# Patient Record
Sex: FEMALE | URBAN - METROPOLITAN AREA
[De-identification: names, ages, dates, MRNs, and addresses within clinical notes are randomized per-mention and may not be internally consistent; named-entity substitution may affect disease eponyms.]

---

## 2017-01-31 DIAGNOSIS — H93.90: Primary | ICD-10-CM

## 2017-01-31 NOTE — PROGRESS NOTES
Dr. 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

## 2017-05-17 ENCOUNTER — APPOINTMENT (OUTPATIENT)
Age: 67
Setting detail: DERMATOLOGY
End: 2017-05-22

## 2017-05-17 DIAGNOSIS — L81.7 PIGMENTED PURPURIC DERMATOSIS: ICD-10-CM

## 2017-05-17 PROCEDURE — OTHER PRESCRIPTION: OTHER

## 2017-05-17 RX ORDER — TRIAMCINOLONE ACETONIDE 1 MG/G
CREAM TOPICAL BID
Qty: 80 | Refills: 0 | Status: ERX | COMMUNITY
Start: 2017-05-17

## 2017-05-17 ASSESSMENT — LOCATION SIMPLE DESCRIPTION DERM
LOCATION SIMPLE: RIGHT PRETIBIAL REGION
LOCATION SIMPLE: LEFT PRETIBIAL REGION

## 2017-05-17 ASSESSMENT — LOCATION DETAILED DESCRIPTION DERM
LOCATION DETAILED: LEFT DISTAL PRETIBIAL REGION
LOCATION DETAILED: RIGHT DISTAL PRETIBIAL REGION

## 2017-05-17 ASSESSMENT — LOCATION ZONE DERM: LOCATION ZONE: LEG

## 2017-10-03 ENCOUNTER — TRANSFERRED RECORDS (OUTPATIENT)
Dept: HEALTH INFORMATION MANAGEMENT | Facility: CLINIC | Age: 67
End: 2017-10-03

## 2017-12-12 ENCOUNTER — HOSPITAL ENCOUNTER (OUTPATIENT)
Dept: MAMMOGRAPHY | Facility: CLINIC | Age: 67
Discharge: HOME | End: 2017-12-12
Attending: INTERNAL MEDICINE | Admitting: INTERNAL MEDICINE
Payer: MEDICARE

## 2017-12-12 DIAGNOSIS — Z12.31 ENCOUNTER FOR SCREENING MAMMOGRAM FOR HIGH-RISK PATIENT: ICD-10-CM

## 2017-12-12 PROCEDURE — G0202 SCR MAMMO BI INCL CAD: HCPCS | Mod: 52

## 2018-03-15 ENCOUNTER — OFFICE VISIT (OUTPATIENT)
Dept: VASCULAR SURGERY | Facility: CLINIC | Age: 68
End: 2018-03-15
Payer: COMMERCIAL

## 2018-03-15 ENCOUNTER — APPOINTMENT (OUTPATIENT)
Dept: VASCULAR SURGERY | Facility: CLINIC | Age: 68
End: 2018-03-15
Payer: COMMERCIAL

## 2018-03-15 DIAGNOSIS — Z53.9 ERRONEOUS ENCOUNTER--DISREGARD: Primary | ICD-10-CM

## 2018-03-15 PROCEDURE — 99213 OFFICE O/P EST LOW 20 MIN: CPT | Performed by: SURGERY

## 2018-03-15 PROCEDURE — 93971 EXTREMITY STUDY: CPT | Performed by: SURGERY

## 2018-03-15 NOTE — PROGRESS NOTES
[facility] Progress Note    Emely Forbes is a 67-year-old female who returns with recurrent left leg pain and varicose veins.  I last saw her in June 2016 following radiofrequency ablation of her left small saphenous vein and Vein of Giacomini and 1 session of ultrasound-guided sclerotherapy residual left lower extremity varicose veins.  She is done well until about 4-6 months ago when she noticed development of new pain on the left leg and bulging varicose veins.  She describes pain as an, tiredness and heaviness as well as a burning pain.  She has not had recurrent dermatitis like she had prior to her treatment.    She has been wearing compression hose over the last 2 years but has not found adequate relief of her symptoms with them.    Her past medical history is significant for breast cancer treated in November 2010 with no evidence of disease at this time.  She also had spontaneous hemorrhage from 1 of her kidneys in 2013 which required, I believe, embolization.    Medicines: Evista, probiotic, Allegra, calcium plus vitamin D and magnesium oxide    Allergies: Adhesive tape and latex, contrast dye and Norvasc    Social history: She works as a , is a non-smoker and has about 1 drink of alcohol weekly.    12 point review of systems was updated and reviewed.  It is negative other than the previously mentioned problems.    Physical exam  General: Pleasant, fit appearing female in no acute distress.  Respiratory: Normal respiratory effort  HEENT: Normocephalic, atraumatic.  EOMI.  Extremities and nose are normal.  Cardiovascular: Pulse is regular  Psychiatric: Judgment, insight, mood and affect are normal  Musculoskeletal: Gait and station are normal.  Is no deformity of  the joints of her fingers or toes.  There is no cyanosis of her nailbeds.  Neurologic: Grossly normal  Extremities: She has 4-6 mm varicosities about the left medial calf.  There is also a 3-4 mm varicosity coursing in the distal anterior  left thigh, across the left patellar area onto the anterolateral left leg.  There are no venous stasis changes.  I do not appreciate significant edema.  I do not appreciate any significant varicose veins of her right leg.    She has easily palpable dorsalis pedis and posterior tibial pulses bilaterally.    Duplex ultrasound of her left lower extremity veins reveals no evidence of deep vein thrombosis.  Her common femoral vein is incompetent while the remaining deep vein valves are competent.  Her left great saphenous vein is incompetent from the knee to the ankle and is a source of several incompetent vein branches on the medial calf.  The saphenofemoral junction is also incompetent.  Segments of the small saphenous vein are not visualized but where visualized the vein is competent the Vein of Giacomini is not visualized.    Impression  Recurrent left lower extremity leg pain and varicose veins.  It appears that the varicosities and symptoms of her left calf are originating from her left great saphenous vein incompetence.  We discussed options of continued conservative management with use of compression hose, elevation, exercise and dietary measures.  Risks of superficial phlebitis, deep vein thrombosis, swelling, worsening of the varicose veins and dermatitis were also discussed.    If she chose treatment, she would need a nonthermal technique as her great saphenous vein is only 1.9 mm deep to the skin in the proximal calf.  She will likely need ultrasound sclerotherapy of the residual varicose veins on her calf.    Details of cyanoacrylate glue ablation of the great saphenous vein including risks of hypersensitivity reaction, bleeding, infection and a low likelihood of nerve injury were discussed.  She appears to understand.    We will perform a skin test with Cyanoacrylate glue to ensure that she does not have a hypersensitivity response.    KAYCEE Chapman MD

## 2018-04-06 ENCOUNTER — APPOINTMENT (OUTPATIENT)
Age: 68
Setting detail: DERMATOLOGY
End: 2018-04-08

## 2018-04-06 DIAGNOSIS — L82.0 INFLAMED SEBORRHEIC KERATOSIS: ICD-10-CM

## 2018-04-06 DIAGNOSIS — D485 NEOPLASM OF UNCERTAIN BEHAVIOR OF SKIN: ICD-10-CM

## 2018-04-06 DIAGNOSIS — L81.7 PIGMENTED PURPURIC DERMATOSIS: ICD-10-CM

## 2018-04-06 PROBLEM — D48.5 NEOPLASM OF UNCERTAIN BEHAVIOR OF SKIN: Status: ACTIVE | Noted: 2018-04-06

## 2018-04-06 PROCEDURE — OTHER PRESCRIPTION: OTHER

## 2018-04-06 PROCEDURE — 11100: CPT | Mod: 59

## 2018-04-06 PROCEDURE — OTHER COUNSELING: OTHER

## 2018-04-06 PROCEDURE — 17110 DESTRUCT B9 LESION 1-14: CPT

## 2018-04-06 PROCEDURE — 99213 OFFICE O/P EST LOW 20 MIN: CPT | Mod: 25

## 2018-04-06 PROCEDURE — OTHER LIQUID NITROGEN: OTHER

## 2018-04-06 PROCEDURE — OTHER BIOPSY BY SHAVE METHOD: OTHER

## 2018-04-06 PROCEDURE — OTHER MIPS QUALITY: OTHER

## 2018-04-06 RX ORDER — TRIAMCINOLONE ACETONIDE 1 MG/G
CREAM TOPICAL BID
Qty: 80 | Refills: 2 | Status: ERX

## 2018-04-06 ASSESSMENT — LOCATION SIMPLE DESCRIPTION DERM
LOCATION SIMPLE: RIGHT BACK
LOCATION SIMPLE: RIGHT UPPER BACK
LOCATION SIMPLE: RIGHT SHOULDER
LOCATION SIMPLE: CHEST
LOCATION SIMPLE: LEFT PRETIBIAL REGION
LOCATION SIMPLE: RIGHT PRETIBIAL REGION

## 2018-04-06 ASSESSMENT — LOCATION ZONE DERM
LOCATION ZONE: LEG
LOCATION ZONE: ARM
LOCATION ZONE: TRUNK

## 2018-04-06 ASSESSMENT — LOCATION DETAILED DESCRIPTION DERM
LOCATION DETAILED: RIGHT SUPERIOR UPPER BACK
LOCATION DETAILED: RIGHT DISTAL PRETIBIAL REGION
LOCATION DETAILED: UPPER STERNUM
LOCATION DETAILED: RIGHT SUPERIOR LATERAL UPPER BACK
LOCATION DETAILED: LEFT DISTAL PRETIBIAL REGION
LOCATION DETAILED: RIGHT POSTERIOR SHOULDER

## 2018-04-06 NOTE — PROCEDURE: LIQUID NITROGEN
Consent: The patient's verbal consent was obtained including but not limited to risks of crusting, scabbing, blistering, scarring, darker or lighter pigmentary change, recurrence, incomplete removal and infection.
Duration Of Freeze Thaw-Cycle (Seconds): 15
Detail Level: Simple
Number Of Freeze-Thaw Cycles: 1 freeze-thaw cycle
Post-Care Instructions: I reviewed with the patient in detail post-care instructions. Patient is to wear sunprotection, and avoid picking at any of the treated lesions. Pt may apply Vaseline to crusted or scabbing areas.
Render Post Care In The Note?: yes
Total Number Of Lesions Treated: 9
Medical Necessity Information: It is in your best interest to select a reason for this procedure from the list below. All of these items fulfill various CMS LCD requirements except the new and changing color options.
Include Z78.9 (Other Specified Conditions Influencing Health Status) As An Associated Diagnosis?: No
Medical Necessity Clause: This procedure was medically necessary because the lesions that were treated were:

## 2018-04-06 NOTE — PROCEDURE: BIOPSY BY SHAVE METHOD
Biopsy Type: H and E
Post-Care Instructions: I reviewed with the patient in detail post-care instructions. Patient is to keep the biopsy site cover and dry overnight, and then apply H2O2,  vaseline  and a bandage daily until healed.
Anesthesia Type: 1% lidocaine with epinephrine
Curettage Text: The wound bed was treated with curettage after the biopsy was performed.
X Size Of Lesion In Cm: 0.6
Electrodesiccation And Curettage Text: The wound bed was treated with electrodesiccation and curettage after the biopsy was performed.
Type Of Destruction Used: Electrodesiccation
Wound Care: Vaseline
Destruction After The Procedure: No
Body Location Override (Optional - Billing Will Still Be Based On Selected Body Map Location If Applicable): upper sternal chest, right of midline
Size Of Lesion In Cm: 0.9
Additional Anesthesia Volume In Cc (Will Not Render If 0): 0
Electrodesiccation Text: The wound bed was treated with electrodesiccation after the biopsy was performed.
Billing Type: Third-Party Bill
Biopsy Method: double edge Personna blade
Hemostasis: Drysol
Silver Nitrate Text: The wound bed was treated with silver nitrate after the biopsy was performed.
Render Post-Care Instructions In Note?: yes
Dressing: Band-Aid
Anesthesia Volume In Cc (Will Not Render If 0): 0.2
Detail Level: Detailed
Cryotherapy Text: The wound bed was treated with cryotherapy after the biopsy was performed.
Consent: Written consent was obtained and risks were reviewed including but not limited to scarring, infection, bleeding, scabbing, incomplete removal, nerve damage and allergy to anesthesia.
Notification Instructions: Patient will be notified of biopsy results. However, patient instructed to call the office if not contacted within 2 weeks.

## 2018-04-06 NOTE — PROCEDURE: MIPS QUALITY
Detail Level: Detailed
Quality 226: Preventive Care And Screening: Tobacco Use: Screening And Cessation Intervention: Patient screened for tobacco and never smoked
Quality 131: Pain Assessment And Follow-Up: Pain assessment using a standardized tool is documented as negative, no follow-up plan required
Quality 431: Preventive Care And Screening: Unhealthy Alcohol Use - Screening: Patient screened for unhealthy alcohol use using a single question and scores less than 2 times per year
Quality 265: Biopsy Follow-Up: Biopsy results reviewed, communicated, tracked, and documented
Quality 130: Documentation Of Current Medications In The Medical Record: Current Medications Documented
Quality 110: Preventive Care And Screening: Influenza Immunization: Influenza Immunization previously received during influenza season

## 2018-04-06 NOTE — HPI: SKIN LESION
How Severe Is Your Skin Lesion?: mild
Has Your Skin Lesion Been Treated?: not been treated
Is This A New Presentation, Or A Follow-Up?: Skin Lesions
Additional History: She is requesting removal as these lesions as they rub on her bra strap and she finds herself picking at them.
Is This A New Presentation, Or A Follow-Up?: Skin Lesion
Additional History: The patient thinks that this lesion is at the edge of a previously treated AK.

## 2018-04-09 ENCOUNTER — APPOINTMENT (OUTPATIENT)
Dept: VASCULAR SURGERY | Facility: CLINIC | Age: 68
End: 2018-04-09
Payer: COMMERCIAL

## 2018-04-09 ENCOUNTER — OFFICE VISIT (OUTPATIENT)
Dept: VASCULAR SURGERY | Facility: CLINIC | Age: 68
End: 2018-04-09
Payer: COMMERCIAL

## 2018-04-09 DIAGNOSIS — Z53.9 ERRONEOUS ENCOUNTER--DISREGARD: Primary | ICD-10-CM

## 2018-04-09 PROCEDURE — 36482 ENDOVEN THER CHEM ADHES 1ST: CPT | Mod: LT | Performed by: SURGERY

## 2018-04-09 PROCEDURE — 36471 NJX SCLRSNT MLT INCMPTNT VN: CPT | Performed by: SURGERY

## 2018-04-09 PROCEDURE — 76942 ECHO GUIDE FOR BIOPSY: CPT | Performed by: SURGERY

## 2018-04-09 NOTE — PROGRESS NOTES
[facility] Operative Report    Preoperative diagnosis  1.  CEAP2 chronic venous insufficiency left lower extremity  2.  Symptomatic left great saphenous vein incompetence  3.  Symptomatic left great saphenous vein tributaries    Postoperative diagnosis  The same    Procedure  1.  The VenaSeal procedure left great saphenous vein  2.  Medically necessary ultrasound-guided sclerotherapy left great saphenous vein tributaries    Surgeon  KAYCEE Chapman MD    First assistant  Betzy Dumont CST/CSFA    Anesthesia  Ativan 2 mg, clonidine 0.1 mg orally with subcutaneous tumescent    Operative description  Details the procedure including risks of bleeding, infection, nerve injury, scarring, hyperpigmentation, deep vein thrombosis, hypersensitivity response to the glue were all discussed.  Both the patient and her  appeared to understand and wish to proceed.  Informed consent was obtained.    I initialed the patient's left leg, then we transported her to the operating room, placing her supine on the operating table, then prepped and draped the left groin and entire left lower extremity sterilely.  We took a timeout to confirm the appropriate operative site and procedure: Cyanoacrylate glue ablation (VenaSeal procedure) sees left great saphenous vein with ultrasound-guided sclerotherapy left lower extremity varicose veins.    We interrogated left leg with ultrasound was able to clearly identify the left great saphenous vein noting tributary coursing from it just below the knee.  The vein was incompetent primarily below the knee, therefore I accessed the vein near the left ankle after infiltrating the skin overlying the vein with 1% lidocaine with bicarbonate.  We placed a 7 Congolese sheath and then passed the delivery catheter which had previously been primed through the sheath into the great saphenous vein up to the level of the knee.  I attempted to pass the delivery catheter more proximally in the great saphenous  "vein but could not negotiate a small turn at the area of a tributary in the great saphenous vein.  The operating table was placed in a neutral position and the catheter tip position confirmed just below the popliteal crease.  I then delivered 1 aliquoted glue, withdrew the catheter 1 cm, delivered a second aliquot of glue then withdrew the catheter 3 cm and held pressure for 30 minutes.  We then delivered 1 aliquoted glue to each 3 cm of the vein down to 5 cm from the skin exit site.  After delivering the last aliquoted glue, I held compression for 30 seconds to allow the glue to polymerize before removing the delivery catheter without incident.    I reimage the vein and found it to be closed.    I then isolated tributaries of the left great saphenous vein on the posterior medial left calf and directed a 27-gauge needle, instilling 1 mL of 0.5% polidocanol foam mixed in a 1 part polidocanol to 2 parts air configuration, into the vein under ultrasound guidance.This glad you are working acute blood check the greenlight normal    She is just her left leg everything and everything in her groin looked normal phasicity and everything she has her\" her common femoral looked fine nothing to suggest anything higher up okay interesting thank was that only in the dictated a letter and review of I was careful to inject in the more proximal calf as the vein communicated with a  to the gastrocnemius vein in the more distal calf.    I then down to headlight and injected several reticular veins on the medial and lateral left leg as well as a small varicosity on the left patellar area.  We used a total of 4 mL of 0.5% polidocanol foam.    We cleaned the leg with saline solution and then placed a small gauze and Tegaderm dressing over the vein entrance site.  A knee-high compression garment was then placed.    Remove the patient to recovery and observe her for 30 minutes to ensure excellent hemostasis.  She tolerated " procedure well without complications.    We used a total of 1 mL of cyanoacrylate glue to treat 27.5 cm of the left great saphenous vein.  We used 4 mL of 1% lidocaine with bicarbonate.    We had the patient walk for 10 minutes prior to leaving her office.  We then escorted her to her car.    KAYCEE Chapman MD

## 2018-04-12 ENCOUNTER — APPOINTMENT (OUTPATIENT)
Dept: VASCULAR SURGERY | Facility: CLINIC | Age: 68
End: 2018-04-12
Payer: COMMERCIAL

## 2018-04-12 PROCEDURE — 99207 ZZC VEINSOLUTIONS 48 HOUR: CPT | Performed by: SURGERY

## 2018-04-12 PROCEDURE — 93971 EXTREMITY STUDY: CPT | Performed by: SURGERY

## 2018-04-19 ENCOUNTER — APPOINTMENT (OUTPATIENT)
Age: 68
Setting detail: DERMATOLOGY
End: 2018-04-30

## 2018-04-19 DIAGNOSIS — L82.0 INFLAMED SEBORRHEIC KERATOSIS: ICD-10-CM

## 2018-04-19 PROCEDURE — OTHER LIQUID NITROGEN: OTHER

## 2018-04-19 PROCEDURE — 17110 DESTRUCT B9 LESION 1-14: CPT

## 2018-04-19 PROCEDURE — OTHER MIPS QUALITY: OTHER

## 2018-04-19 PROCEDURE — OTHER COUNSELING: OTHER

## 2018-04-19 ASSESSMENT — LOCATION DETAILED DESCRIPTION DERM
LOCATION DETAILED: RIGHT POSTERIOR SHOULDER
LOCATION DETAILED: RIGHT SUPERIOR UPPER BACK
LOCATION DETAILED: RIGHT SUPERIOR LATERAL UPPER BACK

## 2018-04-19 ASSESSMENT — LOCATION ZONE DERM
LOCATION ZONE: ARM
LOCATION ZONE: TRUNK

## 2018-04-19 ASSESSMENT — LOCATION SIMPLE DESCRIPTION DERM
LOCATION SIMPLE: RIGHT SHOULDER
LOCATION SIMPLE: RIGHT BACK
LOCATION SIMPLE: RIGHT UPPER BACK

## 2018-04-19 NOTE — PROCEDURE: LIQUID NITROGEN
Consent: The patient's verbal consent was obtained including but not limited to risks of crusting, scabbing, blistering, scarring, darker or lighter pigmentary change, recurrence, incomplete removal and infection.
Detail Level: Simple
Medical Necessity Clause: This procedure was medically necessary because the lesions that were treated were:
Render Post Care In The Note?: yes
Add 52 Modifier (Optional): no
Number Of Freeze-Thaw Cycles: 1 freeze-thaw cycle
Post-Care Instructions: I reviewed with the patient in detail post-care instructions. Patient is to wear sunprotection, and avoid picking at any of the treated lesions. Pt may apply Vaseline to crusted or scabbing areas.
Medical Necessity Information: It is in your best interest to select a reason for this procedure from the list below. All of these items fulfill various CMS LCD requirements except the new and changing color options.
Duration Of Freeze Thaw-Cycle (Seconds): 15
Total Number Of Lesions Treated: 7

## 2018-05-07 ENCOUNTER — OFFICE VISIT (OUTPATIENT)
Dept: VASCULAR SURGERY | Facility: CLINIC | Age: 68
End: 2018-05-07
Payer: COMMERCIAL

## 2018-05-07 DIAGNOSIS — Z53.9 ERRONEOUS ENCOUNTER--DISREGARD: Primary | ICD-10-CM

## 2018-05-07 PROCEDURE — 99207 ZZC VEINSOLUTIONS POST OPERATIVE VISIT: CPT | Performed by: SURGERY

## 2018-05-07 NOTE — MR AVS SNAPSHOT
"              After Visit Summary   2018    Emely Forbes    MRN: [id number]           Patient Information     Date Of Birth          1950        Visit Information        Provider Department      2018 9:30 AM Ricardo Chapman MD [facility]      Today's Diagnoses     ERRONEOUS ENCOUNTER--DISREGARD    -  1       Follow-ups after your visit        Who to contact     If you have questions or need follow up information about today's clinic visit or your schedule please contact [facility] directly at [phone].  Normal or non-critical lab and imaging results will be communicated to you by [employer], letter or phone within 4 business days after the clinic has received the results. If you do not hear from us within 7 days, please contact the clinic through [employer] or phone. If you have a critical or abnormal lab result, we will notify you by phone as soon as possible.  Submit refill requests through [employer] or call your pharmacy and they will forward the refill request to us. Please allow 3 business days for your refill to be completed.          Additional Information About Your Visit        MyChart Information     [employer] lets you send messages to your doctor, view your test results, renew your prescriptions, schedule appointments and more. To sign up, go to [email or web address] . Click on \"Log in\" on the left side of the screen, which will take you to the Welcome page. Then click on \"Sign up Now\" on the right side of the page.     You will be asked to enter the access code listed below, as well as some personal information. Please follow the directions to create your username and password.     Your access code is: [email or web address]  Expires: 10/28/2018 12:46 PM     Your access code will  in 90 days. If you need help or a new code, please call your [place] clinic or [phone].        Care EveryWhere ID     This " is your Care EveryWhere ID. This could be used by other organizations to access your [facility] medical records  [id number]         Blood Pressure from Last 3 Encounters:   01/27/16 121/86   12/02/13 137/89   09/14/12 131/83    Weight from Last 3 Encounters:   01/27/16 67.1 kg (148 lb)   12/02/13 66.2 kg (146 lb)   09/14/12 64.3 kg (141 lb 12.8 oz)              Today, you had the following     No orders found for display         Today's Medication Changes          These changes are accurate as of 5/7/18 11:59 PM.  If you have any questions, ask your nurse or doctor.               These medicines have changed or have updated prescriptions.        Dose/Directions    fexofenadine 180 MG tablet   Commonly known as:  ALLEGRA   This may have changed:    - when to take this  - reasons to take this   Used for:  Seasonal allergic rhinitis        Dose:  180 mg   Take 1 tablet by mouth daily.   Quantity:  30 tablet   Refills:  6                Primary Care Provider Office Phone # Fax #    Carlie Marina PA-C [phone] [address] [facility] [address]  [place] [address]        Equal Access to Services     IVETH ESPINOZA : Hadii alisia morenoo Soalexander, waaxda luqadaha, qaybta kaalmada cally, katerin braden. So [facility] [phone].    ATENCIÓN: Si habla español, tiene a tillman disposición servicios gratuitos de asistencia lingüística. Llame al [phone].    We comply with applicable federal civil rights laws and [place] laws. We do not discriminate on the basis of race, color, national origin, age, disability, sex, sexual orientation, or gender identity.            Thank you!     Thank you for choosing [facility]  for your care. Our goal is always to provide you with excellent care. Hearing back from our patients is one way we can continue to improve our services. Please take a few minutes to complete the written survey that you may receive in the mail after your  visit with us. Thank you!             Your Updated Medication List - Protect others around you: Learn how to safely use, store and throw away your medicines at www.disposemymeds.org.          This list is accurate as of 5/7/18 11:59 PM.  Always use your most recent med list.                   Brand Name Dispense Instructions for use Diagnosis    CALCIUM + D PO      Take  by mouth.        fexofenadine 180 MG tablet    ALLEGRA    30 tablet    Take 1 tablet by mouth daily.    Seasonal allergic rhinitis       MAG-200 PO      Take  by mouth.        PROBIOTIC DAILY PO      Take 1 tablet by mouth daily as needed    Dysuria       raloxifene 60 MG tablet    Evista    30 tablet    Take 1 tablet (60 mg) by mouth daily

## 2018-05-08 NOTE — PROGRESS NOTES
"[facility] Progress Note    Emely Forbes returns in 6 week follow-up of cyanoacrylate glue ablation of the infragenicular left great saphenous vein.  She states that she developed significant erythema overlying the course of the great saphenous vein on the medial leg which he described as a \"rash\".  She was given a topical agent to put on this which she said worked fairly well for her.  She said this is similar to the rash that she has had previously.  She is also raise concerns about what appear to be some new purple telangiectasias along the medial aspect of the left leg.    On exam there is no longer any erythema.  There is minimal induration along the course of the treated saphenous vein to deep palpation.  There is an area of firmness at the mid anteromedial left leg in the area of sclerotherapy of a tributary.    I interrogated the left medial leg with ultrasound and note inflammation and thrombus within the tributary on the anteromedial mid left leg.  Stab this with an 18-gauge needle and expressed some thrombus from it.    I also noted a closed great saphenous vein was a halo of edema around the glue within the vein.    She does have purple telangiectasias along the proximal medial left calf which, after inspecting her preoperative photos, do not appear to be present preoperatively.  There also is still trace to 1+ edema of her left ankle.    Impression  Residual swelling and some inflammation now 6 weeks status post cyanoacrylate glue ablation of the left great saphenous vein.  The postinflammatory to be treated with sclerotherapy if they do not resolve.  I would prefer that she allow time for healing and I will see her in the fall (5-6 months from now) to see if these telangiectasias have resolved.  If they have not, I will treat them with sclerotherapy at no charge to her.  I have asked her to contact me sooner if she has concerns or questions.    KAYCEE Chapman MD  "

## 2018-10-17 ENCOUNTER — TRANSFERRED RECORDS (OUTPATIENT)
Dept: HEALTH INFORMATION MANAGEMENT | Facility: CLINIC | Age: 68
End: 2018-10-17

## 2018-12-06 ENCOUNTER — OFFICE VISIT (OUTPATIENT)
Dept: VASCULAR SURGERY | Facility: CLINIC | Age: 68
End: 2018-12-06
Payer: COMMERCIAL

## 2018-12-06 DIAGNOSIS — Z53.9 ERRONEOUS ENCOUNTER--DISREGARD: Primary | ICD-10-CM

## 2018-12-06 PROCEDURE — 99212 OFFICE O/P EST SF 10 MIN: CPT | Performed by: SURGERY

## 2018-12-06 NOTE — PROGRESS NOTES
"[facility] Office Note    Emely Forbes returns in follow-up of Cyanoacrylate glue ablation of her left great saphenous vein with ultrasound-guided sclerotherapy of the great saphenous vein tributaries.  He states her leg feels great!  She had no trouble with rashes or pain.  Her concerns at this time are two: 1.  A small \"lump\" on the medial aspect of her left leg and 2.  A residual varicose vein in her left infrapatellar area.    Physical exam  The lump on the medial aspect of her left calf measures about 2-3 mm by 5 or 6 mm.  This may represent some very firm thrombus or it could be a small shard of glue.  Ultrasound imaging shows that the treated saphenous vein appears to be deep to this area, therefore this may well simply be hard thrombus within this vein which there was treated by ultrasound-guided sclerotherapy.  I would recommend observation    There is a small varicosity in the left infrapatellar area.  This could be treated with sclerotherapy at a time of her choosing.  She will return sometime in January to have this treated.    KAYCEE Chapman MD    "

## 2018-12-10 ENCOUNTER — HOSPITAL ENCOUNTER (OUTPATIENT)
Dept: MAMMOGRAPHY | Facility: CLINIC | Age: 68
End: 2018-12-10
Attending: INTERNAL MEDICINE
Payer: MEDICARE

## 2018-12-10 ENCOUNTER — HOSPITAL ENCOUNTER (OUTPATIENT)
Dept: BONE DENSITY | Facility: CLINIC | Age: 68
Discharge: HOME | End: 2018-12-10
Attending: INTERNAL MEDICINE | Admitting: INTERNAL MEDICINE
Payer: MEDICARE

## 2018-12-10 DIAGNOSIS — Z79.899 ENCOUNTER FOR LONG-TERM (CURRENT) USE OF HIGH-RISK MEDICATION: ICD-10-CM

## 2018-12-10 DIAGNOSIS — Z78.0 ASYMPTOMATIC MENOPAUSAL STATE: ICD-10-CM

## 2018-12-10 DIAGNOSIS — M85.88 OTHER SPECIFIED DISORDERS OF BONE DENSITY AND STRUCTURE, OTHER SITE: ICD-10-CM

## 2018-12-10 DIAGNOSIS — Z12.31 VISIT FOR SCREENING MAMMOGRAM: ICD-10-CM

## 2018-12-10 PROCEDURE — 77067 SCR MAMMO BI INCL CAD: CPT | Mod: 52

## 2018-12-10 PROCEDURE — 77080 DXA BONE DENSITY AXIAL: CPT

## 2019-01-17 ENCOUNTER — APPOINTMENT (OUTPATIENT)
Dept: VASCULAR SURGERY | Facility: CLINIC | Age: 69
End: 2019-01-17
Payer: MEDICARE

## 2019-01-17 ENCOUNTER — APPOINTMENT (OUTPATIENT)
Dept: VASCULAR SURGERY | Facility: CLINIC | Age: 69
End: 2019-01-17

## 2019-01-17 PROCEDURE — 36468 NJX SCLRSNT SPIDER VEINS: CPT | Performed by: SURGERY

## 2019-01-17 PROCEDURE — S9999 SALES TAX: HCPCS | Performed by: SURGERY

## 2019-02-13 ENCOUNTER — APPOINTMENT (OUTPATIENT)
Age: 69
Setting detail: DERMATOLOGY
End: 2019-03-11

## 2019-02-13 DIAGNOSIS — L81.4 OTHER MELANIN HYPERPIGMENTATION: ICD-10-CM

## 2019-02-13 DIAGNOSIS — D485 NEOPLASM OF UNCERTAIN BEHAVIOR OF SKIN: ICD-10-CM

## 2019-02-13 DIAGNOSIS — L82.0 INFLAMED SEBORRHEIC KERATOSIS: ICD-10-CM

## 2019-02-13 DIAGNOSIS — D22 MELANOCYTIC NEVI: ICD-10-CM

## 2019-02-13 DIAGNOSIS — L82.1 OTHER SEBORRHEIC KERATOSIS: ICD-10-CM

## 2019-02-13 PROBLEM — D22.39 MELANOCYTIC NEVI OF OTHER PARTS OF FACE: Status: ACTIVE | Noted: 2019-02-13

## 2019-02-13 PROBLEM — D23.22 OTHER BENIGN NEOPLASM OF SKIN OF LEFT EAR AND EXTERNAL AURICULAR CANAL: Status: ACTIVE | Noted: 2019-02-13

## 2019-02-13 PROBLEM — D48.5 NEOPLASM OF UNCERTAIN BEHAVIOR OF SKIN: Status: ACTIVE | Noted: 2019-02-13

## 2019-02-13 PROCEDURE — OTHER DIAGNOSIS COMMENT: OTHER

## 2019-02-13 PROCEDURE — 17110 DESTRUCT B9 LESION 1-14: CPT

## 2019-02-13 PROCEDURE — 99213 OFFICE O/P EST LOW 20 MIN: CPT | Mod: 25

## 2019-02-13 PROCEDURE — OTHER COUNSELING: OTHER

## 2019-02-13 PROCEDURE — OTHER MIPS QUALITY: OTHER

## 2019-02-13 PROCEDURE — OTHER BIOPSY BY SHAVE METHOD: OTHER

## 2019-02-13 PROCEDURE — 11102 TANGNTL BX SKIN SINGLE LES: CPT | Mod: 59

## 2019-02-13 PROCEDURE — OTHER LIQUID NITROGEN: OTHER

## 2019-02-13 PROCEDURE — OTHER OBSERVATION: OTHER

## 2019-02-13 ASSESSMENT — LOCATION ZONE DERM
LOCATION ZONE: TRUNK
LOCATION ZONE: FACE

## 2019-02-13 ASSESSMENT — LOCATION DETAILED DESCRIPTION DERM
LOCATION DETAILED: RIGHT SUPERIOR CENTRAL BUCCAL CHEEK
LOCATION DETAILED: UPPER STERNUM
LOCATION DETAILED: LEFT FOREHEAD
LOCATION DETAILED: STERNAL NOTCH
LOCATION DETAILED: SUPERIOR MID FOREHEAD
LOCATION DETAILED: RIGHT CENTRAL TEMPLE

## 2019-02-13 ASSESSMENT — LOCATION SIMPLE DESCRIPTION DERM
LOCATION SIMPLE: CHEST
LOCATION SIMPLE: RIGHT TEMPLE
LOCATION SIMPLE: SUPERIOR FOREHEAD
LOCATION SIMPLE: LEFT FOREHEAD
LOCATION SIMPLE: RIGHT CHEEK

## 2019-02-13 NOTE — PROCEDURE: DIAGNOSIS COMMENT
Detail Level: Simple
Comment: Procedure performed by Dr. Hollie Palmer,  DNP APRN NP-C
Comment: Overall lesion size 2.5 x 1.5 cm
Comment: Procedure performed by Dr. Hollie Palmer, DNP APRN NP-C

## 2019-02-13 NOTE — PROCEDURE: MIPS QUALITY
Detail Level: Detailed
Quality 226: Preventive Care And Screening: Tobacco Use: Screening And Cessation Intervention: Patient screened for tobacco and never smoked
Quality 265: Biopsy Follow-Up: Biopsy results reviewed, communicated, tracked, and documented
Quality 110: Preventive Care And Screening: Influenza Immunization: Influenza Immunization previously received during influenza season
Quality 130: Documentation Of Current Medications In The Medical Record: Current Medications Documented
Quality 131: Pain Assessment And Follow-Up: Pain assessment using a standardized tool is documented as negative, no follow-up plan required
Quality 431: Preventive Care And Screening: Unhealthy Alcohol Use - Screening: Patient screened for unhealthy alcohol use using a single question and scores less than 2 times per year

## 2019-02-13 NOTE — PROCEDURE: OBSERVATION
Body Location Override (Optional - Billing Will Still Be Based On Selected Body Map Location If Applicable): L superior helix
Detail Level: Detailed
X Size Of Lesion In Cm (Optional): 0

## 2019-02-13 NOTE — PROCEDURE: BIOPSY BY SHAVE METHOD
Curettage Text: The wound bed was treated with curettage after the biopsy was performed.
Body Location Override (Optional - Billing Will Still Be Based On Selected Body Map Location If Applicable): L temple at hairline
Biopsy Method: double edge Personna blade
Hemostasis: Aluminum Chloride
Depth Of Biopsy: dermis
X Size Of Lesion In Cm: 0.5
Anesthesia Type: 1% lidocaine with epinephrine and a 1:10 solution of 8.4% sodium bicarbonate
Was A Bandage Applied: Yes
Billing Type: Third-Party Bill
Detail Level: Detailed
Consent: Verbal consent was obtained and risks were reviewed including but not limited to scarring, infection, bleeding, scabbing, incomplete removal, nerve damage and allergy to anesthesia.
Type Of Destruction Used: Electrodesiccation
Silver Nitrate Text: The wound bed was treated with silver nitrate after the biopsy was performed.
Biopsy Type: H and E
Path Notes Override (Will Replace All Of The Above Text): Small portion of larger lesion measuring 1x1cm
Additional Anesthesia Volume In Cc (Will Not Render If 0): 0
Destruction After The Procedure: No
Cryotherapy Text: The wound bed was treated with cryotherapy after the biopsy was performed.
Post-Care Instructions: I verbally reviewed with the patient in detail post-care instructions. Patient is to keep the biopsy site dry overnight, and then apply H2O2, Vaseline and a bandage daily until healed.
Electrodesiccation And Curettage Text: The wound bed was treated with electrodesiccation and curettage after the biopsy was performed.
Notification Instructions: Patient will be notified of biopsy results. However, patient instructed to call the office if not contacted within 2 weeks.
Anesthesia Volume In Cc (Will Not Render If 0): 2
Dressing: pressure dressing with telfa
Electrodesiccation Text: The wound bed was treated with electrodesiccation after the biopsy was performed.
Size Of Lesion In Cm: 0.6
Wound Care: Petrolatum

## 2019-02-15 ENCOUNTER — APPOINTMENT (OUTPATIENT)
Dept: VASCULAR SURGERY | Facility: CLINIC | Age: 69
End: 2019-02-15

## 2019-02-15 PROCEDURE — 99207 ZZC VEINSOLUTIONS NO CHARGE VISIT: CPT | Performed by: SURGERY

## 2019-03-11 ENCOUNTER — APPOINTMENT (OUTPATIENT)
Age: 69
Setting detail: DERMATOLOGY
End: 2019-03-16

## 2019-03-11 PROBLEM — C44.91 BASAL CELL CARCINOMA OF SKIN, UNSPECIFIED: Status: ACTIVE | Noted: 2019-03-11

## 2019-03-11 PROCEDURE — OTHER MOHS SURGERY PHONE CONSULTATION: OTHER

## 2019-03-12 ENCOUNTER — APPOINTMENT (OUTPATIENT)
Age: 69
Setting detail: DERMATOLOGY
End: 2019-03-13

## 2019-03-12 DIAGNOSIS — L82.1 OTHER SEBORRHEIC KERATOSIS: ICD-10-CM

## 2019-03-12 DIAGNOSIS — D18.0 HEMANGIOMA: ICD-10-CM

## 2019-03-12 DIAGNOSIS — Z71.89 OTHER SPECIFIED COUNSELING: ICD-10-CM

## 2019-03-12 PROBLEM — C44.319 BASAL CELL CARCINOMA OF SKIN OF OTHER PARTS OF FACE: Status: ACTIVE | Noted: 2019-03-12

## 2019-03-12 PROBLEM — D23.71 OTHER BENIGN NEOPLASM OF SKIN OF RIGHT LOWER LIMB, INCLUDING HIP: Status: ACTIVE | Noted: 2019-03-12

## 2019-03-12 PROBLEM — D18.01 HEMANGIOMA OF SKIN AND SUBCUTANEOUS TISSUE: Status: ACTIVE | Noted: 2019-03-12

## 2019-03-12 PROCEDURE — OTHER PULSED-DYE LASER: OTHER

## 2019-03-12 PROCEDURE — OTHER MIPS QUALITY: OTHER

## 2019-03-12 PROCEDURE — OTHER LIQUID NITROGEN (COSMETIC): OTHER

## 2019-03-12 PROCEDURE — OTHER MOHS SURGERY: OTHER

## 2019-03-12 PROCEDURE — 17311 MOHS 1 STAGE H/N/HF/G: CPT

## 2019-03-12 PROCEDURE — 99213 OFFICE O/P EST LOW 20 MIN: CPT | Mod: 25

## 2019-03-12 PROCEDURE — 14040 TIS TRNFR F/C/C/M/N/A/G/H/F: CPT

## 2019-03-12 PROCEDURE — OTHER COUNSELING: OTHER

## 2019-03-12 ASSESSMENT — LOCATION SIMPLE DESCRIPTION DERM
LOCATION SIMPLE: LEFT FOREHEAD
LOCATION SIMPLE: SUPERIOR FOREHEAD
LOCATION SIMPLE: RIGHT FOREHEAD
LOCATION SIMPLE: CHEST

## 2019-03-12 ASSESSMENT — LOCATION DETAILED DESCRIPTION DERM
LOCATION DETAILED: RIGHT MEDIAL FOREHEAD
LOCATION DETAILED: LEFT LATERAL SUPERIOR CHEST
LOCATION DETAILED: LEFT MEDIAL FOREHEAD
LOCATION DETAILED: SUPERIOR MID FOREHEAD
LOCATION DETAILED: RIGHT SUPERIOR FOREHEAD

## 2019-03-12 ASSESSMENT — LOCATION ZONE DERM
LOCATION ZONE: FACE
LOCATION ZONE: TRUNK

## 2019-03-12 NOTE — PROCEDURE: MIPS QUALITY
Detail Level: Detailed
Quality 110: Preventive Care And Screening: Influenza Immunization: Influenza Immunization previously received during influenza season
Quality 431: Preventive Care And Screening: Unhealthy Alcohol Use - Screening: Patient screened for unhealthy alcohol use using a single question and scores less than 2 times per year
Quality 226: Preventive Care And Screening: Tobacco Use: Screening And Cessation Intervention: Patient screened for tobacco and never smoked
Quality 143: Oncology: Medical And Radiation- Pain Intensity Quantified: Pain severity quantified, no pain present
Quality 130: Documentation Of Current Medications In The Medical Record: Current Medications Documented

## 2019-03-12 NOTE — PROCEDURE: MOHS SURGERY
Post-Care Instructions: The patient was provided with detailed verbal and written instructions for daily wound care, including use of H2O2 cleansing, followed by application of plain Vaseline and a bandage.  These instructions including Dr. Palmer's contact information should there be any questions or concerns.  The patient is not to engage in any heavy lifting, exercise, or swimming for the next week.  Should the patient develop any fevers, chills, bleeding, or pain not controlled by OTC analgesics, s/he should contact the office immediately.

## 2019-03-12 NOTE — PROCEDURE: PULSED-DYE LASER
Pulse Duration: 3 ms
Spot Size: 10 mm
Post-Care Instructions: I reviewed with the patient in detail post-care instructions. Patient should stay away from the sun and wear sun protection until treated areas are fully healed.
Spot Size: 7 mm
Treated Area: small area
Pulse Duration: 10 ms
Delay Time (Ms): 20
Laser Type: Pulsed-dye laser 595nm
Immediate Endpoint: erythema
Price (Use Numbers Only, No Special Characters Or $): 0
Fluence In J/Cm2 (Optional): 7
Location (Required For Details To Render In Note But Body Touch Will Also Count For First Location): forehead
Consent: Written consent obtained, risks reviewed including but not limited to crusting, scabbing, blistering, scarring, darker or lighter pigmentary change, incidental hair removal, bruising, and/or incomplete removal.
Post-Procedure Care: Post care reviewed with patient.
Pulse Duration: 6 ms
Cryogen Time (Ms): 30
Detail Level: Detailed
Fluence In J/Cm2 (Optional): 8.0
Spot Size: 5 mm
Delay Time (Ms): 10
Pulse Count (Optional): 2

## 2019-03-12 NOTE — PROCEDURE: LIQUID NITROGEN (COSMETIC)
Total Number Of Lesions Treated: 2
Duration Of Freeze Thaw-Cycle (Seconds): 5
Number Of Freeze-Thaw Cycles: 1 freeze-thaw cycle
Consent: The patient's consent was obtained including but not limited to risks of crusting, scabbing, blistering, scarring, darker or lighter pigmentary change, recurrence, incomplete removal and infection.
Post-Care Instructions: I reviewed with the patient in detail post-care instructions. Patient is to wear sunprotection, and avoid picking at any of the treated lesions. Pt may apply Vaseline to crusted or scabbing areas.
Render Post Care In The Note?: yes
Detail Level: Detailed

## 2019-03-19 ENCOUNTER — APPOINTMENT (OUTPATIENT)
Age: 69
Setting detail: DERMATOLOGY
End: 2019-03-20

## 2019-03-19 DIAGNOSIS — Z48.02 ENCOUNTER FOR REMOVAL OF SUTURES: ICD-10-CM

## 2019-03-19 DIAGNOSIS — L82.0 INFLAMED SEBORRHEIC KERATOSIS: ICD-10-CM

## 2019-03-19 PROCEDURE — OTHER MIPS QUALITY: OTHER

## 2019-03-19 PROCEDURE — OTHER LIQUID NITROGEN: OTHER

## 2019-03-19 PROCEDURE — 17110 DESTRUCT B9 LESION 1-14: CPT | Mod: 79

## 2019-03-19 PROCEDURE — OTHER COUNSELING: OTHER

## 2019-03-19 PROCEDURE — OTHER SUTURE REMOVAL (GLOBAL PERIOD): OTHER

## 2019-03-19 PROCEDURE — OTHER DIAGNOSIS COMMENT: OTHER

## 2019-03-19 ASSESSMENT — LOCATION SIMPLE DESCRIPTION DERM
LOCATION SIMPLE: LEFT FOREHEAD
LOCATION SIMPLE: SCALP
LOCATION SIMPLE: FRONTAL SCALP

## 2019-03-19 ASSESSMENT — LOCATION ZONE DERM
LOCATION ZONE: SCALP
LOCATION ZONE: FACE

## 2019-03-19 ASSESSMENT — LOCATION DETAILED DESCRIPTION DERM
LOCATION DETAILED: LEFT SUPERIOR PARIETAL SCALP
LOCATION DETAILED: MEDIAL FRONTAL SCALP
LOCATION DETAILED: LEFT LATERAL FOREHEAD

## 2019-03-19 NOTE — PROCEDURE: LIQUID NITROGEN
Medical Necessity Information: It is in your best interest to select a reason for this procedure from the list below. All of these items fulfill various CMS LCD requirements except the new and changing color options.
Add 52 Modifier (Optional): no
Medical Necessity Clause: This procedure was medically necessary because the lesions that were treated were:
Post-Care Instructions: I reviewed with the patient in detail post-care instructions. Patient is to wear sunprotection, and avoid picking at any of the treated lesions. Pt may apply Vaseline to crusted or scabbing areas.
Duration Of Freeze Thaw-Cycle (Seconds): 15-20
Render Post-Care Instructions In Note?: yes
Number Of Freeze-Thaw Cycles: 1 freeze-thaw cycle
Consent: The patient's consent was obtained including but not limited to risks of crusting, scabbing, blistering, scarring, darker or lighter pigmentary change, recurrence, incomplete removal and infection.
Detail Level: Simple

## 2019-03-19 NOTE — PROCEDURE: SUTURE REMOVAL (GLOBAL PERIOD)
Detail Level: Detailed
Add 95389 Cpt? (Important Note: In 2017 The Use Of 61783 Is Being Tracked By Cms To Determine Future Global Period Reimbursement For Global Periods): no
Universal Safety Interventions

## 2019-03-19 NOTE — PROCEDURE: MIPS QUALITY
Quality 226: Preventive Care And Screening: Tobacco Use: Screening And Cessation Intervention: Patient screened for tobacco and never smoked
Detail Level: Detailed
Quality 131: Pain Assessment And Follow-Up: Pain assessment documented as positive using a standardized tool AND a follow-up plan is documented
Quality 110: Preventive Care And Screening: Influenza Immunization: Influenza Immunization previously received during influenza season
Quality 130: Documentation Of Current Medications In The Medical Record: Current Medications Documented
Quality 431: Preventive Care And Screening: Unhealthy Alcohol Use - Screening: Patient screened for unhealthy alcohol use using a single question and scores less than 2 times per year

## 2019-03-29 ENCOUNTER — APPOINTMENT (OUTPATIENT)
Dept: VASCULAR SURGERY | Facility: CLINIC | Age: 69
End: 2019-03-29
Payer: MEDICARE

## 2019-03-29 PROCEDURE — 99207 ZZC VEINSOLUTIONS NO CHARGE VISIT: CPT | Performed by: SURGERY

## 2019-04-09 ENCOUNTER — APPOINTMENT (OUTPATIENT)
Age: 69
Setting detail: DERMATOLOGY
End: 2019-04-11

## 2019-04-09 DIAGNOSIS — Z00.00 ENCOUNTER FOR GENERAL ADULT MEDICAL EXAMINATION WITHOUT ABNORMAL FINDINGS: ICD-10-CM

## 2019-04-09 DIAGNOSIS — L92.3 FOREIGN BODY GRANULOMA OF THE SKIN AND SUBCUTANEOUS TISSUE: ICD-10-CM

## 2019-04-09 PROBLEM — Z71.1 PERSON WITH FEARED HEALTH COMPLAINT IN WHOM NO DIAGNOSIS IS MADE: Status: ACTIVE | Noted: 2019-04-09

## 2019-04-09 PROCEDURE — OTHER COUNSELING: OTHER

## 2019-04-09 PROCEDURE — OTHER MIPS QUALITY: OTHER

## 2019-04-09 PROCEDURE — 99213 OFFICE O/P EST LOW 20 MIN: CPT | Mod: 24

## 2019-04-09 ASSESSMENT — LOCATION DETAILED DESCRIPTION DERM
LOCATION DETAILED: LEFT SUPERIOR FOREHEAD
LOCATION DETAILED: LEFT FOREHEAD

## 2019-04-09 ASSESSMENT — LOCATION SIMPLE DESCRIPTION DERM: LOCATION SIMPLE: LEFT FOREHEAD

## 2019-04-09 ASSESSMENT — LOCATION ZONE DERM: LOCATION ZONE: FACE

## 2019-04-09 NOTE — PROCEDURE: MIPS QUALITY
Quality 131: Pain Assessment And Follow-Up: Pain assessment using a standardized tool is documented as negative, no follow-up plan required
Detail Level: Detailed
Quality 130: Documentation Of Current Medications In The Medical Record: Current Medications Documented
Quality 226: Preventive Care And Screening: Tobacco Use: Screening And Cessation Intervention: Patient screened for tobacco and never smoked
Quality 110: Preventive Care And Screening: Influenza Immunization: Influenza Immunization previously received during influenza season
Quality 431: Preventive Care And Screening: Unhealthy Alcohol Use - Screening: Patient screened for unhealthy alcohol use using a single question and scores less than 2 times per year

## 2019-04-09 NOTE — PROCEDURE: COUNSELING
Detail Level: Detailed
Patient Specific Counseling (Will Not Stick From Patient To Patient): Patient instructed that site should continue to improve and she can continue applying vitamin E oil.
Patient Specific Counseling (Will Not Stick From Patient To Patient): Noted that this may be irritated by her recent dressings.  If this persists or changes in a concerning way, she may RTC for re-evaluation.

## 2019-08-06 RX ORDER — TRIAMCINOLONE ACETONIDE 1 MG/G
CREAM TOPICAL BID
Qty: 80 | Refills: 2 | Status: ERX | COMMUNITY
Start: 2019-08-06

## 2019-10-07 ENCOUNTER — APPOINTMENT (OUTPATIENT)
Age: 69
Setting detail: DERMATOLOGY
End: 2019-11-17

## 2019-10-07 DIAGNOSIS — D22 MELANOCYTIC NEVI: ICD-10-CM

## 2019-10-07 DIAGNOSIS — Z85.828 PERSONAL HISTORY OF OTHER MALIGNANT NEOPLASM OF SKIN: ICD-10-CM

## 2019-10-07 DIAGNOSIS — D18.0 HEMANGIOMA: ICD-10-CM

## 2019-10-07 DIAGNOSIS — L81.4 OTHER MELANIN HYPERPIGMENTATION: ICD-10-CM

## 2019-10-07 DIAGNOSIS — Z71.89 OTHER SPECIFIED COUNSELING: ICD-10-CM

## 2019-10-07 DIAGNOSIS — L90.5 SCAR CONDITIONS AND FIBROSIS OF SKIN: ICD-10-CM

## 2019-10-07 DIAGNOSIS — L82.1 OTHER SEBORRHEIC KERATOSIS: ICD-10-CM

## 2019-10-07 DIAGNOSIS — L82.0 INFLAMED SEBORRHEIC KERATOSIS: ICD-10-CM

## 2019-10-07 DIAGNOSIS — L57.0 ACTINIC KERATOSIS: ICD-10-CM

## 2019-10-07 DIAGNOSIS — L81.7 PIGMENTED PURPURIC DERMATOSIS: ICD-10-CM

## 2019-10-07 PROBLEM — D22.5 MELANOCYTIC NEVI OF TRUNK: Status: ACTIVE | Noted: 2019-10-07

## 2019-10-07 PROBLEM — D48.5 NEOPLASM OF UNCERTAIN BEHAVIOR OF SKIN: Status: ACTIVE | Noted: 2019-10-07

## 2019-10-07 PROBLEM — D18.01 HEMANGIOMA OF SKIN AND SUBCUTANEOUS TISSUE: Status: ACTIVE | Noted: 2019-10-07

## 2019-10-07 PROCEDURE — 17000 DESTRUCT PREMALG LESION: CPT | Mod: 59

## 2019-10-07 PROCEDURE — OTHER LIQUID NITROGEN: OTHER

## 2019-10-07 PROCEDURE — OTHER BIOPSY BY SHAVE METHOD: OTHER

## 2019-10-07 PROCEDURE — 17110 DESTRUCT B9 LESION 1-14: CPT | Mod: 59

## 2019-10-07 PROCEDURE — OTHER MIPS QUALITY: OTHER

## 2019-10-07 PROCEDURE — OTHER SHAVE REMOVAL (NO PATHOLOGY): OTHER

## 2019-10-07 PROCEDURE — OTHER COUNSELING: OTHER

## 2019-10-07 PROCEDURE — 99214 OFFICE O/P EST MOD 30 MIN: CPT | Mod: 25

## 2019-10-07 PROCEDURE — 11102 TANGNTL BX SKIN SINGLE LES: CPT | Mod: 59

## 2019-10-07 PROCEDURE — 11301 SHAVE SKIN LESION 0.6-1.0 CM: CPT

## 2019-10-07 ASSESSMENT — LOCATION ZONE DERM
LOCATION ZONE: TRUNK
LOCATION ZONE: FACE
LOCATION ZONE: ARM
LOCATION ZONE: LEG

## 2019-10-07 ASSESSMENT — LOCATION SIMPLE DESCRIPTION DERM
LOCATION SIMPLE: LEFT TEMPLE
LOCATION SIMPLE: LEFT FOREHEAD
LOCATION SIMPLE: LEFT UPPER ARM
LOCATION SIMPLE: LEFT PRETIBIAL REGION
LOCATION SIMPLE: RIGHT PRETIBIAL REGION
LOCATION SIMPLE: CHEST
LOCATION SIMPLE: LEFT BREAST
LOCATION SIMPLE: LEFT UPPER BACK
LOCATION SIMPLE: ABDOMEN
LOCATION SIMPLE: UPPER BACK

## 2019-10-07 ASSESSMENT — LOCATION DETAILED DESCRIPTION DERM
LOCATION DETAILED: INFERIOR THORACIC SPINE
LOCATION DETAILED: LEFT ANTERIOR LATERAL PROXIMAL UPPER ARM
LOCATION DETAILED: LEFT LATERAL TEMPLE
LOCATION DETAILED: LEFT SUPERIOR MEDIAL UPPER BACK
LOCATION DETAILED: LEFT MEDIAL BREAST 7-8:00 REGION
LOCATION DETAILED: LEFT LATERAL DISTAL PRETIBIAL REGION
LOCATION DETAILED: LEFT MEDIAL BREAST 9-10:00 REGION
LOCATION DETAILED: LEFT LATERAL FOREHEAD
LOCATION DETAILED: LOWER STERNUM
LOCATION DETAILED: RIGHT RIB CAGE
LOCATION DETAILED: RIGHT DISTAL PRETIBIAL REGION
LOCATION DETAILED: SUPERIOR THORACIC SPINE

## 2019-10-07 NOTE — PROCEDURE: BIOPSY BY SHAVE METHOD
Anesthesia Volume In Cc (Will Not Render If 0): 0.3
Render In Bullet Format When Appropriate: No
Notification Instructions: Patient will be notified of biopsy results. However, patient instructed to call the office if not contacted within 2 weeks.
Electrodesiccation Text: The wound bed was treated with electrodesiccation after the biopsy was performed.
Was A Bandage Applied: Yes
Depth Of Biopsy: dermis
Post-Care Instructions: I verbally reviewed with the patient in detail post-care instructions. Patient is to keep the biopsy site dry overnight, and then apply H2O2, Vaseline and a bandage daily until healed.
Anesthesia Type: 1% lidocaine with epinephrine and a 1:10 solution of 8.4% sodium bicarbonate
Body Location Override (Optional - Billing Will Still Be Based On Selected Body Map Location If Applicable): left lateral distal deltoid
Biopsy Method: double edge Personna blade
Cryotherapy Text: The wound bed was treated with cryotherapy after the biopsy was performed.
Additional Anesthesia Volume In Cc (Will Not Render If 0): 0
Dressing: bandage
Silver Nitrate Text: The wound bed was treated with silver nitrate after the biopsy was performed.
Biopsy Type: H and E
Billing Type: Third-Party Bill
X Size Of Lesion In Cm: 0.6
Consent: Verbal consent was obtained and risks were reviewed including but not limited to scarring, infection, bleeding, scabbing, incomplete removal, nerve damage and allergy to anesthesia.
Curettage Text: The wound bed was treated with curettage after the biopsy was performed.
Detail Level: Detailed
Type Of Destruction Used: Electrodesiccation
Hemostasis: Electrocautery
Wound Care: Petrolatum
Electrodesiccation And Curettage Text: The wound bed was treated with electrodesiccation and curettage after the biopsy was performed.
Size Of Lesion In Cm: 0.9

## 2019-10-07 NOTE — PROCEDURE: MIPS QUALITY
Quality 131: Pain Assessment And Follow-Up: Pain assessment using a standardized tool is documented as negative, no follow-up plan required
Quality 226: Preventive Care And Screening: Tobacco Use: Screening And Cessation Intervention: Patient screened for tobacco and never smoked
Quality 110: Preventive Care And Screening: Influenza Immunization: Influenza Immunization previously received during influenza season
Quality 265: Biopsy Follow-Up: Biopsy results reviewed, communicated, tracked, and documented
Quality 130: Documentation Of Current Medications In The Medical Record: Current Medications Documented
Quality 431: Preventive Care And Screening: Unhealthy Alcohol Use - Screening: Patient screened for unhealthy alcohol use using a single question and scores less than 2 times per year
Quality 474: Zoster Vaccination Status: Shingrix Vaccination not Administered or Previously Received, Reason not Otherwise Specified
Detail Level: Detailed
Quality 143: Oncology: Medical And Radiation- Pain Intensity Quantified: Pain severity quantified, no pain present

## 2019-10-07 NOTE — PROCEDURE: SHAVE REMOVAL (NO PATHOLOGY)
Size Of Lesion In Cm: 0.7
Medical Necessity Clause: This procedure was medically necessary because the lesion that was treated was:
Bill For Surgical Tray: no
X Size Of Lesion In Cm (Optional): 0.5
Wound Care: Petrolatum
Medical Necessity Information: It is in your best interest to select a reason for this procedure from the list below. All of these items fulfill various CMS LCD requirements except the new and changing color options.
Consent was obtained from the patient. The risks and benefits to therapy were discussed in detail. Specifically, the risks of infection, scarring, bleeding, prolonged wound healing, incomplete removal, allergy to anesthesia, nerve injury and recurrence were addressed. Prior to the procedure, the treatment site was clearly identified and confirmed by the patient. All components of Universal Protocol/PAUSE Rule completed.
Anesthesia Type: 1% lidocaine with epinephrine
Detail Level: Detailed
Body Location Override (Optional - Billing Will Still Be Based On Selected Body Map Location If Applicable): sternal chest
Anesthesia Volume In Cc: 0.3
Hemostasis: Electrocautery and Aluminum Chloride
Path Notes (To The Dermatopathologist): Please check margins.
Render Post-Care Instructions In Note?: yes
Post-Care Instructions: I reviewed with the patient in detail post-care instructions. Patient is to keep the biopsy site dry overnight, and then apply bacitracin twice daily until healed. Patient may apply hydrogen peroxide soaks to remove any crusting.

## 2019-10-07 NOTE — PROCEDURE: LIQUID NITROGEN
Render Note In Bullet Format When Appropriate: No
Number Of Freeze-Thaw Cycles: 2 freeze-thaw cycles
Post-Care Instructions: I reviewed with the patient in detail post-care instructions. Patient is to wear sunprotection, and avoid picking at any of the treated lesions. Pt may apply Vaseline to crusted or scabbing areas.
Detail Level: Detailed
Medical Necessity Information: It is in your best interest to select a reason for this procedure from the list below. All of these items fulfill various CMS LCD requirements except the new and changing color options.
Consent: The patient's consent was obtained including but not limited to risks of crusting, scabbing, blistering, scarring, darker or lighter pigmentary change, recurrence, incomplete removal and infection.
Render Post-Care Instructions In Note?: yes
Medical Necessity Clause: This procedure was medically necessary because the lesions that were treated were:
Duration Of Freeze Thaw-Cycle (Seconds): 10-15
Number Of Freeze-Thaw Cycles: 1 freeze-thaw cycle
Duration Of Freeze Thaw-Cycle (Seconds): 15

## 2019-10-23 ENCOUNTER — TRANSFERRED RECORDS (OUTPATIENT)
Dept: HEALTH INFORMATION MANAGEMENT | Facility: CLINIC | Age: 69
End: 2019-10-23

## 2019-12-02 ENCOUNTER — APPOINTMENT (OUTPATIENT)
Age: 69
Setting detail: DERMATOLOGY
End: 2019-12-18

## 2019-12-02 DIAGNOSIS — Z71.89 OTHER SPECIFIED COUNSELING: ICD-10-CM

## 2019-12-02 DIAGNOSIS — L90.5 SCAR CONDITIONS AND FIBROSIS OF SKIN: ICD-10-CM

## 2019-12-02 DIAGNOSIS — L81.8 OTHER SPECIFIED DISORDERS OF PIGMENTATION: ICD-10-CM

## 2019-12-02 DIAGNOSIS — D485 NEOPLASM OF UNCERTAIN BEHAVIOR OF SKIN: ICD-10-CM

## 2019-12-02 DIAGNOSIS — L82.0 INFLAMED SEBORRHEIC KERATOSIS: ICD-10-CM

## 2019-12-02 DIAGNOSIS — L57.0 ACTINIC KERATOSIS: ICD-10-CM

## 2019-12-02 DIAGNOSIS — Z85.828 PERSONAL HISTORY OF OTHER MALIGNANT NEOPLASM OF SKIN: ICD-10-CM

## 2019-12-02 PROBLEM — D48.5 NEOPLASM OF UNCERTAIN BEHAVIOR OF SKIN: Status: ACTIVE | Noted: 2019-12-02

## 2019-12-02 PROCEDURE — 99213 OFFICE O/P EST LOW 20 MIN: CPT | Mod: 25

## 2019-12-02 PROCEDURE — OTHER OBSERVATION: OTHER

## 2019-12-02 PROCEDURE — OTHER LIQUID NITROGEN: OTHER

## 2019-12-02 PROCEDURE — OTHER MIPS QUALITY: OTHER

## 2019-12-02 PROCEDURE — OTHER DIAGNOSIS COMMENT: OTHER

## 2019-12-02 PROCEDURE — 17110 DESTRUCT B9 LESION 1-14: CPT

## 2019-12-02 PROCEDURE — OTHER COUNSELING: OTHER

## 2019-12-02 PROCEDURE — 17000 DESTRUCT PREMALG LESION: CPT | Mod: 59

## 2019-12-02 PROCEDURE — 17003 DESTRUCT PREMALG LES 2-14: CPT | Mod: 59

## 2019-12-02 ASSESSMENT — LOCATION DETAILED DESCRIPTION DERM
LOCATION DETAILED: LEFT LATERAL FOREHEAD
LOCATION DETAILED: LEFT SUPERIOR PREAURICULAR CHEEK
LOCATION DETAILED: LEFT LATERAL TEMPLE
LOCATION DETAILED: POSTERIOR MID-PARIETAL SCALP
LOCATION DETAILED: LEFT PROXIMAL POSTERIOR UPPER ARM
LOCATION DETAILED: RIGHT DISTAL PRETIBIAL REGION
LOCATION DETAILED: LEFT MEDIAL MALAR CHEEK
LOCATION DETAILED: RIGHT MEDIAL DISTAL PRETIBIAL REGION
LOCATION DETAILED: UPPER STERNUM
LOCATION DETAILED: RIGHT CLAVICULAR SKIN
LOCATION DETAILED: LEFT CENTRAL TEMPLE
LOCATION DETAILED: STERNAL NOTCH
LOCATION DETAILED: RIGHT LATERAL SUPERIOR CHEST
LOCATION DETAILED: LEFT INFERIOR ANTERIOR NECK

## 2019-12-02 ASSESSMENT — LOCATION ZONE DERM
LOCATION ZONE: LEG
LOCATION ZONE: NECK
LOCATION ZONE: ARM
LOCATION ZONE: SCALP
LOCATION ZONE: TRUNK
LOCATION ZONE: FACE

## 2019-12-02 ASSESSMENT — LOCATION SIMPLE DESCRIPTION DERM
LOCATION SIMPLE: CHEST
LOCATION SIMPLE: RIGHT CLAVICULAR SKIN
LOCATION SIMPLE: LEFT POSTERIOR UPPER ARM
LOCATION SIMPLE: LEFT ANTERIOR NECK
LOCATION SIMPLE: LEFT CHEEK
LOCATION SIMPLE: LEFT FOREHEAD
LOCATION SIMPLE: RIGHT PRETIBIAL REGION
LOCATION SIMPLE: POSTERIOR SCALP
LOCATION SIMPLE: LEFT TEMPLE

## 2019-12-02 NOTE — PROCEDURE: LIQUID NITROGEN
Render Note In Bullet Format When Appropriate: No
Consent: The patient's consent was obtained including but not limited to risks of crusting, scabbing, blistering, scarring, darker or lighter pigmentary change, recurrence, incomplete removal and infection.
Medical Necessity Information: It is in your best interest to select a reason for this procedure from the list below. All of these items fulfill various CMS LCD requirements except the new and changing color options.
Number Of Freeze-Thaw Cycles: 2 freeze-thaw cycles
Medical Necessity Clause: This procedure was medically necessary because the lesions that were treated were:
Render Post-Care Instructions In Note?: yes
Duration Of Freeze Thaw-Cycle (Seconds): 10-15
Number Of Freeze-Thaw Cycles: 1 freeze-thaw cycle
Duration Of Freeze Thaw-Cycle (Seconds): 15
Post-Care Instructions: I reviewed with the patient in detail post-care instructions. Patient is to wear sunprotection, and avoid picking at any of the treated lesions. Pt may apply Vaseline to crusted or scabbing areas.
Detail Level: Detailed

## 2019-12-02 NOTE — PROCEDURE: COUNSELING
Patient Specific Counseling (Will Not Stick From Patient To Patient): Perform vitamin E oil massage to scar daily.
Detail Level: Detailed

## 2019-12-02 NOTE — PROCEDURE: OBSERVATION
Size Of Lesion In Cm (Optional): 0.7
Body Location Override (Optional - Billing Will Still Be Based On Selected Body Map Location If Applicable): L malar cheek
Detail Level: Detailed

## 2019-12-12 ENCOUNTER — HOSPITAL ENCOUNTER (OUTPATIENT)
Dept: MAMMOGRAPHY | Facility: CLINIC | Age: 69
End: 2019-12-12
Attending: INTERNAL MEDICINE
Payer: MEDICARE

## 2019-12-12 DIAGNOSIS — N64.4 BREAST PAIN, LEFT: ICD-10-CM

## 2019-12-12 DIAGNOSIS — Z85.3 HISTORY OF BREAST CANCER: ICD-10-CM

## 2019-12-12 PROCEDURE — G0279 TOMOSYNTHESIS, MAMMO: HCPCS

## 2019-12-12 PROCEDURE — 76642 ULTRASOUND BREAST LIMITED: CPT | Mod: LT

## 2020-02-03 ENCOUNTER — APPOINTMENT (OUTPATIENT)
Age: 70
Setting detail: DERMATOLOGY
End: 2020-02-08

## 2020-02-03 DIAGNOSIS — D485 NEOPLASM OF UNCERTAIN BEHAVIOR OF SKIN: ICD-10-CM

## 2020-02-03 DIAGNOSIS — Z85.828 PERSONAL HISTORY OF OTHER MALIGNANT NEOPLASM OF SKIN: ICD-10-CM

## 2020-02-03 PROBLEM — D48.5 NEOPLASM OF UNCERTAIN BEHAVIOR OF SKIN: Status: ACTIVE | Noted: 2020-02-03

## 2020-02-03 PROCEDURE — 99213 OFFICE O/P EST LOW 20 MIN: CPT | Mod: 25

## 2020-02-03 PROCEDURE — OTHER MIPS QUALITY: OTHER

## 2020-02-03 PROCEDURE — OTHER BIOPSY BY SHAVE METHOD: OTHER

## 2020-02-03 PROCEDURE — OTHER COUNSELING: OTHER

## 2020-02-03 PROCEDURE — 11102 TANGNTL BX SKIN SINGLE LES: CPT

## 2020-02-03 ASSESSMENT — LOCATION ZONE DERM
LOCATION ZONE: NOSE
LOCATION ZONE: FACE

## 2020-02-03 ASSESSMENT — LOCATION DETAILED DESCRIPTION DERM
LOCATION DETAILED: LEFT NASAL DORSUM
LOCATION DETAILED: LEFT FOREHEAD

## 2020-02-03 ASSESSMENT — LOCATION SIMPLE DESCRIPTION DERM
LOCATION SIMPLE: NOSE
LOCATION SIMPLE: LEFT FOREHEAD

## 2020-02-03 NOTE — PROCEDURE: BIOPSY BY SHAVE METHOD
Dressing: bandage
Curettage Text: The wound bed was treated with curettage after the biopsy was performed.
Detail Level: Detailed
Render In Bullet Format When Appropriate: No
Electrodesiccation And Curettage Text: The wound bed was treated with electrodesiccation and curettage after the biopsy was performed.
Biopsy Type: H and E
Path Notes (To The Dermatopathologist): Please check margins
Anesthesia Volume In Cc (Will Not Render If 0): 0.2
Silver Nitrate Text: The wound bed was treated with silver nitrate after the biopsy was performed.
Size Of Lesion In Cm: 0.5
Anesthesia Type: 1% lidocaine with 1:100,000 epinephrine and a 1:10 solution of 8.4% sodium bicarbonate
Biopsy Method: double edge Personna blade
Billing Type: Third-Party Bill
Was A Bandage Applied: Yes
Wound Care: Petrolatum
Type Of Destruction Used: Curettage
Consent: Written consent was obtained and risks were reviewed including but not limited to scarring, infection, bleeding, scabbing, incomplete removal, nerve damage and allergy to anesthesia.
X Size Of Lesion In Cm: 0.3
Post-Care Instructions: I reviewed with the patient in detail post-care instructions. Patient is to keep the biopsy site dry overnight, and then apply bacitracin twice daily until healed. Patient may apply hydrogen peroxide soaks to remove any crusting.
Cryotherapy Text: The wound bed was treated with cryotherapy after the biopsy was performed.
Body Location Override (Optional - Billing Will Still Be Based On Selected Body Map Location If Applicable): L nasal supratip
Additional Anesthesia Volume In Cc (Will Not Render If 0): 0
Depth Of Biopsy: dermis
Hemostasis: Drysol
Electrodesiccation Text: The wound bed was treated with electrodesiccation after the biopsy was performed.
Notification Instructions: Patient will be notified of biopsy results. However, patient instructed to call the office if not contacted within 2 weeks.

## 2020-02-03 NOTE — PROCEDURE: MIPS QUALITY
Quality 130: Documentation Of Current Medications In The Medical Record: Current Medications Documented
Detail Level: Detailed
Quality 265: Biopsy Follow-Up: Biopsy results reviewed, communicated, tracked, and documented
Quality 226: Preventive Care And Screening: Tobacco Use: Screening And Cessation Intervention: Patient screened for tobacco use and is an ex/non-smoker
Quality 131: Pain Assessment And Follow-Up: Pain assessment using a standardized tool is documented as negative, no follow-up plan required

## 2020-08-10 ENCOUNTER — APPOINTMENT (OUTPATIENT)
Dept: URBAN - METROPOLITAN AREA CLINIC 255 | Age: 70
Setting detail: DERMATOLOGY
End: 2020-08-11

## 2020-08-10 DIAGNOSIS — D18.0 HEMANGIOMA: ICD-10-CM

## 2020-08-10 DIAGNOSIS — D22 MELANOCYTIC NEVI: ICD-10-CM

## 2020-08-10 DIAGNOSIS — L81.4 OTHER MELANIN HYPERPIGMENTATION: ICD-10-CM

## 2020-08-10 DIAGNOSIS — Z85.828 PERSONAL HISTORY OF OTHER MALIGNANT NEOPLASM OF SKIN: ICD-10-CM

## 2020-08-10 DIAGNOSIS — L82.1 OTHER SEBORRHEIC KERATOSIS: ICD-10-CM

## 2020-08-10 DIAGNOSIS — L57.0 ACTINIC KERATOSIS: ICD-10-CM

## 2020-08-10 PROBLEM — D22.5 MELANOCYTIC NEVI OF TRUNK: Status: ACTIVE | Noted: 2020-08-10

## 2020-08-10 PROBLEM — D23.71 OTHER BENIGN NEOPLASM OF SKIN OF RIGHT LOWER LIMB, INCLUDING HIP: Status: ACTIVE | Noted: 2020-08-10

## 2020-08-10 PROBLEM — D18.01 HEMANGIOMA OF SKIN AND SUBCUTANEOUS TISSUE: Status: ACTIVE | Noted: 2020-08-10

## 2020-08-10 PROCEDURE — OTHER COUNSELING: OTHER

## 2020-08-10 PROCEDURE — 17000 DESTRUCT PREMALG LESION: CPT

## 2020-08-10 PROCEDURE — OTHER LIQUID NITROGEN: OTHER

## 2020-08-10 PROCEDURE — 17003 DESTRUCT PREMALG LES 2-14: CPT

## 2020-08-10 PROCEDURE — OTHER MIPS QUALITY: OTHER

## 2020-08-10 PROCEDURE — 99214 OFFICE O/P EST MOD 30 MIN: CPT | Mod: 25

## 2020-08-10 ASSESSMENT — LOCATION ZONE DERM
LOCATION ZONE: TRUNK
LOCATION ZONE: ARM
LOCATION ZONE: FACE

## 2020-08-10 ASSESSMENT — LOCATION DETAILED DESCRIPTION DERM
LOCATION DETAILED: LEFT INFERIOR LATERAL BUCCAL CHEEK
LOCATION DETAILED: LEFT SUPERIOR FOREHEAD
LOCATION DETAILED: LEFT SUPERIOR MEDIAL UPPER BACK
LOCATION DETAILED: LEFT CENTRAL MALAR CHEEK
LOCATION DETAILED: LEFT INFERIOR FOREHEAD
LOCATION DETAILED: RIGHT INFERIOR MEDIAL UPPER BACK
LOCATION DETAILED: SUPERIOR MID FOREHEAD
LOCATION DETAILED: LEFT DISTAL RADIAL DORSAL FOREARM
LOCATION DETAILED: LEFT DISTAL DORSAL FOREARM
LOCATION DETAILED: RIGHT MEDIAL UPPER BACK
LOCATION DETAILED: RIGHT SUPERIOR CENTRAL MALAR CHEEK
LOCATION DETAILED: RIGHT CLAVICULAR SKIN

## 2020-08-10 ASSESSMENT — LOCATION SIMPLE DESCRIPTION DERM
LOCATION SIMPLE: LEFT FOREHEAD
LOCATION SIMPLE: LEFT FOREARM
LOCATION SIMPLE: RIGHT CLAVICULAR SKIN
LOCATION SIMPLE: LEFT UPPER BACK
LOCATION SIMPLE: LEFT CHEEK
LOCATION SIMPLE: RIGHT UPPER BACK
LOCATION SIMPLE: SUPERIOR FOREHEAD
LOCATION SIMPLE: RIGHT CHEEK

## 2020-08-10 NOTE — PROCEDURE: MIPS QUALITY
Quality 431: Preventive Care And Screening: Unhealthy Alcohol Use - Screening: Patient screened for unhealthy alcohol use using a single question and scores less than 2 times per year
Quality 110: Preventive Care And Screening: Influenza Immunization: Influenza Immunization previously received during influenza season
Detail Level: Detailed
Quality 130: Documentation Of Current Medications In The Medical Record: Current Medications Documented
Quality 226: Preventive Care And Screening: Tobacco Use: Screening And Cessation Intervention: Patient screened for tobacco use and is an ex/non-smoker
Quality 111:Pneumonia Vaccination Status For Older Adults: Pneumococcal Vaccination not Administered or Previously Received, Reason not Otherwise Specified

## 2020-11-04 ENCOUNTER — TRANSFERRED RECORDS (OUTPATIENT)
Dept: HEALTH INFORMATION MANAGEMENT | Facility: CLINIC | Age: 70
End: 2020-11-04

## 2020-11-04 ENCOUNTER — MEDICAL CORRESPONDENCE (OUTPATIENT)
Dept: HEALTH INFORMATION MANAGEMENT | Facility: CLINIC | Age: 70
End: 2020-11-04

## 2020-11-06 ENCOUNTER — CARE COORDINATION (OUTPATIENT)
Dept: SURGERY | Facility: CLINIC | Age: 70
End: 2020-11-06

## 2020-11-06 ENCOUNTER — TELEPHONE (OUTPATIENT)
Dept: SURGERY | Facility: CLINIC | Age: 70
End: 2020-11-06

## 2020-11-06 DIAGNOSIS — C50.919 BREAST CANCER (H): ICD-10-CM

## 2020-11-06 DIAGNOSIS — N64.4 BREAST PAIN, LEFT: Primary | ICD-10-CM

## 2020-11-06 NOTE — TELEPHONE ENCOUNTER
Emely notified of the followin.) Diagnostic left breast mammogram, followed by left breast ultrasound on  checking in at 7:45 am at [facility]    2.) Consult with Dr. Cohen on  at 8:45 am.    Emely verbalized understanding.    Gretchen Rosario RN, BSN, OCN  Oncology Care Coordinator  [facility]  [facility]  Phone: [phone]

## 2020-11-09 ENCOUNTER — HOSPITAL ENCOUNTER (OUTPATIENT)
Dept: GENERAL RADIOLOGY | Facility: CLINIC | Age: 70
Discharge: HOME | End: 2020-11-09
Admitting: RADIOLOGY
Payer: MEDICARE

## 2020-11-09 DIAGNOSIS — R07.89 CHEST WALL PAIN: ICD-10-CM

## 2020-11-09 PROCEDURE — 71046 X-RAY EXAM CHEST 2 VIEWS: CPT

## 2020-11-12 ENCOUNTER — HOSPITAL ENCOUNTER (OUTPATIENT)
Dept: MAMMOGRAPHY | Facility: CLINIC | Age: 70
End: 2020-11-12
Attending: SURGERY
Payer: MEDICARE

## 2020-11-12 DIAGNOSIS — C50.919 BREAST CANCER (H): ICD-10-CM

## 2020-11-12 DIAGNOSIS — N64.4 BREAST PAIN, LEFT: ICD-10-CM

## 2020-11-12 PROCEDURE — 76642 ULTRASOUND BREAST LIMITED: CPT | Mod: LT

## 2020-11-12 PROCEDURE — G0279 TOMOSYNTHESIS, MAMMO: HCPCS

## 2020-12-01 ENCOUNTER — OFFICE VISIT (OUTPATIENT)
Dept: SURGERY | Facility: CLINIC | Age: 70
End: 2020-12-01
Payer: MEDICARE

## 2020-12-01 VITALS
WEIGHT: 140 LBS | BODY MASS INDEX: 23.32 KG/M2 | HEART RATE: 73 BPM | OXYGEN SATURATION: 99 % | HEIGHT: 65 IN | DIASTOLIC BLOOD PRESSURE: 88 MMHG | SYSTOLIC BLOOD PRESSURE: 130 MMHG | RESPIRATION RATE: 16 BRPM

## 2020-12-01 DIAGNOSIS — C50.911 HORMONE RECEPTOR POSITIVE MALIGNANT NEOPLASM OF RIGHT BREAST (H): Primary | ICD-10-CM

## 2020-12-01 PROCEDURE — 99204 OFFICE O/P NEW MOD 45 MIN: CPT | Performed by: SURGERY

## 2020-12-01 RX ORDER — TRIAMTERENE AND HYDROCHLOROTHIAZIDE 37.5; 25 MG/1; MG/1
CAPSULE ORAL
COMMUNITY
Start: 2020-09-08

## 2020-12-01 ASSESSMENT — MIFFLIN-ST. JEOR: SCORE: 1161.15

## 2020-12-01 NOTE — PROGRESS NOTES
Breast Patients    BREAST PATIENTS (ALL)    1-Do you have any of the following symptoms? Breast Pain  2-In which breast are you having the symptoms? left  3-Have you had a Mammogram? [place] [facility] - Date:  November 2020  4-Have you ever had a breast cyst drained? No  5-Have you ever had a breast biopsy? Yes:  Right  -  Date:  October 2010  6-Have you ever had a Breast Cancer? Yes:  Right  -  Date:  October 2010   7-Is there a history of Breast Cancer in your family? Yes   Relationship to you:    Mother  8-Have you ever had Ovarian Cancer? No  9-Is there a history of Ovarian Cancer in your family? No  10-Summarize your caffeine intake (i.e. coffee, tea, chocolate, soda etc.): None    BREAST PATIENTS (FEMALE)    11-What age did your periods begin? 11-12 years old  12-Date your last menstrual period began? Hysterectomy  13-Number of full-term pregnancies: 2  14-Your age when your first child was born? 31 years old  15-Did you nurse your children? Yes  16-Are you pregnant now? No  17-Have you begun menopause? Yes  18-Have you had either ovary removed? Yes  19-Do you have breast implants? No   20-Do you use hormone replacement therapy?  Yes     21-Have you taken oral contraceptive pills?  No  22-Have you had an intrauterine device (IUD) placed?  Yes, For how many years?  Long time ago  23-What is your current bra size?  36C

## 2020-12-01 NOTE — LETTER
Breast Pain Recommendations    -  Keep a pain diary   -  Supportive Bra that is professionally fit.    -  Wear a high-intensity sports bra when exercising. Wear while sleeping.   -  Consider eliminating/decrease caffeine (chocolate, tea, coffee, soda) for a two week period of   time to see if pain improves/resolves  -  Eat a low fat diet and improve omega 3 fatty acid intake   -  Reduce sodium intake to 2000 mg /day and increase water intake.  -  Intake vitamin D 800-1000 IU daily   -  Acupuncture   -  Evening Primrose Oil starting with 500 mg/day. May increase to 2 TID for 6 months.   -  Fish oil supplementation  -  Vitamin E starting with 400 IU/day. Can increase to 1200 IU daily in 2-3 divided doses. For 6 months.   - Maintain a healthy BMI <25.  - Tylenol, ibuprofen, or naproxen.        Lifestyle Modifications were provided.   - Maintain a healthy weight. Recommended BMI is 20-25. Higher body mass index (BMI) and adult weight gain is associated with increased risk for breast cancer. This increase in risk has been attributed to increase in circulating endogenous estrogen levels from fat tissue.   - Alcohol consumption, even at moderate levels (1-2 drinks per day), increases breast cancer risk. Limit alcohol consumption to less than 1 drink per day. (1 ounce of liquor, 6 ounces of wine, or 8 ounces of beer)  - Exercise 30 minutes 3x weekly. 150 minutes per week of activity.

## 2020-12-02 ENCOUNTER — TELEPHONE (OUTPATIENT)
Dept: SURGERY | Facility: CLINIC | Age: 70
End: 2020-12-02

## 2020-12-02 ENCOUNTER — TELEPHONE (OUTPATIENT)
Dept: SURGERY | Facility: PHYSICIAN GROUP | Age: 70
End: 2020-12-02

## 2020-12-02 NOTE — PROGRESS NOTES
[facility] Breast Surgery Consultation    HPI:   Emely Forbes is a 70 year old female who is seen in consultation at the request of Dr. Patrick for evaluation of left breast pain. She has a history of right breast cancer and underwent right mastectomy with SLNB in 2010 with Dr. Laboy and immediate reconstruction with a TRAM flap with Dr. Castellanos. She had issues with wound healing post operatively and underwent several surgeries related to this including left breast reduction mammoplasty and then revision of hypertrophic scar on the left. She did not require adjuvant radiation or chemotherapy. She took letrozole for 6 years under the care of Dr. Patrick.     She reports she has had left breast pain for the past 2 years. The pain is daily and worse when lying on the left breast and occasionally wakes her at night. She has tried occasional tylenol which does improve pain slightly. She cannot use ibuprofen due to prior kidney issues and significant hematuria. She has tried several new bras as she thought perhaps pain was due to pressure from a prior sports bra.     She had a diagnostic mammogram and ultrasound both in 2019 and most recently in November 2020. We reviewed her mammograms in clinic today and I agree with radiology review that imaging appears benign.     She has an allergy to IV contrast for CT with hives. She has had a prior breast MRI and did not tolerate it well due to claustrophobia.     Hormonal history:   menarche [age]yo,  2 children, 1st at age 31, post-menopausal, no OCP use, <1 year HRT, no fertility treatment.     Family history of breast cancer: Yes - mother, post menopausal  Family history of ovarian cancer:  No  Family history of colon cancer: No  Family history of prostate cancer: No      Past Medical History:   has a past medical history of Breast cancer (H) (10/10), Carpal tunnel syndrome, Gastro-oesophageal reflux disease, GERD (gastroesophageal reflux disease), Hematuria (2003),  benign breast biopsy, Osteopenia, PONV (postoperative nausea and vomiting), S/P hysterectomy with oophorectomy (2001 for fibroids), and S/P tonsillectomy.      Current Outpatient Medications:      fexofenadine (ALLEGRA) 180 MG tablet, Take 1 tablet by mouth daily. (Patient taking differently: Take 180 mg by mouth daily as needed ), Disp: 30 tablet, Rfl: 6     Probiotic Product (PROBIOTIC DAILY PO), Take 1 tablet by mouth daily as needed, Disp: , Rfl:      triamterene-HCTZ (DYAZIDE) 37.5-25 MG capsule, TAKE 1 CAPSULE BY MOUTH EVERY MORNING, Disp: , Rfl:      raloxifene (EVISTA) 60 MG tablet, Take 1 tablet (60 mg) by mouth daily, Disp: 30 tablet, Rfl: 0    Past Surgical History:  Past Surgical History:   Procedure Laterality Date     BREAST SURGERY      RIGHT BREAST BIOPSY, RIGHT MASTECTOMY WITH TRAM FLAP RECONSTRUCTION     CYSTOSCOPY, URETEROSCOPY, COMBINED  9/14/2012    Procedure: COMBINED CYSTOSCOPY, URETEROSCOPY;  VIDEO CYSTOSCOPY, RIGHT FLEXIBLE URETEROSCOPY, CAUTERIZATION OF PAPILLARY VEIN (LATEX IV CONTRAST TAPE ALLERGY);  Surgeon: Rey Bailon MD;  Location:  OR     ENT SURGERY      TONSILLECTOMY     GI SURGERY      EGD     GYN SURGERY      HYSTERECTOMY WITH OOPHORECTOMY     HYSTERECTOMY, PAP NO LONGER INDICATED       ORTHOPEDIC SURGERY      CARPAL TUNNEL RELEASE     REVISE RECONSTRUCTED BREAST  12/8/2011    Procedure:REVISE RECONSTRUCTED BREAST; REVISION RIGHT BREAST RECONSTRUCTION, LEFT BREAST STEROID INJECTION ; Surgeon:FRAN LEVIN; Location: [place]           Allergies   Allergen Reactions     Adhesive Tape      blisters     Contrast Dye      Latex      blisters     Norvasc [Amlodipine Besylate]          Social History:  Social History     Socioeconomic History     Marital status:      Spouse name: Thomas     Number of children: 2     Years of education: Not on file     Highest education level: Not on file   Occupational History     Occupation: Mine     Employer: UNKNOWN  "  Social Needs     Financial resource strain: Not on file     Food insecurity     Worry: Not on file     Inability: Not on file     Transportation needs     Medical: Not on file     Non-medical: Not on file   Tobacco Use     Smoking status: Never Smoker     Smokeless tobacco: Never Used   Substance and Sexual Activity     Alcohol use: Yes     Alcohol/week: 0.0 - 0.8 standard drinks     Comment: 1 DRINK PER WEEK     Drug use: No     Sexual activity: Yes     Partners: Male   Lifestyle     Physical activity     Days per week: Not on file     Minutes per session: Not on file     Stress: Not on file   Relationships     Social connections     Talks on phone: Not on file     Gets together: Not on file     Attends Buddhism service: Not on file     Active member of club or organization: Not on file     Attends meetings of clubs or organizations: Not on file     Relationship status: Not on file     Intimate partner violence     Fear of current or ex partner: Not on file     Emotionally abused: Not on file     Physically abused: Not on file     Forced sexual activity: Not on file   Other Topics Concern     Parent/sibling w/ CABG, MI or angioplasty before 65F 55M? 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        ROS:  The 10 point review of systems is negative other than noted in the HPI and above.    PE:  Vitals: /88 (BP Location: Left arm, Patient Position: Sitting, Cuff Size: Adult Regular)   Pulse 73   Resp 16   Ht 1.643 m (5' 4.7\")   Wt 63.5 kg (140 lb)   SpO2 99%   BMI 23.51 kg/m    General appearance: well-nourished, sitting comfortably, no apparent distress  Psych: normal affect, pleasant  HEENT:  Head normocephalic and atraumatic, pupils equal and round, conjunctivae clear, mucous membranes moist, external ears and nose normal  Neck: Supple without thyromegaly or " masses  Lungs: Respirations unlabored  Lymphatic: No cervical, or supraclavicular lymphadenopathy  Extremities: Without edema  Musculoskeletal:  Normal station and gait  Neurologic: nonfocal, grossly intact times four extremities, alert and oriented times three  Psychiatric: Mood and affect are appropriate  Skin: Without lesions or rashes    Breast:  A bilateral breast exam was performed in the supine position.. Bilateral breasts were palpated in a circumferential clockwise fashion including the supraclavicular and axillary areas.   Right breast TRAM flap reconstruction, well healed scars and reconstructed nipple. Very good symmetry to left breast. No masses on right, mild tenderness on lateral right breast/chest wall to palpation. On left breast, well healed reduction scars with everted nipple. Breast tissue is soft with scattered areas of increased density. No palpable masses. Tender to palpation on medial left breast from 7-8:00 near inframammary fold and more along chest wall than in breast tissue. Mildly tender on lateral breast/chest wall      Lymph:       No supraclavicular/infraclavicular adenopathy.   Axillary adenopathy: none    Assessment/Plan: Emely Forbes is a 70 year old who presents with h/o right breast cancer and persistent left breast pain. We discussed causes of breast pain and that my role is to ensure no missed malignancy. As above, we have reviewed her imaging and her clinical exam is benign. We discussed possibility of breast MRI as this is the most sensitive test for breast imaging. She had a lot of difficulty with this in the past due to claustrophobia and is hesitant to proceed with this at this point which is reasonable given her benign exam and 3D mammogram/US. We did discuss the possibility of contrast enhanced mammogram; however, with her contrast allergy this is not a good option. We also discussed benign causes of breast pain and she was given the breast pain handout below. I believe  more of her pain is chest wall musculoskeletal pain on exam and recommend she try limiting activity on the left for a two week period to see if this helps and utilize tylenol prn. She will think about if she would like a breast MRI and let us know; otherwise, she should continue with annual mammography going forward and try the options listed below. Specifically we discussed a trial of evening primrose oil, vitamin E and fish oil.     I did place a referral for genetics given her personal history of breast cancer and a first degree relative with breast cancer.     Breast Pain Recommendations  -  Keep a pain diary   -  Supportive Bra that is professionally fit.    -  Wear a high-intensity sports bra when exercising. Wear while sleeping.   -  Consider eliminating/decrease caffeine (chocolate, tea, coffee, soda) for a two week period of   time to see if pain improves/resolves  -  Eat a low fat diet and improve omega 3 fatty acid intake   -  Reduce sodium intake to 2000 mg /day and increase water intake.  -  Intake vitamin D 800-1000 IU daily   -  Acupuncture   -  Evening Primrose Oil starting with 500 mg/day. May increase to 2 TID for 6 months.   -  Fish oil supplementation  -  Vitamin E starting with 400 IU/day. Can increase to 1200 IU daily in 2-3 divided doses. For 6 months.   - Maintain a healthy BMI <25.  - Tylenol, ibuprofen, or naproxen.        Lifestyle Modifications were provided.   - Maintain a healthy weight. Recommended BMI is 20-25. Higher body mass index (BMI) and adult weight gain is associated with increased risk for breast cancer. This increase in risk has been attributed to increase in circulating endogenous estrogen levels from fat tissue.   - Alcohol consumption, even at moderate levels (1-2 drinks per day), increases breast cancer risk. Limit alcohol consumption to less than 1 drink per day. (1 ounce of liquor, 6 ounces of wine, or 8 ounces of beer)  - Exercise 30 minutes 3x weekly. 150 minutes per  week of activity.        Time spent with the patient with greater that 50% of the time in discussion was 30 minutes.    Yuliya Cohen MD      Please route or send letter to:  Primary Care Provider (PCP) and Referring Provider

## 2020-12-02 NOTE — TELEPHONE ENCOUNTER
Call reviewed with Dr. Cohen.     Per Dr. Cohen, given patient's allergy to contrast dye she would not recommend a contrast enhanced mammogram. If Emely wants to proceed with additional imaging, Breast MRI would be recommended.     Above reviewed with Emely. She would like to think about this. She will call our office back to let us know how she'd like to proceed. Both parties in agreement of plan.    Gretchen Rosario RN, BSN, OCN  Oncology Care Coordinator  [facility]  [facility] [place]  Phone: [phone]

## 2020-12-02 NOTE — TELEPHONE ENCOUNTER
Name of caller: Patient    Reason for Call:  Call back  Patient saw Dr. Cohen yesterday, and was told to call back with some information.  She has the information and would like a call back to discuss. (Regarding getting a CT scan or MRI).    Seen for: Hormone receptor positive malignant neoplasm of right breast (H)    Surgeon:  Dr. Cohen    Recent Surgery:  No    If yes, when & what type:  N/A      Best phone number to reach pt at is: [phone]    Ok to leave a message with medical info? Yes.

## 2020-12-18 ENCOUNTER — APPOINTMENT (OUTPATIENT)
Dept: URBAN - METROPOLITAN AREA CLINIC 255 | Age: 70
Setting detail: DERMATOLOGY
End: 2020-12-21

## 2020-12-18 DIAGNOSIS — Z85.828 PERSONAL HISTORY OF OTHER MALIGNANT NEOPLASM OF SKIN: ICD-10-CM

## 2020-12-18 DIAGNOSIS — L82.0 INFLAMED SEBORRHEIC KERATOSIS: ICD-10-CM

## 2020-12-18 DIAGNOSIS — D485 NEOPLASM OF UNCERTAIN BEHAVIOR OF SKIN: ICD-10-CM

## 2020-12-18 DIAGNOSIS — L57.0 ACTINIC KERATOSIS: ICD-10-CM

## 2020-12-18 PROBLEM — D48.5 NEOPLASM OF UNCERTAIN BEHAVIOR OF SKIN: Status: ACTIVE | Noted: 2020-12-18

## 2020-12-18 PROCEDURE — 17000 DESTRUCT PREMALG LESION: CPT | Mod: 59

## 2020-12-18 PROCEDURE — OTHER MIPS QUALITY: OTHER

## 2020-12-18 PROCEDURE — OTHER BIOPSY BY SHAVE METHOD: OTHER

## 2020-12-18 PROCEDURE — 11102 TANGNTL BX SKIN SINGLE LES: CPT | Mod: 59

## 2020-12-18 PROCEDURE — 11103 TANGNTL BX SKIN EA SEP/ADDL: CPT | Mod: 59

## 2020-12-18 PROCEDURE — 17003 DESTRUCT PREMALG LES 2-14: CPT | Mod: 59

## 2020-12-18 PROCEDURE — OTHER COUNSELING: OTHER

## 2020-12-18 PROCEDURE — 17110 DESTRUCT B9 LESION 1-14: CPT

## 2020-12-18 PROCEDURE — OTHER LIQUID NITROGEN: OTHER

## 2020-12-18 PROCEDURE — 99213 OFFICE O/P EST LOW 20 MIN: CPT | Mod: 25

## 2020-12-18 ASSESSMENT — LOCATION DETAILED DESCRIPTION DERM
LOCATION DETAILED: RIGHT CENTRAL MALAR CHEEK
LOCATION DETAILED: LEFT SUPERIOR MEDIAL MALAR CHEEK
LOCATION DETAILED: RIGHT LATERAL MALAR CHEEK
LOCATION DETAILED: RIGHT SUPERIOR FOREHEAD
LOCATION DETAILED: LEFT MEDIAL ZYGOMA
LOCATION DETAILED: LEFT CENTRAL MALAR CHEEK
LOCATION DETAILED: LEFT SUPERIOR CENTRAL MALAR CHEEK
LOCATION DETAILED: LEFT FOREHEAD
LOCATION DETAILED: RIGHT LATERAL ZYGOMA
LOCATION DETAILED: LEFT INFERIOR FOREHEAD
LOCATION DETAILED: RIGHT FOREHEAD
LOCATION DETAILED: LEFT INFERIOR CENTRAL MALAR CHEEK
LOCATION DETAILED: LEFT SUPERIOR LATERAL BUCCAL CHEEK
LOCATION DETAILED: RIGHT INFERIOR FOREHEAD
LOCATION DETAILED: RIGHT INFERIOR LATERAL MALAR CHEEK

## 2020-12-18 ASSESSMENT — LOCATION SIMPLE DESCRIPTION DERM
LOCATION SIMPLE: RIGHT FOREHEAD
LOCATION SIMPLE: LEFT ZYGOMA
LOCATION SIMPLE: LEFT CHEEK
LOCATION SIMPLE: RIGHT ZYGOMA
LOCATION SIMPLE: LEFT FOREHEAD
LOCATION SIMPLE: RIGHT CHEEK

## 2020-12-18 ASSESSMENT — LOCATION ZONE DERM: LOCATION ZONE: FACE

## 2020-12-18 NOTE — PROCEDURE: BIOPSY BY SHAVE METHOD
Render Path Notes In Note?: No
Dressing: bandage
Curettage Text: The wound bed was treated with curettage after the biopsy was performed.
Hemostasis: Drysol
Was A Bandage Applied: Yes
Biopsy Type: H and E
Size Of Lesion In Cm: 0.5
Detail Level: Detailed
Size Of Lesion In Cm: 0.8
Electrodesiccation Text: The wound bed was treated with electrodesiccation after the biopsy was performed.
Path Notes (To The Dermatopathologist): Please check margins.
Anesthesia Type: 1% lidocaine with epinephrine and a 1:10 solution of 8.4% sodium bicarbonate
Biopsy Method: double edge Personna blade
Additional Anesthesia Volume In Cc (Will Not Render If 0): 0
Post-Care Instructions: I reviewed with the patient in detail post-care instructions. Patient is to keep the biopsy site dry overnight, and then apply bacitracin twice daily until healed. Patient may apply hydrogen peroxide soaks to remove any crusting.
Notification Instructions: Patient will be notified of biopsy results. However, patient instructed to call the office if not contacted within 2 weeks.
Consent: Written consent was obtained and risks were reviewed including but not limited to scarring, infection, bleeding, scabbing, incomplete removal, nerve damage and allergy to anesthesia.
Anesthesia Volume In Cc (Will Not Render If 0): 0.3
Type Of Destruction Used: Curettage
Billing Type: Third-Party Bill
X Size Of Lesion In Cm: 0.6
Electrodesiccation And Curettage Text: The wound bed was treated with electrodesiccation and curettage after the biopsy was performed.
Body Location Override (Optional - Billing Will Still Be Based On Selected Body Map Location If Applicable): Right lateral forehead
Depth Of Biopsy: dermis
Wound Care: Petrolatum
Body Location Override (Optional - Billing Will Still Be Based On Selected Body Map Location If Applicable): Left lateral temple
Silver Nitrate Text: The wound bed was treated with silver nitrate after the biopsy was performed.
Cryotherapy Text: The wound bed was treated with cryotherapy after the biopsy was performed.
Information: Selecting Yes will display possible errors in your note based on the variables you have selected. This validation is only offered as a suggestion for you. PLEASE NOTE THAT THE VALIDATION TEXT WILL BE REMOVED WHEN YOU FINALIZE YOUR NOTE. IF YOU WANT TO FAX A PRELIMINARY NOTE YOU WILL NEED TO TOGGLE THIS TO 'NO' IF YOU DO NOT WANT IT IN YOUR FAXED NOTE.

## 2020-12-18 NOTE — PROCEDURE: LIQUID NITROGEN
Render Post-Care Instructions In Note?: no
Medical Necessity Information: It is in your best interest to select a reason for this procedure from the list below. All of these items fulfill various CMS LCD requirements except the new and changing color options.
Number Of Freeze-Thaw Cycles: 1 freeze-thaw cycle
Detail Level: Detailed
Post-Care Instructions: I reviewed with the patient in detail post-care instructions. Patient is to wear sunprotection, and avoid picking at any of the treated lesions. Pt may apply Vaseline to crusted or scabbing areas.
Consent: The patient's consent was obtained including but not limited to risks of crusting, scabbing, blistering, scarring, darker or lighter pigmentary change, recurrence, incomplete removal and infection.
Duration Of Freeze Thaw-Cycle (Seconds): 15-20
Medical Necessity Clause: This procedure was medically necessary because the lesions that were treated were:
Duration Of Freeze Thaw-Cycle (Seconds): 10

## 2020-12-18 NOTE — PROCEDURE: MIPS QUALITY
Detail Level: Detailed
Quality 431: Preventive Care And Screening: Unhealthy Alcohol Use - Screening: Patient screened for unhealthy alcohol use using a single question and scores less than 2 times per year
Quality 265: Biopsy Follow-Up: Biopsy results reviewed, communicated, tracked, and documented
Quality 130: Documentation Of Current Medications In The Medical Record: Current Medications Documented
Quality 110: Preventive Care And Screening: Influenza Immunization: Influenza Immunization previously received during influenza season
Quality 226: Preventive Care And Screening: Tobacco Use: Screening And Cessation Intervention: Patient screened for tobacco use and is an ex/non-smoker

## 2021-01-07 ENCOUNTER — APPOINTMENT (OUTPATIENT)
Dept: URBAN - METROPOLITAN AREA CLINIC 255 | Age: 71
Setting detail: DERMATOLOGY
End: 2021-01-10

## 2021-01-07 PROBLEM — C44.319 BASAL CELL CARCINOMA OF SKIN OF OTHER PARTS OF FACE: Status: ACTIVE | Noted: 2021-01-07

## 2021-01-07 PROCEDURE — 13132 CMPLX RPR F/C/C/M/N/AX/G/H/F: CPT

## 2021-01-07 PROCEDURE — 17312 MOHS ADDL STAGE: CPT

## 2021-01-07 PROCEDURE — OTHER MIPS QUALITY: OTHER

## 2021-01-07 PROCEDURE — OTHER MOHS SURGERY: OTHER

## 2021-01-07 PROCEDURE — 17311 MOHS 1 STAGE H/N/HF/G: CPT

## 2021-01-07 NOTE — PROCEDURE: MOHS SURGERY
Body Location Override (Optional - Billing Will Still Be Based On Selected Body Map Location If Applicable): Left Lateral Temple

## 2021-01-07 NOTE — PROCEDURE: MIPS QUALITY
Quality 431: Preventive Care And Screening: Unhealthy Alcohol Use - Screening: Patient screened for unhealthy alcohol use using a single question and scores less than 2 times per year
Quality 130: Documentation Of Current Medications In The Medical Record: Current Medications Documented
Quality 110: Preventive Care And Screening: Influenza Immunization: Influenza Immunization previously received during influenza season
Quality 226: Preventive Care And Screening: Tobacco Use: Screening And Cessation Intervention: Patient screened for tobacco use and is an ex/non-smoker
Quality 143: Oncology: Medical And Radiation- Pain Intensity Quantified: Pain severity quantified, no pain present
Detail Level: Detailed

## 2021-01-14 ENCOUNTER — TELEPHONE (OUTPATIENT)
Dept: SURGERY | Facility: CLINIC | Age: 71
End: 2021-01-14

## 2021-01-14 DIAGNOSIS — C50.911 HORMONE RECEPTOR POSITIVE MALIGNANT NEOPLASM OF RIGHT BREAST (H): Primary | ICD-10-CM

## 2021-01-14 NOTE — TELEPHONE ENCOUNTER
Scheduling number provided to patient.    Gretchen Rosario RN, BSN, OCN  Breast Nurse Navigator  [facility]  [facility]  Phone: [phone]

## 2021-01-15 ENCOUNTER — APPOINTMENT (OUTPATIENT)
Dept: URBAN - METROPOLITAN AREA CLINIC 255 | Age: 71
Setting detail: DERMATOLOGY
End: 2021-01-18

## 2021-01-15 DIAGNOSIS — Z48.02 ENCOUNTER FOR REMOVAL OF SUTURES: ICD-10-CM

## 2021-01-15 DIAGNOSIS — L57.0 ACTINIC KERATOSIS: ICD-10-CM

## 2021-01-15 PROCEDURE — OTHER DIAGNOSIS COMMENT: OTHER

## 2021-01-15 PROCEDURE — 17003 DESTRUCT PREMALG LES 2-14: CPT | Mod: 79

## 2021-01-15 PROCEDURE — OTHER LIQUID NITROGEN: OTHER

## 2021-01-15 PROCEDURE — 17000 DESTRUCT PREMALG LESION: CPT | Mod: 79

## 2021-01-15 PROCEDURE — OTHER SUTURE REMOVAL (GLOBAL PERIOD): OTHER

## 2021-01-15 PROCEDURE — OTHER COUNSELING: OTHER

## 2021-01-15 PROCEDURE — OTHER MIPS QUALITY: OTHER

## 2021-01-15 ASSESSMENT — LOCATION DETAILED DESCRIPTION DERM
LOCATION DETAILED: LEFT FOREHEAD
LOCATION DETAILED: LEFT SUPERIOR MEDIAL FOREHEAD
LOCATION DETAILED: LEFT SUPERIOR PREAURICULAR CHEEK
LOCATION DETAILED: LEFT SUPERIOR FOREHEAD

## 2021-01-15 ASSESSMENT — LOCATION SIMPLE DESCRIPTION DERM
LOCATION SIMPLE: LEFT CHEEK
LOCATION SIMPLE: LEFT FOREHEAD

## 2021-01-15 ASSESSMENT — LOCATION ZONE DERM: LOCATION ZONE: FACE

## 2021-01-15 NOTE — PROCEDURE: SUTURE REMOVAL (GLOBAL PERIOD)
Body Location Override (Optional - Billing Will Still Be Based On Selected Body Map Location If Applicable): Left lateral temple
Add 59390 Cpt? (Important Note: In 2017 The Use Of 79382 Is Being Tracked By Cms To Determine Future Global Period Reimbursement For Global Periods): no
Detail Level: Detailed

## 2021-01-15 NOTE — PROCEDURE: DIAGNOSIS COMMENT
Comment: S/P MMS for a Primary Nodular Basal Cell Carcinoma on the left lateral temple (01/07/2021)
Detail Level: Simple

## 2021-01-18 ENCOUNTER — PRE VISIT (OUTPATIENT)
Dept: ONCOLOGY | Facility: CLINIC | Age: 71
End: 2021-01-18

## 2021-01-18 NOTE — TELEPHONE ENCOUNTER
ONCOLOGY INTAKE: Records Information      APPT INFORMATION:  Referring provider:  Dr. Cohen  Referring provider s clinic:  [facility]  Reason for visit/diagnosis:  Hormone receptor positive malignant neoplasm of right breast  Has patient been notified of appointment date and time?: yes    RECORDS INFORMATION:  Were the records received with the referral (via [employer])? no    Has patient been seen for any external appt for this diagnosis? no    If yes, where? n/a    Has patient had any imaging or procedures outside of [place] for this condition? no      If Yes, where? n/a    ADDITIONAL INFORMATION:  none

## 2021-01-28 ENCOUNTER — OFFICE VISIT (OUTPATIENT)
Dept: FAMILY MEDICINE | Facility: CLINIC | Age: 71
End: 2021-01-28
Payer: MEDICARE

## 2021-01-28 VITALS
WEIGHT: 144 LBS | BODY MASS INDEX: 23.99 KG/M2 | TEMPERATURE: 96.6 F | HEIGHT: 65 IN | DIASTOLIC BLOOD PRESSURE: 84 MMHG | HEART RATE: 69 BPM | OXYGEN SATURATION: 99 % | SYSTOLIC BLOOD PRESSURE: 137 MMHG

## 2021-01-28 DIAGNOSIS — Z13.6 CARDIOVASCULAR SCREENING; LDL GOAL LESS THAN 160: ICD-10-CM

## 2021-01-28 DIAGNOSIS — Z85.828 HISTORY OF SKIN CANCER: Primary | ICD-10-CM

## 2021-01-28 DIAGNOSIS — R42 VERTIGO: ICD-10-CM

## 2021-01-28 DIAGNOSIS — C50.911 HORMONE RECEPTOR POSITIVE MALIGNANT NEOPLASM OF RIGHT BREAST (H): ICD-10-CM

## 2021-01-28 DIAGNOSIS — Z23 NEED FOR VACCINATION: ICD-10-CM

## 2021-01-28 DIAGNOSIS — Z12.11 COLON CANCER SCREENING: ICD-10-CM

## 2021-01-28 LAB
ALBUMIN SERPL-MCNC: 4.2 G/DL (ref 3.4–5)
ALP SERPL-CCNC: 81 U/L (ref 40–150)
ALT SERPL W P-5'-P-CCNC: 23 U/L (ref 0–50)
ANION GAP SERPL CALCULATED.3IONS-SCNC: 3 MMOL/L (ref 3–14)
AST SERPL W P-5'-P-CCNC: 15 U/L (ref 0–45)
BILIRUB SERPL-MCNC: 0.6 MG/DL (ref 0.2–1.3)
BUN SERPL-MCNC: 21 MG/DL (ref 7–30)
CALCIUM SERPL-MCNC: 10 MG/DL (ref 8.5–10.1)
CHLORIDE SERPL-SCNC: 105 MMOL/L (ref 94–109)
CHOLEST SERPL-MCNC: 231 MG/DL
CO2 SERPL-SCNC: 31 MMOL/L (ref 20–32)
CREAT SERPL-MCNC: 0.93 MG/DL (ref 0.52–1.04)
GFR SERPL CREATININE-BSD FRML MDRD: 63 ML/MIN/{1.73_M2}
GLUCOSE SERPL-MCNC: 102 MG/DL (ref 70–99)
HDLC SERPL-MCNC: 63 MG/DL
LDLC SERPL CALC-MCNC: 152 MG/DL
NONHDLC SERPL-MCNC: 168 MG/DL
POTASSIUM SERPL-SCNC: 3.8 MMOL/L (ref 3.4–5.3)
PROT SERPL-MCNC: 7.8 G/DL (ref 6.8–8.8)
SODIUM SERPL-SCNC: 139 MMOL/L (ref 133–144)
TRIGL SERPL-MCNC: 78 MG/DL

## 2021-01-28 PROCEDURE — 90670 PCV13 VACCINE IM: CPT | Performed by: PHYSICIAN ASSISTANT

## 2021-01-28 PROCEDURE — 99204 OFFICE O/P NEW MOD 45 MIN: CPT | Mod: 25 | Performed by: PHYSICIAN ASSISTANT

## 2021-01-28 PROCEDURE — 80053 COMPREHEN METABOLIC PANEL: CPT | Performed by: PHYSICIAN ASSISTANT

## 2021-01-28 PROCEDURE — 80061 LIPID PANEL: CPT | Performed by: PHYSICIAN ASSISTANT

## 2021-01-28 PROCEDURE — G0009 ADMIN PNEUMOCOCCAL VACCINE: HCPCS | Performed by: PHYSICIAN ASSISTANT

## 2021-01-28 PROCEDURE — 36415 COLL VENOUS BLD VENIPUNCTURE: CPT | Performed by: PHYSICIAN ASSISTANT

## 2021-01-28 RX ORDER — CHOLECALCIFEROL (VITAMIN D3) 50 MCG
1 TABLET ORAL DAILY
COMMUNITY
Start: 2021-01-28

## 2021-01-28 ASSESSMENT — MIFFLIN-ST. JEOR: SCORE: 1180.09

## 2021-01-28 NOTE — NURSING NOTE
Prior to immunization administration, verified patients identity using patient s name and date of birth. Please see Immunization Activity for additional information.     Screening Questionnaire for Adult Immunization    Are you sick today?   No   Do you have allergies to medications, food, a vaccine component or latex?   Yes   Have you ever had a serious reaction after receiving a vaccination?   No   Do you have a long-term health problem with heart, lung, kidney, or metabolic disease (e.g., diabetes), asthma, a blood disorder, no spleen, complement component deficiency, a cochlear implant, or a spinal fluid leak?  Are you on long-term aspirin therapy?   No   Do you have cancer, leukemia, HIV/AIDS, or any other immune system problem?   No   Do you have a parent, brother, or sister with an immune system problem?   No   In the past 3 months, have you taken medications that affect  your immune system, such as prednisone, other steroids, or anticancer drugs; drugs for the treatment of rheumatoid arthritis, Crohn s disease, or psoriasis; or have you had radiation treatments?   No   Have you had a seizure, or a brain or other nervous system problem?   No   During the past year, have you received a transfusion of blood or blood    products, or been given immune (gamma) globulin or antiviral drug?   No   For women: Are you pregnant or is there a chance you could become       pregnant during the next month?   No   Have you received any vaccinations in the past 4 weeks?   No     Immunization questionnaire was positive for at least one answer.  Notified no one.        Per orders of LEEANNE Marina, injection of prevnar 13  given by Miranda Bailey CMA. Patient instructed to remain in clinic for 15 minutes afterwards, and to report any adverse reaction to me immediately.       Screening performed by Miranda Bailey CMA on 1/28/2021 at 9:47 AM.

## 2021-01-28 NOTE — LETTER
"January 29, 2021      Emely FREDERICK Forbes  [address] [place] [address]          Emely,     Your total cholesterol is higher this year (231 compared to 205 when it was checked 7 years ago).   Your HDL or \"good cholesterol\" is great at 63.   Your triglycerides were super at 78.   Your LDL or \"bad cholesterol\" was 152 compared to 136 before.   Follow a lot fat, high fiber diet and let's check that annually now.     Your blood sugar, kidney function, liver function and electrolytes were normal.     Let me know if you have any questions.     It was great to see you again.     Carlie Marina PA-C     Resulted Orders   Lipid panel reflex to direct LDL Fasting   Result Value Ref Range    Cholesterol 231 (H) <200 mg/dL      Comment:      Desirable:       <200 mg/dl    Triglycerides 78 <150 mg/dL      Comment:      Fasting specimen    HDL Cholesterol 63 >49 mg/dL    LDL Cholesterol Calculated 152 (H) <100 mg/dL      Comment:      Above desirable:  100-129 mg/dl  Borderline High:  130-159 mg/dL  High:             160-189 mg/dL  Very high:       >189 mg/dl      Non HDL Cholesterol 168 (H) <130 mg/dL      Comment:      Above Desirable:  130-159 mg/dl  Borderline high:  160-189 mg/dl  High:             190-219 mg/dl  Very high:       >219 mg/dl     Comprehensive metabolic panel   Result Value Ref Range    Sodium 139 133 - 144 mmol/L    Potassium 3.8 3.4 - 5.3 mmol/L    Chloride 105 94 - 109 mmol/L    Carbon Dioxide 31 20 - 32 mmol/L    Anion Gap 3 3 - 14 mmol/L    Glucose 102 (H) 70 - 99 mg/dL      Comment:      Fasting specimen    Urea Nitrogen 21 7 - 30 mg/dL    Creatinine 0.93 0.52 - 1.04 mg/dL    GFR Estimate 63 >60 mL/min/[1.73_m2]      Comment:      Non  GFR Calc  Starting 12/18/2018, serum creatinine based estimated GFR (eGFR) will be   calculated using the Chronic Kidney Disease Epidemiology Collaboration   (CKD-EPI) equation.      GFR Estimate If Black 73 >60 mL/min/[1.73_m2]      Comment:       " American GFR Calc  Starting 12/18/2018, serum creatinine based estimated GFR (eGFR) will be   calculated using the Chronic Kidney Disease Epidemiology Collaboration   (CKD-EPI) equation.      Calcium 10.0 8.5 - 10.1 mg/dL    Bilirubin Total 0.6 0.2 - 1.3 mg/dL    Albumin 4.2 3.4 - 5.0 g/dL    Protein Total 7.8 6.8 - 8.8 g/dL    Alkaline Phosphatase 81 40 - 150 U/L    ALT 23 0 - 50 U/L    AST 15 0 - 45 U/L

## 2021-01-29 ENCOUNTER — VIRTUAL VISIT (OUTPATIENT)
Dept: ONCOLOGY | Facility: CLINIC | Age: 71
End: 2021-01-29
Attending: SURGERY
Payer: MEDICARE

## 2021-01-29 DIAGNOSIS — C50.911 MALIGNANT NEOPLASM OF RIGHT FEMALE BREAST, UNSPECIFIED ESTROGEN RECEPTOR STATUS, UNSPECIFIED SITE OF BREAST (H): Primary | ICD-10-CM

## 2021-01-29 DIAGNOSIS — Z80.8 FAMILY HISTORY OF MALIGNANT MELANOMA: ICD-10-CM

## 2021-01-29 DIAGNOSIS — Z80.3 FAMILY HISTORY OF MALIGNANT NEOPLASM OF BREAST: ICD-10-CM

## 2021-01-29 PROCEDURE — 96040 PR GENETIC COUNSELING, EACH 30 MIN: CPT | Mod: 95 | Performed by: GENETIC COUNSELOR, MS

## 2021-01-29 NOTE — LETTER
Cancer Risk Management  Program Locations    [facility]  [facility]  Mailing Address  Cancer Risk Management Program  [facility]  [address]  [place] [address]    New patient appointments  [phone]  January 29, 2021    Emely Forbes  [address] [place] [address]      Dear Emely,    It was a pleasure speaking with you over video on 1/29/2021. Here is a copy of the progress note from our discussion. If you have any additional questions, please feel free to call.    Referring Provider: Yuliya Cohen MD    Presenting Information:   Given concerns regarding the potential for COVID-19 exposure during a clinic visit, Emely elected for a video genetic counseling visit through the Cancer Risk Management Program to discuss her personal and family history of breast cancer. Today we reviewed this history, cancer screening recommendations, and available genetic testing options.    Personal History:  Emely is a 70 year old year old female. She was diagnosed with right breast cancer at age 60; treatment included right mastectomy and adjuvant aromatase inhibitors.     She had her first menstrual period at age 13 or 14, her first child at age 31, and she completed menopause at age 49. Emely has her ovaries, fallopian tubes and uterus removed at age 49 due to her history of fibroids. She reports that she used hormone replacement therapy for less than one year.    She has annual left clinical breast exams and mammograms; her most recent mammogram in November 2020 was normal. Emely has not had a colonoscopy. She reports that she has annual Cologuard testing which are reported to be normal. She does not regularly do any other cancer screening at this time.         Family History: (Please see scanned pedigree for detailed family  history information)    Emely's sister was diagnosed with melanoma on her arm at an unknown age.    Emely's mother was diagnosed with thyroid cancer at age 37. She was later diagnosed with breast cancer at age 68 and  at age 84.    A maternal cousin was diagnosed with and  from breast cancer in her 30's.    A maternal cousin was diagnosed with breast cancer in her 70's.    A maternal cousin's daughter was diagnosed with breast cancer at an unknown age.    A maternal uncle was diagnosed with melanoma at an unknown age.    There is no reported family history of cancer on her paternal side of the family.    Her maternal ethnicity is [place], [place], [place]ym[place], and [place]. Her paternal ethnicity is [facility]. There is no known Ashkenazi Gnosticist ancestry on either side of her family. There is no reported consanguinity.    Discussion:    Emely's personal and family history of breast cancer is suggestive of a hereditary cancer syndrome.    We reviewed the features of sporadic, familial, and hereditary cancers. We discussed that mutations in either BRCA1 or BRCA2 could be possible hereditary explanations for her family history of breast cancer. Mutations in the BRCA1 or BRCA2 gene are known to cause Hereditary Breast and Ovarian Cancer Syndrome (HBOC). HBOC typically presents with multiple family members diagnosed with breast cancer before age 50 and/or ovarian cancer. Other cancer risks associated with HBOC include male breast cancer, prostate cancer, pancreatic cancer, and melanoma.     We discussed the natural history and genetics of hereditary cancer. A detailed handout regarding hereditary cancer, along with the other information we discussed, will be mailed to Emely at the end of our appointment today and can be found in the after visit summary. Topics included: inheritance pattern, cancer risks, cancer screening recommendations, and also risks, benefits and limitations of testing.    Based on her personal and family  history, Emely meets current National Comprehensive Cancer Network (NCCN) criteria for genetic testing of BRCA1/2 along with other high-penetrance breast and/or ovarian cancer susceptibility genes.    We discussed that there are additional genes that could cause increased risk for breast cancer. As many of these genes present with overlapping features in a family and accurate cancer risk cannot always be established based upon the pedigree analysis alone, it would be reasonable for Emely to consider panel genetic testing to analyze multiple genes at once.    Genetic testing is available for 29 genes associated with hereditary gynecologic, breast, and related cancers: CustomNext-Cancer (RITU, BAP1, BARD1, BRCA1, BRCA2, BRIP1, CDH1, CDK4, CDKN2A, CHEK2, DICER1, EPCAM, MITF, MLH1, MSH2, MSH6, NBN, NF1, PALB2, PMS2, POT1, PTEN, RAD51C, RAD51D, RB1, RECQL, SMARCA4, STK11, TP53).    We discussed that some of the genes in the CustomNext-Cancer panel are associated with specific hereditary cancer syndromes and published management guidelines: Hereditary Breast and Ovarian Cancer syndrome (BRCA1, BRCA2), Hyde syndrome (MLH1, MSH2, MSH6, PMS2, EPCAM), Hereditary Diffuse Gastric Cancer (CDH1), Cowden syndrome (PTEN), Li Fraumeni syndrome (TP53), Peutz-Jeghers syndrome (STK11), Familial Atypical Multiple Mole Melanoma syndrome (CDK4, CDKN2A), Retinoblastoma (RB1), and Neurofibromatosis type 1 (NF1).      Risk-reducing salpingo-oophorectomy can be considered in women with mutations in BRIP1, RAD51C, or RAD51D. Breast and/or other cancer risk management guidelines are available for RITU, CHEK2, PALB2, NF1, and NBN.    The remaining genes (BAP1, BARD1, DICER1, MITF, POT1, RECQL, and SMARCA4) are associated with increased cancer risk and may allow us to make medical recommendations when mutations are identified.      Emely would like to submit a blood sample for her genetic testing and would like this performed at the [facility]  [place]. I will message the scheduling team there to call the patient and get a blood draw appointment scheduled.     Verbal consent was given over the phone and written on the consent form due to COVID-19 restrictions. Turnaround time is 4-5 weeks once the lab receives the sample.    Medical Management: For Emely, we reviewed that the information from genetic testing may determine:    additional cancer screening for which Emely may qualify (i.e. mammogram and breast MRI, more frequent colonoscopies, more frequent dermatologic exams, etc.),    options for risk reducing surgeries Emely could consider (i.e. left mastectomy),      and targeted chemotherapies for Emely if she were to develop certain cancers in the future (i.e. immunotherapy for individuals with Hyde syndrome, PARP inhibitors, etc.).     These recommendations and possible targeted chemotherapies will be discussed in detail once genetic testing is completed.     Plan:  1) Today Emely elected to proceed with CustomNext-Cancer.  2) A copy of the consent form and the after visit summary will be mailed to Emely.  3) This information should be available in 4-5 weeks, once the lab receives the sample.  4) I will call Emely with the results once they become available.    Time spent on the phone: 70 minutes    Rodney Obrien MS, [facility]  Licensed, Certified Genetic Counselor  (P): [phone]  (F): [phone]

## 2021-01-29 NOTE — PROGRESS NOTES
2021    Referring Provider: Yuliya Cohen MD    Presenting Information:   Given concerns regarding the potential for COVID-19 exposure during a clinic visit, Emely elected for a video genetic counseling visit through the Cancer Risk Management Program to discuss her personal and family history of breast cancer. Today we reviewed this history, cancer screening recommendations, and available genetic testing options.    Personal History:  Emely is a 70 year old year old female. She was diagnosed with right breast cancer at age 60; treatment included right mastectomy and adjuvant aromatase inhibitors.     She had her first menstrual period at age 13 or 14, her first child at age 31, and she completed menopause at age 49. Emely has her ovaries, fallopian tubes and uterus removed at age 49 due to her history of fibroids. She reports that she used hormone replacement therapy for less than one year.    She has annual left clinical breast exams and mammograms; her most recent mammogram in 2020 was normal. Emely has not had a colonoscopy. She reports that she has annual Cologuard testing which are reported to be normal. She does not regularly do any other cancer screening at this time.     Family History: (Please see scanned pedigree for detailed family history information)    Emely's sister was diagnosed with melanoma on her arm at an unknown age.    Emely's mother was diagnosed with thyroid cancer at age 37. She was later diagnosed with breast cancer at age 68 and  at age 84.    A maternal cousin was diagnosed with and  from breast cancer in her 30's.    A maternal cousin was diagnosed with breast cancer in her 70's.    A maternal cousin's daughter was diagnosed with breast cancer at an unknown age.    A maternal uncle was diagnosed with melanoma at an unknown age.    There is no reported family history of cancer on her paternal side of the family.    Her maternal ethnicity is [place], Citizen of [place], [place], and  Pashto. Her paternal ethnicity is [facility]. There is no known Ashkenazi Amish ancestry on either side of her family. There is no reported consanguinity.    Discussion:    Emely's personal and family history of breast cancer is suggestive of a hereditary cancer syndrome.    We reviewed the features of sporadic, familial, and hereditary cancers. We discussed that mutations in either BRCA1 or BRCA2 could be possible hereditary explanations for her family history of breast cancer. Mutations in the BRCA1 or BRCA2 gene are known to cause Hereditary Breast and Ovarian Cancer Syndrome (HBOC). HBOC typically presents with multiple family members diagnosed with breast cancer before age 50 and/or ovarian cancer. Other cancer risks associated with HBOC include male breast cancer, prostate cancer, pancreatic cancer, and melanoma.     We discussed the natural history and genetics of hereditary cancer. A detailed handout regarding hereditary cancer, along with the other information we discussed, will be mailed to Emely at the end of our appointment today and can be found in the after visit summary. Topics included: inheritance pattern, cancer risks, cancer screening recommendations, and also risks, benefits and limitations of testing.    Based on her personal and family history, Emely meets current National Comprehensive Cancer Network (NCCN) criteria for genetic testing of BRCA1/2 along with other high-penetrance breast and/or ovarian cancer susceptibility genes.    We discussed that there are additional genes that could cause increased risk for breast cancer. As many of these genes present with overlapping features in a family and accurate cancer risk cannot always be established based upon the pedigree analysis alone, it would be reasonable for Emely to consider panel genetic testing to analyze multiple genes at once.    Genetic testing is available for 29 genes associated with hereditary gynecologic, breast, and related cancers:  CustomNext-Cancer (RITU, BAP1, BARD1, BRCA1, BRCA2, BRIP1, CDH1, CDK4, CDKN2A, CHEK2, DICER1, EPCAM, MITF, MLH1, MSH2, MSH6, NBN, NF1, PALB2, PMS2, POT1, PTEN, RAD51C, RAD51D, RB1, RECQL, SMARCA4, STK11, TP53).    We discussed that some of the genes in the CustomNext-Cancer panel are associated with specific hereditary cancer syndromes and published management guidelines: Hereditary Breast and Ovarian Cancer syndrome (BRCA1, BRCA2), Hyde syndrome (MLH1, MSH2, MSH6, PMS2, EPCAM), Hereditary Diffuse Gastric Cancer (CDH1), Cowden syndrome (PTEN), Li Fraumeni syndrome (TP53), Peutz-Jeghers syndrome (STK11), Familial Atypical Multiple Mole Melanoma syndrome (CDK4, CDKN2A), Retinoblastoma (RB1), and Neurofibromatosis type 1 (NF1).      Risk-reducing salpingo-oophorectomy can be considered in women with mutations in BRIP1, RAD51C, or RAD51D. Breast and/or other cancer risk management guidelines are available for RITU, CHEK2, PALB2, NF1, and NBN.    The remaining genes (BAP1, BARD1, DICER1, MITF, POT1, RECQL, and SMARCA4) are associated with increased cancer risk and may allow us to make medical recommendations when mutations are identified.    Emely would like to submit a blood sample for her genetic testing and would like this performed at the [facility]. I will message the scheduling team there to call the patient and get a blood draw appointment scheduled.     Verbal consent was given over the phone and written on the consent form due to COVID-19 restrictions. Turnaround time is 4-5 weeks once the lab receives the sample.    Medical Management: For Emely, we reviewed that the information from genetic testing may determine:    additional cancer screening for which Emely may qualify (i.e. mammogram and breast MRI, more frequent colonoscopies, more frequent dermatologic exams, etc.),    options for risk reducing surgeries Emely could consider (i.e. left mastectomy),      and targeted chemotherapies for Emely if she were  to develop certain cancers in the future (i.e. immunotherapy for individuals with Hyde syndrome, PARP inhibitors, etc.).     These recommendations and possible targeted chemotherapies will be discussed in detail once genetic testing is completed.     Plan:  1) Today Emely elected to proceed with CustomNext-Cancer.  2) A copy of the consent form and the after visit summary will be mailed to Emely.  3) This information should be available in 4-5 weeks, once the lab receives the sample.  4) I will call Emely with the results once they become available.    Time spent on the phone: 70 minutes    Rodney Obrien MS, [facility]  Licensed, Certified Genetic Counselor  (P): [phone]  (F): [phone]

## 2021-01-29 NOTE — RESULT ENCOUNTER NOTE
"Emely,    Your total cholesterol is higher this year (231 compared to 205 when it was checked 7 years ago).  Your HDL or \"good cholesterol\" is great at 63.  Your triglycerides were super at 78.  Your LDL or \"bad cholesterol\" was 152 compared to 136 before.  Follow a lot fat, high fiber diet and let's check that annually now.    Your blood sugar, kidney function, liver function and electrolytes were normal.    Let me know if you have any questions.    It was great to see you again.    Carlie Marina PA-C  "

## 2021-01-29 NOTE — PATIENT INSTRUCTIONS
Assessing Cancer Risk  Only about 5-10% of cancers are thought to be due to an inherited cancer susceptibility gene.    These families often have:    Several people with the same or related types of cancer    Cancers diagnosed at a young age (before age 50)    Individuals with more than one primary cancer    Multiple generations of the family affected with cancer    Some people may be candidates for genetic testing of more than one gene.  For these families, genetic testing using a cancer panel may be offered.  These panels will test different genes known to increase the risk for breast, ovarian, uterine, and/or other cancers. All of the genes discussed below have published clinical management guidelines for individuals who are found to carry a mutation. The purpose of this handout is to serve as a brief summary of the genes analyzed by the panels used to inquire about hereditary breast and gynecologic cancer:  RITU, BRCA1, BRCA2, BRIP1, CDH1, CHEK2, MLH1, MSH2, MSH6, PMS2, EPCAM, PTEN, PALB2, RAD51C, RAD51D, and TP53.  ______________________________________________________________________________  Hereditary Breast and Ovarian Cancer Syndrome   (BRCA1 and BRCA2)  A single mutation in one of the copies of BRCA1 or BRCA2 increases the risk for breast and ovarian cancer, among others.  The risk for pancreatic cancer and melanoma may also be slightly increased in some families.  The chart below shows the chance that someone with a BRCA mutation would develop cancer in his or her lifetime1,2,3,4.        A person s ethnic background is also important to consider, as individuals of [employer]kenazi [place] ancestry have a higher chance of having a BRCA gene mutation.  There are three BRCA mutations that occur more frequently in this population.    Hyde Syndrome   (MLH1, MSH2, MSH6, PMS2, and EPCAM)  Currently five genes are known to cause Hyde Syndrome: MLH1, MSH2, MSH6, PMS2, and EPCAM.  A single mutation in one of the  Hyde Syndrome genes increases the risk for colon, endometrial, ovarian, and stomach cancers.  Other cancers that occur less commonly in Hyde Syndrome include urinary tract, skin, and brain cancers.  The chart below shows the chance that a person with Hyde syndrome would develop cancer in his or her lifetime5.      *Cancer risk varies depending on Yhde syndrome gene found    Cowden Syndrome   (PTEN)  Cowden syndrome is a hereditary condition that increases the risk for breast, thyroid, endometrial, colon, and kidney cancer.  Cowden syndrome is caused by a mutation in the PTEN gene.  A single mutation in one of the copies of PTEN causes Cowden syndrome and increases cancer risk.  The chart below shows the chance that someone with a PTEN mutation would develop cancer in their lifetime6,7.  Other benign features seen in some individuals with Cowden syndrome include benign skin lesions (facial papules, keratoses, lipomas), learning disability, autism, thyroid nodules, colon polyps, and larger head size.      *One recent study found breast cancer risk to be increased to 85%    Li-Fraumeni Syndrome   (TP53)  Li-Fraumeni Syndrome (LFS) is a cancer predisposition syndrome caused by a mutation in the TP53 gene. A single mutation in one of the copies of TP53 increases the risk for multiple cancers. Individuals with LFS are at an increased risk for developing cancer at a young age. The lifetime risk for development of a LFS-associated cancer is 50% by age 30 and 90% by age 60.   Core Cancers: Sarcomas, Breast, Brain, Lung, Leukemias/Lymphomas, Adrenocortical carcinomas  Other Cancers: Gastrointestinal, Thyroid, Skin, Genitourinary    Hereditary Diffuse Gastric Cancer   (CDH1)  Currently, one gene is known to cause hereditary diffuse gastric cancer (HDGC): CDH1.  Individuals with HDGC are at increased risk for diffuse gastric cancer and lobular breast cancer. Of people diagnosed with HDGC, 30-50% have a mutation in the CDH1  gene.  This suggests there are likely other genes that may cause HDGC that have not been identified yet.      Lifetime Cancer Risks    General Population HDGC    Diffuse Gastric  <1% ~80%   Breast 12% 39-52%         Additional Genes  RITU  RITU is a moderate-risk breast cancer gene. Women who have a mutation in RITU can have between a 2-4 fold increased risk for breast cancer compared to the general population8. RITU mutations have also been associated with increased risk for pancreatic cancer, however an estimate of this cancer risk is not well understood9. Individuals who inherit two RITU mutations have a condition called ataxia-telangiectasia (AT).  This rare autosomal recessive condition affects the nervous system and immune system, and is associated with progressive cerebellar ataxia beginning in childhood.  Individuals with ataxia-telangiectasia often have a weakened immune system and have an increased risk for childhood cancers.    PALB2  Mutations in PALB2 have been shown to increase the risk of breast cancer up to 33-58% in some families; where individuals fall within this risk range is dependent upon family [id number]. PALB2 mutations have also been associated with increased risk for pancreatic cancer, although this risk has not been quantified yet.  Individuals who inherit two PALB2 mutations--one from their mother and one from their father--have a condition called Fanconi Anemia.  This rare autosomal recessive condition is associated with short stature, developmental delay, bone marrow failure, and increased risk for childhood cancers.    CHEK2   CHEK2 is a moderate-risk breast cancer gene.  Women who have a mutation in CHEK2 have around a 2-fold increased risk for breast cancer compared to the general population, and this risk may be higher depending upon family history.11,12,13 Mutations in CHEK2 have also been shown to increase the risk of a number of other cancers, including colon and prostate, however  these cancer risks are currently not well understood.    BRIP1, RAD51C and RAD51D  Mutations in BRIP1, RAD51C, and RAD51D have been shown to increase the risk of ovarian cancer and possibly female breast cancer as well14,15 .       Lifetime Cancer Risk    General Population BRIP1 RAD51C RAD51D   Ovarian 1-2% ~5-8% ~5-9% ~7-15%           Inheritance  All of the cancer syndromes reviewed above are inherited in an autosomal dominant pattern.  This means that if a parent has a mutation, each of his or her children will have a 50% chance of inheriting that same mutation.  Therefore, each child--male or female--would have a 50% chance of being at increased risk for developing cancer.      Image obtained from Genetics Home Reference, 2013     Mutations in some genes can occur de martin, which means that a person s mutation occurred for the first time in them and was not inherited from a parent.  Now that they have the mutation, however, it can be passed on to future generations.    Genetic Testing  Genetic testing involves a blood test and will look at the genetic information in the RITU, BRCA1, BRCA2, BRIP1, CDH1, CHEK2, MLH1, MSH2, MSH6, PMS2, EPCAM, PTEN, PALB2, RAD51C, RAD51D, and TP53 genes for any harmful mutations that are associated with increased cancer risk.  If possible, it is recommended that the person(s) who has had cancer be tested before other family members.  That person will give us the most useful information about whether or not a specific gene is associated with the cancer in the family.    Results  There are three possible results of genetic testing:    Positive--a harmful mutation was identified in one or more of the genes    Negative--no mutation was identified in any of the genes on this panel    Variant of unknown significance--a variation in one of the genes was identified, but it is unclear how this impacts cancer risk in the family    Advantages and Disadvantages   There are advantages and  disadvantages to genetic testing.    Advantages    May clarify your cancer risk    Can help you make medical decisions    May explain the cancers in your family    May give useful information to your family members (if you share your results)    Disadvantages    Possible negative emotional impact of learning about inherited cancer risk    Uncertainty in interpreting a negative test result in some situations    Possible genetic discrimination concerns (see below)    Genetic Information Nondiscrimination Act (RANDY)  RANDY is a federal law that protects individuals from health insurance or employment discrimination based on a genetic test result alone.  Although rare, there are currently no legal discrimination protections in terms of life insurance, long term care, or disability insurances.  Visit the [a]list games [employer] website to learn more.    Reducing Cancer Risk  All of the genes described above have nationally recognized cancer screening guidelines that would be recommended for individuals who test positive.  In addition to increased cancer screening, surgeries may be offered or recommended to reduce cancer risk.  Recommendations are based upon an individual s genetic test result as well as their personal and family history of cancer.    Questions to Think About Regarding Genetic Testing:    What effect will the test result have on me and my relationship with my family members if I have an inherited gene mutation?  If I don t have a gene mutation?    Should I share my test results, and how will my family react to this news, which may also affect them?    Are my children ready to learn new information that may one day affect their own health?    Hereditary Cancer Resources    FORCE: Facing Our Risk of Cancer Empowered facingourrisk.org   Bright Pink bebrightpink.org   Li-Fraumeni Syndrome Association lfsassociation.org   PTEN World PTENworld.com   No stomach for cancer, Inc.  nostomachforcancer.org   Stomach cancer relief network Scrnet.org   Collaborative Group of the Americas on Inherited Colorectal Cancer (CGA) cgaicc.com    Cancer Care cancercare.org   American Cancer Society (ACS) cancer.org   National Cancer [place] (NCI) cancer.gov     Please call us if you have any questions or concerns.   Cancer Risk Management Program [phone]-CANCER ([phone])  ? Rodney Obrien, MS, [facility] [phone]  ? Rajwinder Colvin, MS, [facility]  [phone]  ? Jasmine Call, MS, [facility]  [phone]  ? Lorri Jean, MS, [facility] [phone]  ? Alem Nikki, MS, [facility] [phone]  ? Tiffanie Palacios, MS, [facility]  [phone]  ? Romina Patterson, MS, [facility]  [phone]    References  1. Shawna TELLO, Vaughn PDP, Lashawn S, Faizan CARRASCO, Monik JE, Joyce JL, Letitia N, Darron H, Jesus O, Valencia A, Wilbertini B, Raditrey P, Mandavekialberto S, Karena DM, Freeman N, Brenda E, Jesús H, Anival E, Su J, Gronye J, Oskar B, Kendellus H, Thorlacius S, Eerola H, Nevalbertolinna H, Antonia K, Nat OP. Average risks of breast and ovarian cancer associated with BRCA1 or BRCA2 mutations detected in case series unselected for family history: a combined analysis of 222 studies. Am J Hum Yane. 2003;72:1117-30.  2. Florian N, Hamida M, Vega G.  BRCA1 and BRCA2 Hereditary Breast and Ovarian Cancer. Gene Reviews online. 2013.  3. Harsha YC, Gabriella S, Cassie G, Cast S. Breast cancer risk among male BRCA1 and BRCA2 mutation carriers. J Natl Cancer Inst. 2007;99:1811-4.  4. Kevin THORNTON, Otoniel I, Demetrius J, Keven E, Verónica ER, Radha F. Risk of breast cancer in male BRCA2 carriers. J Med Ayne. 2010;47:710-1.  5. National Comprehensive Cancer Network. Clinical practice guidelines in oncology, colorectal cancer screening. Available online (registration required). 2015.  6. Yunier NÚÑEZ, Zora J, Jv J, Jeff LA, Doc PEPPER, Kenia C. Lifetime cancer risks in individuals with germline PTEN mutations. Clin Cancer Res. 2012;18:400-7.  7. Pilarski R. Cowden  Syndrome: A Critical Review of the Clinical Literature. J Yane . 2009:18:13-27.  8. Alexi A, Javier D, Clotilde S, Bobbi P, Yuan T, Lacho M, Gordo B, Melanie H, Ki R, Gio K, Levar L, Kevin DG, Karena D, Jaron DF, Víctor MR, The Breast Cancer Susceptibility Collaboration ([place]) & Tea HEALY. RITU mutations that cause ataxia-telangiectasia are breast cancer susceptibility alleles. Nature Genetics. 2006;38:873-875  9. Brayan N , Kamila Y, Karishma J, Steven L, Benjamin GM , [place] ML, Gallinger S, Mares AG, Syngal S, Bryanna ML, Fredis J , Rahat R, Lisa SZ, Catherine JR, Khalida VE, Iván M, Voluana B, Haylee N, Luis RH, Medardo KW, and Qasim AP. RITU mutations in patients with hereditary pancreatic cancer. Cancer Discover. 2012;2:41-46  10. Shawna SANTACRUZ, et al. Breast-Cancer Risk in Families with Mutations in PALB2. NEJM. 2014; 371(6):497-506.  11. CHEK2 Breast Cancer Case-Control Consortium. CHEK2*1100delC and susceptibility to breast cancer: A collaborative analysis involving 10,860 breast cancer cases and 9,065 controls from 10 studies. Am J Hum Yane, 74 (2004), pp. 4216-7134  12. Rojelio T, Rudy S, Raza K, et al. Spectrum of Mutations in BRCA1, BRCA2, CHEK2, and TP53 in Families at High Risk of Breast Cancer. ARMANDO. 2006;295(12):8660-8614.   13. Jamari C, Idalmis D, Steven A, et al. Risk of breast cancer in women with a CHEK2 mutation with and without a family history of breast cancer. J Clin Oncol. 2011;29:8969-6355.  14. Brendan H, Francoise E, Pascual SJ, et al. Contribution of germline mutations in the RAD51B, RAD51C, and RAD51D genes to ovarian cancer in the population. J Clin Oncol. 2015;33(26):6345-1766. Doi:10.1200/JCO.2015.61.2408.  15. Felicita T, Thao TAM, Sina P, et al. Mutations in BRIP1 confer high risk of ovarian cancer. Lisa Yane. 2011;43(11):6676-8504. doi:10.1038/ng.955.

## 2021-01-29 NOTE — LETTER
Assessing Cancer Risk  Only about 5-10% of cancers are thought to be due to an inherited cancer susceptibility gene.    These families often have:    Several people with the same or related types of cancer    Cancers diagnosed at a young age (before age 50)    Individuals with more than one primary cancer    Multiple generations of the family affected with cancer    Some people may be candidates for genetic testing of more than one gene.  For these families, genetic testing using a cancer panel may be offered.  These panels will test different genes known to increase the risk for breast, ovarian, uterine, and/or other cancers. All of the genes discussed below have published clinical management guidelines for individuals who are found to carry a mutation. The purpose of this handout is to serve as a brief summary of the genes analyzed by the panels used to inquire about hereditary breast and gynecologic cancer:  RITU, BRCA1, BRCA2, BRIP1, CDH1, CHEK2, MLH1, MSH2, MSH6, PMS2, EPCAM, PTEN, PALB2, RAD51C, RAD51D, and TP53.  ______________________________________________________________________________  Hereditary Breast and Ovarian Cancer Syndrome   (BRCA1 and BRCA2)  A single mutation in one of the copies of BRCA1 or BRCA2 increases the risk for breast and ovarian cancer, among others.  The risk for pancreatic cancer and melanoma may also be slightly increased in some families.  The chart below shows the chance that someone with a BRCA mutation would develop cancer in his or her lifetime1,2,3,4.        A person s ethnic background is also important to consider, as individuals of Ashkenazi Confucianist ancestry have a higher chance of having a BRCA gene mutation.  There are three BRCA mutations that occur more frequently in this population.    Hyde Syndrome   (MLH1, MSH2, MSH6, PMS2, and EPCAM)  Currently five genes are known to cause Hyde Syndrome: MLH1, MSH2, MSH6, PMS2, and EPCAM.  A single mutation in one of the Hyde  Syndrome genes increases the risk for colon, endometrial, ovarian, and stomach cancers.  Other cancers that occur less commonly in Hyde Syndrome include urinary tract, skin, and brain cancers.  The chart below shows the chance that a person with Hyde syndrome would develop cancer in his or her lifetime5.      *Cancer risk varies depending on Hyde syndrome gene found    Cowden Syndrome   (PTEN)  Cowden syndrome is a hereditary condition that increases the risk for breast, thyroid, endometrial, colon, and kidney cancer.  Cowden syndrome is caused by a mutation in the PTEN gene.  A single mutation in one of the copies of PTEN causes Cowden syndrome and increases cancer risk.  The chart below shows the chance that someone with a PTEN mutation would develop cancer in their lifetime6,7.  Other benign features seen in some individuals with Cowden syndrome include benign skin lesions (facial papules, keratoses, lipomas), learning disability, autism, thyroid nodules, colon polyps, and larger head size.      *One recent study found breast cancer risk to be increased to 85%    Li-Fraumeni Syndrome   (TP53)  Li-Fraumeni Syndrome (LFS) is a cancer predisposition syndrome caused by a mutation in the TP53 gene. A single mutation in one of the copies of TP53 increases the risk for multiple cancers. Individuals with LFS are at an increased risk for developing cancer at a young age. The lifetime risk for development of a LFS-associated cancer is 50% by age 30 and 90% by age 60.   Core Cancers: Sarcomas, Breast, Brain, Lung, Leukemias/Lymphomas, Adrenocortical carcinomas  Other Cancers: Gastrointestinal, Thyroid, Skin, Genitourinary    Hereditary Diffuse Gastric Cancer   (CDH1)  Currently, one gene is known to cause hereditary diffuse gastric cancer (HDGC): CDH1.  Individuals with HDGC are at increased risk for diffuse gastric cancer and lobular breast cancer. Of people diagnosed with HDGC, 30-50% have a mutation in the CDH1 gene.   This suggests there are likely other genes that may cause HDGC that have not been identified yet.      Lifetime Cancer Risks    General Population HDGC    Diffuse Gastric  <1% ~80%   Breast 12% 39-52%           Additional Genes  RITU  RITU is a moderate-risk breast cancer gene. Women who have a mutation in RITU can have between a 2-4 fold increased risk for breast cancer compared to the general population8. RITU mutations have also been associated with increased risk for pancreatic cancer, however an estimate of this cancer risk is not well understood9. Individuals who inherit two RITU mutations have a condition called ataxia-telangiectasia (AT).  This rare autosomal recessive condition affects the nervous system and immune system, and is associated with progressive cerebellar ataxia beginning in childhood.  Individuals with ataxia-telangiectasia often have a weakened immune system and have an increased risk for childhood cancers.    PALB2  Mutations in PALB2 have been shown to increase the risk of breast cancer up to 33-58% in some families; where individuals fall within this risk range is dependent upon family [id number]. PALB2 mutations have also been associated with increased risk for pancreatic cancer, although this risk has not been quantified yet.  Individuals who inherit two PALB2 mutations--one from their mother and one from their father--have a condition called Fanconi Anemia.  This rare autosomal recessive condition is associated with short stature, developmental delay, bone marrow failure, and increased risk for childhood cancers.    CHEK2   CHEK2 is a moderate-risk breast cancer gene.  Women who have a mutation in CHEK2 have around a 2-fold increased risk for breast cancer compared to the general population, and this risk may be higher depending upon family history.11,12,13 Mutations in CHEK2 have also been shown to increase the risk of a number of other cancers, including colon and prostate, however these  cancer risks are currently not well understood.    BRIP1, RAD51C and RAD51D  Mutations in BRIP1, RAD51C, and RAD51D have been shown to increase the risk of ovarian cancer and possibly female breast cancer as well14,15 .       Lifetime Cancer Risk    General Population BRIP1 RAD51C RAD51D   Ovarian 1-2% ~5-8% ~5-9% ~7-15%           Inheritance  All of the cancer syndromes reviewed above are inherited in an autosomal dominant pattern.  This means that if a parent has a mutation, each of his or her children will have a 50% chance of inheriting that same mutation.  Therefore, each child--male or female--would have a 50% chance of being at increased risk for developing cancer.      Image obtained from Genetics Home Reference, 2013     Mutations in some genes can occur de martin, which means that a person s mutation occurred for the first time in them and was not inherited from a parent.  Now that they have the mutation, however, it can be passed on to future generations.    Genetic Testing  Genetic testing involves a blood test and will look at the genetic information in the RITU, BRCA1, BRCA2, BRIP1, CDH1, CHEK2, MLH1, MSH2, MSH6, PMS2, EPCAM, PTEN, PALB2, RAD51C, RAD51D, and TP53 genes for any harmful mutations that are associated with increased cancer risk.  If possible, it is recommended that the person(s) who has had cancer be tested before other family members.  That person will give us the most useful information about whether or not a specific gene is associated with the cancer in the family.    Results  There are three possible results of genetic testing:    Positive--a harmful mutation was identified in one or more of the genes    Negative--no mutation was identified in any of the genes on this panel    Variant of unknown significance--a variation in one of the genes was identified, but it is unclear how this impacts cancer risk in the family    Advantages and Disadvantages   There are advantages and disadvantages  to genetic testing.    Advantages    May clarify your cancer risk    Can help you make medical decisions    May explain the cancers in your family    May give useful information to your family members (if you share your results)    Disadvantages    Possible negative emotional impact of learning about inherited cancer risk    Uncertainty in interpreting a negative test result in some situations    Possible genetic discrimination concerns (see below)    Genetic Information Nondiscrimination Act (RANDY)  RANDY is a federal law that protects individuals from health insurance or employment discrimination based on a genetic test result alone.  Although rare, there are currently no legal discrimination protections in terms of life insurance, long term care, or disability insurances.  Visit the [employer] [place] website to learn more.    Reducing Cancer Risk  All of the genes described above have nationally recognized cancer screening guidelines that would be recommended for individuals who test positive.  In addition to increased cancer screening, surgeries may be offered or recommended to reduce cancer risk.  Recommendations are based upon an individual s genetic test result as well as their personal and family history of cancer.    Questions to Think About Regarding Genetic Testing:    What effect will the test result have on me and my relationship with my family members if I have an inherited gene mutation?  If I don t have a gene mutation?    Should I share my test results, and how will my family react to this news, which may also affect them?    Are my children ready to learn new information that may one day affect their own health?    Hereditary Cancer Resources    FORCE: Facing Our Risk of Cancer Empowered [email or web address]   Bright Pink bebrightpink.org   Li-Fraumeni Syndrome Association lfsassociation.org   PTEN World [email or web address]   No stomach for cancer, Inc. nostomachforcancer.[email or web address]   Stomach  cancer relief network Scrnet.org   Collaborative Group of the Americas on Inherited Colorectal Cancer (CGA) cgaicc.com    Cancer Care cancercare.org   American Cancer Society (ACS) cancer.org   National Cancer [place] (NCI) cancer.gov     Please call us if you have any questions or concerns.   Cancer Risk Management Program [phone]-CANCER ([phone])  ? Rodney Obrien, MS, [facility] [phone]  ? Rajwinder Colvin, MS, [facility]  [phone]  ? Jasmine Call, MS, [facility]  [phone]  ? Lorri Jean, MS, [facility] [phone]  ? Alem Nikki, MS, [facility] [phone]  ? Tiffanie Palacios, MS, [facility]  [phone]  ? Romina Patterson, MS, [facility]  [phone]    References  1. Shawna A, Vaughn PDP, Lashawn S, Faizan CARRASCO, Monik JE, Joyce JL, Letitia N, Darron H, Jesus O, Valencia A, Bibi B, Raditrey P, Manjason S, Karena DM, Freeman N, Brenda E, Jesús H, Anival E, Su J, Gronye J, Oskar B, Sen H, Thorlacius S, Eerola H, Nevbarrerana H, Antonia K, Nat OP. Average risks of breast and ovarian cancer associated with BRCA1 or BRCA2 mutations detected in case series unselected for family history: a combined analysis of 222 studies. Am J Hum Yane. 2003;72:1117-30.  2. Florian N, Hamida M, Silvia G.  BRCA1 and BRCA2 Hereditary Breast and Ovarian Cancer. Gene Reviews online. 2013.  3. Harsha YC, Gabriella S, Cassie G, Cast S. Breast cancer risk among male BRCA1 and BRCA2 mutation carriers. J Natl Cancer Inst. 2007;99:1811-4.  4. Kevin THORNTON, Otoniel I, Demetrius J, Keven E, Verónica ER, Radha F. Risk of breast cancer in male BRCA2 carriers. J Med Yane. 2010;47:710-1.  5. National Comprehensive Cancer Network. Clinical practice guidelines in oncology, colorectal cancer screening. Available online (registration required). 2015.  6. Yunier NÚÑEZ, Zora J, Jv J, Jeff LA, Doc PEPPER, Eng C. Lifetime cancer risks in individuals with germline PTEN mutations. Clin Cancer Res. 2012;18:400-7.  7. Nisreen OTERO. Cowden Syndrome: A Critical Review of  the Clinical Literature. J Yane . 2009:18:13-27.  8. Alexi TELLO, Javier D, Clotilde S, Bobbi P, Yuan T, Lacho M, Gordo B, Melanie H, Ki R, Gio K, Levar L, Kevin DG, Karena D, Jaron DF, Víctor MR, The Breast Cancer Susceptibility Collaboration () & Tea HEALY. RITU mutations that cause ataxia-telangiectasia are breast cancer susceptibility alleles. Nature Genetics. 2006;38:873-875  9. Brayan N , Kamila Y, Karishma J, Steven L, Benjamin GM , [place] ML, Gallinger S, Mares AG, Syngal S, Bryanna ML, Fredis J , Rahat R, Lisa SZ, Catherine JR, Khalida VE, Iván M, Voluana B, Haylee N, Luis RH, Medardo KW, and Qasim AP. RITU mutations in patients with hereditary pancreatic cancer. Cancer Discover. 2012;2:41-46  10. Shawna SANTACRUZ, et al. Breast-Cancer Risk in Families with Mutations in PALB2. NEJM. 2014; 371(6):497-506.  11. CHEK2 Breast Cancer Case-Control Consortium. CHEK2*1100delC and susceptibility to breast cancer: A collaborative analysis involving 10,860 breast cancer cases and 9,065 controls from 10 studies. Am J Hum Yane, 74 (2004), pp. 9676-6220  12. Rojelio T, Rudy S, Raza K, et al. Spectrum of Mutations in BRCA1, BRCA2, CHEK2, and TP53 in Families at High Risk of Breast Cancer. ARMANDO. 2006;295(12):3034-4488.   13. Jamari C, Idalmis D, Steven A, et al. Risk of breast cancer in women with a CHEK2 mutation with and without a family history of breast cancer. J Clin Oncol. 2011;29:1610-3723.  14. Brendan H, Francoise E, Pascual SJ, et al. Contribution of germline mutations in the RAD51B, RAD51C, and RAD51D genes to ovarian cancer in the population. J Clin Oncol. 2015;33(26):6159-0065. Doi:10.1200/JCO.2015.61.2408.  15. Felicita T, Thao TAM, Sina P, et al. Mutations in BRIP1 confer high risk of ovarian cancer. Lisa Yane. 2011;43(11):2674-3294. doi:10.1038/ng.955.

## 2021-02-10 ENCOUNTER — OFFICE VISIT (OUTPATIENT)
Dept: INFUSION THERAPY | Facility: CLINIC | Age: 71
End: 2021-02-10
Attending: INTERNAL MEDICINE
Payer: MEDICARE

## 2021-02-10 ENCOUNTER — HOSPITAL ENCOUNTER (OUTPATIENT)
Facility: CLINIC | Age: 71
Setting detail: SPECIMEN
Discharge: HOME | End: 2021-02-10
Attending: INTERNAL MEDICINE | Admitting: INTERNAL MEDICINE
Payer: MEDICARE

## 2021-02-10 DIAGNOSIS — Z80.8 FAMILY HISTORY OF MALIGNANT MELANOMA: ICD-10-CM

## 2021-02-10 DIAGNOSIS — Z80.3 FAMILY HISTORY OF MALIGNANT NEOPLASM OF BREAST: ICD-10-CM

## 2021-02-10 DIAGNOSIS — C50.911 MALIGNANT NEOPLASM OF RIGHT FEMALE BREAST, UNSPECIFIED ESTROGEN RECEPTOR STATUS, UNSPECIFIED SITE OF BREAST (H): ICD-10-CM

## 2021-02-10 LAB — MISCELLANEOUS TEST: NORMAL

## 2021-02-10 PROCEDURE — 36415 COLL VENOUS BLD VENIPUNCTURE: CPT

## 2021-02-10 NOTE — PROGRESS NOTES
Medical Assistant Note:  Emely Forbes presents today for blood draw.    Patient seen by provider today: No.   present during visit today: Not Applicable.    Concerns: No Concerns.    Procedure:  Lab draw site: lac, Needle type: bf, Gauge: 23.    Post Assessment:  Labs drawn without difficulty: Yes.    Discharge Plan:  Departure Mode: Ambulatory.    Face to Face Time: 5 min  .    Azalia Manzano, CMA

## 2021-02-10 NOTE — LETTER
2/10/2021         RE: Emely Forbes  [address]  [place] [address]        Dear Colleague,    Thank you for referring your patient, Emely Forbes, to the [facility]. Please see a copy of my visit note below.    Medical Assistant Note:  Emely Forbes presents today for blood draw.    Patient seen by provider today: No.   present during visit today: Not Applicable.    Concerns: No Concerns.    Procedure:  Lab draw site: lac, Needle type: bf, Gauge: 23.    Post Assessment:  Labs drawn without difficulty: Yes.    Discharge Plan:  Departure Mode: Ambulatory.    Face to Face Time: 5 min  .    Azalia Manzano CMA              Again, thank you for allowing me to participate in the care of your patient.        Sincerely,         Lab Draw

## 2021-02-19 RX ORDER — TRIAMCINOLONE ACETONIDE 1 MG/G
CREAM TOPICAL BID
Qty: 80 | Refills: 2 | Status: ERX

## 2021-02-22 DIAGNOSIS — Z12.11 COLON CANCER SCREENING: ICD-10-CM

## 2021-02-22 PROCEDURE — 82274 ASSAY TEST FOR BLOOD FECAL: CPT | Performed by: PHYSICIAN ASSISTANT

## 2021-02-23 LAB — HEMOCCULT STL QL IA: NEGATIVE

## 2021-03-05 ENCOUNTER — TELEPHONE (OUTPATIENT)
Dept: ONCOLOGY | Facility: CLINIC | Age: 71
End: 2021-03-05
Payer: MEDICARE

## 2021-03-05 LAB — LAB SCANNED RESULT: NORMAL

## 2021-03-05 NOTE — TELEPHONE ENCOUNTER
Patient called requesting to speak with Rodney Obrien regarding the genetic testing results that she received an email/message through [employer].    Patient would like to know what she needs to do to see the results.    Please advise. Thank you.    Esmer Mena  [facility]  Cancer/Infusion Center   [phone]

## 2021-03-14 ENCOUNTER — HEALTH MAINTENANCE LETTER (OUTPATIENT)
Age: 71
End: 2021-03-14

## 2021-03-25 ENCOUNTER — VIRTUAL VISIT (OUTPATIENT)
Dept: ONCOLOGY | Facility: CLINIC | Age: 71
End: 2021-03-25
Attending: GENETIC COUNSELOR, MS
Payer: MEDICARE

## 2021-03-25 DIAGNOSIS — Z80.8 FAMILY HISTORY OF MALIGNANT MELANOMA: ICD-10-CM

## 2021-03-25 DIAGNOSIS — C50.911 MALIGNANT NEOPLASM OF RIGHT FEMALE BREAST, UNSPECIFIED ESTROGEN RECEPTOR STATUS, UNSPECIFIED SITE OF BREAST (H): Primary | ICD-10-CM

## 2021-03-25 DIAGNOSIS — Z80.3 FAMILY HISTORY OF MALIGNANT NEOPLASM OF BREAST: ICD-10-CM

## 2021-03-25 PROCEDURE — 96040 HC GENETIC COUNSELING, EACH 30 MINUTES: CPT | Mod: 95 | Performed by: GENETIC COUNSELOR, MS

## 2021-03-25 NOTE — PROGRESS NOTES
"3/25/2021    Referring Provider: Yuliya Cohen MD    Presenting Information:  I spoke to Emely by video today to discuss her genetic testing results. Her blood was drawn on 2/10/21. A CustomNext-Cancer panel was ordered from [facility]. This testing was done because of Emely's personal and family history of breast cancer.    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 This variant is called c.[id number] (p.A402V). No other variants or mutations were found in the CHEK2 gene. Given the uncertain significance of this result, medical management decisions should NOT be made based on this test result alone.    Of note, Emely is negative for mutations in the RITU, BAP1, BARD1, BRCA1, BRCA2, BRIP1, CDH1, CDK4, CDKN2A, DICER1, EPCAM, MITF, MLH1, MSH2, MSH6, NBN, NF1, PALB2, PMS2, POT1, PTEN, RAD51C, RAD51D, RB1, RECQL, SMARCA4, STK11, and TP53 genes. No mutations were found in any of the other 28 genes analyzed. This test involved sequencing and deletion/duplication analysis of all genes with the exception of EPCAM (deletions only) and MITF (sequencing only). We reviewed the autosomal dominant inheritance of these genes. Emely cannot pass on a mutation in any of these genes to her children based on this test result. Mutations in these genes do not skip generations.      A copy of the test report can be found in the Laboratory tab, dated 2/10/21, and named \"[facility]". The report is scanned in as a linked document.    Interpretation:  We discussed several different interpretations of this inconclusive test result. It is not clear if this variant in the CHEK2 gene is associated with increased cancer risk.  1. This variant may be a benign change that does not increase cancer risk.  2. This variant may be a harmful mutation that causes an increased risk for cancer.    CHEK2 c.[id number] " (p.A402V)  Harmful mutations in the CHEK2 gene are associated with increased risk for breast and colon cancer and possibly other cancers such as melanoma, prostate cancer, and thyroid cancer. We discussed that medical management decisions are NOT recommended for individuals with a VUS result.    The laboratory is working to determine if this variant is harmful or benign, and they will contact me if it is reclassified. If this variant is determined to be a benign change, there may be a different gene or combination of genes and environment that are associated with the cancers in this family.    Inheritance:  We reviewed the autosomal dominant inheritance of this variant in the CHEK2 gene. We discussed that Emely has a 50% chance to pass this variant to each of her children. Likewise, each of her siblings has a 50% risk of having the same variant. Because it is unclear what, if any, risk is associated with this variant, clinical genetic testing for this CHEK2 variant alone is not recommended for relatives.    Screening:  Based on this inconclusive test result, it is important for Emely and her relatives to refer back to the family history for appropriate cancer screening.      Emely s close female relatives remain at increased risk for breast cancer given their family history. Breast cancer screening is generally recommended to begin approximately 10 years younger than the earliest age of breast cancer diagnosis in the family, or at age 40, whichever comes first. Breast screening options should be discussed with an individual's primary care provider and a genetic counselor, to determine at what age to begin screening, what screening is appropriate, and if additional screening (such as breast MRI) is necessary based on personal/family history factors.    Due to the close family history of melanoma, Emely remains at some increased risk for developing melanoma. According to the American Cancer Society, Emely is encouraged to speak  to her primary care provider about having regular skin exams and safe skin practices (i.e. sunscreen, self skin exams, limited sun exposure, etc.). Of note, Emely is already being followed by Dermatology.    Other population cancer screening options, such as those recommended by the American Cancer Society and the National Comprehensive Cancer Network (NCCN), are also appropriate for Emely and her family. These screening recommendations may change if there are changes to Emely's personal and/or family history of cancer. Final screening recommendations should be made by each individual's managing physician.      Additional Testing Considerations:  Although Emely's genetic testing result is inconclusive, other relatives may still carry a harmful gene mutation associated with breast cancer. Genetic counseling is recommended for her maternal cousins with breast cancer to discuss genetic testing options. If any of these relatives do pursue genetic testing, Emely is encouraged to contact me so that we may review the impact of their test results on her.    Summary:  We do not have an explanation for Emely's personal or family history of cancer. While no genetic changes were identified, Emely may still be at risk for certain cancers due to family history, environmental factors, or other genetic causes not identified by this test.  Because of that, it is important that she continue with cancer screening based on her personal and family history as discussed above.    Genetic testing is rapidly advancing, and new cancer susceptibility genes will most likely be identified in the future. Therefore, I encouraged Emely to contact me annually or if there are changes in her personal or family history. This may change how we assess her cancer risk, screening, and the testing we would offer.    Plan:  1.  A copy of the test results will be mailed to Emely.    2.  She plans to follow-up with her other providers.  3.  She should contact me annually, or  sooner if her family history changes.  4.  I will contact Emely if the laboratory informs me that this VUS has been reclassified.  This may change screening and testing recommendations for Emely and her relatives.    If Emely has any further questions, I encouraged her to contact me at [phone].    Time spent on video: 23 minutes    Rodney Obrien MS, [facility] [place]  Licensed, Certified Genetic Counselor  (P): [phone]  (F): [phone]

## 2021-03-25 NOTE — Clinical Note
"Hello,    Please enclose a copy of the test report from the laboratory tab titled \"send out misc test\" dated 2/10/21 (Order [id number]) to send to the patient along with the letter.    Referring provider: please see summary of genetic test results below.    Thank you,  Rodney"

## 2021-03-25 NOTE — LETTER
Cancer Risk Management  Program [facility]  [facility]  Mailing Address  Cancer Risk Management Program  [address]  [place] [address]    New patient appointments  [phone]  March 25, 2021    Emely Forbes  [address] [place] [address]      Dear Emely,    It was a pleasure speaking with you over video on 3/25/2021. Here is a copy of the progress note from our discussion. If you have any additional questions, please feel free to call.    Referring Provider: Yuliya Cohen MD    Presenting Information:  I spoke to Emely by video today to discuss her genetic testing results. Her blood was drawn on 2/10/21. A [facility]Next-Cancer panel was ordered from [facility]. This testing was done because of Emely's personal and family history of breast cancer.    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 This variant is called [id number] (p.A402V). No other variants or mutations were found in the CHEK2 gene. Given the uncertain significance of this result, medical management decisions should NOT be made based on this test result alone.    Of note, Emely is negative for mutations in the RITU, BAP1, BARD1, BRCA1, BRCA2, BRIP1, CDH1, CDK4, CDKN2A, DICER1, EPCAM, MITF, MLH1, MSH2, MSH6, NBN, NF1, PALB2, PMS2, POT1, PTEN, RAD51C, RAD51D, RB1, RECQL, SMARCA4, STK11, and TP53 genes. No mutations were found in any of the other 28 genes analyzed. This test involved sequencing and deletion/duplication analysis of all genes with the exception of EPCAM (deletions only) and MITF (sequencing only). We reviewed the autosomal dominant inheritance of these genes. Emely cannot pass on a mutation in any of  "these genes to her children based on this test result. Mutations in these genes do not skip generations.      A copy of the test report can be found in the Laboratory tab, dated 2/10/21, and named \"[facility]\". The report is scanned in as a linked document.    Interpretation:  We discussed several different interpretations of this inconclusive test result. It is not clear if this variant in the CHEK2 gene is associated with increased cancer risk.  1. This variant may be a benign change that does not increase cancer risk.  2. This variant may be a harmful mutation that causes an increased risk for cancer.    CHEK2 c.[id number] (p.A402V)  Harmful mutations in the CHEK2 gene are associated with increased risk for breast and colon cancer and possibly other cancers such as melanoma, prostate cancer, and thyroid cancer. We discussed that medical management decisions are NOT recommended for individuals with a VUS result.    The laboratory is working to determine if this variant is harmful or benign, and they will contact me if it is reclassified. If this variant is determined to be a benign change, there may be a different gene or combination of genes and environment that are associated with the cancers in this family.    Inheritance:  We reviewed the autosomal dominant inheritance of this variant in the CHEK2 gene. We discussed that Emely has a 50% chance to pass this variant to each of her children. Likewise, each of her siblings has a 50% risk of having the same variant. Because it is unclear what, if any, risk is associated with this variant, clinical genetic testing for this CHEK2 variant alone is not recommended for relatives.    Screening:  Based on this inconclusive test result, it is important for Emely and her relatives to refer back to the family history for appropriate cancer screening.      Emely s close female relatives remain at increased risk for breast cancer given their family history. Breast " cancer screening is generally recommended to begin approximately 10 years younger than the earliest age of breast cancer diagnosis in the family, or at age 40, whichever comes first. Breast screening options should be discussed with an individual's primary care provider and a genetic counselor, to determine at what age to begin screening, what screening is appropriate, and if additional screening (such as breast MRI) is necessary based on personal/family history factors.    Due to the close family history of melanoma, Emely remains at some increased risk for developing melanoma. According to the American Cancer Society, Eemly is encouraged to speak to her primary care provider about having regular skin exams and safe skin practices (i.e. sunscreen, self skin exams, limited sun exposure, etc.). Of note, Emely is already being followed by Dermatology.    Other population cancer screening options, such as those recommended by the American Cancer Society and the National Comprehensive Cancer Network (NCCN), are also appropriate for Emely and her family. These screening recommendations may change if there are changes to Emely's personal and/or family history of cancer. Final screening recommendations should be made by each individual's managing physician.      Additional Testing Considerations:  Although Emely's genetic testing result is inconclusive, other relatives may still carry a harmful gene mutation associated with breast cancer. Genetic counseling is recommended for her maternal cousins with breast cancer to discuss genetic testing options. If any of these relatives do pursue genetic testing, Emely is encouraged to contact me so that we may review the impact of their test results on her.    Summary:  We do not have an explanation for Emely's personal or family history of cancer. While no genetic changes were identified, Emely may still be at risk for certain cancers due to family history, environmental factors, or other genetic  causes not identified by this test.  Because of that, it is important that she continue with cancer screening based on her personal and family history as discussed above.    Genetic testing is rapidly advancing, and new cancer susceptibility genes will most likely be identified in the future. Therefore, I encouraged Emely to contact me annually or if there are changes in her personal or family history. This may change how we assess her cancer risk, screening, and the testing we would offer.    Plan:  1.  A copy of the test results will be mailed to Emely.    2.  She plans to follow-up with her other providers.  3.  She should contact me annually, or sooner if her family history changes.  4.  I will contact Emely if the laboratory informs me that this VUS has been reclassified.  This may change screening and testing recommendations for Emely and her relatives.    If Emely has any further questions, I encouraged her to contact me at [phone].    Time spent on video: 23 minutes    Rodney Obrien MS, [facility]  Licensed, Certified Genetic Counselor  (P): [phone]  (F): [phone]

## 2021-05-17 ENCOUNTER — APPOINTMENT (OUTPATIENT)
Dept: URBAN - METROPOLITAN AREA CLINIC 255 | Age: 71
Setting detail: DERMATOLOGY
End: 2021-05-24

## 2021-05-17 DIAGNOSIS — L82.1 OTHER SEBORRHEIC KERATOSIS: ICD-10-CM

## 2021-05-17 DIAGNOSIS — Z85.828 PERSONAL HISTORY OF OTHER MALIGNANT NEOPLASM OF SKIN: ICD-10-CM

## 2021-05-17 DIAGNOSIS — L81.4 OTHER MELANIN HYPERPIGMENTATION: ICD-10-CM

## 2021-05-17 DIAGNOSIS — D22 MELANOCYTIC NEVI: ICD-10-CM

## 2021-05-17 DIAGNOSIS — L82.0 INFLAMED SEBORRHEIC KERATOSIS: ICD-10-CM

## 2021-05-17 DIAGNOSIS — D18.0 HEMANGIOMA: ICD-10-CM

## 2021-05-17 PROBLEM — D18.01 HEMANGIOMA OF SKIN AND SUBCUTANEOUS TISSUE: Status: ACTIVE | Noted: 2021-05-17

## 2021-05-17 PROBLEM — D22.5 MELANOCYTIC NEVI OF TRUNK: Status: ACTIVE | Noted: 2021-05-17

## 2021-05-17 PROCEDURE — 99213 OFFICE O/P EST LOW 20 MIN: CPT | Mod: 25

## 2021-05-17 PROCEDURE — OTHER MIPS QUALITY: OTHER

## 2021-05-17 PROCEDURE — OTHER LIQUID NITROGEN: OTHER

## 2021-05-17 PROCEDURE — OTHER COUNSELING: OTHER

## 2021-05-17 PROCEDURE — 17110 DESTRUCT B9 LESION 1-14: CPT

## 2021-05-17 ASSESSMENT — LOCATION ZONE DERM
LOCATION ZONE: SCALP
LOCATION ZONE: FACE
LOCATION ZONE: TRUNK

## 2021-05-17 ASSESSMENT — LOCATION DETAILED DESCRIPTION DERM
LOCATION DETAILED: RIGHT INFERIOR MEDIAL UPPER BACK
LOCATION DETAILED: LEFT LATERAL SUPERIOR CHEST
LOCATION DETAILED: LEFT SUPERIOR MEDIAL UPPER BACK
LOCATION DETAILED: LEFT MEDIAL FRONTAL SCALP
LOCATION DETAILED: RIGHT MEDIAL UPPER BACK
LOCATION DETAILED: LEFT CENTRAL TEMPLE

## 2021-05-17 ASSESSMENT — LOCATION SIMPLE DESCRIPTION DERM
LOCATION SIMPLE: CHEST
LOCATION SIMPLE: RIGHT UPPER BACK
LOCATION SIMPLE: LEFT UPPER BACK
LOCATION SIMPLE: LEFT TEMPLE
LOCATION SIMPLE: LEFT SCALP

## 2021-05-17 NOTE — PROCEDURE: LIQUID NITROGEN
Medical Necessity Information: It is in your best interest to select a reason for this procedure from the list below. All of these items fulfill various CMS LCD requirements except the new and changing color options.
Consent: The patient's consent was obtained including but not limited to risks of crusting, scabbing, blistering, scarring, darker or lighter pigmentary change, recurrence, incomplete removal and infection.
Post-Care Instructions: I reviewed with the patient in detail post-care instructions. Patient is to wear sunprotection, and avoid picking at any of the treated lesions. Pt may apply Vaseline to crusted or scabbing areas.
Medical Necessity Clause: This procedure was medically necessary because the lesions that were treated were:
Detail Level: Detailed
Render Post-Care Instructions In Note?: no
Number Of Freeze-Thaw Cycles: 1 freeze-thaw cycle
Duration Of Freeze Thaw-Cycle (Seconds): 15-20

## 2021-07-16 NOTE — PROCEDURE: MOHS SURGERY PHONE CONSULTATION
Has The Pathology Report Been Received?: Yes Advancement Flap (Double) Text: The defect edges were debeveled with a #15 scalpel blade.  Given the location of the defect and the proximity to free margins a double advancement flap was deemed most appropriate.  Using a sterile surgical marker, the appropriate advancement flaps were drawn incorporating the defect and placing the expected incisions within the relaxed skin tension lines where possible.    The area thus outlined was incised deep to adipose tissue with a #15 scalpel blade.  The skin margins were undermined to an appropriate distance in all directions utilizing iris scissors.

## 2021-09-10 ENCOUNTER — APPOINTMENT (OUTPATIENT)
Dept: URBAN - METROPOLITAN AREA CLINIC 255 | Age: 71
Setting detail: DERMATOLOGY
End: 2021-09-11

## 2021-09-10 DIAGNOSIS — L82.0 INFLAMED SEBORRHEIC KERATOSIS: ICD-10-CM

## 2021-09-10 DIAGNOSIS — Z71.89 OTHER SPECIFIED COUNSELING: ICD-10-CM

## 2021-09-10 DIAGNOSIS — L82.1 OTHER SEBORRHEIC KERATOSIS: ICD-10-CM

## 2021-09-10 PROCEDURE — 99212 OFFICE O/P EST SF 10 MIN: CPT | Mod: 25

## 2021-09-10 PROCEDURE — OTHER MIPS QUALITY: OTHER

## 2021-09-10 PROCEDURE — 17110 DESTRUCT B9 LESION 1-14: CPT

## 2021-09-10 PROCEDURE — OTHER LIQUID NITROGEN: OTHER

## 2021-09-10 PROCEDURE — OTHER COUNSELING: OTHER

## 2021-09-10 ASSESSMENT — LOCATION SIMPLE DESCRIPTION DERM
LOCATION SIMPLE: CHEST
LOCATION SIMPLE: HAIR
LOCATION SIMPLE: LEFT EYEBROW

## 2021-09-10 ASSESSMENT — LOCATION ZONE DERM
LOCATION ZONE: SCALP
LOCATION ZONE: TRUNK
LOCATION ZONE: FACE

## 2021-09-10 ASSESSMENT — LOCATION DETAILED DESCRIPTION DERM
LOCATION DETAILED: UPPER STERNUM
LOCATION DETAILED: LEFT CENTRAL EYEBROW
LOCATION DETAILED: HAIR

## 2021-09-10 NOTE — PROCEDURE: MIPS QUALITY
Quality 130: Documentation Of Current Medications In The Medical Record: Current Medications Documented
Quality 110: Preventive Care And Screening: Influenza Immunization: Influenza Immunization previously received during influenza season
Detail Level: Detailed
Quality 431: Preventive Care And Screening: Unhealthy Alcohol Use - Screening: Patient not identified as an unhealthy alcohol user when screened for unhealthy alcohol use using a systematic screening method
Quality 226: Preventive Care And Screening: Tobacco Use: Screening And Cessation Intervention: Patient screened for tobacco use and is an ex/non-smoker

## 2021-09-10 NOTE — PROCEDURE: LIQUID NITROGEN
Render Note In Bullet Format When Appropriate: No
Post-Care Instructions: I reviewed with the patient in detail post-care instructions. Patient is to wear sunprotection, and avoid picking at any of the treated lesions. Pt may apply Vaseline to crusted or scabbing areas.
Consent: The patient's consent was obtained including but not limited to risks of crusting, scabbing, blistering, scarring, darker or lighter pigmentary change, recurrence, incomplete removal and infection.
Medical Necessity Information: It is in your best interest to select a reason for this procedure from the list below. All of these items fulfill various CMS LCD requirements except the new and changing color options.
Medical Necessity Clause: This procedure was medically necessary because the lesions that were treated were:
Detail Level: Detailed
Duration Of Freeze Thaw-Cycle (Seconds): 0
Show Topical Anesthesia Variable?: Yes

## 2021-10-24 ENCOUNTER — HEALTH MAINTENANCE LETTER (OUTPATIENT)
Age: 71
End: 2021-10-24

## 2021-10-26 ENCOUNTER — APPOINTMENT (OUTPATIENT)
Dept: URBAN - METROPOLITAN AREA CLINIC 255 | Age: 71
Setting detail: DERMATOLOGY
End: 2021-10-27

## 2021-10-26 DIAGNOSIS — Z85.828 PERSONAL HISTORY OF OTHER MALIGNANT NEOPLASM OF SKIN: ICD-10-CM

## 2021-10-26 DIAGNOSIS — L72.0 EPIDERMAL CYST: ICD-10-CM

## 2021-10-26 DIAGNOSIS — L81.4 OTHER MELANIN HYPERPIGMENTATION: ICD-10-CM

## 2021-10-26 DIAGNOSIS — L82.0 INFLAMED SEBORRHEIC KERATOSIS: ICD-10-CM

## 2021-10-26 DIAGNOSIS — D22 MELANOCYTIC NEVI: ICD-10-CM

## 2021-10-26 DIAGNOSIS — D18.0 HEMANGIOMA: ICD-10-CM

## 2021-10-26 DIAGNOSIS — L82.1 OTHER SEBORRHEIC KERATOSIS: ICD-10-CM

## 2021-10-26 PROBLEM — D18.01 HEMANGIOMA OF SKIN AND SUBCUTANEOUS TISSUE: Status: ACTIVE | Noted: 2021-10-26

## 2021-10-26 PROBLEM — D22.5 MELANOCYTIC NEVI OF TRUNK: Status: ACTIVE | Noted: 2021-10-26

## 2021-10-26 PROCEDURE — OTHER LIQUID NITROGEN: OTHER

## 2021-10-26 PROCEDURE — 17110 DESTRUCT B9 LESION 1-14: CPT

## 2021-10-26 PROCEDURE — 99213 OFFICE O/P EST LOW 20 MIN: CPT | Mod: 25

## 2021-10-26 PROCEDURE — OTHER PRESCRIPTION: OTHER

## 2021-10-26 PROCEDURE — OTHER COUNSELING: OTHER

## 2021-10-26 RX ORDER — TRETIONIN 0.5 MG/G
CREAM TOPICAL QHS
Qty: 20 | Refills: 2 | Status: ERX | COMMUNITY
Start: 2021-10-26

## 2021-10-26 ASSESSMENT — LOCATION SIMPLE DESCRIPTION DERM
LOCATION SIMPLE: ANTERIOR SCALP
LOCATION SIMPLE: LEFT TEMPLE
LOCATION SIMPLE: RIGHT CHEEK
LOCATION SIMPLE: LEFT UPPER BACK
LOCATION SIMPLE: RIGHT UPPER BACK
LOCATION SIMPLE: RIGHT EYELID

## 2021-10-26 ASSESSMENT — LOCATION DETAILED DESCRIPTION DERM
LOCATION DETAILED: MID-FRONTAL SCALP
LOCATION DETAILED: RIGHT MEDIAL UPPER BACK
LOCATION DETAILED: LEFT CENTRAL TEMPLE
LOCATION DETAILED: RIGHT CENTRAL BUCCAL CHEEK
LOCATION DETAILED: LEFT SUPERIOR MEDIAL UPPER BACK
LOCATION DETAILED: RIGHT INFERIOR MEDIAL UPPER BACK
LOCATION DETAILED: RIGHT MEDIAL CANTHUS

## 2021-10-26 ASSESSMENT — LOCATION ZONE DERM
LOCATION ZONE: TRUNK
LOCATION ZONE: SCALP
LOCATION ZONE: FACE
LOCATION ZONE: EYELID

## 2021-10-26 NOTE — PROCEDURE: LIQUID NITROGEN
Show Topical Anesthesia Variable?: Yes
Add 52 Modifier (Optional): no
Consent: The patient's consent was obtained including but not limited to risks of crusting, scabbing, blistering, scarring, darker or lighter pigmentary change, recurrence, incomplete removal and infection.
Detail Level: Detailed
Medical Necessity Clause: This procedure was medically necessary because the lesions that were treated were:
Post-Care Instructions: I reviewed with the patient in detail post-care instructions. Patient is to wear sunprotection, and avoid picking at any of the treated lesions. Pt may apply Vaseline to crusted or scabbing areas.
Medical Necessity Information: It is in your best interest to select a reason for this procedure from the list below. All of these items fulfill various CMS LCD requirements except the new and changing color options.

## 2021-11-03 ENCOUNTER — RX ONLY (RX ONLY)
Age: 71
End: 2021-11-03

## 2021-11-03 RX ORDER — TRETIONIN 0.5 MG/G
CREAM TOPICAL QHS
Qty: 45 | Refills: 2 | Status: ERX

## 2021-11-03 RX ORDER — TRETIONIN 0.5 MG/G
CREAM TOPICAL QHS
Qty: 20 | Refills: 2 | Status: CANCELLED
Stop reason: CLARIF

## 2021-11-30 ENCOUNTER — HOSPITAL ENCOUNTER (OUTPATIENT)
Dept: MAMMOGRAPHY | Facility: CLINIC | Age: 71
Discharge: HOME | End: 2021-11-30
Attending: INTERNAL MEDICINE | Admitting: INTERNAL MEDICINE
Payer: MEDICARE

## 2021-11-30 DIAGNOSIS — Z12.31 BREAST CANCER SCREENING BY MAMMOGRAM: ICD-10-CM

## 2021-11-30 PROCEDURE — 77067 SCR MAMMO BI INCL CAD: CPT | Mod: 52

## 2022-02-08 ENCOUNTER — APPOINTMENT (OUTPATIENT)
Dept: URBAN - METROPOLITAN AREA CLINIC 255 | Age: 72
Setting detail: DERMATOLOGY
End: 2022-02-08

## 2022-02-08 VITALS — HEIGHT: 64 IN | WEIGHT: 140 LBS

## 2022-02-08 DIAGNOSIS — L82.0 INFLAMED SEBORRHEIC KERATOSIS: ICD-10-CM

## 2022-02-08 DIAGNOSIS — L57.0 ACTINIC KERATOSIS: ICD-10-CM

## 2022-02-08 PROCEDURE — OTHER LIQUID NITROGEN: OTHER

## 2022-02-08 PROCEDURE — 17110 DESTRUCT B9 LESION 1-14: CPT

## 2022-02-08 PROCEDURE — 17000 DESTRUCT PREMALG LESION: CPT | Mod: 59

## 2022-02-08 PROCEDURE — OTHER COUNSELING: OTHER

## 2022-02-08 ASSESSMENT — LOCATION SIMPLE DESCRIPTION DERM
LOCATION SIMPLE: LEFT CHEEK
LOCATION SIMPLE: SCALP
LOCATION SIMPLE: RIGHT CHEEK

## 2022-02-08 ASSESSMENT — LOCATION DETAILED DESCRIPTION DERM
LOCATION DETAILED: RIGHT INFERIOR NASAL CHEEK
LOCATION DETAILED: LEFT INFERIOR MEDIAL MALAR CHEEK
LOCATION DETAILED: RIGHT SUPERIOR PARIETAL SCALP

## 2022-02-08 ASSESSMENT — LOCATION ZONE DERM
LOCATION ZONE: SCALP
LOCATION ZONE: FACE

## 2022-02-08 NOTE — PROCEDURE: LIQUID NITROGEN
Show Applicator Variable?: Yes
Post-Care Instructions: I reviewed with the patient in detail post-care instructions. Patient is to wear sunprotection, and avoid picking at any of the treated lesions. Pt may apply Vaseline to crusted or scabbing areas.
Consent: The patient's consent was obtained including but not limited to risks of crusting, scabbing, blistering, scarring, darker or lighter pigmentary change, recurrence, incomplete removal and infection.
Render Post-Care Instructions In Note?: no
Medical Necessity Clause: This procedure was medically necessary because the lesions that were treated were:
Detail Level: Detailed
Spray Paint Text: The liquid nitrogen was applied to the skin utilizing a spray paint frosting technique.
Medical Necessity Information: It is in your best interest to select a reason for this procedure from the list below. All of these items fulfill various CMS LCD requirements except the new and changing color options.
Duration Of Freeze Thaw-Cycle (Seconds): 0

## 2022-04-10 ENCOUNTER — HEALTH MAINTENANCE LETTER (OUTPATIENT)
Age: 72
End: 2022-04-10

## 2022-04-18 ENCOUNTER — OFFICE VISIT (OUTPATIENT)
Dept: VASCULAR SURGERY | Facility: CLINIC | Age: 72
End: 2022-04-18

## 2022-04-18 DIAGNOSIS — I78.1 SPIDER TELANGIECTASIS OF SKIN: Primary | ICD-10-CM

## 2022-04-18 PROCEDURE — 36468 NJX SCLRSNT SPIDER VEINS: CPT | Performed by: SURGERY

## 2022-04-18 PROCEDURE — S9999 SALES TAX: HCPCS | Performed by: SURGERY

## 2022-04-18 NOTE — PROGRESS NOTES
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  The patient voiced understanding and wished to proceed.  Informed consent was obtained.    The patient was concerned about an area of thin skin just distal to the midportion of the anteromedial right leg.  There were no venous structures coursing to this area and this skin lesion did not appear to be related to a vein abnormality.  The patient recalls no trauma to the area just simply saw this area which appeared to be filled with red fluid and then seem to decompress.  Palpating the area in question which measured about 3 to 4 mm in diameter revealed a slightly weakened area of the skin with a bit of a defect but no masses or other concerns.  She will be seeing a dermatologist tomorrow to have this checked.    There were scattered reticular veins and telangiectasias over both lower extremities assessed from the knees to the ankles.  She was concerned about this from a cosmetic standpoint.  I donned the Syris headlight and injected areas from the knees to the distal third of the legs anteriorly, medially and laterally using 0.5% polidocanol foam, total 2 syringes.  I had the patient perform ankle pumps.    We cleaned her legs, had her don compression, then had her walk for 10 minutes.  She tolerated procedure well without evidence of allergic reactions or other complications.  She will return in 6 weeks in follow-up.    tSclerotherapy    Date/Time: 4/18/2022 12:26 PM  Performed by: Ricardo Chapman MD  Authorized by: Ricardo Chapman MD     Time out: Immediately prior to the procedure a time out was called     Type:  Cosmetic  Session:  Limited  Procedure side:  Bilateral  Solution/Amount:  .5 POLIDOCANOL  Syringes:  2  Patient tolerance:  Patient tolerated the procedure well with no immediate complications  Wrap/Hose:  Hose    .  I dennis Chapman MD    Dictated using Dragon voice recognition software which may result in transcription errors

## 2022-04-18 NOTE — LETTER
4/18/2022         RE: Emely Forbes  [address] [place] [address]        Dear Colleague,    Thank you for referring your patient, Emely Forbes, to the [facility] [place]. Please see a copy of my visit note below.        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  The patient voiced understanding and wished to proceed.  Informed consent was obtained.    The patient was concerned about an area of thin skin just distal to the midportion of the anteromedial right leg.  There were no venous structures coursing to this area and this skin lesion did not appear to be related to a vein abnormality.  The patient recalls no trauma to the area just simply saw this area which appeared to be filled with red fluid and then seem to decompress.  Palpating the area in question which measured about 3 to 4 mm in diameter revealed a slightly weakened area of the skin with a bit of a defect but no masses or other concerns.  She will be seeing a dermatologist tomorrow to have this checked.    There were scattered reticular veins and telangiectasias over both lower extremities assessed from the knees to the ankles.  She was concerned about this from a cosmetic standpoint.  I donned the Syris headlight and injected areas from the knees to the distal third of the legs anteriorly, medially and laterally using 0.5% polidocanol foam, total 2 syringes.  I had the patient perform ankle pumps.    We cleaned her legs, had her don compression, then had her walk for 10 minutes.  She tolerated procedure well without evidence of allergic reactions or other complications.  She  will return in 6 weeks in follow-up.    tSclerotherapy    Date/Time: 4/18/2022 12:26 PM  Performed by: Ricardo Chapman MD  Authorized by: Ricardo Chapman MD     Time out: Immediately prior to the procedure a time out was called    Type:  Cosmetic  Session:  Limited  Procedure side:  Bilateral  Solution/Amount:  .5 POLIDOCANOL  Syringes:  2  Patient tolerance:  Patient tolerated the procedure well with no immediate complications  Wrap/Hose:  Hose    .  I donned    C Edgar Chapman MD    Dictated using Dragon voice recognition software which may result in transcription errors      Again, thank you for allowing me to participate in the care of your patient.        Sincerely,        Ricardo Chapman MD

## 2022-04-19 ENCOUNTER — APPOINTMENT (OUTPATIENT)
Dept: URBAN - METROPOLITAN AREA CLINIC 255 | Age: 72
Setting detail: DERMATOLOGY
End: 2022-04-20

## 2022-04-19 VITALS — WEIGHT: 140 LBS | HEIGHT: 65 IN

## 2022-04-19 DIAGNOSIS — L70.8 OTHER ACNE: ICD-10-CM

## 2022-04-19 DIAGNOSIS — L81.4 OTHER MELANIN HYPERPIGMENTATION: ICD-10-CM

## 2022-04-19 DIAGNOSIS — L82.1 OTHER SEBORRHEIC KERATOSIS: ICD-10-CM

## 2022-04-19 DIAGNOSIS — D22 MELANOCYTIC NEVI: ICD-10-CM

## 2022-04-19 DIAGNOSIS — L82.0 INFLAMED SEBORRHEIC KERATOSIS: ICD-10-CM

## 2022-04-19 PROBLEM — D22.5 MELANOCYTIC NEVI OF TRUNK: Status: ACTIVE | Noted: 2022-04-19

## 2022-04-19 PROBLEM — D23.71 OTHER BENIGN NEOPLASM OF SKIN OF RIGHT LOWER LIMB, INCLUDING HIP: Status: ACTIVE | Noted: 2022-04-19

## 2022-04-19 PROCEDURE — OTHER LIQUID NITROGEN: OTHER

## 2022-04-19 PROCEDURE — OTHER MIPS QUALITY: OTHER

## 2022-04-19 PROCEDURE — 17110 DESTRUCT B9 LESION 1-14: CPT

## 2022-04-19 PROCEDURE — 99213 OFFICE O/P EST LOW 20 MIN: CPT | Mod: 25

## 2022-04-19 PROCEDURE — OTHER COUNSELING: OTHER

## 2022-04-19 ASSESSMENT — LOCATION DETAILED DESCRIPTION DERM
LOCATION DETAILED: NASAL DORSUM
LOCATION DETAILED: LEFT POSTERIOR SHOULDER
LOCATION DETAILED: LEFT INFERIOR LATERAL UPPER BACK
LOCATION DETAILED: RIGHT SUPERIOR LATERAL MIDBACK
LOCATION DETAILED: LEFT RIB CAGE
LOCATION DETAILED: LEFT SUPERIOR FOREHEAD
LOCATION DETAILED: RIGHT INFERIOR UPPER BACK
LOCATION DETAILED: LEFT ANTERIOR SHOULDER
LOCATION DETAILED: LEFT INFERIOR CENTRAL MALAR CHEEK
LOCATION DETAILED: LEFT SUPERIOR LATERAL LOWER BACK
LOCATION DETAILED: RIGHT ANTERIOR SHOULDER

## 2022-04-19 ASSESSMENT — LOCATION SIMPLE DESCRIPTION DERM
LOCATION SIMPLE: LEFT LOWER BACK
LOCATION SIMPLE: LEFT FOREHEAD
LOCATION SIMPLE: NOSE
LOCATION SIMPLE: RIGHT UPPER BACK
LOCATION SIMPLE: RIGHT LOWER BACK
LOCATION SIMPLE: RIGHT SHOULDER
LOCATION SIMPLE: LEFT CHEEK
LOCATION SIMPLE: LEFT SHOULDER
LOCATION SIMPLE: LEFT UPPER BACK
LOCATION SIMPLE: ABDOMEN

## 2022-04-19 ASSESSMENT — LOCATION ZONE DERM
LOCATION ZONE: ARM
LOCATION ZONE: NOSE
LOCATION ZONE: TRUNK
LOCATION ZONE: FACE

## 2022-04-19 NOTE — PROCEDURE: LIQUID NITROGEN
Duration Of Freeze Thaw-Cycle (Seconds): 0
Consent: The patient's consent was obtained including but not limited to risks of crusting, scabbing, blistering, scarring, darker or lighter pigmentary change, recurrence, incomplete removal and infection.
Add 52 Modifier (Optional): no
Number Of Freeze-Thaw Cycles: 3 freeze-thaw cycles
Post-Care Instructions: I reviewed with the patient in detail post-care instructions. Patient is to wear sunprotection, and avoid picking at any of the treated lesions. Pt may apply Vaseline to crusted or scabbing areas.
Detail Level: Zone
Total Number Of Lesions Treated: 6
Spray Paint Text: The liquid nitrogen was applied to the skin utilizing a spray paint frosting technique.
Medical Necessity Clause: This procedure was medically necessary because the lesions that were treated were:
Medical Necessity Information: It is in your best interest to select a reason for this procedure from the list below. All of these items fulfill various CMS LCD requirements except the new and changing color options.
Show Spray Paint Technique Variable?: Yes

## 2022-04-19 NOTE — HPI: SKIN LESION
What Type Of Note Output Would You Prefer (Optional)?: Standard Output
How Severe Is Your Skin Lesion?: mild
Has Your Skin Lesion Been Treated?: not been treated
Is This A New Presentation, Or A Follow-Up?: Skin Lesion
Additional History: Patient t reports that her vein doctor not concerned about lesion.

## 2022-06-06 ENCOUNTER — OFFICE VISIT (OUTPATIENT)
Dept: VASCULAR SURGERY | Facility: CLINIC | Age: 72
End: 2022-06-06
Payer: MEDICARE

## 2022-06-06 DIAGNOSIS — I78.1 SPIDER TELANGIECTASIS OF SKIN: Primary | ICD-10-CM

## 2022-06-06 PROCEDURE — 99207 PR VEINSOLUTIONS NO CHARGE VISIT: CPT | Performed by: SURGERY

## 2022-06-06 NOTE — PROGRESS NOTES
[facility] sclerotherapy follow-up note  Emely Forbes returns in follow-up of cosmetic sclerotherapy of bilateral lower extremity spider telangiectasias.    Exam  She has 2 areas on her right proximal medial calf and the mid right pretibial area which appeared somewhat dark, actually darker than they appeared on her pretreatment photos.    I donned the Syris headlight and inspected these areas and these do not represent trapped blood.  They ángel.    Assessment/plan  She had reasonable improvement on her left lower extremity but no significant improvement and to the areas that were treated on the right leg.  These actually look a bit worse.    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  She will see how things respond between now and then and may return.    JULISSA Chapman MD    Dictated using Dragon voice recognition software which may result in transcription errors

## 2022-06-06 NOTE — LETTER
6/6/2022         RE: Emely Forbes  [address] [place] [place] [address]        Dear Colleague,    Thank you for referring your patient, Emely Forbes, to the [facility] [place]. Please see a copy of my visit note below.    [facility] Vein [facility] sclerotherapy follow-up note  Emely Forbes returns in follow-up of cosmetic sclerotherapy of bilateral lower extremity spider telangiectasias.    Exam  She has 2 areas on her right proximal medial calf and the mid right pretibial area which appeared somewhat dark, actually darker than they appeared on her pretreatment photos.    I donned the Syris headlight and inspected these areas and these do not represent trapped blood.  They ángel.    Assessment/plan  She had reasonable improvement on her left lower extremity but no significant improvement and to the areas that were treated on the right leg.  These actually look a bit worse.    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  She will see how things respond between now and then and may return.    JULISSA Chapman MD    Dictated using Dragon voice recognition software which may result in transcription errors      Again, thank you for allowing me to participate in the care of your patient.        Sincerely,        Ricardo Chapman MD

## 2022-06-07 ENCOUNTER — OFFICE VISIT (OUTPATIENT)
Dept: FAMILY MEDICINE | Facility: CLINIC | Age: 72
End: 2022-06-07
Payer: MEDICARE

## 2022-06-07 VITALS
OXYGEN SATURATION: 98 % | DIASTOLIC BLOOD PRESSURE: 86 MMHG | HEIGHT: 64 IN | RESPIRATION RATE: 16 BRPM | HEART RATE: 65 BPM | WEIGHT: 147.1 LBS | BODY MASS INDEX: 25.11 KG/M2 | TEMPERATURE: 96.9 F | SYSTOLIC BLOOD PRESSURE: 125 MMHG

## 2022-06-07 DIAGNOSIS — Z00.00 ENCOUNTER FOR ANNUAL WELLNESS EXAM IN MEDICARE PATIENT: Primary | ICD-10-CM

## 2022-06-07 DIAGNOSIS — Z13.6 CARDIOVASCULAR SCREENING; LDL GOAL LESS THAN 160: ICD-10-CM

## 2022-06-07 DIAGNOSIS — Z11.59 NEED FOR HEPATITIS C SCREENING TEST: ICD-10-CM

## 2022-06-07 DIAGNOSIS — Z12.11 SCREEN FOR COLON CANCER: ICD-10-CM

## 2022-06-07 DIAGNOSIS — Z85.828 HISTORY OF SKIN CANCER: ICD-10-CM

## 2022-06-07 DIAGNOSIS — Z80.8 FH: THYROID CANCER: ICD-10-CM

## 2022-06-07 DIAGNOSIS — C50.911 HORMONE RECEPTOR POSITIVE MALIGNANT NEOPLASM OF RIGHT BREAST (H): ICD-10-CM

## 2022-06-07 DIAGNOSIS — Z23 NEED FOR VACCINATION: ICD-10-CM

## 2022-06-07 DIAGNOSIS — Z78.0 POSTMENOPAUSAL STATUS: ICD-10-CM

## 2022-06-07 LAB
ERYTHROCYTE [DISTWIDTH] IN BLOOD BY AUTOMATED COUNT: 13 % (ref 10–15)
HCT VFR BLD AUTO: 47.3 % (ref 35–47)
HGB BLD-MCNC: 15.4 G/DL (ref 11.7–15.7)
MCH RBC QN AUTO: 30 PG (ref 26.5–33)
MCHC RBC AUTO-ENTMCNC: 32.6 G/DL (ref 31.5–36.5)
MCV RBC AUTO: 92 FL (ref 78–100)
PLATELET # BLD AUTO: 214 10E3/UL (ref 150–450)
RBC # BLD AUTO: 5.14 10E6/UL (ref 3.8–5.2)
WBC # BLD AUTO: 5.2 10E3/UL (ref 4–11)

## 2022-06-07 PROCEDURE — 82274 ASSAY TEST FOR BLOOD FECAL: CPT | Performed by: PHYSICIAN ASSISTANT

## 2022-06-07 PROCEDURE — 84443 ASSAY THYROID STIM HORMONE: CPT | Performed by: PHYSICIAN ASSISTANT

## 2022-06-07 PROCEDURE — 80061 LIPID PANEL: CPT | Performed by: PHYSICIAN ASSISTANT

## 2022-06-07 PROCEDURE — 85027 COMPLETE CBC AUTOMATED: CPT | Performed by: PHYSICIAN ASSISTANT

## 2022-06-07 PROCEDURE — 36415 COLL VENOUS BLD VENIPUNCTURE: CPT | Performed by: PHYSICIAN ASSISTANT

## 2022-06-07 PROCEDURE — 90471 IMMUNIZATION ADMIN: CPT | Performed by: PHYSICIAN ASSISTANT

## 2022-06-07 PROCEDURE — 80053 COMPREHEN METABOLIC PANEL: CPT | Performed by: PHYSICIAN ASSISTANT

## 2022-06-07 PROCEDURE — 90715 TDAP VACCINE 7 YRS/> IM: CPT | Performed by: PHYSICIAN ASSISTANT

## 2022-06-07 PROCEDURE — G0439 PPPS, SUBSEQ VISIT: HCPCS | Performed by: PHYSICIAN ASSISTANT

## 2022-06-07 PROCEDURE — 86803 HEPATITIS C AB TEST: CPT | Performed by: PHYSICIAN ASSISTANT

## 2022-06-07 ASSESSMENT — ENCOUNTER SYMPTOMS
NAUSEA: 0
DIARRHEA: 0
HEMATOCHEZIA: 0
BREAST MASS: 0
SORE THROAT: 0
DIZZINESS: 0
WEAKNESS: 0
FREQUENCY: 0
EYE PAIN: 0
HEMATURIA: 0
PALPITATIONS: 0
CHILLS: 0
PARESTHESIAS: 0
MYALGIAS: 1
DYSURIA: 0
ARTHRALGIAS: 1
SHORTNESS OF BREATH: 0
HEARTBURN: 1
HEADACHES: 0
JOINT SWELLING: 0
NERVOUS/ANXIOUS: 0
COUGH: 0
ABDOMINAL PAIN: 0
CONSTIPATION: 0
FEVER: 0

## 2022-06-07 ASSESSMENT — ACTIVITIES OF DAILY LIVING (ADL): CURRENT_FUNCTION: NO ASSISTANCE NEEDED

## 2022-06-07 ASSESSMENT — PAIN SCALES - GENERAL: PAINLEVEL: NO PAIN (0)

## 2022-06-07 NOTE — NURSING NOTE
Prior to immunization administration, verified patients identity using patient s name and date of birth. Please see Immunization Activity for additional information.     Screening Questionnaire for Adult Immunization    Are you sick today?   No   Do you have allergies to medications, food, a vaccine component or latex?   Yes   Have you ever had a serious reaction after receiving a vaccination?   No   Do you have a long-term health problem with heart, lung, kidney, or metabolic disease (e.g., diabetes), asthma, a blood disorder, no spleen, complement component deficiency, a cochlear implant, or a spinal fluid leak?  Are you on long-term aspirin therapy?   No   Do you have cancer, leukemia, HIV/AIDS, or any other immune system problem?   No   Do you have a parent, brother, or sister with an immune system problem?   No   In the past 3 months, have you taken medications that affect  your immune system, such as prednisone, other steroids, or anticancer drugs; drugs for the treatment of rheumatoid arthritis, Crohn s disease, or psoriasis; or have you had radiation treatments?   No   Have you had a seizure, or a brain or other nervous system problem?   No   During the past year, have you received a transfusion of blood or blood    products, or been given immune (gamma) globulin or antiviral drug?   No   For women: Are you pregnant or is there a chance you could become       pregnant during the next month?   No   Have you received any vaccinations in the past 4 weeks?   No     Immunization questionnaire was positive for at least one answer.  Notified Carlie Marina.        Per orders of  Carlie Marina, injection of Tdap given by Yue Reyes CMA. Patient instructed to remain in clinic for 15 minutes afterwards, and to report any adverse reaction to me immediately.       Screening performed by Yue Reyes CMA on 6/7/2022 at 10:43 AM.

## 2022-06-07 NOTE — PROGRESS NOTES
"SUBJECTIVE:   Emely Forbes is a 71 year old female who presents for Preventive Visit.    Mammogram is up to date  Declines colonoscopy, fit due  Followed by onc (Dr. 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    FH thyroid ca in her mother  She has some pain in the ant neck  She has seen ENT and had neg imaging    She has hx of skin ca and followed by [facility] Q3mo    Patient has been advised of split billing requirements and indicates understanding: Yes  Are you in the first 12 months of your Medicare coverage?  No    Healthy Habits:     In general, how would you rate your overall health?  Good    Frequency of exercise:  4-5 days/week    Duration of exercise:  Greater than 60 minutes    Do you usually eat at least 4 servings of fruit and vegetables a day, include whole grains    & fiber and avoid regularly eating high fat or \"junk\" foods?  Yes    Taking medications regularly:  Yes    Medication side effects:  None    Ability to successfully perform activities of daily living:  No assistance needed    Home Safety:  No safety concerns identified    Hearing Impairment:  No hearing concerns    In the past 6 months, have you been bothered by leaking of urine?  No    In general, how would you rate your overall mental or emotional health?  Excellent      PHQ-2 Total Score: 0    Additional concerns today:  No    Do you feel safe in your environment? Yes    Have you ever done Advance Care Planning? (For example, a Health Directive, POLST, or a discussion with a medical provider or your loved ones about your wishes): No, advance care planning information given to patient to review.  Patient plans to discuss their wishes with loved ones or provider.         Fall risk  Fallen 2 or more times in the past " year?: No  Any fall with injury in the past year?: No    Cognitive Screening   1) Repeat 3 items (Leader, Season, Table)    2) Clock draw: NORMAL  3) 3 item recall: Recalls 3 objects  Results: 3 items recalled: COGNITIVE IMPAIRMENT LESS LIKELY    Mini-CogTM Copyright IRLANDA Lemus. Licensed by the author for use in [facility]; reprinted with permission (ld@[facility]). All rights reserved.      Do you have sleep apnea, excessive snoring or daytime drowsiness?: no    Reviewed and updated as needed this visit by clinical staff   Tobacco  Allergies  Meds                Reviewed and updated as needed this visit by Provider                   Social History     Tobacco Use     Smoking status: Never Smoker     Smokeless tobacco: Never Used   Substance Use Topics     Alcohol use: Yes     Alcohol/week: 0.0 - 0.8 standard drinks     Comment: 1 DRINK PER WEEK     If you drink alcohol do you typically have >3 drinks per day or >7 drinks per week? No    Alcohol Use 6/7/2022   Prescreen: >3 drinks/day or >7 drinks/week? No   Prescreen: >3 drinks/day or >7 drinks/week? -         Current providers sharing in care for this patient include:   Patient Care Team:  Carlie Marina PA-C as PCP - General (Internal Medicine)  Yuliya Cohen MD as Assigned Surgical Provider  Carlie Marina PA-C as Assigned PCP  Ricardo Chapman MD as Assigned Heart and Vascular Provider    The following health maintenance items are reviewed in Epic and correct as of today:  Health Maintenance Due   Topic Date Due     ANNUAL REVIEW OF  ORDERS  Never done     HEPATITIS C SCREENING  Never done     ZOSTER IMMUNIZATION (2 of 3) 01/27/2014     ADVANCE CARE PLANNING  09/04/2017     DTAP/TDAP/TD IMMUNIZATION (2 - Td or Tdap) 12/05/2021     COVID-19 Vaccine (4 - Booster for Pfizer series) 02/21/2022     COLORECTAL CANCER SCREENING  02/22/2022     Pneumococcal Vaccine: 65+ Years (2 - PPSV23 or PCV20) 01/28/2022         FHS-7:   Breast CA  "Risk Assessment (FHS-7) 11/30/2021   Did any of your first-degree relatives have breast or ovarian cancer? Yes   Did any of your relatives have bilateral breast cancer? No   Did any man in your family have breast cancer? No   Did any woman in your family have breast and ovarian cancer? No   Did any woman in your family have breast cancer before age 50 y? No   Do you have 2 or more relatives with breast and/or ovarian cancer? No   Do you have 2 or more relatives with breast and/or bowel cancer? No         Pertinent mammograms are reviewed under the imaging tab.    Review of Systems   Constitutional: Negative for chills and fever.   HENT: Negative for congestion, ear pain, hearing loss and sore throat.    Eyes: Negative for pain and visual disturbance.   Respiratory: Negative for cough and shortness of breath.    Cardiovascular: Negative for chest pain, palpitations and peripheral edema.   Gastrointestinal: Positive for heartburn. Negative for abdominal pain, constipation, diarrhea, hematochezia and nausea.   Breasts:  Negative for tenderness, breast mass and discharge.   Genitourinary: Negative for dysuria, frequency, genital sores, hematuria, pelvic pain, urgency, vaginal bleeding and vaginal discharge.   Musculoskeletal: Positive for arthralgias and myalgias. Negative for joint swelling.   Skin: Negative for rash.   Neurological: Negative for dizziness, weakness, headaches and paresthesias.   Psychiatric/Behavioral: Negative for mood changes. The patient is not nervous/anxious.          OBJECTIVE:   /86 (BP Location: Left arm, Patient Position: Sitting, Cuff Size: Adult Large)   Pulse 65   Temp 96.9  F (36.1  C) (Temporal)   Resp 16   Ht 1.63 m (5' 4.17\")   Wt 66.7 kg (147 lb 1.6 oz)   SpO2 98%   Breastfeeding No   BMI 25.11 kg/m   Estimated body mass index is 25.11 kg/m  as calculated from the following:    Height as of this encounter: 1.63 m (5' 4.17\").    Weight as of this encounter: 66.7 kg (147 " lb 1.6 oz).  Physical Exam  GENERAL APPEARANCE: healthy, alert and no distress  EYES: Eyes grossly normal to inspection, PERRL and conjunctivae and sclerae normal  HENT: ear canals and TM's normal, nose and mouth without ulcers or lesions, oropharynx clear and oral mucous membranes moist  NECK: no adenopathy, no asymmetry, masses, or scars and thyroid normal to palpation  RESP: lungs clear to auscultation - no rales, rhonchi or wheezes  BREAST: s/p reconstruction, normal without masses, tenderness or nipple discharge and no palpable axillary masses or adenopathy  CV: regular rate and rhythm, normal S1 S2, no S3 or S4, no murmur, click or rub, no peripheral edema and peripheral pulses strong  ABDOMEN: soft, nontender, no hepatosplenomegaly, no masses and bowel sounds normal  MS: no musculoskeletal defects are noted and gait is age appropriate without ataxia  SKIN: no suspicious lesions or rashes  NEURO: Normal strength and tone, sensory exam grossly normal, mentation intact and speech normal  PSYCH: mentation appears normal and affect normal/bright        ASSESSMENT / PLAN:   Assessment and Plan:     (Z00.00) Encounter for annual wellness exam in Medicare patient  (primary encounter diagnosis)  Comment: mammogram is up to date.  Declines colonoscopy but agrees to fit test.  Plan: Comprehensive metabolic panel (BMP + Alb, Alk         Phos, ALT, AST, Total. Bili, TP), CBC with         platelets          Patient Instructions   Consider seeing Dr. 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 Patrick. No signs of recurrence.    (Z85.828) History of skin cancer  Comment:   Plan: followed by [facility] Q3mo    (Z11.59) Need for hepatitis C screening test  Comment:   Plan: Hepatitis C Screen Reflex to HCV RNA Quant  "and         Genotype            (Z12.11) Screen for colon cancer  Comment:   Plan: Fecal colorectal cancer screen FIT - Future         (S+30)            (Z13.6) CARDIOVASCULAR SCREENING; LDL GOAL LESS THAN 160  Comment:   Plan: Lipid panel reflex to direct LDL Fasting            (Z78.0) Postmenopausal status  Comment:   Plan: DX Hip/Pelvis/Spine            (Z80.8) FH: thyroid cancer  Comment:   Plan: TSH with free T4 reflex            (Z23) Need for vaccination  Comment:   Plan: TDAP VACCINE (Adacel, Boostrix)  [[id number]]                Patient has been advised of split billing requirements and indicates understanding: Yes    COUNSELING:  Reviewed preventive health counseling, as reflected in patient instructions    Estimated body mass index is 25.11 kg/m  as calculated from the following:    Height as of this encounter: 1.63 m (5' 4.17\").    Weight as of this encounter: 66.7 kg (147 lb 1.6 oz).        She reports that she has never smoked. She has never used smokeless tobacco.      Appropriate preventive services were discussed with this patient, including applicable screening as appropriate for cardiovascular disease, diabetes, osteopenia/osteoporosis, and glaucoma.  As appropriate for age/gender, discussed screening for colorectal cancer, prostate cancer, breast cancer, and cervical cancer. Checklist reviewing preventive services available has been given to the patient.    Reviewed patients plan of care and provided an AVS. The Basic Care Plan (routine screening as documented in Health Maintenance) for Emely meets the Care Plan requirement. This Care Plan has been established and reviewed with the Patient.    Counseling Resources:  ATP IV Guidelines  Pooled Cohorts Equation Calculator  Breast Cancer Risk Calculator  Breast Cancer: Medication to Reduce Risk  FRAX Risk Assessment  ICSI Preventive Guidelines  Dietary Guidelines for Americans, 2010  USDA's MyPlate  ASA Prophylaxis  Lung CA Screening    Carlie Marina, " MANDI BLACKMAN [facility]    Identified Health Risks:

## 2022-06-08 LAB
ALBUMIN SERPL-MCNC: 4.3 G/DL (ref 3.4–5)
ALP SERPL-CCNC: 83 U/L (ref 40–150)
ALT SERPL W P-5'-P-CCNC: 24 U/L (ref 0–50)
ANION GAP SERPL CALCULATED.3IONS-SCNC: 3 MMOL/L (ref 3–14)
AST SERPL W P-5'-P-CCNC: 18 U/L (ref 0–45)
BILIRUB SERPL-MCNC: 0.9 MG/DL (ref 0.2–1.3)
BUN SERPL-MCNC: 17 MG/DL (ref 7–30)
CALCIUM SERPL-MCNC: 9.2 MG/DL (ref 8.5–10.1)
CHLORIDE BLD-SCNC: 104 MMOL/L (ref 94–109)
CHOLEST SERPL-MCNC: 213 MG/DL
CO2 SERPL-SCNC: 31 MMOL/L (ref 20–32)
CREAT SERPL-MCNC: 0.9 MG/DL (ref 0.52–1.04)
FASTING STATUS PATIENT QL REPORTED: YES
GFR SERPL CREATININE-BSD FRML MDRD: 69 ML/MIN/1.73M2
GLUCOSE BLD-MCNC: 99 MG/DL (ref 70–99)
HCV AB SERPL QL IA: NONREACTIVE
HDLC SERPL-MCNC: 60 MG/DL
LDLC SERPL CALC-MCNC: 134 MG/DL
NONHDLC SERPL-MCNC: 153 MG/DL
POTASSIUM BLD-SCNC: 3.4 MMOL/L (ref 3.4–5.3)
PROT SERPL-MCNC: 7.6 G/DL (ref 6.8–8.8)
SODIUM SERPL-SCNC: 138 MMOL/L (ref 133–144)
TRIGL SERPL-MCNC: 93 MG/DL
TSH SERPL DL<=0.005 MIU/L-ACNC: 1 MU/L (ref 0.4–4)

## 2022-06-08 NOTE — RESULT ENCOUNTER NOTE
It was a pleasure seeing you for your physical examination.  I wanted to get back to you with your test results.        I am happy to report that your CBC or complete blood count is normal with no signs of anemia, leukemia or platelet abnormalities. Your chemistry panel shows no signs of diabetes.  Your blood salts, kidney tests, liver tests, and proteins are all fine.    Your hepatitis C test was negative.  Your TSH (thyroid test) is in normal range.    Your total cholesterol is 213 with the normal range being below 200.  Your HDL or good cholesterol is 60 with the normal range being above 50.  Your LDL or bad cholesterol is 134 with the normal range being below 160.    Carlie Marina PA-C

## 2022-06-13 ENCOUNTER — HOSPITAL ENCOUNTER (OUTPATIENT)
Dept: BONE DENSITY | Facility: CLINIC | Age: 72
Discharge: HOME | End: 2022-06-13
Attending: PHYSICIAN ASSISTANT | Admitting: PHYSICIAN ASSISTANT
Payer: MEDICARE

## 2022-06-13 DIAGNOSIS — Z78.0 POSTMENOPAUSAL STATUS: ICD-10-CM

## 2022-06-13 PROCEDURE — 77080 DXA BONE DENSITY AXIAL: CPT

## 2022-06-17 NOTE — RESULT ENCOUNTER NOTE
Call pt and schedule a telephone visit to discuss her bone density as she does have scores in the osteoporosis range so we should discuss medication options

## 2022-06-23 ENCOUNTER — VIRTUAL VISIT (OUTPATIENT)
Dept: FAMILY MEDICINE | Facility: CLINIC | Age: 72
End: 2022-06-23
Payer: MEDICARE

## 2022-06-23 DIAGNOSIS — M81.8 OTHER OSTEOPOROSIS, UNSPECIFIED PATHOLOGICAL FRACTURE PRESENCE: Primary | ICD-10-CM

## 2022-06-23 PROCEDURE — 99442 PR PHYSICIAN TELEPHONE EVALUATION 11-20 MIN: CPT | Mod: 95 | Performed by: PHYSICIAN ASSISTANT

## 2022-06-23 NOTE — PROGRESS NOTES
Emely is a 71 year old who is being evaluated via a billable telephone visit.      What phone number would you like to be contacted at? [phone]  How would you like to obtain your AVS? Mail a copy        Subjective   Emely is a 71 year old, presenting for the following health issues:  Bone density    HPI     Pt has hx of breast ca in 2010 and was on Femara for 7 years and was seen by Dr. Patrick who stopped that medication due to osteopenia in the hips.   Dexa from 2018 reviewed that he ordered.  Pt has no hx of fracture  Pt a nonsmoker  Her mother did have osteoporosis and has had a hip fx which ultimately led to her death.        Objective           Vitals:  No vitals were obtained today due to virtual visit.    Physical Exam   healthy, alert and no distress  PSYCH: Alert and oriented times 3; coherent speech, normal   rate and volume, able to articulate logical thoughts, able   to abstract reason, no tangential thoughts, no hallucinations   or delusions  Her affect is normal  RESP: No cough, no audible wheezing, able to talk in full sentences  Remainder of exam unable to be completed due to telephone visits      FINDINGS:  Lumbar Spine: L1-L4: BMD: 1.303 g/cm2. T-score: 0.9. Z-score: 2.6.  RIGHT Hip Total: BMD: 0.845 g/cm2. T-score: -1.3. Z-score: 0.2.  RIGHT Hip Femoral neck: BMD: 0.721 g/cm2. T-score: -2.3. Z-score: -0.6.  LEFT Hip Total: BMD: 0.803 g/cm2. T-score: -1.6. Z-score: -0.1.  LEFT Hip Femoral neck: BMD: 0.694 g/cm2. T-score: -2.5. Z-score: -0.8.     WHO Criteria:  Normal: T score at or above -1 SD  Osteopenia: T score between -1 and -2.5 SD  Osteoporosis: T score at or below -2.5 SD     COMPARISON: There has been a 1.4% decrease in lumbar spine BMD. There has been a 3.5% decrease in bilateral hip BMD.     FRAX Results: Not applicable due to Osteoporosis.     Assessment and Plan:     (M81.8) Other osteoporosis, unspecified pathological fracture presence  (primary encounter diagnosis)  Comment:  Plan:  recd she f/u with Dr. Patrick to discussed treatment options. Discussed options such as bisphosphonates and Evista (may be a good option given her hx of breast ca). 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        Carlie Marina PA-C            Phone call duration: 15 minutes

## 2022-07-01 LAB — HEMOCCULT STL QL IA: NEGATIVE

## 2022-07-01 NOTE — RESULT ENCOUNTER NOTE
Dear Emely,     I'm covering for Carlie Marina:    Here are your recent FIT test results which are negative.    Please let us know if you have any questions or concerns.    Regards,  Kiara Diaz PA-C

## 2022-08-04 ENCOUNTER — APPOINTMENT (OUTPATIENT)
Dept: URBAN - METROPOLITAN AREA CLINIC 256 | Age: 72
Setting detail: DERMATOLOGY
End: 2022-08-07

## 2022-08-04 VITALS — WEIGHT: 140 LBS | HEIGHT: 64 IN

## 2022-08-04 DIAGNOSIS — Z85.828 PERSONAL HISTORY OF OTHER MALIGNANT NEOPLASM OF SKIN: ICD-10-CM

## 2022-08-04 DIAGNOSIS — L57.0 ACTINIC KERATOSIS: ICD-10-CM

## 2022-08-04 DIAGNOSIS — L82.1 OTHER SEBORRHEIC KERATOSIS: ICD-10-CM

## 2022-08-04 DIAGNOSIS — D18.0 HEMANGIOMA: ICD-10-CM

## 2022-08-04 DIAGNOSIS — Z71.89 OTHER SPECIFIED COUNSELING: ICD-10-CM

## 2022-08-04 DIAGNOSIS — D22 MELANOCYTIC NEVI: ICD-10-CM

## 2022-08-04 DIAGNOSIS — L81.4 OTHER MELANIN HYPERPIGMENTATION: ICD-10-CM

## 2022-08-04 PROBLEM — D18.01 HEMANGIOMA OF SKIN AND SUBCUTANEOUS TISSUE: Status: ACTIVE | Noted: 2022-08-04

## 2022-08-04 PROBLEM — D22.5 MELANOCYTIC NEVI OF TRUNK: Status: ACTIVE | Noted: 2022-08-04

## 2022-08-04 PROBLEM — D23.71 OTHER BENIGN NEOPLASM OF SKIN OF RIGHT LOWER LIMB, INCLUDING HIP: Status: ACTIVE | Noted: 2022-08-04

## 2022-08-04 PROCEDURE — OTHER PRESCRIPTION: OTHER

## 2022-08-04 PROCEDURE — 99213 OFFICE O/P EST LOW 20 MIN: CPT

## 2022-08-04 PROCEDURE — OTHER COUNSELING: OTHER

## 2022-08-04 PROCEDURE — OTHER MIPS QUALITY: OTHER

## 2022-08-04 RX ORDER — FLUOROURACIL 2 G/40G
5% CREAM TOPICAL BID
Qty: 40 | Refills: 3 | Status: ERX | COMMUNITY
Start: 2022-08-04

## 2022-08-04 ASSESSMENT — LOCATION SIMPLE DESCRIPTION DERM
LOCATION SIMPLE: LEFT UPPER BACK
LOCATION SIMPLE: LEFT TEMPLE
LOCATION SIMPLE: LEFT FOREARM
LOCATION SIMPLE: CHEST
LOCATION SIMPLE: LEFT LOWER BACK

## 2022-08-04 ASSESSMENT — LOCATION DETAILED DESCRIPTION DERM
LOCATION DETAILED: RIGHT MEDIAL SUPERIOR CHEST
LOCATION DETAILED: RIGHT LATERAL SUPERIOR CHEST
LOCATION DETAILED: LEFT INFERIOR LATERAL MIDBACK
LOCATION DETAILED: LEFT PROXIMAL DORSAL FOREARM
LOCATION DETAILED: LEFT SUPERIOR UPPER BACK
LOCATION DETAILED: LEFT LATERAL SUPERIOR CHEST
LOCATION DETAILED: LEFT CENTRAL TEMPLE

## 2022-08-04 ASSESSMENT — LOCATION ZONE DERM
LOCATION ZONE: FACE
LOCATION ZONE: ARM
LOCATION ZONE: TRUNK

## 2022-08-22 ENCOUNTER — RX ONLY (RX ONLY)
Age: 72
End: 2022-08-22

## 2022-08-22 RX ORDER — FLUOROURACIL 2 G/40G
5% CREAM TOPICAL BID
Qty: 40 | Refills: 3 | Status: ERX

## 2022-10-15 ENCOUNTER — HEALTH MAINTENANCE LETTER (OUTPATIENT)
Age: 72
End: 2022-10-15

## 2022-11-07 ENCOUNTER — APPOINTMENT (OUTPATIENT)
Dept: URBAN - METROPOLITAN AREA CLINIC 255 | Age: 72
Setting detail: DERMATOLOGY
End: 2022-11-09

## 2022-11-07 VITALS — WEIGHT: 140 LBS | WEIGHT: 140 LBS | HEIGHT: 64 IN | HEIGHT: 64 IN

## 2022-11-07 DIAGNOSIS — L81.4 OTHER MELANIN HYPERPIGMENTATION: ICD-10-CM

## 2022-11-07 DIAGNOSIS — L57.0 ACTINIC KERATOSIS: ICD-10-CM

## 2022-11-07 DIAGNOSIS — D22 MELANOCYTIC NEVI: ICD-10-CM

## 2022-11-07 DIAGNOSIS — L82.1 OTHER SEBORRHEIC KERATOSIS: ICD-10-CM

## 2022-11-07 DIAGNOSIS — D18.0 HEMANGIOMA: ICD-10-CM

## 2022-11-07 DIAGNOSIS — D49.2 NEOPLASM OF UNSPECIFIED BEHAVIOR OF BONE, SOFT TISSUE, AND SKIN: ICD-10-CM

## 2022-11-07 DIAGNOSIS — Z71.89 OTHER SPECIFIED COUNSELING: ICD-10-CM

## 2022-11-07 PROBLEM — D18.01 HEMANGIOMA OF SKIN AND SUBCUTANEOUS TISSUE: Status: ACTIVE | Noted: 2022-11-07

## 2022-11-07 PROBLEM — D22.5 MELANOCYTIC NEVI OF TRUNK: Status: ACTIVE | Noted: 2022-11-07

## 2022-11-07 PROBLEM — D23.72 OTHER BENIGN NEOPLASM OF SKIN OF LEFT LOWER LIMB, INCLUDING HIP: Status: ACTIVE | Noted: 2022-11-07

## 2022-11-07 PROCEDURE — OTHER BIOPSY BY SHAVE METHOD: OTHER

## 2022-11-07 PROCEDURE — 11102 TANGNTL BX SKIN SINGLE LES: CPT

## 2022-11-07 PROCEDURE — 17000 DESTRUCT PREMALG LESION: CPT | Mod: 59

## 2022-11-07 PROCEDURE — OTHER MIPS QUALITY: OTHER

## 2022-11-07 PROCEDURE — 17003 DESTRUCT PREMALG LES 2-14: CPT

## 2022-11-07 PROCEDURE — OTHER ADDITIONAL NOTES: OTHER

## 2022-11-07 PROCEDURE — 99213 OFFICE O/P EST LOW 20 MIN: CPT | Mod: 25

## 2022-11-07 PROCEDURE — OTHER LIQUID NITROGEN: OTHER

## 2022-11-07 PROCEDURE — OTHER COUNSELING: OTHER

## 2022-11-07 ASSESSMENT — LOCATION ZONE DERM
LOCATION ZONE: LEG
LOCATION ZONE: TRUNK
LOCATION ZONE: SCALP

## 2022-11-07 ASSESSMENT — LOCATION SIMPLE DESCRIPTION DERM
LOCATION SIMPLE: UPPER BACK
LOCATION SIMPLE: RIGHT PRETIBIAL REGION
LOCATION SIMPLE: LEFT CLAVICULAR SKIN
LOCATION SIMPLE: ANTERIOR SCALP
LOCATION SIMPLE: CHEST

## 2022-11-07 ASSESSMENT — LOCATION DETAILED DESCRIPTION DERM
LOCATION DETAILED: INFERIOR THORACIC SPINE
LOCATION DETAILED: RIGHT MEDIAL SUPERIOR CHEST
LOCATION DETAILED: RIGHT PROXIMAL PRETIBIAL REGION
LOCATION DETAILED: MID-FRONTAL SCALP
LOCATION DETAILED: LEFT CLAVICULAR SKIN

## 2022-11-07 NOTE — PROCEDURE: ADDITIONAL NOTES
Additional Notes: Her previous biopsy report was attached for [facility]. Patient preferred telfa and tegaderm for bandaging. Additional Notes: Her previous biopsy report was attached for [facility]. Patient preferred telfa and tegaderm for bandaging.

## 2022-11-07 NOTE — PROCEDURE: BIOPSY BY SHAVE METHOD
Additional Anesthesia Volume In Cc (Will Not Render If 0): 0
Information: Selecting Yes will display possible errors in your note based on the variables you have selected. This validation is only offered as a suggestion for you. PLEASE NOTE THAT THE VALIDATION TEXT WILL BE REMOVED WHEN YOU FINALIZE YOUR NOTE. IF YOU WANT TO FAX A PRELIMINARY NOTE YOU WILL NEED TO TOGGLE THIS TO 'NO' IF YOU DO NOT WANT IT IN YOUR FAXED NOTE.
Post-Care Instructions: I reviewed with the patient in detail post-care instructions. Patient is to keep the biopsy site dry overnight, and then apply bacitracin twice daily until healed. Patient may apply hydrogen peroxide soaks to remove any crusting.
Hide Additional Size Dimension?: No
Was A Bandage Applied: Yes
Notification Instructions: Patient will be notified of biopsy results. However, patient instructed to call the office if not contacted within 2 weeks.
Silver Nitrate Text: The wound bed was treated with silver nitrate after the biopsy was performed.
Wound Care: Petrolatum
Hemostasis: Drysol
Billing Type: Third-Party Bill
Depth Of Biopsy: dermis
Consent: Written consent was obtained and risks were reviewed including but not limited to scarring, infection, bleeding, scabbing, incomplete removal, nerve damage and allergy to anesthesia.
Electrodesiccation Text: The wound bed was treated with electrodesiccation after the biopsy was performed.
Biopsy Type: H and E
Path Notes Override (Will Replace All Of The Above Text): Please check margins. Biopsied in the past as a benign keratosis. Treated multiple times with liquid nitrogen.
Electrodesiccation And Curettage Text: The wound bed was treated with electrodesiccation and curettage after the biopsy was performed.
Curettage Text: The wound bed was treated with curettage after the biopsy was performed.
Detail Level: Detailed
Anesthesia Type: 1% lidocaine with epinephrine
Type Of Destruction Used: Curettage
Cryotherapy Text: The wound bed was treated with cryotherapy after the biopsy was performed.
Anesthesia Volume In Cc (Will Not Render If 0): 0.5
Dressing: bandage
Biopsy Method: Dermablade

## 2022-11-07 NOTE — PROCEDURE: ADDITIONAL NOTES
Additional Notes: This is from a fall many years ago. It is  at times. This can be biopsied or monitored. Reminded patient that the legs take the longest to heal. She will hold off on treating it at this time as she is going through physical therapy for her hamstring of the same leg.

## 2022-11-07 NOTE — HPI: FULL BODY SKIN EXAMINATION
What Is The Reason For Today's Visit?: Full Body Skin Examination
What Is The Reason For Today's Visit? (Being Monitored For X): the re-examination of lesions previously examined
Additional History: The lesion of her chest has been biopsied and treated with liquid nitrogen.

## 2022-11-07 NOTE — PROCEDURE: LIQUID NITROGEN
Show Applicator Variable?: Yes
Detail Level: Simple
Render Post-Care Instructions In Note?: no
Duration Of Freeze Thaw-Cycle (Seconds): 0
Post-Care Instructions: I reviewed with the patient in detail post-care instructions. Patient is to wear sunprotection, and avoid picking at any of the treated lesions. Pt may apply Vaseline to crusted or scabbing areas.
Consent: The patient's consent was obtained including but not limited to risks of crusting, scabbing, blistering, scarring, darker or lighter pigmentary change, recurrence, incomplete removal and infection.

## 2022-11-17 ENCOUNTER — APPOINTMENT (OUTPATIENT)
Dept: URBAN - METROPOLITAN AREA CLINIC 260 | Age: 72
Setting detail: DERMATOLOGY
End: 2022-11-18

## 2022-11-17 PROBLEM — D04.9 CARCINOMA IN SITU OF SKIN, UNSPECIFIED: Status: ACTIVE | Noted: 2022-11-17

## 2022-11-17 PROCEDURE — OTHER PRESCRIPTION: OTHER

## 2022-11-17 PROCEDURE — OTHER COUNSELING: OTHER

## 2022-11-17 RX ORDER — FLUOROURACIL 2 G/40G
CREAM TOPICAL DAILY
Qty: 40 | Refills: 0 | Status: ERX | COMMUNITY
Start: 2022-11-17

## 2022-12-20 ENCOUNTER — HOSPITAL ENCOUNTER (OUTPATIENT)
Dept: MAMMOGRAPHY | Facility: CLINIC | Age: 72
Discharge: HOME | End: 2022-12-20
Attending: PHYSICIAN ASSISTANT | Admitting: PHYSICIAN ASSISTANT
Payer: MEDICARE

## 2022-12-20 DIAGNOSIS — Z12.31 ENCOUNTER FOR SCREENING MAMMOGRAM FOR MALIGNANT NEOPLASM OF BREAST: ICD-10-CM

## 2022-12-20 PROCEDURE — 77067 SCR MAMMO BI INCL CAD: CPT | Mod: 52

## 2022-12-27 ENCOUNTER — APPOINTMENT (OUTPATIENT)
Dept: URBAN - METROPOLITAN AREA CLINIC 255 | Age: 72
Setting detail: DERMATOLOGY
End: 2022-12-31

## 2022-12-27 DIAGNOSIS — D49.2 NEOPLASM OF UNSPECIFIED BEHAVIOR OF BONE, SOFT TISSUE, AND SKIN: ICD-10-CM

## 2022-12-27 DIAGNOSIS — L57.0 ACTINIC KERATOSIS: ICD-10-CM

## 2022-12-27 PROBLEM — D48.5 NEOPLASM OF UNCERTAIN BEHAVIOR OF SKIN: Status: ACTIVE | Noted: 2022-12-27

## 2022-12-27 PROCEDURE — OTHER MIPS QUALITY: OTHER

## 2022-12-27 PROCEDURE — OTHER PRESCRIPTION MEDICATION MANAGEMENT: OTHER

## 2022-12-27 PROCEDURE — OTHER COUNSELING: OTHER

## 2022-12-27 PROCEDURE — 99213 OFFICE O/P EST LOW 20 MIN: CPT | Mod: 25

## 2022-12-27 PROCEDURE — 11104 PUNCH BX SKIN SINGLE LESION: CPT

## 2022-12-27 PROCEDURE — OTHER BIOPSY BY PUNCH METHOD: OTHER

## 2022-12-27 ASSESSMENT — LOCATION ZONE DERM
LOCATION ZONE: LEG
LOCATION ZONE: FACE

## 2022-12-27 ASSESSMENT — LOCATION SIMPLE DESCRIPTION DERM
LOCATION SIMPLE: RIGHT FOREHEAD
LOCATION SIMPLE: RIGHT PRETIBIAL REGION

## 2022-12-27 ASSESSMENT — LOCATION DETAILED DESCRIPTION DERM
LOCATION DETAILED: RIGHT PROXIMAL PRETIBIAL REGION
LOCATION DETAILED: RIGHT MEDIAL FOREHEAD

## 2022-12-27 NOTE — PROCEDURE: BIOPSY BY PUNCH METHOD
Render Path Notes In Note?: Yes
Epidermal Sutures: 4-0 Prolene
Dressing: bandage
Consent: Written consent was obtained and risks were reviewed including but not limited to scarring, infection, bleeding, scabbing, incomplete removal, nerve damage and allergy to anesthesia.
Bill For Surgical Tray: no
Post-Care Instructions: I reviewed with the patient in detail post-care instructions. Patient is to keep the biopsy site dry overnight, and then apply bacitracin twice daily until healed. Patient may apply hydrogen peroxide soaks to remove any crusting.
Size Of Lesion In Cm (Optional): 0
Home Suture Removal Text: Patient was provided a home suture removal kit and will remove their sutures at home.  If they have any questions or difficulties they will call the office.
Punch Size In Mm: 6
Detail Level: Detailed
Biopsy Type: H and E
Anesthesia Type: 1% lidocaine with epinephrine
Notification Instructions: Patient will be notified of biopsy results. However, patient instructed to call the office if not contacted within 2 weeks.
Depth Of Punch Biopsy: dermis
Billing Type: Third-Party Bill
Wound Care: Petrolatum
Information: Selecting Yes will display possible errors in your note based on the variables you have selected. This validation is only offered as a suggestion for you. PLEASE NOTE THAT THE VALIDATION TEXT WILL BE REMOVED WHEN YOU FINALIZE YOUR NOTE. IF YOU WANT TO FAX A PRELIMINARY NOTE YOU WILL NEED TO TOGGLE THIS TO 'NO' IF YOU DO NOT WANT IT IN YOUR FAXED NOTE.
Anesthesia Volume In Cc (Will Not Render If 0): 2
Number Of Epidermal Sutures (Optional): 3
Suture Removal: 7 days
Hemostasis: Electrocautery

## 2022-12-27 NOTE — PROCEDURE: PRESCRIPTION MEDICATION MANAGEMENT
Initiate Treatment: Efudex once daily x 1 week
Discontinue Regimen: Efudex
Render In Strict Bullet Format?: No
Detail Level: Zone
Plan: Return if recurrent
Plan: Return if not improved

## 2023-01-11 ENCOUNTER — APPOINTMENT (OUTPATIENT)
Dept: URBAN - METROPOLITAN AREA CLINIC 255 | Age: 73
Setting detail: DERMATOLOGY
End: 2023-01-15

## 2023-01-11 DIAGNOSIS — Z48.02 ENCOUNTER FOR REMOVAL OF SUTURES: ICD-10-CM

## 2023-01-11 PROCEDURE — OTHER SUTURE REMOVAL (GLOBAL PERIOD): OTHER

## 2023-01-11 PROCEDURE — OTHER PHOTO-DOCUMENTATION: OTHER

## 2023-01-11 PROCEDURE — OTHER COUNSELING: OTHER

## 2023-01-11 PROCEDURE — OTHER MIPS QUALITY: OTHER

## 2023-01-11 ASSESSMENT — LOCATION SIMPLE DESCRIPTION DERM: LOCATION SIMPLE: RIGHT PRETIBIAL REGION

## 2023-01-11 ASSESSMENT — LOCATION DETAILED DESCRIPTION DERM: LOCATION DETAILED: RIGHT DISTAL PRETIBIAL REGION

## 2023-01-11 ASSESSMENT — LOCATION ZONE DERM: LOCATION ZONE: LEG

## 2023-01-11 NOTE — PROCEDURE: SUTURE REMOVAL (GLOBAL PERIOD)
Detail Level: Detailed
Add 32040 Cpt? (Important Note: In 2017 The Use Of 25360 Is Being Tracked By Cms To Determine Future Global Period Reimbursement For Global Periods): no

## 2023-02-10 ENCOUNTER — HOSPITAL ENCOUNTER (EMERGENCY)
Facility: CLINIC | Age: 73
Discharge: HOME | End: 2023-02-10
Attending: EMERGENCY MEDICINE | Admitting: EMERGENCY MEDICINE
Payer: MEDICARE

## 2023-02-10 ENCOUNTER — OFFICE VISIT (OUTPATIENT)
Dept: URGENT CARE | Facility: URGENT CARE | Age: 73
End: 2023-02-10
Payer: MEDICARE

## 2023-02-10 ENCOUNTER — NURSE TRIAGE (OUTPATIENT)
Dept: FAMILY MEDICINE | Facility: CLINIC | Age: 73
End: 2023-02-10
Payer: MEDICARE

## 2023-02-10 VITALS
SYSTOLIC BLOOD PRESSURE: 138 MMHG | TEMPERATURE: 98.7 F | BODY MASS INDEX: 25.27 KG/M2 | RESPIRATION RATE: 11 BRPM | HEART RATE: 71 BPM | WEIGHT: 148 LBS | DIASTOLIC BLOOD PRESSURE: 99 MMHG | OXYGEN SATURATION: 95 % | HEIGHT: 64 IN

## 2023-02-10 VITALS
RESPIRATION RATE: 18 BRPM | HEART RATE: 75 BPM | OXYGEN SATURATION: 96 % | DIASTOLIC BLOOD PRESSURE: 98 MMHG | SYSTOLIC BLOOD PRESSURE: 157 MMHG | TEMPERATURE: 97.8 F

## 2023-02-10 DIAGNOSIS — R00.2 HEART PALPITATIONS: Primary | ICD-10-CM

## 2023-02-10 DIAGNOSIS — R42 DIZZINESS: ICD-10-CM

## 2023-02-10 DIAGNOSIS — R23.1 CLAMMINESS: ICD-10-CM

## 2023-02-10 DIAGNOSIS — R00.2 PALPITATIONS: ICD-10-CM

## 2023-02-10 LAB
ANION GAP SERPL CALCULATED.3IONS-SCNC: 11 MMOL/L (ref 7–15)
ATRIAL RATE - MUSE: 80 BPM
BASOPHILS # BLD AUTO: 0.1 10E3/UL (ref 0–0.2)
BASOPHILS NFR BLD AUTO: 1 %
BUN SERPL-MCNC: 22.6 MG/DL (ref 8–23)
CALCIUM SERPL-MCNC: 9.6 MG/DL (ref 8.8–10.2)
CHLORIDE SERPL-SCNC: 101 MMOL/L (ref 98–107)
CREAT SERPL-MCNC: 0.93 MG/DL (ref 0.51–0.95)
DEPRECATED HCO3 PLAS-SCNC: 30 MMOL/L (ref 22–29)
DIASTOLIC BLOOD PRESSURE - MUSE: NORMAL MMHG
EOSINOPHIL # BLD AUTO: 0.1 10E3/UL (ref 0–0.7)
EOSINOPHIL NFR BLD AUTO: 2 %
ERYTHROCYTE [DISTWIDTH] IN BLOOD BY AUTOMATED COUNT: 12.7 % (ref 10–15)
GFR SERPL CREATININE-BSD FRML MDRD: 66 ML/MIN/1.73M2
GLUCOSE SERPL-MCNC: 101 MG/DL (ref 70–99)
HCT VFR BLD AUTO: 45.7 % (ref 35–47)
HGB BLD-MCNC: 15.2 G/DL (ref 11.7–15.7)
HOLD SPECIMEN: NORMAL
IMM GRANULOCYTES # BLD: 0 10E3/UL
IMM GRANULOCYTES NFR BLD: 0 %
INTERPRETATION ECG - MUSE: NORMAL
LYMPHOCYTES # BLD AUTO: 1.6 10E3/UL (ref 0.8–5.3)
LYMPHOCYTES NFR BLD AUTO: 22 %
MCH RBC QN AUTO: 30.6 PG (ref 26.5–33)
MCHC RBC AUTO-ENTMCNC: 33.3 G/DL (ref 31.5–36.5)
MCV RBC AUTO: 92 FL (ref 78–100)
MONOCYTES # BLD AUTO: 0.5 10E3/UL (ref 0–1.3)
MONOCYTES NFR BLD AUTO: 8 %
NEUTROPHILS # BLD AUTO: 4.8 10E3/UL (ref 1.6–8.3)
NEUTROPHILS NFR BLD AUTO: 67 %
NRBC # BLD AUTO: 0 10E3/UL
NRBC BLD AUTO-RTO: 0 /100
P AXIS - MUSE: 73 DEGREES
PLATELET # BLD AUTO: 230 10E3/UL (ref 150–450)
POTASSIUM SERPL-SCNC: 3.6 MMOL/L (ref 3.4–5.3)
PR INTERVAL - MUSE: 142 MS
QRS DURATION - MUSE: 82 MS
QT - MUSE: 380 MS
QTC - MUSE: 438 MS
R AXIS - MUSE: 53 DEGREES
RBC # BLD AUTO: 4.97 10E6/UL (ref 3.8–5.2)
SODIUM SERPL-SCNC: 142 MMOL/L (ref 136–145)
SYSTOLIC BLOOD PRESSURE - MUSE: NORMAL MMHG
T AXIS - MUSE: 71 DEGREES
TROPONIN T SERPL HS-MCNC: 7 NG/L
TSH SERPL DL<=0.005 MIU/L-ACNC: 1.66 UIU/ML (ref 0.3–4.2)
VENTRICULAR RATE- MUSE: 80 BPM
WBC # BLD AUTO: 7.1 10E3/UL (ref 4–11)

## 2023-02-10 PROCEDURE — 84443 ASSAY THYROID STIM HORMONE: CPT | Performed by: EMERGENCY MEDICINE

## 2023-02-10 PROCEDURE — 82310 ASSAY OF CALCIUM: CPT | Performed by: EMERGENCY MEDICINE

## 2023-02-10 PROCEDURE — 85025 COMPLETE CBC W/AUTO DIFF WBC: CPT | Performed by: EMERGENCY MEDICINE

## 2023-02-10 PROCEDURE — 36415 COLL VENOUS BLD VENIPUNCTURE: CPT | Performed by: EMERGENCY MEDICINE

## 2023-02-10 PROCEDURE — 93005 ELECTROCARDIOGRAM TRACING: CPT

## 2023-02-10 PROCEDURE — 99214 OFFICE O/P EST MOD 30 MIN: CPT | Performed by: PHYSICIAN ASSISTANT

## 2023-02-10 PROCEDURE — 82374 ASSAY BLOOD CARBON DIOXIDE: CPT | Performed by: EMERGENCY MEDICINE

## 2023-02-10 PROCEDURE — 99284 EMERGENCY DEPT VISIT MOD MDM: CPT

## 2023-02-10 PROCEDURE — 84484 ASSAY OF TROPONIN QUANT: CPT | Performed by: EMERGENCY MEDICINE

## 2023-02-10 PROCEDURE — C9803 HOPD COVID-19 SPEC COLLECT: HCPCS

## 2023-02-10 ASSESSMENT — ENCOUNTER SYMPTOMS: PALPITATIONS: 1

## 2023-02-10 NOTE — ED NOTES
"Rapid Assessment Note    History:   The patient presents to the ED with intermittent tachycardia and palpitations for the last month. The patient states that her symptoms have been erratic and often happen daily, lasting anywhere between 2 minutes to an hour. During these episodes, she feels like her \"heart is going to jump of [her] chest\". Last night, she had palpitations for approximately 1 hour before going to bed, and this morning she woke up feeling warm and with persisting tachycardia and palpitations. She initially called her primary care provider who told her to go to urgent care. There, she was told to present to the ED for further evaluation. Presently, she denies any symptoms and notes having a history of breast cancer which she was on an oral chemotherapy for 7 years for. She also had a double mastectomy.    Exam:   Well-appearing sitting upright seems somewhat anxious.  Cardiac exam is regular without murmurs rubs or gallops.    Plan of Care:   Patient presents with a 1 month history of palpitations intermittently no trigger noted.  Patient is in sinus rhythm without EKG abnormalities.  Will evaluate for lab work abnormalities but likely will be discharged his EKG is normal follow-up with primary care recommended.  I evaluated the patient and developed an initial plan of care. I discussed this plan and explained that I, or one of my partners, would be returning to complete the evaluation.     I, Lissa Medel, am serving as a scribe to document services personally performed by Dr.brian kirk  , based on my observations and the provider's statements to me.    11/5/2018  [employer]    Portions of this medical record were completed by a scribe. UPON MY REVIEW AND AUTHENTICATION BY ELECTRONIC SIGNATURE, this confirms (a) I performed the applicable clinical services, and (b) the record is accurate.      Trevon Kirk MD  02/10/23 1702    "

## 2023-02-10 NOTE — PROGRESS NOTES
SUBJECTIVE:   Emely Forbes is a 72 year old female who complains of intermittent heart palpations becoming more consistently and daily.  She is feeling clammy when they happen.  She feels like her heart is beating out of her chest.  She denies a history of shortness of breath and weakness and denies a history of asthma. Patient denies smoke cigarettes.     OBJECTIVE:    BP (!) 157/98   Pulse 75   Temp 97.8  F (36.6  C) (Tympanic)   Resp 18   SpO2 96%     She appears upset and flused, skin feels clammy . Ears normal.  Throat and pharynx normal.  Neck supple. No adenopathy in the neck lungs clear without wheezes or rales. Cardiac RRR    ASSESSMENT:     (R00.2) Heart palpitations  (primary encounter diagnosis)        (R23.1) Clamminess      (R42) Dizziness        PLAN:  Explained to her that she needs a full cardiac workup in the ED  She understands and is here with her  who will bring her to ED

## 2023-02-10 NOTE — TELEPHONE ENCOUNTER
"CC: Patient calling reporting palpitations     DESCRIPTION: \"my heart is fluttering and it will start beating so fast\"  ONSET: about a month ago   DURATION: anywhere from seconds - hours. Varies in duration.   PATTERN: completely random, denies relation to exertion   TAP: unable to do this   HEART RATE: patient counted radial pulse while on writer P 84   RECURRENT SYMPTOM: similar symptoms many years ago but did not have workup  CAUSE: unsure   CARDIAC HISTORY: DENIES heart attack, angina, bypass surgery, angioplasty, arrhythmia  OTHER SYMPTOMS: denies chest pain, difficulty breathing     When symptoms do occur, patient states \"my head feels different\" \"I get hot and sweaty\"    Patient is asymptomatic currently.     Triaged per [facility] protocol, patient to be seen in office today. No appointments available at [place], [place], or [facility].    Writer advised with no appointment availability would be advised that patient be seen in urgent care today. Patient expressed hesitations to go to urgent care and would like to make an appointment for next week. Writer reiterated recommendation to be seen today - discussed need to rule out serious cardiac dysthymias.    Patient requesting to be transferred to central scheduling to see if able to find appointment at other locations, transferred to scheduling but advised patient that if unable to find appointment, patient would need to go to urgent care today and provided [place] urgent care information. Patient expressed verbal understanding of this recommendation - will attempt to schedule first and go to urgent care if unable to schedule.     Writer reviewed red flag symptoms that would warrant emergent evaluation in ER, patient expressed verbal understanding and is agreeable.    Verna Villatoro RN  [facility]    Reason for Disposition    Age > 60 years  (Exception: Brief heartbeat symptoms that went away and now feels well.)    Additional " Information    Negative: Passed out (i.e., lost consciousness, collapsed and was not responding)    Negative: Shock suspected (e.g., cold/pale/clammy skin, too weak to stand, low BP, rapid pulse)    Negative: Difficult to awaken or acting confused (e.g., disoriented, slurred speech)    Negative: Visible sweat on face or sweat dripping down face    Negative: Unable to walk, or can only walk with assistance (e.g., requires support)    Negative: Received SHOCK from implantable cardiac defibrillator and has persisting symptoms (i.e., palpitations, lightheadedness)    Negative: Dizziness, lightheadedness, or weakness and heart beating very rapidly (e.g., > 140 / minute)    Negative: Dizziness, lightheadedness, or weakness and heart beating very slowly (e.g., < 50 / minute)    Negative: Sounds like a life-threatening emergency to the triager    Negative: Chest pain    Negative: Difficulty breathing    Negative: Dizziness, lightheadedness, or weakness    Negative: Heart beating very rapidly (e.g., > 140 / minute) and present now  (Exception: During exercise.)    Negative: Heart beating very slowly (e.g., < 50 / minute)  (Exception: Athlete and heart rate normal for caller.)    Negative: New or worsened shortness of breath with activity (dyspnea on exertion)    Negative: Patient sounds very sick or weak to the triager    Negative: Wearing a 'Holter monitor' or 'cardiac event monitor'    Negative: Received SHOCK from implantable cardiac defibrillator (and now feels well)    Negative: Heart beating very rapidly (e.g., > 140 / minute) and not present now  (Exception: During exercise.)    Negative: Skipped or extra beat(s) and increases with exercise or exertion    Negative: Skipped or extra beat(s) and occurs 4 or more times per minute    Negative: History of heart disease (i.e., heart attack, bypass surgery, angina, angioplasty)  (Exception: Brief heartbeat symptoms that went away and now feels well.)    Protocols used: HEART  RATE AND HEARTBEAT [id number]

## 2023-02-10 NOTE — ED TRIAGE NOTES
"Patient comes in with several weeks of intermittent palpitations.  She denies CP.  \"Feels like my heart is going to jump out of my chest.\"   Denies the palpitations right now. No pattern to when this happens.  Sometimes lasts a few minutes and sometimes lasts up to an hour.       "

## 2023-02-11 NOTE — ED PROVIDER NOTES
"  History     Chief Complaint:  Palpitations     The history is provided by the patient.      Emely Forbes is a 72 year old female with a history of breast cancer who presents with intermittent tachycardia and palpitations for the last month. The patient states that her symptoms have been erratic and usually happen daily, lasting anywhere between 2 minutes to an hour, and making her feel like her \"heart is going to jump of [her] chest\". Last night, she had an episode lasting approximately 1 hour and this morning she woke up feeling warm with persisting tachycardia and palpitations. She initially called her primary care provider who told her to go to urgent care. There, she was told to present to the ED for further evaluation. Presently, her symptoms are resolved. Notably, she has history of breast cancer and was on an oral chemotherapy for 7 years for. During this time, she also underwent a double mastectomy.    Independent Historian:   None - Patient Only    Review of External Notes:     ROS:  Review of Systems   Cardiovascular: Positive for palpitations.        (+) tachycardia   All other systems reviewed and are negative.    Allergies:  Adhesive Tape  Contrast Dye  Latex  Norvasc [Amlodipine Besylate]     Medications:    Dyazide    Past Medical History:    Breast cancer  CTS  GERD  Osteopenia  PONV  Venous malformation    Past Surgical History:    Right breast biopsy  Cystoscopy, ureteroscopy, combined  Tonsillectomy  EGD  Hysterectomy w/ oophorectomy  Carpal tunnel release  Revise reconstructed breast, right    Family History:    Breast cancer  Cardiovascular  Cerebrovascular disease  Diabetes mellitus  Hypertension  Thyroid disease     Social History:  The patient presents to the ED alone.  The patient arrived to the ED in a private vehicle.  PCP: Carlie Marina     Physical Exam     Patient Vitals for the past 24 hrs:   BP Temp Pulse Resp SpO2 Height Weight   02/10/23 1800 (!) 138/99 -- 71 11 95 % -- -- " "  02/10/23 1750 (!) 149/107 -- 81 (!) 32 95 % -- --   02/10/23 1628 (!) 173/91 98.7  F (37.1  C) 82 16 99 % 1.626 m (5' 4\") 67.1 kg (148 lb)      Physical Exam  Vitals reviewed.   HENT:      Head: Normocephalic.      Mouth/Throat:      Mouth: Mucous membranes are moist.   Eyes:      Pupils: Pupils are equal, round, and reactive to light.   Cardiovascular:      Rate and Rhythm: Normal rate and regular rhythm.   Pulmonary:      Effort: Pulmonary effort is normal.      Breath sounds: Normal breath sounds.   Abdominal:      General: Abdomen is flat.   Musculoskeletal:         General: Normal range of motion.   Skin:     General: Skin is warm.      Capillary Refill: Capillary refill takes less than 2 seconds.   Neurological:      General: No focal deficit present.      Mental Status: She is alert.   Psychiatric:         Mood and Affect: Mood normal.           Emergency Department Course   ECG  ECG taken at 1624, ECG read at 1630  Sinus rhythm  Normal ECG   No significant change as compared to prior, dated 3/19/09.  Rate 80 bpm. OH interval 142 ms. QRS duration 82 ms. QT/QTc 380/438 ms. P-R-T axes 73 53 71.     Imaging:  Holter Monitor 48 hour Adult Pediatric    (Results Pending)      Report per radiology    Laboratory:  Labs Ordered and Resulted from Time of ED Arrival to Time of ED Departure   BASIC METABOLIC PANEL - Abnormal       Result Value    Sodium 142      Potassium 3.6      Chloride 101      Carbon Dioxide (CO2) 30 (*)     Anion Gap 11      Urea Nitrogen 22.6      Creatinine 0.93      Calcium 9.6      Glucose 101 (*)     GFR Estimate 66     TROPONIN T, HIGH SENSITIVITY - Normal    Troponin T, High Sensitivity 7     TSH WITH FREE T4 REFLEX - Normal    TSH 1.66     CBC WITH PLATELETS AND DIFFERENTIAL    WBC Count 7.1      RBC Count 4.97      Hemoglobin 15.2      Hematocrit 45.7      MCV 92      MCH 30.6      MCHC 33.3      RDW 12.7      Platelet Count 230      % Neutrophils 67      % Lymphocytes 22      % " Monocytes 8      % Eosinophils 2      % Basophils 1      % Immature Granulocytes 0      NRBCs per 100 WBC 0      Absolute Neutrophils 4.8      Absolute Lymphocytes 1.6      Absolute Monocytes 0.5      Absolute Eosinophils 0.1      Absolute Basophils 0.1      Absolute Immature Granulocytes 0.0      Absolute NRBCs 0.0        Emergency Department Course & Assessments:  Independent Interpretation (X-rays, CTs, rhythm strip):      Social Determinants of Health affecting care:       Assessments:  1648: I performed an exam of the patient and obtained history, as documented above.   1821: I rechecked the patient and explained findings. I believe that she is safe for discharge at this time.    Disposition:  The patient was discharged to home.     Impression & Plan      Medical Decision Making:  Patient presents with intermittent palpitations.  Examination is normal without signs of murmur but heart rate is normal here but patient is asymptomatic.  Lab work is unremarkable.  Patient offered reassurance due to age and history of palpitations intermittently offered Holter monitor as a bridge to follow-up with primary care no need for admission patient was asked to return if constant or unrelenting symptoms she does have an appointment to discuss her care as an outpatient was discharged home in stable condition  Diagnosis:    ICD-10-CM    1. Palpitations  R00.2 Holter Monitor 48 hour Adult Pediatric         Scribe Disclosure:  I, Lissa Evelyn, am serving as a scribe at 8:18 PM on 2/10/2023 to document services personally performed by Trevon Kirk MD  based on my observations and the provider's statements to me.     2/10/2023   Trevon Kirk MD Goodman, Brian Samuel, MD  02/15/23 [id number]

## 2023-02-11 NOTE — DISCHARGE INSTRUCTIONS
Your EKG and lab work are unremarkable.  As we have discussed episodes of rapid pulse that come and go are unable to be diagnosed in the ER if you are asymptomatic here.  If you have symptoms that are constant and unrelenting the emergency room is here to recheck at any point.  Please follow-up with Dr. Jaime as scheduled on Tuesday we have ordered you an outpatient Holter monitor the cardiopulmonary department during the week we will call you and schedule an appointment to pick 1 after you for 48 hours.  Use the emergency room as backup if you have constant unrelenting symptoms or severe chest pain.  Thanks for your patience today.

## 2023-02-15 ENCOUNTER — ANCILLARY PROCEDURE (OUTPATIENT)
Dept: CARDIOLOGY | Facility: CLINIC | Age: 73
End: 2023-02-15
Attending: EMERGENCY MEDICINE
Payer: MEDICARE

## 2023-02-15 DIAGNOSIS — R00.2 PALPITATIONS: ICD-10-CM

## 2023-02-15 PROCEDURE — 93224 XTRNL ECG REC UP TO 48 HRS: CPT | Performed by: INTERNAL MEDICINE

## 2023-02-26 ENCOUNTER — HOSPITAL ENCOUNTER (EMERGENCY)
Facility: CLINIC | Age: 73
Discharge: HOME | End: 2023-02-27
Attending: EMERGENCY MEDICINE | Admitting: EMERGENCY MEDICINE
Payer: MEDICARE

## 2023-02-26 DIAGNOSIS — F41.9 ANXIETY: ICD-10-CM

## 2023-02-26 DIAGNOSIS — E87.6 HYPOKALEMIA: ICD-10-CM

## 2023-02-26 DIAGNOSIS — R00.2 PALPITATIONS: ICD-10-CM

## 2023-02-26 LAB
ANION GAP SERPL CALCULATED.3IONS-SCNC: 15 MMOL/L (ref 7–15)
BASOPHILS # BLD AUTO: 0.1 10E3/UL (ref 0–0.2)
BASOPHILS NFR BLD AUTO: 1 %
BUN SERPL-MCNC: 20.3 MG/DL (ref 8–23)
CALCIUM SERPL-MCNC: 10.3 MG/DL (ref 8.8–10.2)
CHLORIDE SERPL-SCNC: 97 MMOL/L (ref 98–107)
CREAT SERPL-MCNC: 1.07 MG/DL (ref 0.51–0.95)
DEPRECATED HCO3 PLAS-SCNC: 26 MMOL/L (ref 22–29)
EOSINOPHIL # BLD AUTO: 0.1 10E3/UL (ref 0–0.7)
EOSINOPHIL NFR BLD AUTO: 1 %
ERYTHROCYTE [DISTWIDTH] IN BLOOD BY AUTOMATED COUNT: 12.4 % (ref 10–15)
GFR SERPL CREATININE-BSD FRML MDRD: 56 ML/MIN/1.73M2
GLUCOSE SERPL-MCNC: 123 MG/DL (ref 70–99)
HCT VFR BLD AUTO: 45 % (ref 35–47)
HGB BLD-MCNC: 15.3 G/DL (ref 11.7–15.7)
HOLD SPECIMEN: NORMAL
IMM GRANULOCYTES # BLD: 0 10E3/UL
IMM GRANULOCYTES NFR BLD: 0 %
LYMPHOCYTES # BLD AUTO: 1.9 10E3/UL (ref 0.8–5.3)
LYMPHOCYTES NFR BLD AUTO: 21 %
MCH RBC QN AUTO: 30.4 PG (ref 26.5–33)
MCHC RBC AUTO-ENTMCNC: 34 G/DL (ref 31.5–36.5)
MCV RBC AUTO: 90 FL (ref 78–100)
MONOCYTES # BLD AUTO: 0.7 10E3/UL (ref 0–1.3)
MONOCYTES NFR BLD AUTO: 7 %
NEUTROPHILS # BLD AUTO: 6.4 10E3/UL (ref 1.6–8.3)
NEUTROPHILS NFR BLD AUTO: 70 %
NRBC # BLD AUTO: 0 10E3/UL
NRBC BLD AUTO-RTO: 0 /100
PLATELET # BLD AUTO: 249 10E3/UL (ref 150–450)
POTASSIUM SERPL-SCNC: 3.1 MMOL/L (ref 3.4–5.3)
RBC # BLD AUTO: 5.03 10E6/UL (ref 3.8–5.2)
SODIUM SERPL-SCNC: 138 MMOL/L (ref 136–145)
TROPONIN T SERPL HS-MCNC: 8 NG/L
WBC # BLD AUTO: 9.2 10E3/UL (ref 4–11)

## 2023-02-26 PROCEDURE — 85025 COMPLETE CBC W/AUTO DIFF WBC: CPT | Performed by: EMERGENCY MEDICINE

## 2023-02-26 PROCEDURE — 80048 BASIC METABOLIC PNL TOTAL CA: CPT | Performed by: EMERGENCY MEDICINE

## 2023-02-26 PROCEDURE — 93005 ELECTROCARDIOGRAM TRACING: CPT | Mod: RTG

## 2023-02-26 PROCEDURE — 84484 ASSAY OF TROPONIN QUANT: CPT | Performed by: EMERGENCY MEDICINE

## 2023-02-26 PROCEDURE — 36415 COLL VENOUS BLD VENIPUNCTURE: CPT | Performed by: EMERGENCY MEDICINE

## 2023-02-26 PROCEDURE — 99284 EMERGENCY DEPT VISIT MOD MDM: CPT | Mod: 25

## 2023-02-26 ASSESSMENT — ENCOUNTER SYMPTOMS
LIGHT-HEADEDNESS: 0
PALPITATIONS: 1
SHORTNESS OF BREATH: 0
NERVOUS/ANXIOUS: 1

## 2023-02-26 ASSESSMENT — ACTIVITIES OF DAILY LIVING (ADL): ADLS_ACUITY_SCORE: 35

## 2023-02-27 VITALS
HEIGHT: 64 IN | SYSTOLIC BLOOD PRESSURE: 119 MMHG | WEIGHT: 140 LBS | OXYGEN SATURATION: 95 % | BODY MASS INDEX: 23.9 KG/M2 | HEART RATE: 67 BPM | TEMPERATURE: 98.4 F | RESPIRATION RATE: 9 BRPM | DIASTOLIC BLOOD PRESSURE: 85 MMHG

## 2023-02-27 LAB
ATRIAL RATE - MUSE: 89 BPM
DIASTOLIC BLOOD PRESSURE - MUSE: NORMAL MMHG
INTERPRETATION ECG - MUSE: NORMAL
P AXIS - MUSE: 73 DEGREES
PR INTERVAL - MUSE: 138 MS
QRS DURATION - MUSE: 88 MS
QT - MUSE: 370 MS
QTC - MUSE: 450 MS
R AXIS - MUSE: 33 DEGREES
SYSTOLIC BLOOD PRESSURE - MUSE: NORMAL MMHG
T AXIS - MUSE: 68 DEGREES
VENTRICULAR RATE- MUSE: 89 BPM

## 2023-02-27 PROCEDURE — 96365 THER/PROPH/DIAG IV INF INIT: CPT | Mod: 59

## 2023-02-27 PROCEDURE — 250N000011 HC RX IP 250 OP 636: Performed by: EMERGENCY MEDICINE

## 2023-02-27 PROCEDURE — 258N000003 HC RX IP 258 OP 636: Performed by: EMERGENCY MEDICINE

## 2023-02-27 PROCEDURE — 250N000013 HC RX MED GY IP 250 OP 250 PS 637: Performed by: EMERGENCY MEDICINE

## 2023-02-27 RX ORDER — HYDROXYZINE HYDROCHLORIDE 25 MG/1
25 TABLET, FILM COATED ORAL EVERY 6 HOURS PRN
Status: DISCONTINUED | OUTPATIENT
Start: 2023-02-27 | End: 2023-02-27 | Stop reason: HOSPADM

## 2023-02-27 RX ORDER — HYDROXYZINE HYDROCHLORIDE 25 MG/1
25 TABLET, FILM COATED ORAL 3 TIMES DAILY PRN
Qty: 20 TABLET | Refills: 0 | Status: SHIPPED | OUTPATIENT
Start: 2023-02-27 | End: 2023-08-28

## 2023-02-27 RX ORDER — POTASSIUM CHLORIDE 1.5 G/1.58G
40 POWDER, FOR SOLUTION ORAL ONCE
Status: COMPLETED | OUTPATIENT
Start: 2023-02-27 | End: 2023-02-27

## 2023-02-27 RX ORDER — POTASSIUM CHLORIDE 7.45 MG/ML
10 INJECTION INTRAVENOUS ONCE
Status: COMPLETED | OUTPATIENT
Start: 2023-02-27 | End: 2023-02-27

## 2023-02-27 RX ADMIN — POTASSIUM CHLORIDE 10 MEQ: 7.46 INJECTION, SOLUTION INTRAVENOUS at 00:48

## 2023-02-27 RX ADMIN — POTASSIUM CHLORIDE 40 MEQ: 1.5 POWDER, FOR SOLUTION ORAL at 01:52

## 2023-02-27 RX ADMIN — SODIUM CHLORIDE 1000 ML: 9 INJECTION, SOLUTION INTRAVENOUS at 00:47

## 2023-02-27 RX ADMIN — HYDROXYZINE HYDROCHLORIDE 25 MG: 25 TABLET, FILM COATED ORAL at 01:37

## 2023-02-27 ASSESSMENT — ACTIVITIES OF DAILY LIVING (ADL)
ADLS_ACUITY_SCORE: 35
ADLS_ACUITY_SCORE: 35

## 2023-02-27 NOTE — ED TRIAGE NOTES
Pt reports onset of afib at approx at 1700 today. Was seen here approx 2 weeks ago for same. Pt denies chest pain/ightheadedness/SOB.

## 2023-02-27 NOTE — ED PROVIDER NOTES
"  History     Chief Complaint:  Palpitations        HPI   Emely Forbes is a 72 year old female with a history of breast cancer who presents with an onset of palpitations around 1700 today that lasted for 45 minutes. Patient was seen in the ED two weeks ago for palpitations and was referred to her internist who set her up with a Holter Monitor. She notes that this monitor did not catch any irregular rhythms within the 48 hour period. She then had another episode of palpitations two nights ago and was evaluated by EMS at this time, though again there was no evidence of atrial fibrillation. Emely reports that she felt a few flutters earlier today and then had a more sustained episode this afternoon, which prompted her to call 911 again. She describes the palpitations as feeling her heart beat fast and states that she feels it \"jump, flutter, and do weird things.\" Patient did track her heart rhythm on an apple watch, which showed inconclusive results. She states that she has been quite anxious about these episodes, but otherwise is not short of breath or experiencing other symptoms. She denies any regular caffeine or alcohol use. Emely has not followed with cardiology for this concern in the past. No recent long travel.     Independent Historian:   None - Patient Only    Review of External Notes: Reviewed her recent office and triage note    ROS:  Review of Systems   Respiratory: Negative for shortness of breath.    Cardiovascular: Positive for palpitations. Negative for chest pain.   Neurological: Negative for light-headedness.   Psychiatric/Behavioral: The patient is nervous/anxious.      10 point review of systems performed and is negative except as above and in HPI.    Allergies:  Adhesive Tape  Contrast Dye  Latex  Norvasc [Amlodipine Besylate]     Medications:    Dyazide   Lexapro     Past Medical History:    Breast cancer  Carpal tunnel syndrome  GERD  Osteoporosis   Anxiety   Venous malformation     Past Surgical " "History:    Hysterectomy with oophorectomy   Tonsillectomy   Cystoscopy, ureteroscopy, combined  EGD  Carpal tunnel release  Right mastectomy with tram flap reconstruction   Revise reconstructed breast     Family History:    Mother: Tyroid cancer, HTN, breast cancer, uterine cancer  Father: Cerebrovascular disease, cardiovascular disease, diabetes    Social History:  Arrives with her .   PCP: Carlie Marina PA-C, Family Medicine     Physical Exam     Patient Vitals for the past 24 hrs:   BP Temp Temp src Pulse Resp SpO2 Height Weight   02/27/23 0227 119/85 -- -- 67 (!) 9 95 % -- --   02/27/23 0157 (!) 160/101 -- -- 79 23 95 % -- --   02/27/23 0127 (!) 157/93 -- -- 74 22 98 % -- --   02/27/23 0100 (!) 152/96 -- -- 73 17 97 % -- --   02/27/23 0026 (!) 141/90 -- -- 70 14 97 % -- --   02/27/23 0004 (!) 151/94 -- -- 76 19 96 % -- --   02/26/23 2334 (!) 163/111 -- -- 74 11 99 % -- --   02/26/23 2304 (!) 161/99 -- -- 74 (!) 9 99 % -- --   02/26/23 2300 (!) 161/99 -- -- 78 12 98 % -- --   02/26/23 2237 (!) 153/99 -- -- 85 19 99 % -- --   02/26/23 2220 (!) 161/108 98.4  F (36.9  C) Oral 91 18 98 % 1.626 m (5' 4\") 63.5 kg (140 lb)      Physical Exam  General: Resting on the gurney, appears uncomfortable  Head:  The scalp, face, and head appear normal  Mouth/Throat: Mucus membranes are moist  CV:  Regular rhythm with the exception of irregularity when ectopic beats are seen in rhythm strip.     Normal S1 and S2  No pathological murmur   Resp:  Breath sounds clear and equal bilaterally    Non-labored, no retractions or accessory muscle use    No coarseness    No wheezing   GI:  Abdomen is soft, no rigidity    No tenderness to palpation  MS:  Normal motor assessment of all extremities.    Good capillary refill noted.  Skin:  No rash or lesions noted.  Neuro:   Speech is normal and fluent. No apparent deficit.  Psych:  Awake. Alert. Anxious affect.     Appropriate interactions.    Emergency Department Course   ECG  ECG " results from 02/26/23   EKG 12 lead     Value    Systolic Blood Pressure     Diastolic Blood Pressure     Ventricular Rate 89    Atrial Rate 89    SC Interval 138    QRS Duration 88        QTc 450    P Axis 73    R AXIS 33    T Axis 68    Interpretation ECG      Sinus rhythm  Possible Left atrial enlargement  Septal infarct , age undetermined  Abnormal ECG  When compared with ECG of 10-FEB-2023 16:24,  No significant change was found  Confirmed by GENERATED REPORT, COMPUTER ([id number]),  Deb Noble ([id number]) on 2/27/2023 1:33:21 AM         Laboratory:  Labs Ordered and Resulted from Time of ED Arrival to Time of ED Departure   BASIC METABOLIC PANEL - Abnormal       Result Value    Sodium 138      Potassium 3.1 (*)     Chloride 97 (*)     Carbon Dioxide (CO2) 26      Anion Gap 15      Urea Nitrogen 20.3      Creatinine 1.07 (*)     Calcium 10.3 (*)     Glucose 123 (*)     GFR Estimate 56 (*)    TROPONIN T, HIGH SENSITIVITY - Normal    Troponin T, High Sensitivity 8     CBC WITH PLATELETS AND DIFFERENTIAL    WBC Count 9.2      RBC Count 5.03      Hemoglobin 15.3      Hematocrit 45.0      MCV 90      MCH 30.4      MCHC 34.0      RDW 12.4      Platelet Count 249      % Neutrophils 70      % Lymphocytes 21      % Monocytes 7      % Eosinophils 1      % Basophils 1      % Immature Granulocytes 0      NRBCs per 100 WBC 0      Absolute Neutrophils 6.4      Absolute Lymphocytes 1.9      Absolute Monocytes 0.7      Absolute Eosinophils 0.1      Absolute Basophils 0.1      Absolute Immature Granulocytes 0.0      Absolute NRBCs 0.0          Emergency Department Course & Assessments:       Interventions:  Medications   hydrOXYzine (ATARAX) tablet 25 mg (25 mg Oral $Given 2/27/23 0137)   0.9% sodium chloride BOLUS (0 mLs Intravenous Stopped 2/27/23 0129)   potassium chloride 10 mEq in 100 mL sterile water infusion (0 mEq Intravenous Stopped 2/27/23 0139)   potassium chloride (KLOR-CON) Packet 40 mEq (40 mEq Oral $Given  2/27/23 0152)      Independent Interpretation (X-rays, CTs, rhythm strip):  Rhythm strip reviewed which showed several single ectopic beats.   Reviewed patient's tracings from her apple watch rhythm strips which also showed ectopic beats and otherwise showed normal sinus rhythm.     Social Determinants of Health affecting care:   None    Assessments:  2348 I obtained history and examined the patient as noted above.  0140 I rechecked the patient and explained findings. She was only recently administered Atarax for her anxiety and thus has not felt the effects of this medication yet. Patient was started on IV potassium which was bothering her, so she will be given oral potasium instead.  0405 I rechecked the patient and explained findings. She is comfortable with discharge at this time.     Disposition:  The patient was discharged to home.     Impression & Plan      Medical Decision Making:  Emely Forbes is a 72 year old female presented with a sensation of palpitations.  The work up in the Emergency Department is negative, monitoring and EKG have not shown any abnormal heart rhythms or concerning morphologies, other than occasional ectopic beats.  I considered a broad differential diagnosis including acute coronary syndrome, myocardial infarction, pulmonary embolism, electrolyte abnormalities, drug reaction, anxiety, amongst others.  Lab work reveals mild hypokalemia but no other electrolyte abnormalities. Pt did not tolerate IV replacement, therefore IV was stopped and oral potassium given.  Her ectopy then stopped.  The patient is not anemic.  No EKG changes or troponin elevation concerning for acute coronary syndrome.  No serious etiology for the palpitations were detected today during this visit and I feel the patient is safe for discharge.  She does have significant anxiety with the palpitations, therefore atarax given with significant improvement.  I would not start her on a beta-blocker in the emergency  department I recommended she eat high potassium foods as this corrected her ectopy and she is sensitive to medications.  She was given a small course of Atarax should she have more ectopy and associated anxiety.  Close follow up with primary care is indicated should the symptoms continue, as further work up may be performed; this was made clear to the patient, who understands.     Diagnosis:    ICD-10-CM    1. Palpitations  R00.2       2. Hypokalemia  E87.6       3. Anxiety  F41.9            Discharge Medications:  Discharge Medication List as of 2/27/2023  4:03 AM      START taking these medications    Details   hydrOXYzine (ATARAX) 25 MG tablet Take 1 tablet (25 mg) by mouth 3 times daily as needed for itching, Disp-20 tablet, R-0, Local Print              Scribe Disclosure:  I, Lizzie Landry, am serving as a scribe at 11:48 PM on 2/26/2023 to document services personally performed by Lady Giraldo MD based on my observations and the provider's statements to me.     2/26/2023   Lady Giraldo MD Debroux, Karah M, MD  02/27/23 0655

## 2023-02-27 NOTE — ED NOTES
"Pt. Related she could \"feel the Afib again.\"  EKG order placed for pt.; however, pt. Now states she does not feel it any longer.  Pt. Is on cardiac monitor and will advise nurse if she feels irregularity again in order to obtain EKG.  "

## 2023-02-27 NOTE — ED NOTES
"Patient up to restroom with stand by assist, denies any dizziness or discomfort. Reports feeling better and is not having the \"palpitation\" feeling any more.  "

## 2023-02-28 NOTE — PROGRESS NOTES
HPI and Plan:               CURRENT MEDICATIONS:  Current Outpatient Medications   Medication Sig Dispense Refill     hydrOXYzine (ATARAX) 25 MG tablet Take 1 tablet (25 mg) by mouth 3 times daily as needed for itching 20 tablet 0     triamterene-HCTZ (DYAZIDE) 37.5-25 MG capsule TAKE 1 CAPSULE BY MOUTH EVERY MORNING       vitamin D3 (CHOLECALCIFEROL) 50 mcg (2000 units) tablet Take 1 tablet (50 mcg) by mouth daily         ALLERGIES     Allergies   Allergen Reactions     Adhesive Tape      blisters     Contrast Dye      Latex      blisters     Norvasc [Amlodipine Besylate]        PAST MEDICAL HISTORY:  Past Medical History:   Diagnosis Date     Breast cancer (H) 10/10    R s/p mast/tram flap, reconstruction     Carpal tunnel syndrome      Gastro-oesophageal reflux disease      GERD (gastroesophageal reflux disease)     s/p EGD 5/06     Hematuria 2003     HX: benign breast biopsy     L, papilloma     Osteopenia     DXA 12/16/10     PONV (postoperative nausea and vomiting)     Scope patch on pre-op     S/P hysterectomy with oophorectomy 2001 for fibroids     S/P tonsillectomy        PAST SURGICAL HISTORY:  Past Surgical History:   Procedure Laterality Date     BREAST SURGERY      RIGHT BREAST BIOPSY, RIGHT MASTECTOMY WITH TRAM FLAP RECONSTRUCTION     CYSTOSCOPY, URETEROSCOPY, COMBINED  9/14/2012    Procedure: COMBINED CYSTOSCOPY, URETEROSCOPY;  VIDEO CYSTOSCOPY, RIGHT FLEXIBLE URETEROSCOPY, CAUTERIZATION OF PAPILLARY VEIN (LATEX IV CONTRAST TAPE ALLERGY);  Surgeon: Rey Bailon MD;  Location:  OR     ENT SURGERY      TONSILLECTOMY     GI SURGERY      EGD     GYN SURGERY      HYSTERECTOMY WITH OOPHORECTOMY     HYSTERECTOMY, PAP NO LONGER INDICATED       ORTHOPEDIC SURGERY      CARPAL TUNNEL RELEASE     REVISE RECONSTRUCTED BREAST  12/8/2011    Procedure:REVISE RECONSTRUCTED BREAST; REVISION RIGHT BREAST RECONSTRUCTION, LEFT BREAST STEROID INJECTION ; Surgeon:FRAN LEVIN; Location[facility]       FAMILY  HISTORY:  Family History   Problem Relation Age of Onset     Thyroid Disease Mother         cancer     Hypertension Mother      Breast Cancer Mother      Cancer Mother         uterine     Cardiovascular Father      Cerebrovascular Disease Father      Diabetes Father        SOCIAL HISTORY:  Social History     Socioeconomic History     Marital status:      Spouse name: Thomas     Number of children: 2   Occupational History     Occupation:      Employer: UNKNOWN   Tobacco Use     Smoking status: Never     Smokeless tobacco: Never   Vaping Use     Vaping Use: Never used   Substance and Sexual Activity     Alcohol use: Yes     Alcohol/week: 0.0 - 0.8 standard drinks     Comment: 1 DRINK PER WEEK     Drug use: No     Sexual activity: Yes     Partners: Male   Social History Narrative    , 2 kids    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       Review of Systems:  Skin:          Eyes:         ENT:         Respiratory:          Cardiovascular:         Gastroenterology:        Genitourinary:         Musculoskeletal:         Neurologic:         Psychiatric:         Heme/Lymph/Imm:         Endocrine:           Physical Exam:  Vitals: There were no vitals taken for this visit.    Constitutional:  cooperative, alert and oriented, well developed, well nourished, in no acute distress        Skin:  warm and dry to the touch, no apparent skin lesions or masses noted          Head:  normocephalic, no masses or lesions        Eyes:  pupils equal and round        Lymph:      ENT:  no pallor or cyanosis, dentition good        Neck:           Respiratory:  clear to auscultation         Cardiac: regular rhythm                pulses full and equal                                        GI:  abdomen soft        Extremities and Muscular Skeletal:  no edema              Neurological:  no gross motor deficits        Psych:   Alert and Oriented x 3        CC  No referring provider defined for this encounter.

## 2023-03-01 ENCOUNTER — OFFICE VISIT (OUTPATIENT)
Dept: CARDIOLOGY | Facility: CLINIC | Age: 73
End: 2023-03-01
Payer: MEDICARE

## 2023-03-01 ENCOUNTER — TELEPHONE (OUTPATIENT)
Dept: CARDIOLOGY | Facility: CLINIC | Age: 73
End: 2023-03-01

## 2023-03-01 ENCOUNTER — LAB (OUTPATIENT)
Dept: LAB | Facility: CLINIC | Age: 73
End: 2023-03-01
Payer: MEDICARE

## 2023-03-01 VITALS
DIASTOLIC BLOOD PRESSURE: 90 MMHG | WEIGHT: 147 LBS | SYSTOLIC BLOOD PRESSURE: 144 MMHG | OXYGEN SATURATION: 99 % | HEART RATE: 75 BPM | BODY MASS INDEX: 24.49 KG/M2 | HEIGHT: 65 IN

## 2023-03-01 DIAGNOSIS — R00.2 PALPITATIONS: ICD-10-CM

## 2023-03-01 DIAGNOSIS — R00.2 PALPITATIONS: Primary | ICD-10-CM

## 2023-03-01 LAB
ANION GAP SERPL CALCULATED.3IONS-SCNC: 9 MMOL/L (ref 7–15)
BUN SERPL-MCNC: 17.1 MG/DL (ref 8–23)
CALCIUM SERPL-MCNC: 9.7 MG/DL (ref 8.8–10.2)
CHLORIDE SERPL-SCNC: 104 MMOL/L (ref 98–107)
CREAT SERPL-MCNC: 0.85 MG/DL (ref 0.51–0.95)
DEPRECATED HCO3 PLAS-SCNC: 30 MMOL/L (ref 22–29)
GFR SERPL CREATININE-BSD FRML MDRD: 73 ML/MIN/1.73M2
GLUCOSE SERPL-MCNC: 96 MG/DL (ref 70–99)
POTASSIUM SERPL-SCNC: 3.7 MMOL/L (ref 3.4–5.3)
SODIUM SERPL-SCNC: 143 MMOL/L (ref 136–145)

## 2023-03-01 PROCEDURE — 80048 BASIC METABOLIC PNL TOTAL CA: CPT | Performed by: INTERNAL MEDICINE

## 2023-03-01 PROCEDURE — 99205 OFFICE O/P NEW HI 60 MIN: CPT | Performed by: INTERNAL MEDICINE

## 2023-03-01 PROCEDURE — 36415 COLL VENOUS BLD VENIPUNCTURE: CPT | Performed by: INTERNAL MEDICINE

## 2023-03-01 RX ORDER — METOPROLOL TARTRATE 25 MG/1
25 TABLET, FILM COATED ORAL 2 TIMES DAILY
Qty: 60 TABLET | Refills: 1 | Status: SHIPPED | OUTPATIENT
Start: 2023-03-01 | End: 2023-03-27

## 2023-03-01 NOTE — TELEPHONE ENCOUNTER
[facility] Call Center    Phone Message    May a detailed message be left on voicemail: yes     Reason for Call: Other: Pt needs a call from Dr. Gonzalez with results of the the Echo Stress as soon as they are availalbe as pt is waiting to hear if she can fly.        Action Taken: Message routed to:  [facility] ([facility] cardio    Travel Screening: Not Applicable     Thank you!  [facility]

## 2023-03-01 NOTE — TELEPHONE ENCOUNTER
Bmp drawn post-OV 3/1/2023 noted. Ordered to check K+ level and consider medication changes.    Per Dr. Gonzalez's dictation 3/1/2023:  I do think that her hypokalemia may have exacerbated her recent symptoms and triamterene/hydrochlorothiazide can certainly be contributing to that.  I think it would be reasonable to reassess her potassium level today, and if it is still low, I would recommend the discontinuation of triamterene/hydrochlorothiazide.  We can consider alternative options for blood pressure control if she remains hypertensive despite metoprolol.    K+ in ED 2/26/2023 was 3.1    Component      Latest Ref Rng & Units 3/1/2023   Sodium      136 - 145 mmol/L 143   Potassium      3.4 - 5.3 mmol/L 3.7   Chloride      98 - 107 mmol/L 104   Carbon Dioxide (CO2)      22 - 29 mmol/L 30 (H)   Anion Gap      7 - 15 mmol/L 9   Urea Nitrogen      8.0 - 23.0 mg/dL 17.1   Creatinine      0.51 - 0.95 mg/dL 0.85   Calcium      8.8 - 10.2 mg/dL 9.7   Glucose      70 - 99 mg/dL 96   GFR Estimate      >60 mL/min/1.73m2 73     Will message Dr. Gonzalez to review

## 2023-03-01 NOTE — TELEPHONE ENCOUNTER
Spoke to patient, reviewed normal labs and no changes recommended at this time. She states she already took one tablet of metoprolol as prescribed by Dr Gonzalez and is feeling better. She wonders if she should stop the dyazide, states it was prescribed to her by her ENT for inner ear problems. Recommended she discuss with them but it does not appear to be effecting her electrolytes/renal function anymore, no indication to stop it from a cardiology perspective but it may be changed in the future if her BP remains elevated per Dr Gonzalez's note. Pt verbalized understanding, no other questions at this time.

## 2023-03-01 NOTE — TELEPHONE ENCOUNTER
Stress test is scheduled for 3/2/23, will contact patient with results once they are available and Dr Gonzalez has reviewed it.

## 2023-03-01 NOTE — LETTER
3/1/2023    Clement Jaime MD  [address] [address]    RE: Emely Forbes       Dear Colleague,     I had the pleasure of seeing Emely Forbes in the [facility].  HPI and Plan:               CURRENT MEDICATIONS:  Current Outpatient Medications   Medication Sig Dispense Refill     hydrOXYzine (ATARAX) 25 MG tablet Take 1 tablet (25 mg) by mouth 3 times daily as needed for itching 20 tablet 0     triamterene-HCTZ (DYAZIDE) 37.5-25 MG capsule TAKE 1 CAPSULE BY MOUTH EVERY MORNING       vitamin D3 (CHOLECALCIFEROL) 50 mcg (2000 units) tablet Take 1 tablet (50 mcg) by mouth daily         ALLERGIES     Allergies   Allergen Reactions     Adhesive Tape      blisters     Contrast Dye      Latex      blisters     Norvasc [Amlodipine Besylate]        PAST MEDICAL HISTORY:  Past Medical History:   Diagnosis Date     Breast cancer (H) 10/10    R s/p mast/tram flap, reconstruction     Carpal tunnel syndrome      Gastro-oesophageal reflux disease      GERD (gastroesophageal reflux disease)     s/p EGD 5/06     Hematuria 2003     HX: benign breast biopsy     L, papilloma     Osteopenia     DXA 12/16/10     PONV (postoperative nausea and vomiting)     Scope patch on pre-op     S/P hysterectomy with oophorectomy 2001 for fibroids     S/P tonsillectomy        PAST SURGICAL HISTORY:  Past Surgical History:   Procedure Laterality Date     BREAST SURGERY      RIGHT BREAST BIOPSY, RIGHT MASTECTOMY WITH TRAM FLAP RECONSTRUCTION     CYSTOSCOPY, URETEROSCOPY, COMBINED  9/14/2012    Procedure: COMBINED CYSTOSCOPY, URETEROSCOPY;  VIDEO CYSTOSCOPY, RIGHT FLEXIBLE URETEROSCOPY, CAUTERIZATION OF PAPILLARY VEIN (LATEX IV CONTRAST TAPE ALLERGY);  Surgeon: Rey Bailon MD;  Location:  OR     ENT SURGERY      TONSILLECTOMY     GI SURGERY      EGD     GYN SURGERY      HYSTERECTOMY WITH OOPHORECTOMY     HYSTERECTOMY, PAP NO LONGER INDICATED       ORTHOPEDIC SURGERY      CARPAL  TUNNEL RELEASE     REVISE RECONSTRUCTED BREAST  12/8/2011    Procedure:REVISE RECONSTRUCTED BREAST; REVISION RIGHT BREAST RECONSTRUCTION, LEFT BREAST STEROID INJECTION ; Surgeon:FRAN LEVIN; Location:[facility]       FAMILY HISTORY:  Family History   Problem Relation Age of Onset     Thyroid Disease Mother         cancer     Hypertension Mother      Breast Cancer Mother      Cancer Mother         uterine     Cardiovascular Father      Cerebrovascular Disease Father      Diabetes Father        SOCIAL HISTORY:  Social History     Socioeconomic History     Marital status:      Spouse name: Thomas     Number of children: 2   Occupational History     Occupation:      Employer: UNKNOWN   Tobacco Use     Smoking status: Never     Smokeless tobacco: Never   Vaping Use     Vaping Use: Never used   Substance and Sexual Activity     Alcohol use: Yes     Alcohol/week: 0.0 - 0.8 standard drinks     Comment: 1 DRINK PER WEEK     Drug use: No     Sexual activity: Yes     Partners: Male   Social History Narrative    , 2 kids    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       Review of Systems:  Skin:          Eyes:         ENT:         Respiratory:          Cardiovascular:         Gastroenterology:        Genitourinary:         Musculoskeletal:         Neurologic:         Psychiatric:         Heme/Lymph/Imm:         Endocrine:           Physical Exam:  Vitals: There were no vitals taken for this visit.    Constitutional:  cooperative, alert and oriented, well developed, well nourished, in no acute distress        Skin:  warm and dry to the touch, no apparent skin lesions or masses noted          Head:  normocephalic, no masses or lesions        Eyes:  pupils equal and round        Lymph:      ENT:  no pallor or cyanosis, dentition good        Neck:           Respiratory:  clear to auscultation         Cardiac:  regular rhythm                pulses full and equal                                        GI:  abdomen soft        Extremities and Muscular Skeletal:  no edema              Neurological:  no gross motor deficits        Psych:  Alert and Oriented x 3        CC  No referring provider defined for this encounter.                Service Date: 03/01/2023    REASON FOR CONSULTATION:  Palpitations.    HISTORY OF PRESENT ILLNESS:  I had the pleasure of seeing Ms. Forbes in consultation at the [facility] today.  She is a very pleasant 72-year-old female with a history of breast cancer treated with mastectomy in 2010 who was referred today due to concerns regarding palpitations.      The patient states that she has always been a very active individual and has always had a relatively fast heart rate.  In 12/22, however, she started noticing palpitations of increasing frequency.  These worsened to the point where on 02/10/2023, she presented to the emergency department with an episode of palpitations that lasted over an hour.  She was noted to be hypertensive upon presentation to the emergency department, but an EKG was within normal limits and her workup that included basic blood work and a TSH was all within normal limits.    She subsequently returned to the emergency department on 02/26/2023 with a recurrence of her symptoms.  Of note, she had worn a Holter monitor after the first episode for 48 hours, which demonstrated sinus rhythm with occasional PACs and PVCs and a few short runs of supraventricular ectopy.  She was asymptomatic during this time, however.    Her evaluation at the time of her second emergency department visit was remarkable for the presence of hypokalemia.  Of note, she is on Dyazide, which was given for ear fullness.  She was also hypertensive at the time of her presentation.  An EKG demonstrated sinus rhythm with no acute ST-T wave abnormalities.  She received potassium supplementation  and was instructed to follow up with Cardiology.    The patient states that she felt better for about a day after her emergency department presentation, but her symptoms appear to have recurred since then.  She also brought a record of her Apple watch recordings and the EKGs, which I reviewed, demonstrating sinus rhythm with occasional PACs and PVCs.    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  She denies any history of exertional chest discomfort, syncope or presyncope.  The symptoms tend to occur at rest.  There is no history of PND or orthopnea.    Her past medical history, family history, social history, allergies and medications are in my Epic note.    PHYSICAL EXAMINATION:  Dictated below.    I personally reviewed and independently interpreted her EKG at the time of her emergency department presentation on 02/26/2023.  This demonstrates sinus rhythm with possible left atrial enlargement and no acute ST-T wave abnormalities.    She has also undergone stress echocardiography in 2009 which was negative for ischemia.    As stated above, her TSH on 02/10/2023 was within normal limits at 1.66.  Troponin was negative at 8.    IMPRESSION:    1.  Palpitations, which may be due to symptomatic PACs/PVCs versus PSVT.  2.  History of breast cancer as described above.  3.  Hypertension.  Blood pressure elevated today on Dyazide 37.5/25 mg daily.    Ms. Forbes presents for an evaluation regarding palpitations over the past few months, which appear to have worsened recently leading to 2 emergency department visits as described above.  As of now, her Holter monitor and EKG tracings demonstrate sinus rhythm with occasional PACs and PVCs at the time of her symptoms.  I do not think we have excluded supraventricular tachycardia as an etiology for her arrhythmias given that she did not have any symptoms during the 48-hour Holter monitor.    We did discuss the pathophysiology and treatment  options for symptomatic PACs and PVCs as well as SVT in detail.  As I explained to her, I would like to first establish that she has a structurally normal heart and a normal chronotropic response to exercise given her recurrent symptoms.  To this effect, I have ordered an exercise stress echocardiogram.  I have also started her on metoprolol 25 mg p.o. b.i.d. given that her symptoms appear to be quite bothersome and persistent.  She will obviously need to hold this prior to her stress echocardiogram.    If the stress echocardiogram is within normal limits, we will continue metoprolol and obtain followup Zio Patch monitoring for approximately 2 weeks.  We can then decide if further evaluation with potentially electrophysiology involvement would be appropriate.    I do think that her hypokalemia may have exacerbated her recent symptoms and triamterene/hydrochlorothiazide can certainly be contributing to that.  I think it would be reasonable to reassess her potassium level today, and if it is still low, I would recommend the discontinuation of triamterene/hydrochlorothiazide.  We can consider alternative options for blood pressure control if she remains hypertensive despite metoprolol.    Finally, she does have mild dyslipidemia and would probably benefit from coronary artery calcium scoring at some point.  We can discuss this after her palpitations have been addressed.    It was a pleasure seeing her in consultation.      This was a high complexity visit requiring over 60 minutes of outside chart review, as well as a discussion with the patient regarding options for further management and the ordering of testing to rule out significant structural heart disease that could contribute to her symptoms.    Rebecca Gonzalez MD        D: 2023   T: 2023   MT: geeta    Name:     ANÍBAL VELAZQUEZ  MRN:      [id number]        Account:      [id number]   :      1950           Service Date: 2023        Document: [id number]      Thank you for allowing me to participate in the care of your patient.      Sincerely,     Rebecca Gonzalez MD     [facility]  cc:   No referring provider defined for this encounter.

## 2023-03-01 NOTE — PROGRESS NOTES
Service Date: 03/01/2023    REASON FOR CONSULTATION:  Palpitations.    HISTORY OF PRESENT ILLNESS:  I had the pleasure of seeing Ms. Forbes in consultation at the [facility] today.  She is a very pleasant 72-year-old female with a history of breast cancer treated with mastectomy in 2010 who was referred today due to concerns regarding palpitations.      The patient states that she has always been a very active individual and has always had a relatively fast heart rate.  In 12/22, however, she started noticing palpitations of increasing frequency.  These worsened to the point where on 02/10/2023, she presented to the emergency department with an episode of palpitations that lasted over an hour.  She was noted to be hypertensive upon presentation to the emergency department, but an EKG was within normal limits and her workup that included basic blood work and a TSH was all within normal limits.    She subsequently returned to the emergency department on 02/26/2023 with a recurrence of her symptoms.  Of note, she had worn a Holter monitor after the first episode for 48 hours, which demonstrated sinus rhythm with occasional PACs and PVCs and a few short runs of supraventricular ectopy.  She was asymptomatic during this time, however.    Her evaluation at the time of her second emergency department visit was remarkable for the presence of hypokalemia.  Of note, she is on Dyazide, which was given for ear fullness.  She was also hypertensive at the time of her presentation.  An EKG demonstrated sinus rhythm with no acute ST-T wave abnormalities.  She received potassium supplementation and was instructed to follow up with Cardiology.    The patient states that she felt better for about a day after her emergency department presentation, but her symptoms appear to have recurred since then.  She also brought a record of her Apple watch recordings and the EKGs, which I reviewed, demonstrating sinus rhythm with  occasional PACs and PVCs.    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  She denies any history of exertional chest discomfort, syncope or presyncope.  The symptoms tend to occur at rest.  There is no history of PND or orthopnea.    Her past medical history, family history, social history, allergies and medications are in my Epic note.    PHYSICAL EXAMINATION:  Dictated below.    I personally reviewed and independently interpreted her EKG at the time of her emergency department presentation on 02/26/2023.  This demonstrates sinus rhythm with possible left atrial enlargement and no acute ST-T wave abnormalities.    She has also undergone stress echocardiography in 2009 which was negative for ischemia.    As stated above, her TSH on 02/10/2023 was within normal limits at 1.66.  Troponin was negative at 8.    IMPRESSION:    1.  Palpitations, which may be due to symptomatic PACs/PVCs versus PSVT.  2.  History of breast cancer as described above.  3.  Hypertension.  Blood pressure elevated today on Dyazide 37.5/25 mg daily.    Ms. Forbes presents for an evaluation regarding palpitations over the past few months, which appear to have worsened recently leading to 2 emergency department visits as described above.  As of now, her Holter monitor and EKG tracings demonstrate sinus rhythm with occasional PACs and PVCs at the time of her symptoms.  I do not think we have excluded supraventricular tachycardia as an etiology for her arrhythmias given that she did not have any symptoms during the 48-hour Holter monitor.    We did discuss the pathophysiology and treatment options for symptomatic PACs and PVCs as well as SVT in detail.  As I explained to her, I would like to first establish that she has a structurally normal heart and a normal chronotropic response to exercise given her recurrent symptoms.  To this effect, I have ordered an exercise stress echocardiogram.  I have also started her  on metoprolol 25 mg p.o. b.i.d. given that her symptoms appear to be quite bothersome and persistent.  She will obviously need to hold this prior to her stress echocardiogram.    If the stress echocardiogram is within normal limits, we will continue metoprolol and obtain followup Zio Patch monitoring for approximately 2 weeks.  We can then decide if further evaluation with potentially electrophysiology involvement would be appropriate.    I do think that her hypokalemia may have exacerbated her recent symptoms and triamterene/hydrochlorothiazide can certainly be contributing to that.  I think it would be reasonable to reassess her potassium level today, and if it is still low, I would recommend the discontinuation of triamterene/hydrochlorothiazide.  We can consider alternative options for blood pressure control if she remains hypertensive despite metoprolol.    Finally, she does have mild dyslipidemia and would probably benefit from coronary artery calcium scoring at some point.  We can discuss this after her palpitations have been addressed.    It was a pleasure seeing her in consultation.      This was a high complexity visit requiring over 60 minutes of outside chart review, as well as a discussion with the patient regarding options for further management and the ordering of testing to rule out significant structural heart disease that could contribute to her symptoms.    Rebecca Gonzalez MD        D: 2023   T: 2023   MT: geeta    Name:     ANÍBAL VELAZQUEZ  MRN:      [id number]        Account:      [id number]   :      1950           Service Date: 2023       Document: [id number]

## 2023-03-03 ENCOUNTER — HOSPITAL ENCOUNTER (OUTPATIENT)
Dept: CARDIOLOGY | Facility: CLINIC | Age: 73
Discharge: HOME | End: 2023-03-03
Attending: INTERNAL MEDICINE
Payer: MEDICARE

## 2023-03-03 ENCOUNTER — TELEPHONE (OUTPATIENT)
Dept: CARDIOLOGY | Facility: CLINIC | Age: 73
End: 2023-03-03

## 2023-03-03 DIAGNOSIS — R00.2 PALPITATIONS: ICD-10-CM

## 2023-03-03 PROCEDURE — 255N000002 HC RX 255 OP 636: Performed by: INTERNAL MEDICINE

## 2023-03-03 PROCEDURE — 93248 EXT ECG>7D<15D REV&INTERPJ: CPT | Performed by: INTERNAL MEDICINE

## 2023-03-03 PROCEDURE — 93016 CV STRESS TEST SUPVJ ONLY: CPT | Performed by: INTERNAL MEDICINE

## 2023-03-03 PROCEDURE — 93325 DOPPLER ECHO COLOR FLOW MAPG: CPT | Mod: 26 | Performed by: INTERNAL MEDICINE

## 2023-03-03 PROCEDURE — 93017 CV STRESS TEST TRACING ONLY: CPT

## 2023-03-03 PROCEDURE — 93018 CV STRESS TEST I&R ONLY: CPT | Performed by: INTERNAL MEDICINE

## 2023-03-03 PROCEDURE — 93325 DOPPLER ECHO COLOR FLOW MAPG: CPT | Mod: TC

## 2023-03-03 PROCEDURE — 93321 DOPPLER ECHO F-UP/LMTD STD: CPT | Mod: 26 | Performed by: INTERNAL MEDICINE

## 2023-03-03 PROCEDURE — 93246 EXT ECG>7D<15D RECORDING: CPT

## 2023-03-03 PROCEDURE — 93350 STRESS TTE ONLY: CPT | Mod: 26 | Performed by: INTERNAL MEDICINE

## 2023-03-03 RX ADMIN — HUMAN ALBUMIN MICROSPHERES AND PERFLUTREN 9 ML: 10; .22 INJECTION, SOLUTION INTRAVENOUS at 11:38

## 2023-03-03 NOTE — TELEPHONE ENCOUNTER
Stress echo completed as below. Seen 3/1/23 for palpitations. Pt had previously requested Dr Gonzalez review this urgently as she needs to know if she is OK to travel. Ziopatch was also placed today and is pending. Routed to provider.        A treadmill exercise test according to the Christopher protocol was performed.  The patient exercised 9:00.  The patient exhibited no chest pain during exercise.  The EKG portion of this stress test was negative for inducible ischemia (see echo results below).  Normal resting wall motion and no stress-induced wall motion abnormality.  This was a normal stress echocardiogram with no evidence of stress-induced ischemia.

## 2023-03-03 NOTE — TELEPHONE ENCOUNTER
Spoke to patient, reviewed normal stress test and that she is OK to travel. All patients questions answered. Will follow up with her again once Kelsey is available.

## 2023-03-20 ENCOUNTER — APPOINTMENT (OUTPATIENT)
Dept: URBAN - METROPOLITAN AREA CLINIC 255 | Age: 73
Setting detail: DERMATOLOGY
End: 2023-03-23

## 2023-03-20 VITALS — HEIGHT: 65 IN | WEIGHT: 143 LBS

## 2023-03-20 DIAGNOSIS — L82.0 INFLAMED SEBORRHEIC KERATOSIS: ICD-10-CM

## 2023-03-20 DIAGNOSIS — I78.8 OTHER DISEASES OF CAPILLARIES: ICD-10-CM

## 2023-03-20 DIAGNOSIS — L57.8 OTHER SKIN CHANGES DUE TO CHRONIC EXPOSURE TO NONIONIZING RADIATION: ICD-10-CM

## 2023-03-20 DIAGNOSIS — L81.0 POSTINFLAMMATORY HYPERPIGMENTATION: ICD-10-CM

## 2023-03-20 DIAGNOSIS — L57.0 ACTINIC KERATOSIS: ICD-10-CM

## 2023-03-20 PROCEDURE — 99213 OFFICE O/P EST LOW 20 MIN: CPT | Mod: 25

## 2023-03-20 PROCEDURE — OTHER MIPS QUALITY: OTHER

## 2023-03-20 PROCEDURE — OTHER LIQUID NITROGEN: OTHER

## 2023-03-20 PROCEDURE — OTHER COUNSELING: OTHER

## 2023-03-20 PROCEDURE — OTHER ADDITIONAL NOTES: OTHER

## 2023-03-20 PROCEDURE — 17000 DESTRUCT PREMALG LESION: CPT | Mod: 59

## 2023-03-20 PROCEDURE — 17110 DESTRUCT B9 LESION 1-14: CPT

## 2023-03-20 PROCEDURE — 17003 DESTRUCT PREMALG LES 2-14: CPT | Mod: 59

## 2023-03-20 ASSESSMENT — LOCATION DETAILED DESCRIPTION DERM
LOCATION DETAILED: RIGHT SUPERIOR LATERAL FOREHEAD
LOCATION DETAILED: RIGHT DISTAL PRETIBIAL REGION
LOCATION DETAILED: RIGHT INFERIOR MEDIAL MALAR CHEEK
LOCATION DETAILED: LEFT SUPERIOR FOREHEAD
LOCATION DETAILED: LEFT LATERAL PROXIMAL PRETIBIAL REGION
LOCATION DETAILED: RIGHT SUPERIOR FOREHEAD

## 2023-03-20 ASSESSMENT — LOCATION SIMPLE DESCRIPTION DERM
LOCATION SIMPLE: LEFT FOREHEAD
LOCATION SIMPLE: RIGHT PRETIBIAL REGION
LOCATION SIMPLE: RIGHT CHEEK
LOCATION SIMPLE: LEFT PRETIBIAL REGION
LOCATION SIMPLE: RIGHT FOREHEAD

## 2023-03-20 ASSESSMENT — LOCATION ZONE DERM
LOCATION ZONE: LEG
LOCATION ZONE: FACE

## 2023-03-20 NOTE — PROCEDURE: LIQUID NITROGEN
Medical Necessity Clause: This procedure was medically necessary because the lesions that were treated were:
Post-Care Instructions: I reviewed with the patient in detail post-care instructions. Patient is to wear sunprotection, and avoid picking at any of the treated lesions. Pt may apply Vaseline to crusted or scabbing areas.
Detail Level: Detailed
Show Applicator Variable?: Yes
Include Z78.9 (Other Specified Conditions Influencing Health Status) As An Associated Diagnosis?: No
Number Of Freeze-Thaw Cycles: 1 freeze-thaw cycle
Duration Of Freeze Thaw-Cycle (Seconds): 2
Consent: The patient's consent was obtained including but not limited to risks of crusting, scabbing, blistering, scarring, darker or lighter pigmentary change, recurrence, incomplete removal and infection.
Spray Paint Text: The liquid nitrogen was applied to the skin utilizing a spray paint frosting technique.
Medical Necessity Information: It is in your best interest to select a reason for this procedure from the list below. All of these items fulfill various CMS LCD requirements except the new and changing color options.

## 2023-03-27 ENCOUNTER — TELEPHONE (OUTPATIENT)
Dept: CARDIOLOGY | Facility: CLINIC | Age: 73
End: 2023-03-27
Payer: MEDICARE

## 2023-03-27 DIAGNOSIS — R00.2 PALPITATIONS: ICD-10-CM

## 2023-03-27 RX ORDER — METOPROLOL TARTRATE 25 MG/1
25 TABLET, FILM COATED ORAL 2 TIMES DAILY
Qty: 60 TABLET | Refills: 3 | Status: SHIPPED | OUTPATIENT
Start: 2023-03-27 | End: 2023-05-22

## 2023-03-27 NOTE — TELEPHONE ENCOUNTER
Patient wonders is she is to remain on the metoprolol tartrate 25 mg twice daily or if dose  should be increased?   - Will need refills. 1 month left.     Spoke with Patient who states she still notices about 2-3 short episodes per day both at rest and with exercise.     Patient denies symptoms of light headedness, dizziness, shortness of breath or near syncopal.      Does  not checked any BP's but will start to check about 90 minutes post medication. Will record and monitor.

## 2023-03-27 NOTE — TELEPHONE ENCOUNTER
[facility]    Phone Message    May a detailed message be left on voicemail: yes     Reason for Call: Requesting Results   Name/type of test: Ziopatch  Date of test: 3/3/23  Was test done at a location other than [facility] (Please fill in the location if not [facility])?: No    Patient called requesting to speak with a member of her care team in regards to her ziopatch results. Please call patient back to further discuss, thank you.    Action Taken: Message routed to:  Other: Cardiology    Travel Screening: Not Applicable     Thank you!  [facility]

## 2023-03-27 NOTE — TELEPHONE ENCOUNTER
Leadless monitor 14 day worn 3-3-23 to 3-17-23  - Predominant rhythm was SR. Average HR 62 bpm, range  bpm  - 22 SVT runs. Longest was 11 sec averaging 120 bpm  Fastest was 5 beats, max rate 164 bpm  - Rare PAC, PVC.     Stress echo done 3-2-23.     Last Dr. Gonzalez OV 3-1-23 for palpitations -    started  metoprolol tartrate 25 mg p.o. b.i.d. given that her symptoms appear to be quite bothersome and persistent.    - If the stress echocardiogram is within normal limits, we will continue metoprolol and obtain followup Zio Patch monitoring for approximately 2 weeks.

## 2023-03-27 NOTE — TELEPHONE ENCOUNTER
"Spoke to patient and reviewed preliminary ziopatch results, waiting Dr Gonzalez's review. She says she does not think the metoprolol is helping her palpitations, has not really noticed a difference in her symptoms on the med. Describes it as \"a flutter that takes her breath away,\" worse at night. She endorses some anxiety about falling asleep since it was overnight that her symptoms first occurred and brought her to the ED. She isn't sure if this is something she just has to \"live with\" or if she should pursue and ablation or other meds or what. She has a lot of questions and would prefer to discuss this with Dr Gonzalez at an appointment. Routed to provider.   "

## 2023-03-27 NOTE — TELEPHONE ENCOUNTER
Per Dr. Gonzalez's reply - message left for Patient regarding continuing the current metoprolol Tartrate dose. Refills e scribed to [facility] in [place]    Patient to call scheduling for 2-3 month KI F/Up OV.   Order placed.   Patient will still monitor BP and symptoms and call in a couple of weeks or sooner with an update.

## 2023-03-28 NOTE — TELEPHONE ENCOUNTER
Spoke with Patient regarding Metoprolol Tartrate refill. Patient will call scheduling for KI F/Up OV    Patient will continue to monitor BP and for any symptoms and call sooner with any questions or concerns.    Patient verbalized understanding and agrees.

## 2023-04-06 ENCOUNTER — OFFICE VISIT (OUTPATIENT)
Dept: CARDIOLOGY | Facility: CLINIC | Age: 73
End: 2023-04-06
Attending: INTERNAL MEDICINE
Payer: MEDICARE

## 2023-04-06 VITALS
HEART RATE: 66 BPM | WEIGHT: 150.2 LBS | DIASTOLIC BLOOD PRESSURE: 83 MMHG | BODY MASS INDEX: 25.64 KG/M2 | SYSTOLIC BLOOD PRESSURE: 139 MMHG | HEIGHT: 64 IN

## 2023-04-06 DIAGNOSIS — R00.2 PALPITATIONS: Primary | ICD-10-CM

## 2023-04-06 PROCEDURE — 99214 OFFICE O/P EST MOD 30 MIN: CPT | Performed by: NURSE PRACTITIONER

## 2023-04-06 RX ORDER — METOPROLOL SUCCINATE 50 MG/1
50 TABLET, EXTENDED RELEASE ORAL DAILY
Qty: 90 TABLET | Refills: 0 | Status: SHIPPED | OUTPATIENT
Start: 2023-04-06 | End: 2023-05-22

## 2023-04-06 NOTE — LETTER
4/6/2023    Clement Jaime MD  [address] [address]    RE: Emely Forbes       Dear Colleague,     I had the pleasure of seeing Emely Forbes in the [facility].  Cardiology Clinic Progress Note  Emely Forbes MRN# [id number]   YOB: 1950 Age: 72 year old     Primary cardiologist: Dr. Gonzalez     Reason for visit: palpitations     History of presenting illness:    Emely Forbes is a pleasant 72 year old patient with past medical history significant for breast cancer treated with mastectomy in 2010 who was recently referred to cardiology due to concerns regarding palpitations.    The patient has had ongoing palpitations since December 2022, increasing in frequency.  In fact, they worsened to a point where on 2/10/2023, she presented to the emergency department with an episode of palpitations that lasted over an hour.  She was noted to be hypertensive upon presentation to the emergency department, but EKG was within normal limits.  Her work-up included blood work with TSH which was all within normal limits.    She subsequently returned to the emergency department on 2/26/2023 with recurrence of her symptoms.  Her labs at this time showed hypokalemia.  Repeat EKG was, again, unremarkable.  She was hypertensive at the time of her presentation.  She received potassium.  Of note, she had a Holter monitor after the first episode for 48 hours, which demonstrated sinus rhythm with occasional PACs and PVCs and a few short runs of SVT.  She was asymptomatic during this time, however.    She was seen by Dr. Gonzalez on 3/1/2023 at which point she reported feeling better shortly after her emergency room visit, but her symptoms have since recurred.  She brought a record of her Apple Watch recordings and EKGs that demonstrated sinus rhythm with occasional PACs and PVCs.  She otherwise denies any exertional chest discomfort or shortness of breath, syncope or near  syncope.  Nonetheless, it was recommended she pursue exercise stress echocardiogram to establish that she has a structurally normal heart and normal chronotropic response to exercise.  She was placed on metoprolol 25 twice daily.     She underwent exercise stress echocardiogram on 3/3/2023 that was normal.  She exercised 9 minutes on the Christopher protocol exhibiting no chest discomfort with exercise.  The EKG portion of the stress test was negative for inducible ischemia.  She had normal resting wall motion and no stress-induced wall motion abnormality.  Her LV function is preserved.    Subsequent 14-day Zio patch demonstrated 22 runs of SVT, the longest lasting 11 seconds.  She had rare PACs and PVCs.  Her predominant underlying rhythm was sinus rhythm.  No symptoms were reported.    Today the patient reports ongoing intermittent episodes of palpitations that occur most often at night.  Her symptoms occur at rest.  Though, she states that she has continued to feel much better since the initiation of metoprolol.  She otherwise denies any exertional chest discomfort, syncope or near syncope.  Her blood pressure is well controlled.    Her most recent BMP from 3/1/2023 shows normal renal function and a potassium of 3.7.         Assessment and Plan:     ASSESSMENT:    Palpitations. Results of ziopatch and holter monitor outlined above. Her symptoms are likely due to symptomatic PACs/PVCs vs SVT. Echo stress test is normal without any structural abnormalities. She is currently on metoprolol tartrate 25 mg BID, reports significant improvement since the initiation of BB therapy, though symptoms remain bothersome at night, denies symptoms throughout the day.   Hypertension. Well-controlled on dyazide 37.5/25 mg daily.   Hypokalemia. Repeat BMP shows K 3.7.   Hx of breast cancer s/p mastectomy in 2010      PLAN:     Given her symptoms are quite bothersome at night and she is essentially symptom free throughout the day,  "recommend transitioning to Toprol XL 50 mg every evening.   Continue dyazide 37.5/25 mg daily.   Follow-up in on 5/22 with Sarika Mak CNP, as previously arranged.          Ana Mckay DNP, APRN, CNP  Page: [phone] (8a-5p M-F)    Orders this Visit:  Orders Placed This Encounter   Procedures    Follow-Up with Cardiology KI     Orders Placed This Encounter   Medications    metoprolol succinate ER (TOPROL XL) 50 MG 24 hr tablet     Sig: Take 1 tablet (50 mg) by mouth daily     Dispense:  90 tablet     Refill:  0     There are no discontinued medications.    Today's clinic visit entailed:  Review of the result(s) of each unique test - holter, zio, echo stress test, labs   Ordering of each unique test  Prescription drug management  40 minutes spent by me on the date of the encounter doing chart review, review of test results, patient visit and documentation   Provider  Link to [facility] Help Grid     The level of medical decision making during this visit was of moderate complexity.           Review of Systems:     Review of Systems:  Skin:        Eyes:       ENT:       Respiratory:  Negative    Cardiovascular:  Negative;chest pain;edema;syncope or near-syncope;cyanosis;exercise intolerance;fatigue;lightheadedness;dizziness palpitations  Gastroenterology:      Genitourinary:       Musculoskeletal:       Neurologic:       Psychiatric:       Heme/Lymph/Imm:       Endocrine:                 Physical Exam:   Vitals: /83   Pulse 66   Ht 1.626 m (5' 4\")   Wt 68.1 kg (150 lb 3.2 oz)   BMI 25.78 kg/m    Constitutional:  cooperative        Skin:  warm and dry to the touch        Head:  normocephalic        Eyes:  pupils equal and round        ENT:  no pallor or cyanosis        Neck:  JVP normal        Chest:  normal breath sounds, clear to auscultation, normal A-P diameter, normal symmetry, normal respiratory excursion, no use of accessory muscles        Cardiac: normal S1 and S2;regular rhythm;no murmurs, gallops or " rubs detected                  Abdomen:  abdomen soft        Vascular: pulses full and equal                                      Extremities and Back:  no edema        Neurological:  no gross motor deficits;affect appropriate             Medications:     Current Outpatient Medications   Medication Sig Dispense Refill    metoprolol succinate ER (TOPROL XL) 50 MG 24 hr tablet Take 1 tablet (50 mg) by mouth daily 90 tablet 0    metoprolol tartrate (LOPRESSOR) 25 MG tablet Take 1 tablet (25 mg) by mouth 2 times daily 60 tablet 3    triamterene-HCTZ (DYAZIDE) 37.5-25 MG capsule TAKE 1 CAPSULE BY MOUTH EVERY MORNING      vitamin D3 (CHOLECALCIFEROL) 50 mcg (2000 units) tablet Take 1 tablet (50 mcg) by mouth daily      hydrOXYzine (ATARAX) 25 MG tablet Take 1 tablet (25 mg) by mouth 3 times daily as needed for itching (Patient not taking: Reported on 3/1/2023) 20 tablet 0       Family History   Problem Relation Age of Onset    Thyroid Disease Mother         cancer    Hypertension Mother     Breast Cancer Mother     Cancer Mother         uterine    Cardiovascular Father     Cerebrovascular Disease Father     Diabetes Father        Social History     Socioeconomic History    Marital status:      Spouse name: Thomas    Number of children: 2    Years of education: Not on file    Highest education level: Not on file   Occupational History    Occupation:      Employer: UNKNOWN   Tobacco Use    Smoking status: Never    Smokeless tobacco: Never   Vaping Use    Vaping status: Never Used   Substance and Sexual Activity    Alcohol use: Not Currently     Alcohol/week: 0.0 - 0.8 standard drinks of alcohol    Drug use: No    Sexual activity: Yes     Partners: Male   Other Topics Concern    Parent/sibling w/ CABG, MI or angioplasty before 65F 55M? 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     Social Determinants of Health     Financial Resource Strain: Not on file   Food Insecurity: Not on file   Transportation Needs: Not on file   Physical Activity: Not on file   Stress: Not on file   Social Connections: Not on file   Intimate Partner Violence: Not on file   Housing Stability: Not on file            Past Medical History:     Past Medical History:   Diagnosis Date    Breast cancer (H) 10/10    R s/p mast/tram flap, reconstruction    Carpal tunnel syndrome     Gastro-oesophageal reflux disease     GERD (gastroesophageal reflux disease)     s/p EGD 5/06    Hematuria 2003    HX: benign breast biopsy     L, papilloma    Osteopenia     DXA 12/16/10    PONV (postoperative nausea and vomiting)     Scope patch on pre-op    S/P hysterectomy with oophorectomy 2001 for fibroids    S/P tonsillectomy               Past Surgical History:     Past Surgical History:   Procedure Laterality Date    BREAST SURGERY      RIGHT BREAST BIOPSY, RIGHT MASTECTOMY WITH TRAM FLAP RECONSTRUCTION    CYSTOSCOPY, URETEROSCOPY, COMBINED  9/14/2012    Procedure: COMBINED CYSTOSCOPY, URETEROSCOPY;  VIDEO CYSTOSCOPY, RIGHT FLEXIBLE URETEROSCOPY, CAUTERIZATION OF PAPILLARY VEIN (LATEX IV CONTRAST TAPE ALLERGY);  Surgeon: Rey Bailon MD;  Location:  OR    ENT SURGERY      TONSILLECTOMY    GI SURGERY      EGD    GYN SURGERY      HYSTERECTOMY WITH OOPHORECTOMY    HYSTERECTOMY, PAP NO LONGER INDICATED      ORTHOPEDIC SURGERY      CARPAL TUNNEL RELEASE    REVISE RECONSTRUCTED BREAST  12/8/2011    Procedure:REVISE RECONSTRUCTED BREAST; REVISION RIGHT BREAST RECONSTRUCTION, LEFT BREAST STEROID INJECTION ; Surgeon:FRAN LEVIN; Location:[facility]              Allergies:   Adhesive tape, Contrast dye, Latex, and Norvasc [amlodipine besylate]       Data:   All laboratory data reviewed:    Recent Labs   Lab Test 02/10/23  1644 06/07/22  1049 01/28/21  0919   LDL  --  134* 152*   HDL  --  60 63   NHDL  --  153*  168*   CHOL  --  213* 231*   TRIG  --  93 78   TSH 1.66 1.00  --        Lab Results   Component Value Date    WBC 9.2 02/26/2023    WBC 5.7 12/02/2013    RBC 5.03 02/26/2023    RBC 4.64 12/02/2013    HGB 15.3 02/26/2023    HGB 14.1 12/02/2013    HCT 45.0 02/26/2023    HCT 42.3 12/02/2013    MCV 90 02/26/2023    MCV 91 12/02/2013    MCH 30.4 02/26/2023    MCH 30.4 12/02/2013    MCHC 34.0 02/26/2023    MCHC 33.3 12/02/2013    RDW 12.4 02/26/2023    RDW 13.0 12/02/2013     02/26/2023     12/02/2013       Lab Results   Component Value Date     03/01/2023     01/28/2021    POTASSIUM 3.7 03/01/2023    POTASSIUM 3.4 06/07/2022    POTASSIUM 3.8 01/28/2021    CHLORIDE 104 03/01/2023    CHLORIDE 104 06/07/2022    CHLORIDE 105 01/28/2021    CO2 30 (H) 03/01/2023    CO2 31 06/07/2022    CO2 31 01/28/2021    ANIONGAP 9 03/01/2023    ANIONGAP 3 06/07/2022    ANIONGAP 3 01/28/2021    GLC 96 03/01/2023    GLC 99 06/07/2022     (H) 01/28/2021    BUN 17.1 03/01/2023    BUN 17 06/07/2022    BUN 21 01/28/2021    CR 0.85 03/01/2023    CR 0.93 01/28/2021    GFRESTIMATED 73 03/01/2023    GFRESTIMATED 63 01/28/2021    GFRESTBLACK 73 01/28/2021    KRISTEL 9.7 03/01/2023    KRISTEL 10.0 01/28/2021      Lab Results   Component Value Date    AST 18 06/07/2022    AST 15 01/28/2021    ALT 24 06/07/2022    ALT 23 01/28/2021       No results found for: A1C    No results found for: INR          Thank you for allowing me to participate in the care of your patient.      Sincerely,     Ana Mckay, [facility]  cc:   Rebecca Gonzalez MD  [address]  [place] [id number]

## 2023-04-06 NOTE — PATIENT INSTRUCTIONS
Today's Plan:     1) Stop metoprolol tartrate, start Toprol XL 50 mg every evening (metoprolol succinate).    2) Follow-up in May, as previously arranged.     If you have questions or concerns please call my nurse team at [phone].       Scheduling phone number: [phone]    Reminder: Please bring in all current medications, over the counter supplements and vitamin bottles to your next appointment.-    It was a pleasure seeing you today!     Ana Mckay, MELANIE  4/6/2023    -

## 2023-04-07 NOTE — HPI: MOHS SURGERY CONSULTATION
Update History & Physical    The patient's History and Physical of 4/1/23 was reviewed with the patient and I examined the patient. There was no change. The surgical site was confirmed by the patient and me. Plan: The risks, benefits, expected outcome, and alternative to the recommended procedure have been discussed with the patient. Patient understands and wants to proceed with the procedure.      Electronically signed by Jairon Adams MD on 4/7/2023 at 9:55 AM Who Is Your Referring Provider?: Nhung Palmer MD

## 2023-05-22 ENCOUNTER — OFFICE VISIT (OUTPATIENT)
Dept: CARDIOLOGY | Facility: CLINIC | Age: 73
End: 2023-05-22
Attending: NURSE PRACTITIONER
Payer: MEDICARE

## 2023-05-22 VITALS
HEART RATE: 61 BPM | SYSTOLIC BLOOD PRESSURE: 133 MMHG | BODY MASS INDEX: 25.27 KG/M2 | DIASTOLIC BLOOD PRESSURE: 85 MMHG | HEIGHT: 64 IN | WEIGHT: 148 LBS

## 2023-05-22 DIAGNOSIS — R00.2 PALPITATIONS: ICD-10-CM

## 2023-05-22 DIAGNOSIS — Z13.6 CARDIOVASCULAR SCREENING; LDL GOAL LESS THAN 160: Primary | ICD-10-CM

## 2023-05-22 DIAGNOSIS — E78.5 HYPERLIPIDEMIA LDL GOAL <100: ICD-10-CM

## 2023-05-22 PROCEDURE — 99214 OFFICE O/P EST MOD 30 MIN: CPT | Performed by: NURSE PRACTITIONER

## 2023-05-22 RX ORDER — METOPROLOL SUCCINATE 50 MG/1
50 TABLET, EXTENDED RELEASE ORAL DAILY
Qty: 90 TABLET | Refills: 3 | Status: SHIPPED | OUTPATIENT
Start: 2023-05-22 | End: 2024-01-26

## 2023-05-22 NOTE — PROGRESS NOTES
Cardiology Clinic Progress Note  Emely Forbes MRN# [id number]   YOB: 1950 Age: 72 year old   Primary Cardiologist: Dr. Gonzalez Reason for visit: 6 week follow up             Assessment and Plan:       1.  Palpitations       SVT        Rare PACs/PVCs  -2/2023 holter monitor-> sinus rhythm with occasional PACs and PVCs and a few short runs of SVT  -3/2023 zio patch -> 22 runs of SVT, longest lasting 11 seconds.  She had rare PACs and PVCs  -3/2023 exercise stress echocardiogram negative for ischemia and chronotropic incompetence  -Initially started on metoprolol succinate 25mg BID and continued to have evening time palpitations, symptomatically significantly improved since start of toprol XL   -Discussed avoidance of triggers for palpitations including alcohol, caffeine and dehydration     2.  Hypertension, controlled  -Continue triametrene/HCTZ and metoprolol     3.  Hypokalemia, resolved  -Patient actively eating potassium containing foods    4.  History of breast cancer     5. Hyperlipidemia  -ASCVD risk score 10.0%, optimal risk score 6.9%  -Will have patient undergo CT calcium scoring for risk stratification and guidance for further medication management   -Fasting lipid panel to be done in 1 month       Changes today: CT calcium scoring ordered, fasting lipid panel in 1 month     Follow up plan: Follow up with me in 3 months         History of Presenting Illness:    Emely Forbes is a very pleasant 72 year old female with a history of breast cancer treated with mastectomy (2010), and palpitations.    Patient had ongoing palpitations since December 2022 increasing in frequency.  In February 2023 they worsened to a point she presented to the emergency room with episode that lasted over an hour.  She was noted to be hypertensive upon presentation, her EKG was within normal limits.  They completed blood work including a TSH which was all within normal limits.  She returned to the emergency department  approximately 2 weeks later on 2/26/2023 with recurrence of her symptoms.  Labs at that time showed hypokalemia and EKG again was unremarkable.  She was hypertensive at the time presentation.  She was given potassium supplementation.  Of note she did wear a Holter monitor after the first episode for 48 hours, which demonstrated sinus rhythm with occasional PACs and PVCs and a few short runs of SVT, she was asymptomatic during those episodes.    Patient was seen by Dr. Gonzalez and on 3/1/2023.  She reported feeling better after her emergency room visits, however her symptoms had recurred.  She brought with her a record of her Apple Watch recordings and EKGs that demonstrated sinus rhythm with occasional PACs and PVCs.  He recommended a exercise stress echocardiogram to establish she had a structurally normal heart and normal chronotropic response to exercise.  She was started metoprolol 25 mg twice daily.      She underwent a stress echocardiogram 3/3/2023 that was normal. She exercised 9 minutes of Christopher protocol without any symptoms.  EKG was negative for inducible ischemia.  She had normal resting wall motion and no stress-induced wall motion abnormality on imaging.  Her LV function was preserved.    Subsequent 14-day Zio patch monitor demonstrated 22 runs of SVT, longest lasting 11 seconds.  She had rare PACs and PVCs.  Her predominant underlying rhythm was sinus rhythm.  No symptoms were reported.    She is on my partner Ana Mckay NP on 4/6/2023 and continued to feel episodes of palpitations most often occurring at night.  Her symptoms occur at rest.  She was overall feeling better since initiation of metoprolol.  BMP was done at that time showing normal renal function and potassium 3.7.  She was initiated on Toprol 50 mg every evening.    Patient is here today for follow up on her palpitations and blood pressure. Her palpitations have improved since switching to the metoprolol XL. Blood pressure readings at  home. Last week, she had a lane with half the amount of alcohol and experienced a slight increase in her palpitations.    Patient denies chest pain or chest tightness. Denies dizziness, lightheadedness or other presyncopal symptoms. Denies tachycardia or palpitations.      Blood pressure 133/85 and HR 61 in clinic today.        Recent Hospitalizations   None in [id number]          Social History      Social History     Socioeconomic History     Marital status:      Spouse name: Thomas     Number of children: 2     Years of education: Not on file     Highest education level: Not on file   Occupational History     Occupation: [employer]     Employer: UNKNOWN   Tobacco Use     Smoking status: Never     Smokeless tobacco: Never   Vaping Use     Vaping status: Never Used   Substance and Sexual Activity     Alcohol use: Not Currently     Alcohol/week: 0.0 - 0.8 standard drinks of alcohol     Drug use: No     Sexual activity: Yes     Partners: Male   Other Topics Concern     Parent/sibling w/ CABG, MI or angioplasty before 65F 55M? 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     Social Determinants of Health     Financial Resource Strain: Not on file   Food Insecurity: Not on file   Transportation Needs: Not on file   Physical Activity: Not on file   Stress: Not on file   Social Connections: Not on file   Intimate Partner Violence: Not on file   Housing Stability: Not on file            Review of Systems:   Skin:        Eyes:       ENT:       Respiratory:  Negative    Cardiovascular:  chest pain;edema;syncope or near-syncope;exercise intolerance;lightheadedness;dizziness palpitations;Positive for;fatigue  Gastroenterology:      Genitourinary:       Musculoskeletal:       Neurologic:       Psychiatric:       Heme/Lymph/Imm:       Endocrine:              Physical  "Exam:   Vitals: /85   Pulse 61   Ht 1.626 m (5' 4\")   Wt 67.1 kg (148 lb)   BMI 25.40 kg/m     Wt Readings from Last 4 Encounters:   05/22/23 67.1 kg (148 lb)   04/06/23 68.1 kg (150 lb 3.2 oz)   03/01/23 66.7 kg (147 lb)   02/26/23 63.5 kg (140 lb)     GEN: well nourished, in no acute distress.  HEENT:  Pupils equal, round. Sclerae nonicteric.   NECK: Supple, no masses appreciated. No JVD with patient supine.  C/V:  Regular rate and rhythm, no murmur, rub or gallop.    RESP: Respirations are unlabored. Clear to auscultation bilaterally without wheezing, rales, or rhonchi.  GI: Abdomen soft, nontender.  EXTREM: No LE edema.  NEURO: Alert and oriented, cooperative.  SKIN: Warm and dry.        Data:     LIPID RESULTS:  Lab Results   Component Value Date    CHOL 213 (H) 06/07/2022    CHOL 231 (H) 01/28/2021    HDL 60 06/07/2022    HDL 63 01/28/2021     (H) 06/07/2022     (H) 01/28/2021    TRIG 93 06/07/2022    TRIG 78 01/28/2021    CHOLHDLRATIO 3.8 12/02/2013     LIVER ENZYME RESULTS:  Lab Results   Component Value Date    AST 18 06/07/2022    AST 15 01/28/2021    ALT 24 06/07/2022    ALT 23 01/28/2021     CBC RESULTS:  Lab Results   Component Value Date    WBC 9.2 02/26/2023    WBC 5.7 12/02/2013    RBC 5.03 02/26/2023    RBC 4.64 12/02/2013    HGB 15.3 02/26/2023    HGB 14.1 12/02/2013    HCT 45.0 02/26/2023    HCT 42.3 12/02/2013    MCV 90 02/26/2023    MCV 91 12/02/2013    MCH 30.4 02/26/2023    MCH 30.4 12/02/2013    MCHC 34.0 02/26/2023    MCHC 33.3 12/02/2013    RDW 12.4 02/26/2023    RDW 13.0 12/02/2013     02/26/2023     12/02/2013     BMP RESULTS:  Lab Results   Component Value Date     03/01/2023     01/28/2021    POTASSIUM 3.7 03/01/2023    POTASSIUM 3.4 06/07/2022    POTASSIUM 3.8 01/28/2021    CHLORIDE 104 03/01/2023    CHLORIDE 104 06/07/2022    CHLORIDE 105 01/28/2021    CO2 30 (H) 03/01/2023    CO2 31 06/07/2022    CO2 31 01/28/2021    ANIONGAP 9 " 03/01/2023    ANIONGAP 3 06/07/2022    ANIONGAP 3 01/28/2021    GLC 96 03/01/2023    GLC 99 06/07/2022     (H) 01/28/2021    BUN 17.1 03/01/2023    BUN 17 06/07/2022    BUN 21 01/28/2021    CR 0.85 03/01/2023    CR 0.93 01/28/2021    GFRESTIMATED 73 03/01/2023    GFRESTIMATED 63 01/28/2021    GFRESTBLACK 73 01/28/2021    KRISTEL 9.7 03/01/2023    KRISTEL 10.0 01/28/2021      A1C RESULTS:  No results found for: A1C  INR RESULTS:  No results found for: INR         Medications     Current Outpatient Medications   Medication Sig Dispense Refill     metoprolol succinate ER (TOPROL XL) 50 MG 24 hr tablet Take 1 tablet (50 mg) by mouth daily 90 tablet 3     triamterene-HCTZ (DYAZIDE) 37.5-25 MG capsule TAKE 1 CAPSULE BY MOUTH EVERY MORNING       vitamin D3 (CHOLECALCIFEROL) 50 mcg (2000 units) tablet Take 1 tablet (50 mcg) by mouth daily       hydrOXYzine (ATARAX) 25 MG tablet Take 1 tablet (25 mg) by mouth 3 times daily as needed for itching (Patient not taking: Reported on 3/1/2023) 20 tablet 0          Past Medical History     Past Medical History:   Diagnosis Date     Breast cancer (H) 10/10    R s/p mast/tram flap, reconstruction     Carpal tunnel syndrome      Gastro-oesophageal reflux disease      GERD (gastroesophageal reflux disease)     s/p EGD 5/06     Hematuria 2003     HX: benign breast biopsy     L, papilloma     Osteopenia     DXA 12/16/10     PONV (postoperative nausea and vomiting)     Scope patch on pre-op     S/P hysterectomy with oophorectomy 2001 for fibroids     S/P tonsillectomy      Past Surgical History:   Procedure Laterality Date     BREAST SURGERY      RIGHT BREAST BIOPSY, RIGHT MASTECTOMY WITH TRAM FLAP RECONSTRUCTION     CYSTOSCOPY, URETEROSCOPY, COMBINED  9/14/2012    Procedure: COMBINED CYSTOSCOPY, URETEROSCOPY;  VIDEO CYSTOSCOPY, RIGHT FLEXIBLE URETEROSCOPY, CAUTERIZATION OF PAPILLARY VEIN (LATEX IV CONTRAST TAPE ALLERGY);  Surgeon: Rey Bailon MD;  Location:  OR     ENT SURGERY       TONSILLECTOMY     GI SURGERY      EGD     GYN SURGERY      HYSTERECTOMY WITH OOPHORECTOMY     HYSTERECTOMY, PAP NO LONGER INDICATED       ORTHOPEDIC SURGERY      CARPAL TUNNEL RELEASE     REVISE RECONSTRUCTED BREAST  12/8/2011    Procedure:REVISE RECONSTRUCTED BREAST; REVISION RIGHT BREAST RECONSTRUCTION, LEFT BREAST STEROID INJECTION ; Surgeon:FRAN LEVIN; Location:[facility]     Family History   Problem Relation Age of Onset     Thyroid Disease Mother         cancer     Hypertension Mother      Breast Cancer Mother      Cancer Mother         uterine     Cardiovascular Father      Cerebrovascular Disease Father      Diabetes Father             Allergies   Adhesive tape, Contrast dye, Latex, and Norvasc [amlodipine besylate]        Urvashi Mak NP  [facility]  Pager: [phone]

## 2023-05-22 NOTE — LETTER
5/22/2023    Clement Jaime MD  [address] [address]    RE: Emely Forbes       Dear Colleague,     I had the pleasure of seeing Emely Forbes in the [facility].  Cardiology Clinic Progress Note  Emely Forbes MRN# [id number]   YOB: 1950 Age: 72 year old   Primary Cardiologist: Dr. Gonzalez Reason for visit: 6 week follow up             Assessment and Plan:       1.  Palpitations       SVT        Rare PACs/PVCs  -2/2023 holter monitor-> sinus rhythm with occasional PACs and PVCs and a few short runs of SVT  -3/2023 zio patch -> 22 runs of SVT, longest lasting 11 seconds.  She had rare PACs and PVCs  -3/2023 exercise stress echocardiogram negative for ischemia and chronotropic incompetence  -Initially started on metoprolol succinate 25mg BID and continued to have evening time palpitations, symptomatically significantly improved since start of toprol XL   -Discussed avoidance of triggers for palpitations including alcohol, caffeine and dehydration     2.  Hypertension, controlled  -Continue triametrene/HCTZ and metoprolol     3.  Hypokalemia, resolved  -Patient actively eating potassium containing foods    4.  History of breast cancer     5. Hyperlipidemia  -ASCVD risk score 10.0%, optimal risk score 6.9%  -Will have patient undergo CT calcium scoring for risk stratification and guidance for further medication management   -Fasting lipid panel to be done in 1 month       Changes today: CT calcium scoring ordered, fasting lipid panel in 1 month     Follow up plan: Follow up with me in 3 months         History of Presenting Illness:    Emely Forbes is a very pleasant 72 year old female with a history of breast cancer treated with mastectomy (2010), and palpitations.    Patient had ongoing palpitations since December 2022 increasing in frequency.  In February 2023 they worsened to a point she presented to the emergency room with episode that  lasted over an hour.  She was noted to be hypertensive upon presentation, her EKG was within normal limits.  They completed blood work including a TSH which was all within normal limits.  She returned to the emergency department approximately 2 weeks later on 2/26/2023 with recurrence of her symptoms.  Labs at that time showed hypokalemia and EKG again was unremarkable.  She was hypertensive at the time presentation.  She was given potassium supplementation.  Of note she did wear a Holter monitor after the first episode for 48 hours, which demonstrated sinus rhythm with occasional PACs and PVCs and a few short runs of SVT, she was asymptomatic during those episodes.    Patient was seen by Dr. Gonzalez and on 3/1/2023.  She reported feeling better after her emergency room visits, however her symptoms had recurred.  She brought with her a record of her Apple Watch recordings and EKGs that demonstrated sinus rhythm with occasional PACs and PVCs.  He recommended a exercise stress echocardiogram to establish she had a structurally normal heart and normal chronotropic response to exercise.  She was started metoprolol 25 mg twice daily.      She underwent a stress echocardiogram 3/3/2023 that was normal. She exercised 9 minutes of Christopher protocol without any symptoms.  EKG was negative for inducible ischemia.  She had normal resting wall motion and no stress-induced wall motion abnormality on imaging.  Her LV function was preserved.    Subsequent 14-day Zio patch monitor demonstrated 22 runs of SVT, longest lasting 11 seconds.  She had rare PACs and PVCs.  Her predominant underlying rhythm was sinus rhythm.  No symptoms were reported.    She is on my partner Ana Mckay NP on 4/6/2023 and continued to feel episodes of palpitations most often occurring at night.  Her symptoms occur at rest.  She was overall feeling better since initiation of metoprolol.  BMP was done at that time showing normal renal function and potassium  3.7.  She was initiated on Toprol 50 mg every evening.    Patient is here today for follow up on her palpitations and blood pressure. Her palpitations have improved since switching to the metoprolol XL. Blood pressure readings at home. Last week, she had a lane with half the amount of alcohol and experienced a slight increase in her palpitations.    Patient denies chest pain or chest tightness. Denies dizziness, lightheadedness or other presyncopal symptoms. Denies tachycardia or palpitations.      Blood pressure 133/85 and HR 61 in clinic today.        Recent Hospitalizations   None in [id number]          Social History      Social History     Socioeconomic History    Marital status:      Spouse name: Thomas    Number of children: 2    Years of education: Not on file    Highest education level: Not on file   Occupational History    Occupation:      Employer: UNKNOWN   Tobacco Use    Smoking status: Never    Smokeless tobacco: Never   Vaping Use    Vaping status: Never Used   Substance and Sexual Activity    Alcohol use: Not Currently     Alcohol/week: 0.0 - 0.8 standard drinks of alcohol    Drug use: No    Sexual activity: Yes     Partners: Male   Other Topics Concern    Parent/sibling w/ CABG, MI or angioplasty before 65F 55M? 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     Social Determinants of Health     Financial Resource Strain: Not on file   Food Insecurity: Not on file   Transportation Needs: Not on file   Physical Activity: Not on file   Stress: Not on file   Social Connections: Not on file   Intimate Partner Violence: Not on file   Housing Stability: Not on file            Review of Systems:   Skin:        Eyes:       ENT:       Respiratory:  Negative    Cardiovascular:  chest pain;edema;syncope or near-syncope;exercise  "intolerance;lightheadedness;dizziness palpitations;Positive for;fatigue  Gastroenterology:      Genitourinary:       Musculoskeletal:       Neurologic:       Psychiatric:       Heme/Lymph/Imm:       Endocrine:              Physical Exam:   Vitals: /85   Pulse 61   Ht 1.626 m (5' 4\")   Wt 67.1 kg (148 lb)   BMI 25.40 kg/m     Wt Readings from Last 4 Encounters:   05/22/23 67.1 kg (148 lb)   04/06/23 68.1 kg (150 lb 3.2 oz)   03/01/23 66.7 kg (147 lb)   02/26/23 63.5 kg (140 lb)     GEN: well nourished, in no acute distress.  HEENT:  Pupils equal, round. Sclerae nonicteric.   NECK: Supple, no masses appreciated. No JVD with patient supine.  C/V:  Regular rate and rhythm, no murmur, rub or gallop.    RESP: Respirations are unlabored. Clear to auscultation bilaterally without wheezing, rales, or rhonchi.  GI: Abdomen soft, nontender.  EXTREM: No LE edema.  NEURO: Alert and oriented, cooperative.  SKIN: Warm and dry.        Data:     LIPID RESULTS:  Lab Results   Component Value Date    CHOL 213 (H) 06/07/2022    CHOL 231 (H) 01/28/2021    HDL 60 06/07/2022    HDL 63 01/28/2021     (H) 06/07/2022     (H) 01/28/2021    TRIG 93 06/07/2022    TRIG 78 01/28/2021    CHOLHDLRATIO 3.8 12/02/2013     LIVER ENZYME RESULTS:  Lab Results   Component Value Date    AST 18 06/07/2022    AST 15 01/28/2021    ALT 24 06/07/2022    ALT 23 01/28/2021     CBC RESULTS:  Lab Results   Component Value Date    WBC 9.2 02/26/2023    WBC 5.7 12/02/2013    RBC 5.03 02/26/2023    RBC 4.64 12/02/2013    HGB 15.3 02/26/2023    HGB 14.1 12/02/2013    HCT 45.0 02/26/2023    HCT 42.3 12/02/2013    MCV 90 02/26/2023    MCV 91 12/02/2013    MCH 30.4 02/26/2023    MCH 30.4 12/02/2013    MCHC 34.0 02/26/2023    MCHC 33.3 12/02/2013    RDW 12.4 02/26/2023    RDW 13.0 12/02/2013     02/26/2023     12/02/2013     BMP RESULTS:  Lab Results   Component Value Date     03/01/2023     01/28/2021    POTASSIUM 3.7 " 03/01/2023    POTASSIUM 3.4 06/07/2022    POTASSIUM 3.8 01/28/2021    CHLORIDE 104 03/01/2023    CHLORIDE 104 06/07/2022    CHLORIDE 105 01/28/2021    CO2 30 (H) 03/01/2023    CO2 31 06/07/2022    CO2 31 01/28/2021    ANIONGAP 9 03/01/2023    ANIONGAP 3 06/07/2022    ANIONGAP 3 01/28/2021    GLC 96 03/01/2023    GLC 99 06/07/2022     (H) 01/28/2021    BUN 17.1 03/01/2023    BUN 17 06/07/2022    BUN 21 01/28/2021    CR 0.85 03/01/2023    CR 0.93 01/28/2021    GFRESTIMATED 73 03/01/2023    GFRESTIMATED 63 01/28/2021    GFRESTBLACK 73 01/28/2021    KRISTEL 9.7 03/01/2023    KRISTEL 10.0 01/28/2021      A1C RESULTS:  No results found for: A1C  INR RESULTS:  No results found for: INR         Medications     Current Outpatient Medications   Medication Sig Dispense Refill    metoprolol succinate ER (TOPROL XL) 50 MG 24 hr tablet Take 1 tablet (50 mg) by mouth daily 90 tablet 3    triamterene-HCTZ (DYAZIDE) 37.5-25 MG capsule TAKE 1 CAPSULE BY MOUTH EVERY MORNING      vitamin D3 (CHOLECALCIFEROL) 50 mcg (2000 units) tablet Take 1 tablet (50 mcg) by mouth daily      hydrOXYzine (ATARAX) 25 MG tablet Take 1 tablet (25 mg) by mouth 3 times daily as needed for itching (Patient not taking: Reported on 3/1/2023) 20 tablet 0          Past Medical History     Past Medical History:   Diagnosis Date    Breast cancer (H) 10/10    R s/p mast/tram flap, reconstruction    Carpal tunnel syndrome     Gastro-oesophageal reflux disease     GERD (gastroesophageal reflux disease)     s/p EGD 5/06    Hematuria 2003    HX: benign breast biopsy     L, papilloma    Osteopenia     DXA 12/16/10    PONV (postoperative nausea and vomiting)     Scope patch on pre-op    S/P hysterectomy with oophorectomy 2001 for fibroids    S/P tonsillectomy      Past Surgical History:   Procedure Laterality Date    BREAST SURGERY      RIGHT BREAST BIOPSY, RIGHT MASTECTOMY WITH TRAM FLAP RECONSTRUCTION    CYSTOSCOPY, URETEROSCOPY, COMBINED  9/14/2012    Procedure:  COMBINED CYSTOSCOPY, URETEROSCOPY;  VIDEO CYSTOSCOPY, RIGHT FLEXIBLE URETEROSCOPY, CAUTERIZATION OF PAPILLARY VEIN (LATEX IV CONTRAST TAPE ALLERGY);  Surgeon: Rey Bailon MD;  Location:  OR    ENT SURGERY      TONSILLECTOMY    GI SURGERY      EGD    GYN SURGERY      HYSTERECTOMY WITH OOPHORECTOMY    HYSTERECTOMY, PAP NO LONGER INDICATED      ORTHOPEDIC SURGERY      CARPAL TUNNEL RELEASE    REVISE RECONSTRUCTED BREAST  12/8/2011    Procedure:REVISE RECONSTRUCTED BREAST; REVISION RIGHT BREAST RECONSTRUCTION, LEFT BREAST STEROID INJECTION ; Surgeon:FRAN LEVIN; Location:[facility]     Family History   Problem Relation Age of Onset    Thyroid Disease Mother         cancer    Hypertension Mother     Breast Cancer Mother     Cancer Mother         uterine    Cardiovascular Father     Cerebrovascular Disease Father     Diabetes Father             Allergies   Adhesive tape, Contrast dye, Latex, and Norvasc [amlodipine besylate]        Urvashi Mak NP  [facility]  Pager: [phone]        Thank you for allowing me to participate in the care of your patient.      Sincerely,     Urvashi Mak NP     [facility]  cc:   Ana Mckay, CNP  [address] [place] [address]

## 2023-05-22 NOTE — PATIENT INSTRUCTIONS
Today's Recommendations    Stay on your current dose of metoprolol   I will check your fasting labs in 1 month to recheck your cholesterol, this can be done at any [place] location  We will check your CT calcium score to determine if we should start a statin medication or consider aspirin  Continue all medications without changes.  Please follow up with me in 3 months.    Please send a [facility] message or call [phone] to the RN team with questions or concerns.     Scheduling number [phone]  MICHAEL Martin, CNP

## 2023-06-28 ENCOUNTER — LAB (OUTPATIENT)
Dept: LAB | Facility: CLINIC | Age: 73
End: 2023-06-28
Payer: MEDICARE

## 2023-06-28 DIAGNOSIS — E78.5 HYPERLIPIDEMIA LDL GOAL <100: ICD-10-CM

## 2023-06-28 LAB
ALT SERPL W P-5'-P-CCNC: 15 U/L (ref 0–50)
CHOLEST SERPL-MCNC: 201 MG/DL
HDLC SERPL-MCNC: 47 MG/DL
LDLC SERPL CALC-MCNC: 136 MG/DL
NONHDLC SERPL-MCNC: 154 MG/DL
TRIGL SERPL-MCNC: 92 MG/DL

## 2023-06-28 PROCEDURE — 84460 ALANINE AMINO (ALT) (SGPT): CPT | Performed by: NURSE PRACTITIONER

## 2023-06-28 PROCEDURE — 36415 COLL VENOUS BLD VENIPUNCTURE: CPT | Performed by: NURSE PRACTITIONER

## 2023-06-28 PROCEDURE — 80061 LIPID PANEL: CPT | Performed by: NURSE PRACTITIONER

## 2023-07-06 ENCOUNTER — HOSPITAL ENCOUNTER (OUTPATIENT)
Dept: CARDIOLOGY | Facility: CLINIC | Age: 73
Discharge: HOME | End: 2023-07-06
Attending: NURSE PRACTITIONER | Admitting: NURSE PRACTITIONER
Payer: MEDICARE

## 2023-07-06 DIAGNOSIS — Z13.6 CARDIOVASCULAR SCREENING; LDL GOAL LESS THAN 160: ICD-10-CM

## 2023-07-06 PROCEDURE — G1010 CDSM STANSON: HCPCS | Performed by: INTERNAL MEDICINE

## 2023-07-06 PROCEDURE — 75571 CT HRT W/O DYE W/CA TEST: CPT | Mod: 26 | Performed by: INTERNAL MEDICINE

## 2023-07-06 PROCEDURE — 75571 CT HRT W/O DYE W/CA TEST: CPT | Mod: ME

## 2023-07-10 ENCOUNTER — TELEPHONE (OUTPATIENT)
Dept: CARDIOLOGY | Facility: CLINIC | Age: 73
End: 2023-07-10
Payer: MEDICARE

## 2023-07-10 DIAGNOSIS — E78.5 HYPERLIPIDEMIA WITH TARGET LDL LESS THAN 70: ICD-10-CM

## 2023-07-10 DIAGNOSIS — I25.10 ASCVD (ARTERIOSCLEROTIC CARDIOVASCULAR DISEASE): Primary | ICD-10-CM

## 2023-07-10 RX ORDER — ROSUVASTATIN CALCIUM 10 MG/1
10 TABLET, COATED ORAL DAILY
Qty: 30 TABLET | Refills: 2 | Status: SHIPPED | OUTPATIENT
Start: 2023-07-10 | End: 2023-08-11

## 2023-07-10 NOTE — TELEPHONE ENCOUNTER
----- Message from Urvashi Mak NP sent at 7/7/2023 12:53 PM CDT -----  If patient is agreeable, rosuvastatin 10mg daily.   ----- Message -----  From: Jeri Smith RN  Sent: 7/7/2023   9:20 AM CDT  To: Urvashi Mak NP    Which statin & dose would you like to start? Jeri Glass   ----- Message -----  From: Urvashi Mak NP  Sent: 7/6/2023   1:45 PM CDT  To: Jeri Smith RN    Elevated calcium score and lipid panel. We will review the report and her labs at our upcoming visit, but would recommend starting moderate intensity statin therapy.

## 2023-07-10 NOTE — TELEPHONE ENCOUNTER
----- Message from Urvashi Mak NP sent at 7/6/2023  1:45 PM CDT -----  Elevated calcium score and lipid panel. We will review the report and her labs at our upcoming visit, but would recommend starting moderate intensity statin therapy.

## 2023-08-10 ENCOUNTER — TELEPHONE (OUTPATIENT)
Dept: CARDIOLOGY | Facility: CLINIC | Age: 73
End: 2023-08-10

## 2023-08-10 NOTE — TELEPHONE ENCOUNTER
[facility] Call Center    Phone Message    May a detailed message be left on voicemail: yes     Reason for Call: Medication Refill Request    Has the patient contacted the pharmacy for the refill? Yes   Name of medication being requested: rosuvastatin (CRESTOR) 10 MG tablet   Provider who prescribed the medication: Urvashi Mak  Pharmacy: [facility] [address] [facility], [place] - [address] [facility]    Date medication is needed: Today   The pharmacy said they do not have any more medication for her and that we have not responded to multiple request for the refills   The patient is very frustrated, please call the pharmacy to give them a verbal    Action Taken: Other: Cardiology    Travel Screening: Not Applicable    Thank you!  [facility]

## 2023-08-11 DIAGNOSIS — E78.5 HYPERLIPIDEMIA WITH TARGET LDL LESS THAN 70: ICD-10-CM

## 2023-08-11 DIAGNOSIS — I25.10 ASCVD (ARTERIOSCLEROTIC CARDIOVASCULAR DISEASE): ICD-10-CM

## 2023-08-11 RX ORDER — ROSUVASTATIN CALCIUM 10 MG/1
10 TABLET, COATED ORAL DAILY
Qty: 90 TABLET | Refills: 0 | Status: SHIPPED | OUTPATIENT
Start: 2023-08-11 | End: 2023-08-28

## 2023-08-18 ENCOUNTER — TRANSFERRED RECORDS (OUTPATIENT)
Dept: HEALTH INFORMATION MANAGEMENT | Facility: CLINIC | Age: 73
End: 2023-08-18
Payer: MEDICARE

## 2023-08-20 ENCOUNTER — HEALTH MAINTENANCE LETTER (OUTPATIENT)
Age: 73
End: 2023-08-20

## 2023-08-22 ENCOUNTER — LAB (OUTPATIENT)
Dept: LAB | Facility: CLINIC | Age: 73
End: 2023-08-22
Payer: MEDICARE

## 2023-08-22 DIAGNOSIS — E78.5 HYPERLIPIDEMIA WITH TARGET LDL LESS THAN 70: ICD-10-CM

## 2023-08-22 DIAGNOSIS — I25.10 ASCVD (ARTERIOSCLEROTIC CARDIOVASCULAR DISEASE): ICD-10-CM

## 2023-08-22 LAB
ALT SERPL W P-5'-P-CCNC: 16 U/L (ref 0–50)
CHOLEST SERPL-MCNC: 152 MG/DL
HDLC SERPL-MCNC: 54 MG/DL
LDLC SERPL CALC-MCNC: 85 MG/DL
NONHDLC SERPL-MCNC: 98 MG/DL
TRIGL SERPL-MCNC: 67 MG/DL

## 2023-08-22 PROCEDURE — 36415 COLL VENOUS BLD VENIPUNCTURE: CPT | Performed by: NURSE PRACTITIONER

## 2023-08-22 PROCEDURE — 84460 ALANINE AMINO (ALT) (SGPT): CPT | Performed by: NURSE PRACTITIONER

## 2023-08-22 PROCEDURE — 80061 LIPID PANEL: CPT | Performed by: NURSE PRACTITIONER

## 2023-08-25 NOTE — PROGRESS NOTES
Cardiology Clinic Progress Note  Emely Forbes MRN# [id number]   YOB: 1950 Age: 72 year old   Primary Cardiologist: Dr. Gonzalez Reason for visit: 1 month follow up             Assessment and Plan:     1. Hyperlipidemia      Elevated ASCVD risk score  -ASCVD risk score 10.0%, optimal risk score 6.9%  -7/2023 CT calcium score of 91 and rosuvastatin 10 mg initiated  -8/2023 repeat lipid panel showed significant improvement in overall cholesterol control with an LDL of 85    2.  Palpitations       SVT        Rare PACs/PVCs  -2/2023 holter monitor-> sinus rhythm with occasional PACs and PVCs and a few short runs of SVT  -3/2023 zio patch -> 22 runs of SVT, longest lasting 11 seconds.  She had rare PACs and PVCs  -3/2023 exercise stress echocardiogram negative for ischemia and chronotropic incompetence  -Initially started on metoprolol succinate 25mg BID and continued to have evening time palpitations, symptomatically significantly improved since start of toprol XL   -Discussed avoidance of triggers for palpitations including alcohol, caffeine and dehydration     3.  Hypertension, controlled  -Continue triametrene/HCTZ and metoprolol     4.  History of breast cancer     Patient had a CT calcium score that placed her in the 71st percentile when compared to age and gender matched control groups. Statin therapy with crestor was initiated for aggressive CV risk factor management which has improved her LDL to 85 on her lab work last week. Her blood pressure was slightly elevated today, however she had just taken her triametrene/hydrochlorothiazide prior to our visit. Her home readings are well controlled. She continues to be very active without any symptoms concerning for angina.     Changes today: No medication changes made today    Follow up plan: Follow up with Dr. Gonzalez in 1 year with a fasting lipid panel prior         History of Presenting Illness:    Emely Forbes is a very pleasant 72 year old female with a history  of breast cancer treated with mastectomy (2010), and palpitations.    Patient had ongoing palpitations since December 2022 increasing in frequency.  In February 2023 they worsened to a point she presented to the emergency room with episode that lasted over an hour.  She was noted to be hypertensive upon presentation, her EKG was within normal limits.  They completed blood work including a TSH which was all within normal limits.  She returned to the emergency department approximately 2 weeks later on 2/26/2023 with recurrence of her symptoms.  Labs at that time showed hypokalemia and EKG again was unremarkable.  She was hypertensive at the time presentation.  She was given potassium supplementation.  Of note she did wear a Holter monitor after the first episode for 48 hours, which demonstrated sinus rhythm with occasional PACs and PVCs and a few short runs of SVT, she was asymptomatic during those episodes.    Patient was seen by Dr. Gonzalez and on 3/1/2023.  She reported feeling better after her emergency room visits, however her symptoms had recurred.  She brought with her a record of her Apple Watch recordings and EKGs that demonstrated sinus rhythm with occasional PACs and PVCs.  He recommended a exercise stress echocardiogram to establish she had a structurally normal heart and normal chronotropic response to exercise.  She was started metoprolol 25 mg twice daily.      She underwent a stress echocardiogram 3/3/2023 that was normal. She exercised 9 minutes of Christopher protocol without any symptoms.  EKG was negative for inducible ischemia.  She had normal resting wall motion and no stress-induced wall motion abnormality on imaging.  Her LV function was preserved.    Subsequent 14-day Zio patch monitor demonstrated 22 runs of SVT, longest lasting 11 seconds.  She had rare PACs and PVCs.  Her predominant underlying rhythm was sinus rhythm.  No symptoms were reported.    She is on my partner Ana Mckay NP on 4/6/2023  and continued to feel episodes of palpitations most often occurring at night.  Her symptoms occur at rest.  She was overall feeling better since initiation of metoprolol.  BMP was done at that time showing normal renal function and potassium 3.7.  She was initiated on Toprol 50 mg every evening.    When I met this patient in May 2023 she felt significantly better after switching to metoprolol XL.  Given her elevated ASCVD risk score we mutually decided to perform a CT calcium score for risk stratification.  This showed moderate coronary calcifications and a total ACE and calcium score of 91 placing the patient in the 71st percentile compared to age and gender.  Scores were specifically 62.9 left anterior descending artery and 20 but within the right coronary artery.  I recommend starting a moderate intensity rosuvastatin 10 mg daily.  We repeated her lipid panel 8/22/2023 which showed ALT 16, total cholesterol 152, HDL 54, LDL 85, triglycerides 67.    Patient is here today for follow up on her CT calcium score results. She will continue to get short episodes of palpitations, primarily in the evening which are minimally bothersome and last seconds-minutes. She continues to garden daily on her 3 acre property. She denies any myalgias since starting the rosuvastatin.    Patient denies chest pain or chest tightness. Denies dizziness, lightheadedness or other presyncopal symptoms. Denies tachycardia. Denies shortness of breath.     Blood pressure 142/84 and HR 70 in clinic today. At home it has been around 120/70. She just took her triametrene/hydrochlorothiazide prior to her appointment. She takes her metoprolol in the evenings.        Recent Hospitalizations   None in [id number]          Social History      Social History     Socioeconomic History    Marital status:      Spouse name: Thomas    Number of children: 2    Years of education: Not on file    Highest education level: Not on file   Occupational History     "Occupation: [employer]     Employer: UNKNOWN   Tobacco Use    Smoking status: Never    Smokeless tobacco: Never   Vaping Use    Vaping Use: Never used   Substance and Sexual Activity    Alcohol use: Not Currently     Alcohol/week: 0.0 - 0.8 standard drinks of alcohol    Drug use: No    Sexual activity: Yes     Partners: Male   Other Topics Concern    Parent/sibling w/ CABG, MI or angioplasty before 65F 55M? 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     Social Determinants of Health     Financial Resource Strain: Not on file   Food Insecurity: Not on file   Transportation Needs: Not on file   Physical Activity: Not on file   Stress: Not on file   Social Connections: Not on file   Intimate Partner Violence: Not on file   Housing Stability: Not on file            Review of Systems:   Skin:  not assessed     Eyes:  not assessed    ENT:  not assessed    Respiratory:  Negative    Cardiovascular:  Negative    Gastroenterology: not assessed    Genitourinary:  not assessed    Musculoskeletal:  not assessed    Neurologic:  not assessed    Psychiatric:  not assessed    Heme/Lymph/Imm:  not assessed    Endocrine:  not assessed           Physical Exam:   Vitals: BP (!) 144/84   Pulse 70   Ht 1.626 m (5' 4\")   Wt 66.8 kg (147 lb 3.2 oz)   SpO2 98%   BMI 25.27 kg/m     Wt Readings from Last 4 Encounters:   08/28/23 66.8 kg (147 lb 3.2 oz)   05/22/23 67.1 kg (148 lb)   04/06/23 68.1 kg (150 lb 3.2 oz)   03/01/23 66.7 kg (147 lb)     GEN: well nourished, in no acute distress.  HEENT:  Pupils equal, round. Sclerae nonicteric.   NECK: Supple, no masses appreciated. No JVD with patient supine.  C/V:  Regular rate and rhythm, no murmur, rub or gallop.    RESP: Respirations are unlabored. Clear to auscultation bilaterally without wheezing, rales, or rhonchi.  GI: Abdomen soft, " nontender.  EXTREM: No LE edema.  NEURO: Alert and oriented, cooperative.  SKIN: Warm and dry.        Data:     LIPID RESULTS:  Lab Results   Component Value Date    CHOL 152 08/22/2023    CHOL 231 (H) 01/28/2021    HDL 54 08/22/2023    HDL 63 01/28/2021    LDL 85 08/22/2023     (H) 01/28/2021    TRIG 67 08/22/2023    TRIG 78 01/28/2021    CHOLHDLRATIO 3.8 12/02/2013     LIVER ENZYME RESULTS:  Lab Results   Component Value Date    AST 18 06/07/2022    AST 15 01/28/2021    ALT 16 08/22/2023    ALT 23 01/28/2021     CBC RESULTS:  Lab Results   Component Value Date    WBC 9.2 02/26/2023    WBC 5.7 12/02/2013    RBC 5.03 02/26/2023    RBC 4.64 12/02/2013    HGB 15.3 02/26/2023    HGB 14.1 12/02/2013    HCT 45.0 02/26/2023    HCT 42.3 12/02/2013    MCV 90 02/26/2023    MCV 91 12/02/2013    MCH 30.4 02/26/2023    MCH 30.4 12/02/2013    MCHC 34.0 02/26/2023    MCHC 33.3 12/02/2013    RDW 12.4 02/26/2023    RDW 13.0 12/02/2013     02/26/2023     12/02/2013     BMP RESULTS:  Lab Results   Component Value Date     03/01/2023     01/28/2021    POTASSIUM 3.7 03/01/2023    POTASSIUM 3.4 06/07/2022    POTASSIUM 3.8 01/28/2021    CHLORIDE 104 03/01/2023    CHLORIDE 104 06/07/2022    CHLORIDE 105 01/28/2021    CO2 30 (H) 03/01/2023    CO2 31 06/07/2022    CO2 31 01/28/2021    ANIONGAP 9 03/01/2023    ANIONGAP 3 06/07/2022    ANIONGAP 3 01/28/2021    GLC 96 03/01/2023    GLC 99 06/07/2022     (H) 01/28/2021    BUN 17.1 03/01/2023    BUN 17 06/07/2022    BUN 21 01/28/2021    CR 0.85 03/01/2023    CR 0.93 01/28/2021    GFRESTIMATED 73 03/01/2023    GFRESTIMATED 63 01/28/2021    GFRESTBLACK 73 01/28/2021    KRISTEL 9.7 03/01/2023    KRISTEL 10.0 01/28/2021      A1C RESULTS:  No results found for: A1C  INR RESULTS:  No results found for: INR         Medications     Current Outpatient Medications   Medication Sig Dispense Refill    metoprolol succinate ER (TOPROL XL) 50 MG 24 hr tablet Take 1 tablet (50  mg) by mouth daily 90 tablet 3    rosuvastatin (CRESTOR) 10 MG tablet Take 1 tablet (10 mg) by mouth daily 90 tablet 0    triamterene-HCTZ (DYAZIDE) 37.5-25 MG capsule TAKE 1 CAPSULE BY MOUTH EVERY MORNING      vitamin D3 (CHOLECALCIFEROL) 50 mcg (2000 units) tablet Take 1 tablet (50 mcg) by mouth daily            Past Medical History     Past Medical History:   Diagnosis Date    Breast cancer (H) 10/10    R s/p mast/tram flap, reconstruction    Carpal tunnel syndrome     Gastro-oesophageal reflux disease     GERD (gastroesophageal reflux disease)     s/p EGD 5/06    Hematuria 2003    HX: benign breast biopsy     L, papilloma    Osteopenia     DXA 12/16/10    PONV (postoperative nausea and vomiting)     Scope patch on pre-op    S/P hysterectomy with oophorectomy 2001 for fibroids    S/P tonsillectomy      Past Surgical History:   Procedure Laterality Date    BREAST SURGERY      RIGHT BREAST BIOPSY, RIGHT MASTECTOMY WITH TRAM FLAP RECONSTRUCTION    CYSTOSCOPY, URETEROSCOPY, COMBINED  9/14/2012    Procedure: COMBINED CYSTOSCOPY, URETEROSCOPY;  VIDEO CYSTOSCOPY, RIGHT FLEXIBLE URETEROSCOPY, CAUTERIZATION OF PAPILLARY VEIN (LATEX IV CONTRAST TAPE ALLERGY);  Surgeon: Rey Bailon MD;  Location:  OR    ENT SURGERY      TONSILLECTOMY    GI SURGERY      EGD    GYN SURGERY      HYSTERECTOMY WITH OOPHORECTOMY    HYSTERECTOMY, PAP NO LONGER INDICATED      ORTHOPEDIC SURGERY      CARPAL TUNNEL RELEASE    REVISE RECONSTRUCTED BREAST  12/8/2011    Procedure:REVISE RECONSTRUCTED BREAST; REVISION RIGHT BREAST RECONSTRUCTION, LEFT BREAST STEROID INJECTION ; Surgeon:FRAN LEVIN; Location:[facility]     Family History   Problem Relation Age of Onset    Thyroid Disease Mother         cancer    Hypertension Mother     Breast Cancer Mother     Cancer Mother         uterine    Cardiovascular Father     Cerebrovascular Disease Father     Diabetes Father             Allergies   Adhesive tape, Contrast dye, Latex, and Norvasc  [amlodipine besylate]        Urvashi Mak NP  [facility]  Pager: [phone]

## 2023-08-28 ENCOUNTER — OFFICE VISIT (OUTPATIENT)
Dept: CARDIOLOGY | Facility: CLINIC | Age: 73
End: 2023-08-28
Attending: NURSE PRACTITIONER
Payer: MEDICARE

## 2023-08-28 VITALS
SYSTOLIC BLOOD PRESSURE: 144 MMHG | HEART RATE: 70 BPM | DIASTOLIC BLOOD PRESSURE: 84 MMHG | WEIGHT: 147.2 LBS | OXYGEN SATURATION: 98 % | HEIGHT: 64 IN | BODY MASS INDEX: 25.13 KG/M2

## 2023-08-28 DIAGNOSIS — E78.5 HYPERLIPIDEMIA WITH TARGET LDL LESS THAN 70: Primary | ICD-10-CM

## 2023-08-28 DIAGNOSIS — I25.10 ASCVD (ARTERIOSCLEROTIC CARDIOVASCULAR DISEASE): ICD-10-CM

## 2023-08-28 DIAGNOSIS — R00.2 PALPITATIONS: ICD-10-CM

## 2023-08-28 DIAGNOSIS — E78.5 HYPERLIPIDEMIA WITH TARGET LDL LESS THAN 70: ICD-10-CM

## 2023-08-28 DIAGNOSIS — Z13.6 CARDIOVASCULAR SCREENING; LDL GOAL LESS THAN 160: ICD-10-CM

## 2023-08-28 PROCEDURE — 99214 OFFICE O/P EST MOD 30 MIN: CPT | Performed by: NURSE PRACTITIONER

## 2023-08-28 RX ORDER — ROSUVASTATIN CALCIUM 10 MG/1
10 TABLET, COATED ORAL DAILY
Qty: 90 TABLET | Refills: 3 | Status: SHIPPED | OUTPATIENT
Start: 2023-08-28 | End: 2024-06-26

## 2023-08-28 NOTE — LETTER
8/28/2023    Clement Jaime MD  [address] [address]    RE: Emely Forbes       Dear Colleague,     I had the pleasure of seeing Emely Forbes in the [facility].  Cardiology Clinic Progress Note  Emely Forbes MRN# [id number]   YOB: 1950 Age: 72 year old   Primary Cardiologist: Dr. Gonzalez Reason for visit: 1 month follow up             Assessment and Plan:     1. Hyperlipidemia      Elevated ASCVD risk score  -ASCVD risk score 10.0%, optimal risk score 6.9%  -7/2023 CT calcium score of 91 and rosuvastatin 10 mg initiated  -8/2023 repeat lipid panel showed significant improvement in overall cholesterol control with an LDL of 85    2.  Palpitations       SVT        Rare PACs/PVCs  -2/2023 holter monitor-> sinus rhythm with occasional PACs and PVCs and a few short runs of SVT  -3/2023 zio patch -> 22 runs of SVT, longest lasting 11 seconds.  She had rare PACs and PVCs  -3/2023 exercise stress echocardiogram negative for ischemia and chronotropic incompetence  -Initially started on metoprolol succinate 25mg BID and continued to have evening time palpitations, symptomatically significantly improved since start of toprol XL   -Discussed avoidance of triggers for palpitations including alcohol, caffeine and dehydration     3.  Hypertension, controlled  -Continue triametrene/HCTZ and metoprolol     4.  History of breast cancer     Patient had a CT calcium score that placed her in the 71st percentile when compared to age and gender matched control groups. Statin therapy with crestor was initiated for aggressive CV risk factor management which has improved her LDL to 85 on her lab work last week. Her blood pressure was slightly elevated today, however she had just taken her triametrene/hydrochlorothiazide prior to our visit. Her home readings are well controlled. She continues to be very active without any symptoms concerning for angina.      Changes today: No medication changes made today    Follow up plan: Follow up with Dr. Gonzalez in 1 year with a fasting lipid panel prior         History of Presenting Illness:    Emely Forbes is a very pleasant 72 year old female with a history of breast cancer treated with mastectomy (2010), and palpitations.    Patient had ongoing palpitations since December 2022 increasing in frequency.  In February 2023 they worsened to a point she presented to the emergency room with episode that lasted over an hour.  She was noted to be hypertensive upon presentation, her EKG was within normal limits.  They completed blood work including a TSH which was all within normal limits.  She returned to the emergency department approximately 2 weeks later on 2/26/2023 with recurrence of her symptoms.  Labs at that time showed hypokalemia and EKG again was unremarkable.  She was hypertensive at the time presentation.  She was given potassium supplementation.  Of note she did wear a Holter monitor after the first episode for 48 hours, which demonstrated sinus rhythm with occasional PACs and PVCs and a few short runs of SVT, she was asymptomatic during those episodes.    Patient was seen by Dr. Gonzalez and on 3/1/2023.  She reported feeling better after her emergency room visits, however her symptoms had recurred.  She brought with her a record of her Apple Watch recordings and EKGs that demonstrated sinus rhythm with occasional PACs and PVCs.  He recommended a exercise stress echocardiogram to establish she had a structurally normal heart and normal chronotropic response to exercise.  She was started metoprolol 25 mg twice daily.      She underwent a stress echocardiogram 3/3/2023 that was normal. She exercised 9 minutes of Christopher protocol without any symptoms.  EKG was negative for inducible ischemia.  She had normal resting wall motion and no stress-induced wall motion abnormality on imaging.  Her LV function was preserved.    Subsequent  14-day Zio patch monitor demonstrated 22 runs of SVT, longest lasting 11 seconds.  She had rare PACs and PVCs.  Her predominant underlying rhythm was sinus rhythm.  No symptoms were reported.    She is on my partner Ana Mckay NP on 4/6/2023 and continued to feel episodes of palpitations most often occurring at night.  Her symptoms occur at rest.  She was overall feeling better since initiation of metoprolol.  BMP was done at that time showing normal renal function and potassium 3.7.  She was initiated on Toprol 50 mg every evening.    When I met this patient in May 2023 she felt significantly better after switching to metoprolol XL.  Given her elevated ASCVD risk score we mutually decided to perform a CT calcium score for risk stratification.  This showed moderate coronary calcifications and a total ACE and calcium score of 91 placing the patient in the 71st percentile compared to age and gender.  Scores were specifically 62.9 left anterior descending artery and 20 but within the right coronary artery.  I recommend starting a moderate intensity rosuvastatin 10 mg daily.  We repeated her lipid panel 8/22/2023 which showed ALT 16, total cholesterol 152, HDL 54, LDL 85, triglycerides 67.    Patient is here today for follow up on her CT calcium score results. She will continue to get short episodes of palpitations, primarily in the evening which are minimally bothersome and last seconds-minutes. She continues to garden daily on her 3 acre property. She denies any myalgias since starting the rosuvastatin.    Patient denies chest pain or chest tightness. Denies dizziness, lightheadedness or other presyncopal symptoms. Denies tachycardia. Denies shortness of breath.     Blood pressure 142/84 and HR 70 in clinic today. At home it has been around 120/70. She just took her triametrene/hydrochlorothiazide prior to her appointment. She takes her metoprolol in the evenings.        Recent Hospitalizations   None in  "[id number]          Social History      Social History     Socioeconomic History    Marital status:      Spouse name: Thomas    Number of children: 2    Years of education: Not on file    Highest education level: Not on file   Occupational History    Occupation:      Employer: UNKNOWN   Tobacco Use    Smoking status: Never    Smokeless tobacco: Never   Vaping Use    Vaping Use: Never used   Substance and Sexual Activity    Alcohol use: Not Currently     Alcohol/week: 0.0 - 0.8 standard drinks of alcohol    Drug use: No    Sexual activity: Yes     Partners: Male   Other Topics Concern    Parent/sibling w/ CABG, MI or angioplasty before 65F 55M? 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     Social Determinants of Health     Financial Resource Strain: Not on file   Food Insecurity: Not on file   Transportation Needs: Not on file   Physical Activity: Not on file   Stress: Not on file   Social Connections: Not on file   Intimate Partner Violence: Not on file   Housing Stability: Not on file            Review of Systems:   Skin:  not assessed     Eyes:  not assessed    ENT:  not assessed    Respiratory:  Negative    Cardiovascular:  Negative    Gastroenterology: not assessed    Genitourinary:  not assessed    Musculoskeletal:  not assessed    Neurologic:  not assessed    Psychiatric:  not assessed    Heme/Lymph/Imm:  not assessed    Endocrine:  not assessed           Physical Exam:   Vitals: BP (!) 144/84   Pulse 70   Ht 1.626 m (5' 4\")   Wt 66.8 kg (147 lb 3.2 oz)   SpO2 98%   BMI 25.27 kg/m     Wt Readings from Last 4 Encounters:   08/28/23 66.8 kg (147 lb 3.2 oz)   05/22/23 67.1 kg (148 lb)   04/06/23 68.1 kg (150 lb 3.2 oz)   03/01/23 66.7 kg (147 lb)     GEN: well nourished, in no acute distress.  HEENT:  Pupils equal, round. Sclerae nonicteric. "   NECK: Supple, no masses appreciated. No JVD with patient supine.  C/V:  Regular rate and rhythm, no murmur, rub or gallop.    RESP: Respirations are unlabored. Clear to auscultation bilaterally without wheezing, rales, or rhonchi.  GI: Abdomen soft, nontender.  EXTREM: No LE edema.  NEURO: Alert and oriented, cooperative.  SKIN: Warm and dry.        Data:     LIPID RESULTS:  Lab Results   Component Value Date    CHOL 152 08/22/2023    CHOL 231 (H) 01/28/2021    HDL 54 08/22/2023    HDL 63 01/28/2021    LDL 85 08/22/2023     (H) 01/28/2021    TRIG 67 08/22/2023    TRIG 78 01/28/2021    CHOLHDLRATIO 3.8 12/02/2013     LIVER ENZYME RESULTS:  Lab Results   Component Value Date    AST 18 06/07/2022    AST 15 01/28/2021    ALT 16 08/22/2023    ALT 23 01/28/2021     CBC RESULTS:  Lab Results   Component Value Date    WBC 9.2 02/26/2023    WBC 5.7 12/02/2013    RBC 5.03 02/26/2023    RBC 4.64 12/02/2013    HGB 15.3 02/26/2023    HGB 14.1 12/02/2013    HCT 45.0 02/26/2023    HCT 42.3 12/02/2013    MCV 90 02/26/2023    MCV 91 12/02/2013    MCH 30.4 02/26/2023    MCH 30.4 12/02/2013    MCHC 34.0 02/26/2023    MCHC 33.3 12/02/2013    RDW 12.4 02/26/2023    RDW 13.0 12/02/2013     02/26/2023     12/02/2013     BMP RESULTS:  Lab Results   Component Value Date     03/01/2023     01/28/2021    POTASSIUM 3.7 03/01/2023    POTASSIUM 3.4 06/07/2022    POTASSIUM 3.8 01/28/2021    CHLORIDE 104 03/01/2023    CHLORIDE 104 06/07/2022    CHLORIDE 105 01/28/2021    CO2 30 (H) 03/01/2023    CO2 31 06/07/2022    CO2 31 01/28/2021    ANIONGAP 9 03/01/2023    ANIONGAP 3 06/07/2022    ANIONGAP 3 01/28/2021    GLC 96 03/01/2023    GLC 99 06/07/2022     (H) 01/28/2021    BUN 17.1 03/01/2023    BUN 17 06/07/2022    BUN 21 01/28/2021    CR 0.85 03/01/2023    CR 0.93 01/28/2021    GFRESTIMATED 73 03/01/2023    GFRESTIMATED 63 01/28/2021    GFRESTBLACK 73 01/28/2021    KRISTEL 9.7 03/01/2023    KRISTEL 10.0 01/28/2021       A1C RESULTS:  No results found for: A1C  INR RESULTS:  No results found for: INR         Medications     Current Outpatient Medications   Medication Sig Dispense Refill    metoprolol succinate ER (TOPROL XL) 50 MG 24 hr tablet Take 1 tablet (50 mg) by mouth daily 90 tablet 3    rosuvastatin (CRESTOR) 10 MG tablet Take 1 tablet (10 mg) by mouth daily 90 tablet 0    triamterene-HCTZ (DYAZIDE) 37.5-25 MG capsule TAKE 1 CAPSULE BY MOUTH EVERY MORNING      vitamin D3 (CHOLECALCIFEROL) 50 mcg (2000 units) tablet Take 1 tablet (50 mcg) by mouth daily            Past Medical History     Past Medical History:   Diagnosis Date    Breast cancer (H) 10/10    R s/p mast/tram flap, reconstruction    Carpal tunnel syndrome     Gastro-oesophageal reflux disease     GERD (gastroesophageal reflux disease)     s/p EGD 5/06    Hematuria 2003    HX: benign breast biopsy     L, papilloma    Osteopenia     DXA 12/16/10    PONV (postoperative nausea and vomiting)     Scope patch on pre-op    S/P hysterectomy with oophorectomy 2001 for fibroids    S/P tonsillectomy      Past Surgical History:   Procedure Laterality Date    BREAST SURGERY      RIGHT BREAST BIOPSY, RIGHT MASTECTOMY WITH TRAM FLAP RECONSTRUCTION    CYSTOSCOPY, URETEROSCOPY, COMBINED  9/14/2012    Procedure: COMBINED CYSTOSCOPY, URETEROSCOPY;  VIDEO CYSTOSCOPY, RIGHT FLEXIBLE URETEROSCOPY, CAUTERIZATION OF PAPILLARY VEIN (LATEX IV CONTRAST TAPE ALLERGY);  Surgeon: Rey Bailon MD;  Location:  OR    ENT SURGERY      TONSILLECTOMY    GI SURGERY      EGD    GYN SURGERY      HYSTERECTOMY WITH OOPHORECTOMY    HYSTERECTOMY, PAP NO LONGER INDICATED      ORTHOPEDIC SURGERY      CARPAL TUNNEL RELEASE    REVISE RECONSTRUCTED BREAST  12/8/2011    Procedure:REVISE RECONSTRUCTED BREAST; REVISION RIGHT BREAST RECONSTRUCTION, LEFT BREAST STEROID INJECTION ; Surgeon:FRAN LEVIN; Location:[facility]     Family History   Problem Relation Age of Onset    Thyroid Disease Mother          cancer    Hypertension Mother     Breast Cancer Mother     Cancer Mother         uterine    Cardiovascular Father     Cerebrovascular Disease Father     Diabetes Father             Allergies   Adhesive tape, Contrast dye, Latex, and Norvasc [amlodipine besylate]      Urvashi Mak NP  [facility]  Pager: [phone]      Thank you for allowing me to participate in the care of your patient.      Sincerely,     Urvashi Mak NP     [facility]  cc:   Urvashi Mak NP  [address] [place] [address]

## 2023-08-28 NOTE — PATIENT INSTRUCTIONS
Today's Recommendations    Your cholesterol has significantly improved since starting the crestor  Your CT calcium score was 91, which places you in the 71st percentile for your age/gender  Continue all medications without changes.  Please follow up with Dr. Gonzalez in 1 year.    Please send a [employer] message or call [phone] to the RN team with questions or concerns.     Scheduling number [phone]  MICHAEL Martin, CNP

## 2023-09-27 ENCOUNTER — APPOINTMENT (OUTPATIENT)
Dept: URBAN - METROPOLITAN AREA CLINIC 255 | Age: 73
Setting detail: DERMATOLOGY
End: 2023-09-27

## 2023-09-27 DIAGNOSIS — D49.2 NEOPLASM OF UNSPECIFIED BEHAVIOR OF BONE, SOFT TISSUE, AND SKIN: ICD-10-CM

## 2023-09-27 DIAGNOSIS — D18.0 HEMANGIOMA: ICD-10-CM

## 2023-09-27 DIAGNOSIS — I78.8 OTHER DISEASES OF CAPILLARIES: ICD-10-CM

## 2023-09-27 DIAGNOSIS — D22 MELANOCYTIC NEVI: ICD-10-CM

## 2023-09-27 DIAGNOSIS — Z71.89 OTHER SPECIFIED COUNSELING: ICD-10-CM

## 2023-09-27 DIAGNOSIS — L84 CORNS AND CALLOSITIES: ICD-10-CM

## 2023-09-27 DIAGNOSIS — L81.4 OTHER MELANIN HYPERPIGMENTATION: ICD-10-CM

## 2023-09-27 DIAGNOSIS — L82.1 OTHER SEBORRHEIC KERATOSIS: ICD-10-CM

## 2023-09-27 PROBLEM — D23.72 OTHER BENIGN NEOPLASM OF SKIN OF LEFT LOWER LIMB, INCLUDING HIP: Status: ACTIVE | Noted: 2023-09-27

## 2023-09-27 PROBLEM — D18.01 HEMANGIOMA OF SKIN AND SUBCUTANEOUS TISSUE: Status: ACTIVE | Noted: 2023-09-27

## 2023-09-27 PROBLEM — D22.5 MELANOCYTIC NEVI OF TRUNK: Status: ACTIVE | Noted: 2023-09-27

## 2023-09-27 PROCEDURE — 99213 OFFICE O/P EST LOW 20 MIN: CPT | Mod: 25

## 2023-09-27 PROCEDURE — 11102 TANGNTL BX SKIN SINGLE LES: CPT

## 2023-09-27 PROCEDURE — OTHER COUNSELING: OTHER

## 2023-09-27 PROCEDURE — OTHER ADDITIONAL NOTES: OTHER

## 2023-09-27 PROCEDURE — OTHER BIOPSY BY SHAVE METHOD: OTHER

## 2023-09-27 ASSESSMENT — LOCATION SIMPLE DESCRIPTION DERM
LOCATION SIMPLE: LEFT PLANTAR SURFACE
LOCATION SIMPLE: SCALP
LOCATION SIMPLE: UPPER BACK
LOCATION SIMPLE: RIGHT PRETIBIAL REGION
LOCATION SIMPLE: RIGHT CHEEK

## 2023-09-27 ASSESSMENT — LOCATION DETAILED DESCRIPTION DERM
LOCATION DETAILED: INFERIOR THORACIC SPINE
LOCATION DETAILED: LEFT CENTRAL PARIETAL SCALP
LOCATION DETAILED: RIGHT INFERIOR MEDIAL MALAR CHEEK
LOCATION DETAILED: LEFT PLANTAR FOREFOOT OVERLYING 2ND METATARSAL
LOCATION DETAILED: RIGHT PROXIMAL PRETIBIAL REGION

## 2023-09-27 ASSESSMENT — LOCATION ZONE DERM
LOCATION ZONE: FACE
LOCATION ZONE: FEET
LOCATION ZONE: TRUNK
LOCATION ZONE: SCALP
LOCATION ZONE: LEG

## 2023-09-27 NOTE — PROCEDURE: ADDITIONAL NOTES
Additional Notes: Discussed alternative Mohs surgeon, Denisse Delcid vs. [facility] dermatology. Additional Notes: Discussed alternative Mohs surgeon, Denisse Delcid vs. [facility] dermatology.

## 2023-09-27 NOTE — PROCEDURE: BIOPSY BY SHAVE METHOD
"Defect picture from 3/8/22 was mislabeled. Label should read "left upper eyelid defect".     " Silver Nitrate Text: The wound bed was treated with silver nitrate after the biopsy was performed.

## 2023-09-27 NOTE — HPI: FULL BODY SKIN EXAMINATION
How Severe Are Your Spot(S)?: mild
What Is The Reason For Today's Visit?: Full Body Skin Examination
What Is The Reason For Today's Visit? (Being Monitored For X): concerning skin lesions on an annual basis
Additional History: Has a new lesion on L foot that is sore and growing.

## 2023-09-27 NOTE — PROCEDURE: COUNSELING
Patient Specific Counseling (Will Not Stick From Patient To Patient): *** Corn pared as courtesy to patient via 15 blade.\\n- Rec using corn pads to prevent removing area.
Detail Level: Detailed
Detail Level: Simple
Detail Level: Generalized

## 2023-11-13 NOTE — PROGRESS NOTES
HPI:   1. 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 Kyle and wonders if he is the best choice and doesn't feel like they have a good rapport  She had a deep BCC L temple and needed 14 sutures    2. L breast pain for more than a year.  She is s/p R breast ca and is followed by Dr. Patrick  She did see Dr. Cohen for this and they didn't find anything on the work up  She takes tylenol at night which helps  She is going to meet with genetic counselor tomorrow regarding BRCA testing     3. Vertigo/possible meneires: She is on triamterene prescribed by ENT for vertigo and wonders if that is okay. This is working well    Past Medical History:   Diagnosis Date     Breast cancer (H) 10/10    R s/p mast/tram flap, reconstruction     Carpal tunnel syndrome      Gastro-oesophageal reflux disease      GERD (gastroesophageal reflux disease)     s/p EGD 5/06     Hematuria 2003     HX: benign breast biopsy     L, papilloma     Osteopenia     DXA 12/16/10     PONV (postoperative nausea and vomiting)     Scope patch on pre-op     S/P hysterectomy with oophorectomy 2001 for fibroids     S/P tonsillectomy      Past Surgical History:   Procedure Laterality Date     BREAST SURGERY      RIGHT BREAST BIOPSY, RIGHT MASTECTOMY WITH TRAM FLAP RECONSTRUCTION     CYSTOSCOPY, URETEROSCOPY, COMBINED  9/14/2012    Procedure: COMBINED CYSTOSCOPY, URETEROSCOPY;  VIDEO CYSTOSCOPY, RIGHT FLEXIBLE URETEROSCOPY, CAUTERIZATION OF PAPILLARY VEIN (LATEX IV CONTRAST TAPE ALLERGY);  Surgeon: Rey Bailon MD;  Location:  OR     ENT SURGERY      TONSILLECTOMY     GI SURGERY      EGD     GYN SURGERY      HYSTERECTOMY WITH OOPHORECTOMY     HYSTERECTOMY, PAP NO LONGER INDICATED       ORTHOPEDIC SURGERY      CARPAL TUNNEL RELEASE     REVISE RECONSTRUCTED BREAST  12/8/2011    Procedure:REVISE RECONSTRUCTED BREAST; REVISION RIGHT BREAST  "RECONSTRUCTION, LEFT BREAST STEROID INJECTION ; Surgeon:FRAN LEVIN; Location:[facility]     Social History     Tobacco Use     Smoking status: Never Smoker     Smokeless tobacco: Never Used   Substance Use Topics     Alcohol use: Yes     Alcohol/week: 0.0 - 0.8 standard drinks     Comment: 1 DRINK PER WEEK     Current Outpatient Medications   Medication Sig Dispense Refill     fexofenadine (ALLEGRA) 180 MG tablet Take 1 tablet by mouth daily. (Patient taking differently: Take 180 mg by mouth daily as needed ) 30 tablet 6     triamterene-HCTZ (DYAZIDE) 37.5-25 MG capsule TAKE 1 CAPSULE BY MOUTH EVERY MORNING       vitamin D3 (CHOLECALCIFEROL) 50 mcg (2000 units) tablet Take 1 tablet (50 mcg) by mouth daily       Allergies   Allergen Reactions     Adhesive Tape      blisters     Contrast Dye      Latex      blisters     Norvasc [Amlodipine Besylate]      FAMILY HISTORY NOTED AND REVIEWED    PHYSICAL EXAM:    /84 (BP Location: Right arm, Patient Position: Chair, Cuff Size: Adult Regular)   Pulse 69   Temp 96.6  F (35.9  C) (Temporal)   Ht 1.645 m (5' 4.75\")   Wt 65.3 kg (144 lb)   SpO2 99%   BMI 24.15 kg/m      Patient appears non toxic  Psych: pt tearful at times  L lateral forehead with healing incision from recent mohs    Assessment and Plan:     (Z85.828) History of skin cancer  (primary encounter diagnosis)  Comment: Pt seeing Dr. Kyle and had a difficult encounter as pt had a lot of anxiety and felt Dr. Kyle wasn't being very compassionate.  She agrees he is a good surgeon  Plan: she used to see Dr. Villela so could see her again or just express her concerns with Dr. Kyle the next time she goes in.  She is opting for the latter at this point.    (C50.298) Hormone receptor positive malignant neoplasm of right breast (H)  Comment:   Plan: followed by Dr. Patrick    (R42) Vertigo  Comment: possible meneires per ENT. Taking dyazide which helps.  Plan: Comprehensive metabolic panel            (Z13.6) " CARDIOVASCULAR SCREENING; LDL GOAL LESS THAN 160  Comment:   Plan: Lipid panel reflex to direct LDL Fasting            (Z12.11) Colon cancer screening  Comment: declines colonoscopy  Plan: Fecal colorectal cancer screen FIT            (Z23) Need for vaccination  Comment: Also recd pt check her insurance RE: shingrex.  Plan: Pneumococcal vaccine 13 valent PCV13 IM. Td due in Dec.        (Prevnar) [[id number]], ADMIN MEDICARE:         Pneumococcal Vaccine ()          Spent 45 minutes FTF with patient of which over 50% was spent discussing the coordination of care and management of their issues noted.      Carlie Marina PA-C                 99

## 2023-12-18 ENCOUNTER — TELEPHONE (OUTPATIENT)
Dept: SCHEDULING | Facility: CLINIC | Age: 73
End: 2023-12-18
Payer: MEDICARE

## 2023-12-18 NOTE — TELEPHONE ENCOUNTER
"  Reason for Call:  Appointment Request    Patient requesting this type of appt:  Office Visit    Requested provider:  Stacy Tariq MD    Reason patient unable to be scheduled: Not within requested timeframe    When does patient want to be seen/preferred time:  ASAP    Comments: PT is looking to establish care with Stacy Tariq MD (recommended by Carlie Marina), pt wants to be seen ASAP: Experiencing brain fog, and her head \"feels different\". Cancer DR, recommended seeing primary care. Please contact pt, first opening with Marciano is not until August.       Could we send this information to you in Virtual Web or would you prefer to receive a phone call?:   Patient would prefer a phone call   Okay to leave a detailed message?: Yes at Cell number on file:    Telephone Information:   Mobile [phone]       Call taken on 12/18/2023 at 10:13 AM by mEily Flores    "

## 2023-12-22 ENCOUNTER — NURSE TRIAGE (OUTPATIENT)
Dept: FAMILY MEDICINE | Facility: CLINIC | Age: 73
End: 2023-12-22
Payer: MEDICARE

## 2023-12-22 NOTE — TELEPHONE ENCOUNTER
Pt scheduled to est care with Dr. Tariq 08.26.24. Pt placed on waitlist. [facility] will reach out to Pt when an available matt't opens up before this date.

## 2023-12-22 NOTE — TELEPHONE ENCOUNTER
Patient called the clinic and stated that she is having cough for the past 10 days. Patient was given care advise per protocol. Patient agreed with the plan and had no further questions.    Reason for Disposition   Cough with no complications    Additional Information   Negative: Bluish (or gray) lips or face   Negative: SEVERE difficulty breathing (e.g., struggling for each breath, speaks in single words)   Negative: Rapid onset of cough and has hives   Negative: Coughing started suddenly after medicine, an allergic food or bee sting   Negative: Difficulty breathing after exposure to flames, smoke, or fumes   Negative: Sounds like a life-threatening emergency to the triager   Negative: Previous asthma attacks and this feels like asthma attack   Negative: Dry cough (non-productive; no sputum or minimal clear sputum) and within 14 days of COVID-19 Exposure   Negative: MODERATE difficulty breathing (e.g., speaks in phrases, SOB even at rest, pulse 100-120) and still present when not coughing   Negative: Chest pain present when not coughing   Negative: Passed out (i.e., fainted, collapsed and was not responding)   Negative: Patient sounds very sick or weak to the triager   Negative: MILD difficulty breathing (e.g., minimal/no SOB at rest, SOB with walking, pulse <100) and still present when not coughing   Negative: Coughed up > 1 tablespoon (15 ml) blood (Exception: Blood-tinged sputum.)   Negative: Fever > 103 F (39.4 C)   Negative: Fever > 101 F (38.3 C) and over 60 years of age   Negative: Fever > 100.0 F (37.8 C) and has diabetes mellitus or a weak immune system (e.g., HIV positive, cancer chemotherapy, organ transplant, splenectomy, chronic steroids)   Negative: Fever > 100.0 F (37.8 C) and bedridden (e.g., CVA, chronic illness, recovering from surgery)   Negative: Increasing ankle swelling   Negative: Wheezing is present   Negative: SEVERE coughing spells (e.g., whooping sound after coughing, vomiting after  "coughing)   Negative: Coughing up charlie-colored (reddish-brown) or blood-tinged sputum   Negative: Fever present > 3 days (72 hours)   Negative: Fever returns after gone for over 24 hours and symptoms worse or not improved   Negative: Using nasal washes and pain medicine > 24 hours and sinus pain persists   Negative: Known COPD or other severe lung disease (i.e., bronchiectasis, cystic fibrosis, lung surgery) and worsening symptoms (i.e., increased sputum purulence or amount, increased breathing difficulty)   Negative: Continuous (nonstop) coughing interferes with work or school and no improvement using cough treatment per Care Advice   Negative: Patient wants to be seen   Negative: Cough has been present for > 3 weeks   Negative: Allergy symptoms are also present (e.g., itchy eyes, clear nasal discharge, postnasal drip)   Negative: Nasal discharge present > 10 days   Negative: Exposure to TB (Tuberculosis)   Negative: Taking an ACE Inhibitor medicine (e.g., benazepril/LOTENSIN, captopril/CAPOTEN, enalapril/VASOTEC, lisinopril/ZESTRIL)    Answer Assessment - Initial Assessment Questions  1. ONSET: \"When did the cough begin?\"         12/12/23    2. SEVERITY: \"How bad is the cough today?\"         Moderate    3. SPUTUM: \"Describe the color of your sputum\" (none, dry cough; clear, white, yellow, green)        Clear    4. HEMOPTYSIS: \"Are you coughing up any blood?\" If so ask: \"How much?\" (flecks, streaks, tablespoons, etc.)        None    5. DIFFICULTY BREATHING: \"Are you having difficulty breathing?\" If Yes, ask: \"How bad is it?\" (e.g., mild, moderate, severe)     - MILD: No SOB at rest, mild SOB with walking, speaks normally in sentences, can lie down, no retractions, pulse < 100.     - MODERATE: SOB at rest, SOB with minimal exertion and prefers to sit, cannot lie down flat, speaks in phrases, mild retractions, audible wheezing, pulse 100-120.     - SEVERE: Very SOB at rest, speaks in single words, struggling to " "breathe, sitting hunched forward, retractions, pulse > 120         None    6. FEVER: \"Do you have a fever?\" If Yes, ask: \"What is your temperature, how was it measured, and when did it start?\"        No    7. CARDIAC HISTORY: \"Do you have any history of heart disease?\" (e.g., heart attack, congestive heart failure)         See dx list    8. LUNG HISTORY: \"Do you have any history of lung disease?\"  (e.g., pulmonary embolus, asthma, emphysema)        See dx list    9. PE RISK FACTORS: \"Do you have a history of blood clots?\" (or: recent major surgery, recent prolonged travel, bedridden)        No    10. OTHER SYMPTOMS: \"Do you have any other symptoms?\" (e.g., runny nose, wheezing, chest pain)          Runny nose    11. PREGNANCY: \"Is there any chance you are pregnant?\" \"When was your last menstrual period?\"          No    12. TRAVEL: \"Have you traveled out of the country in the last month?\" (e.g., travel history, exposures)          No    Protocols used: [facility] Triage Team    "

## 2023-12-26 ENCOUNTER — OFFICE VISIT (OUTPATIENT)
Dept: FAMILY MEDICINE | Facility: CLINIC | Age: 73
End: 2023-12-26
Payer: MEDICARE

## 2023-12-26 VITALS
TEMPERATURE: 98.1 F | WEIGHT: 146 LBS | SYSTOLIC BLOOD PRESSURE: 132 MMHG | HEIGHT: 64 IN | DIASTOLIC BLOOD PRESSURE: 83 MMHG | HEART RATE: 62 BPM | OXYGEN SATURATION: 98 % | RESPIRATION RATE: 18 BRPM | BODY MASS INDEX: 24.92 KG/M2

## 2023-12-26 DIAGNOSIS — Z85.828 HISTORY OF SKIN CANCER: ICD-10-CM

## 2023-12-26 DIAGNOSIS — Z85.3 HISTORY OF BREAST CANCER: ICD-10-CM

## 2023-12-26 DIAGNOSIS — R05.1 ACUTE COUGH: Primary | ICD-10-CM

## 2023-12-26 DIAGNOSIS — F41.9 ANXIETY: ICD-10-CM

## 2023-12-26 DIAGNOSIS — I47.10 SVT (SUPRAVENTRICULAR TACHYCARDIA) (H): ICD-10-CM

## 2023-12-26 LAB
FLUAV RNA SPEC QL NAA+PROBE: NEGATIVE
FLUBV RNA RESP QL NAA+PROBE: NEGATIVE
RSV RNA SPEC NAA+PROBE: NEGATIVE
SARS-COV-2 RNA RESP QL NAA+PROBE: NEGATIVE

## 2023-12-26 PROCEDURE — 87637 SARSCOV2&INF A&B&RSV AMP PRB: CPT | Performed by: PHYSICIAN ASSISTANT

## 2023-12-26 PROCEDURE — 99214 OFFICE O/P EST MOD 30 MIN: CPT | Performed by: PHYSICIAN ASSISTANT

## 2023-12-26 RX ORDER — PREDNISONE 20 MG/1
20 TABLET ORAL 2 TIMES DAILY
Qty: 10 TABLET | Refills: 0 | Status: SHIPPED | OUTPATIENT
Start: 2023-12-26 | End: 2024-08-26

## 2023-12-26 RX ORDER — LORAZEPAM 1 MG/1
1 TABLET ORAL EVERY 12 HOURS PRN
Qty: 10 TABLET | Refills: 0 | Status: SHIPPED | OUTPATIENT
Start: 2023-12-26 | End: 2024-08-26

## 2023-12-26 ASSESSMENT — PAIN SCALES - GENERAL: PAINLEVEL: NO PAIN (0)

## 2023-12-26 NOTE — PROGRESS NOTES
Salvador Han is a 73 year old, presenting for the following health issues:  URI    Pt has cough and was exposed to RSV by a grandchild  Pt has hx of basal cell skin ca on scalp and followed by derm (removed in Nov)  Had trouble with the incision  She notes some brain fog and forgetfulness around that time but that resolved  She had grandsons ages 2 and 4 mid Dec   One child had cough and fever  She had congestion and sore throat which resolved  3 days later pt developed fever, cough, lethargy  Finally feeling better, but cough is persisting  The head pain did finally resolve.    She is followed by Dr. Patrick for her breast ca  She is followed by cardiology for palpit and taking metoprolol  She does avoid caffeine    She has been feeling very anxious  She declined lexapro when Dr. Jaime prescribed that for her.  She sleeps well and doesn't feel anxious at night and has no trouble falling to sleep  She is considering divorce which is very stressful as well      History of Present Illness       Reason for visit:  Skin cancer effects and cough  Symptom onset:  1-2 weeks ago  Symptoms include:  Cough  Symptom intensity:  Moderate  Symptom progression:  Staying the same  Had these symptoms before:  No    She eats 2-3 servings of fruits and vegetables daily.She consumes 0 sweetened beverage(s) daily.She exercises with enough effort to increase her heart rate 10 to 19 minutes per day.  She exercises with enough effort to increase her heart rate 7 days per week.   She is taking medications regularly.           Past Medical History:   Diagnosis Date    Breast cancer (H) 10/10    R s/p mast/tram flap, reconstruction    Carpal tunnel syndrome     Gastro-oesophageal reflux disease     GERD (gastroesophageal reflux disease)     s/p EGD 5/06    Hematuria 2003    HX: benign breast biopsy     L, papilloma    Osteopenia     DXA 12/16/10    PONV (postoperative nausea and vomiting)     Scope patch on pre-op    S/P  "hysterectomy with oophorectomy 2001 for fibroids    S/P tonsillectomy      Past Surgical History:   Procedure Laterality Date    BREAST SURGERY      RIGHT BREAST BIOPSY, RIGHT MASTECTOMY WITH TRAM FLAP RECONSTRUCTION    CYSTOSCOPY, URETEROSCOPY, COMBINED  9/14/2012    Procedure: COMBINED CYSTOSCOPY, URETEROSCOPY;  VIDEO CYSTOSCOPY, RIGHT FLEXIBLE URETEROSCOPY, CAUTERIZATION OF PAPILLARY VEIN (LATEX IV CONTRAST TAPE ALLERGY);  Surgeon: Rey Bailon MD;  Location:  OR    ENT SURGERY      TONSILLECTOMY    GI SURGERY      EGD    GYN SURGERY      HYSTERECTOMY WITH OOPHORECTOMY    HYSTERECTOMY, PAP NO LONGER INDICATED      ORTHOPEDIC SURGERY      CARPAL TUNNEL RELEASE    REVISE RECONSTRUCTED BREAST  12/8/2011    Procedure:REVISE RECONSTRUCTED BREAST; REVISION RIGHT BREAST RECONSTRUCTION, LEFT BREAST STEROID INJECTION ; Surgeon:FRAN LEVIN; Location:[facility]     Social History     Tobacco Use    Smoking status: Never    Smokeless tobacco: Never   Substance Use Topics    Alcohol use: Not Currently     Alcohol/week: 0.0 - 0.8 standard drinks of alcohol     Current Outpatient Medications   Medication Sig Dispense Refill    metoprolol succinate ER (TOPROL XL) 50 MG 24 hr tablet Take 1 tablet (50 mg) by mouth daily 90 tablet 3    rosuvastatin (CRESTOR) 10 MG tablet Take 1 tablet (10 mg) by mouth daily 90 tablet 3    triamterene-HCTZ (DYAZIDE) 37.5-25 MG capsule TAKE 1 CAPSULE BY MOUTH EVERY MORNING      vitamin D3 (CHOLECALCIFEROL) 50 mcg (2000 units) tablet Take 1 tablet (50 mcg) by mouth daily       Allergies   Allergen Reactions    Adhesive Tape      blisters    Contrast Dye     Latex      blisters    Norvasc [Amlodipine Besylate]      FAMILY HISTORY NOTED AND REVIEWED      PHYSICAL EXAM:    /83 (BP Location: Right arm, Patient Position: Chair, Cuff Size: Adult Large)   Pulse 62   Temp 98.1  F (36.7  C) (Temporal)   Resp 18   Ht 1.626 m (5' 4\")   Wt 66.2 kg (146 lb)   SpO2 98%   Breastfeeding No   " BMI 25.06 kg/m      Patient appears non toxic  Psych: pt tearful during visit  Lungs: bilat coarse wheezing throughout  Heart: RRR    Assessment and Plan:     (R05.1) Acute cough  (primary encounter diagnosis)  Comment: pt was exposed to RSV by grandchild  Plan: Symptomatic Influenza A/B, RSV, & SARS-CoV2 PCR        (COVID-19) Nose, predniSONE (DELTASONE) 20 MG         tablet        BID x 5d; take with food. Follow up if cough worsens or persists.    (F41.9) Anxiety  Comment: declines selective serotonin reuptake inhibitor. Start with individual therapy and consider marriage therapy  Plan: LORazepam (ATIVAN) 1 MG tablet, Adult Mental         Health  Referral            (I47.10) SVT (supraventricular tachycardia)  Comment:   Plan: followed by cards and on a beta blocker which helps    (Z85.828) History of skin cancer  Comment:   Plan: had BCC removed from scalp with some complications;resolved    (Z85.3) History of breast cancer  Comment:   Plan: followed by Dr. Patrick    Spent 30 minutes FTF with patient of which over 50% was spent discussing the coordination of care and management of their issues noted.      Carlie Marina PA-C

## 2023-12-26 NOTE — RESULT ENCOUNTER NOTE
Emely,    Your flu, covid and RSV tests were negative.   If the prednisone doesn't knock this out, please let me know.    Carlie Marina PA-C

## 2023-12-27 ENCOUNTER — NURSE TRIAGE (OUTPATIENT)
Dept: FAMILY MEDICINE | Facility: CLINIC | Age: 73
End: 2023-12-27

## 2023-12-27 NOTE — TELEPHONE ENCOUNTER
Nurse Triage SBAR    Is this a 2nd Level Triage? YES, LICENSED PRACTITIONER REVIEW IS REQUIRED    Situation: Patient calling about prednisone. She took one pill last night and has been having bad gastric reflux. Patient unable to sleep last night due to the gastric reflux. She also felt achy. Patient has no history of gastric reflux.     Patient wondering if she should take half a pill rather than the whole pill due to side effects?  (Should she take a reduced dosage)     (Patient stated that she is thinking of you today. It was her mom's birthday as well.)     Background: No hist. Of gastric reflux    Assessment: dosage change     Protocol Recommended Disposition:   Discuss With PCP And Callback By Nurse Within 1 Hour    Recommendation: routing to oscar pollock    Routed to provider    Does the patient meet one of the following criteria for ADS visit consideration? No    Reason for Disposition   Caller has URGENT medicine question about med that PCP or specialist prescribed and triager unable to answer question    Additional Information   Negative: Drug overdose and triager unable to answer question   Negative: Caller requesting a renewal or refill of a medicine patient is currently taking   Negative: Caller requesting information unrelated to medicine   Negative: Caller requesting information about COVID-19 Vaccine   Negative: Caller requesting information about Emergency Contraception   Negative: Caller requesting information about Combined Birth Control Pills   Negative: Caller requesting information about Progestin Birth Control Pills   Negative: Caller requesting information about Post-Op pain or medicines   Negative: Caller requesting a prescription antibiotic (such as penicillin) for Strep throat and has a positive culture result   Negative: Caller requesting a prescription anti-viral med (such as Tamiflu) and has influenza (flu) symptoms   Negative: Immunization reaction suspected   Negative: Rash while taking a  "medicine or within 3 days of stopping it   Negative: Asthma and having symptoms of asthma (cough, wheezing, etc.)   Negative: Symptom of illness (e.g., headache, abdominal pain, earache, vomiting) that are more than mild   Negative: Breastfeeding questions about mother's medicines and diet   Negative: MORE THAN A DOUBLE DOSE of a prescription or over-the-counter (OTC) drug   Negative: DOUBLE DOSE (an extra dose or lesser amount) of prescription drug and any symptoms (e.g., dizziness, nausea, pain, sleepiness)   Negative: DOUBLE DOSE (an extra dose or lesser amount) of over-the-counter (OTC) drug and any symptoms (e.g., dizziness, nausea, pain, sleepiness)   Negative: Took another person's prescription drug   Negative: DOUBLE DOSE (an extra dose or lesser amount) of prescription drug and NO symptoms  (Exception: A double dose of antibiotics.)   Negative: Diabetes drug error or overdose (e.g., took wrong type of insulin or took extra dose)   Negative: Caller has medication question about med NOT prescribed by PCP and triager unable to answer question (e.g., compatibility with other med, storage)   Negative: Prescription not at pharmacy and was prescribed by PCP recently  (Exception: triager has access to EMR and prescription is recorded there. Go to Home Care and confirm for pharmacy.)   Negative: Pharmacy calling with prescription question and triager unable to answer question    Answer Assessment - Initial Assessment Questions  1. NAME of MEDICINE: \"What medicine(s) are you calling about?\"      Prednisone   2. QUESTION: \"What is your question?\" (e.g., double dose of medicine, side effect)      1) Side effects, patient concerned about being up all night. Can she take half of the ammount? So there are not so many side effects   3. PRESCRIBER: \"Who prescribed the medicine?\" Reason: if prescribed by specialist, call should be referred to that group.      Carlie pollock  4. SYMPTOMS: \"Do you have any symptoms?\" If Yes, ask: " "\"What symptoms are you having?\"  \"How bad are the symptoms (e.g., mild, moderate, severe)      Gastric reflux kept you up all night, insomnia, diarrhea and aching   5. PREGNANCY:  \"Is there any chance that you are pregnant?\" \"When was your last menstrual period?\"      No    Protocols used: Medication Question Call-A-[place]    "

## 2024-01-03 ENCOUNTER — HOSPITAL ENCOUNTER (OUTPATIENT)
Dept: MAMMOGRAPHY | Facility: CLINIC | Age: 74
Discharge: HOME | End: 2024-01-03
Attending: PHYSICIAN ASSISTANT | Admitting: PHYSICIAN ASSISTANT
Payer: MEDICARE

## 2024-01-03 DIAGNOSIS — Z12.31 VISIT FOR SCREENING MAMMOGRAM: ICD-10-CM

## 2024-01-03 PROCEDURE — 77063 BREAST TOMOSYNTHESIS BI: CPT | Mod: 52

## 2024-01-11 ENCOUNTER — OFFICE VISIT (OUTPATIENT)
Dept: BEHAVIORAL HEALTH | Facility: CLINIC | Age: 74
End: 2024-01-11
Payer: MEDICARE

## 2024-01-11 DIAGNOSIS — F41.1 GENERALIZED ANXIETY DISORDER: Primary | ICD-10-CM

## 2024-01-11 DIAGNOSIS — F33.0 MILD EPISODE OF RECURRENT MAJOR DEPRESSIVE DISORDER (H): ICD-10-CM

## 2024-01-11 PROCEDURE — 90791 PSYCH DIAGNOSTIC EVALUATION: CPT | Performed by: SOCIAL WORKER

## 2024-01-11 ASSESSMENT — ANXIETY QUESTIONNAIRES
2. NOT BEING ABLE TO STOP OR CONTROL WORRYING: SEVERAL DAYS
8. IF YOU CHECKED OFF ANY PROBLEMS, HOW DIFFICULT HAVE THESE MADE IT FOR YOU TO DO YOUR WORK, TAKE CARE OF THINGS AT HOME, OR GET ALONG WITH OTHER PEOPLE?: SOMEWHAT DIFFICULT
6. BECOMING EASILY ANNOYED OR IRRITABLE: NEARLY EVERY DAY
1. FEELING NERVOUS, ANXIOUS, OR ON EDGE: NEARLY EVERY DAY
7. FEELING AFRAID AS IF SOMETHING AWFUL MIGHT HAPPEN: SEVERAL DAYS
GAD7 TOTAL SCORE: 11
4. TROUBLE RELAXING: NOT AT ALL
5. BEING SO RESTLESS THAT IT IS HARD TO SIT STILL: NOT AT ALL
3. WORRYING TOO MUCH ABOUT DIFFERENT THINGS: NEARLY EVERY DAY
GAD7 TOTAL SCORE: 11
GAD7 TOTAL SCORE: 11
7. FEELING AFRAID AS IF SOMETHING AWFUL MIGHT HAPPEN: SEVERAL DAYS
IF YOU CHECKED OFF ANY PROBLEMS ON THIS QUESTIONNAIRE, HOW DIFFICULT HAVE THESE PROBLEMS MADE IT FOR YOU TO DO YOUR WORK, TAKE CARE OF THINGS AT HOME, OR GET ALONG WITH OTHER PEOPLE: SOMEWHAT DIFFICULT

## 2024-01-11 NOTE — PROGRESS NOTES
"    [facility]      PATIENT'S NAME: Eemly Forbes  PREFERRED NAME: Emely  PRONOUNS:       MRN: [id number]  : 1950  ADDRESS: [address] [place] [address]  ACCT. NUMBER:  [id number]  DATE OF SERVICE: 24  START TIME: 2:43 pm  END TIME: 3:56 pm  PREFERRED PHONE: [phone]  May we leave a program related message: Yes  EMERGENCY CONTACT: was obtained  .  SERVICE MODALITY:  In-person    UNIVERSAL ADULT Mental Health DIAGNOSTIC ASSESSMENT    Identifying Information:  Patient is a 73 year old,    individual.  Patient was referred for an assessment by primary care provider .  Patient attended the session alone.    Chief Complaint:   The reason for seeking services at this time is: \" anxiety and feeling more down \"   The problem(s) began 1 year.     Met with pt for initial assessment. Reports being more down and sad since last spring. Discussed family rupture which has left her feeling very sad. States being a someone who tries to always do the the right thing for others and feels it hurts her in the end. More recently, has attempted to set boundaries with step dtr which ended in discord throughout the family. States she struggled with support from  initially but has been more supportive of her lately. She expresses when she tries to ask for basic respect from others, others tend to have a strong reaction towards her and implications tend to be drastic. Discussed wanting to figure out more recent rapture within family system and ways of managing it. Pt expresses trauma as a child when mother was very ill with cancer. Again experienced trauma when then  had gone to [facility] for illegal activity. At that time, reports losing everything in her life and has had lasting effects on children and her.     Patient has attempted to resolve these concerns in the past through therapy .    Social/Family History:  Patient reported they " grew up in  [place] .  They were raised by biological parents.  Parents stayed ..   Patient reported that their childhood was very good but mother had been very ill when she was 9 with cancer.  Patient described their current relationships with family of origin as fair.      The patient describes their cultural background as na.  Cultural influences and impact on patient's life structure, values, norms, and healthcare:  na .  Contextual influences on patient's health include: Contextual Factors: Individual Factors management of MH and Family Factors family discord .  Cultural, Contextual, and socioeconomic factors do not affect the patient's access to services.  These factors will be addressed in the Preliminary Treatment plan.  Patient identified their preferred language to be English. Patient reported they do not  need the assistance of an  or other support involved in therapy.     Patient reported had no significant delays in developmental tasks.   Patient's highest education level was college graduate. Patient identified the following learning problems: none reported.  Modifications will not be used to assist communication in therapy.   Patient reports they are  able to understand written materials.    Patient's current relationship status is  for 25 years.   Patient identified their sexual orientation as heterosexual.  Patient reported having two child(dorina). Patient identified adult child, friends, and spouse as part of their support system.  Patient identified the quality of these relationships as inconsistent.     Patient's current living/housing situation involves staying in own home/apartment.  They live with  and they report that housing is stable.     Patient is currently employed part time and reports they are able to function appropriately at work..  Patient reports their finances are obtained through employment.  Patient does not identify finances as a current  stressor.      Patient reported that they have not been involved with the legal system.   Patient denies being on probation / parole / under the jurisdiction of the court.    Patient's Strengths and Limitations:  Patient identified the following strengths or resources that will help them succeed in treatment: community involvement, exercise routine, renan / spirituality, friends / good social support, family support, insight, intelligence, and work ethic. Things that may interfere with the patient's success in treatment include:  family discord .     Assessments:  The following assessments were completed by patient for this visit:  PHQ2:       12/26/2023    10:03 AM 6/23/2022    11:38 AM 6/7/2022     9:39 AM 1/28/2021     8:08 AM 1/27/2016    12:10 PM 12/2/2013     9:50 AM 9/4/2012    12:23 PM   PHQ-2 ( 1999 [employer])   Q1: Little interest or pleasure in doing things 0 0 0 0 0 0 0   Q2: Feeling down, depressed or hopeless 0 0 0 0 0 0 0   PHQ-2 Score 0 0 0 0 0 0 0   PHQ-2 Total Score (12-17 Years)- Positive if 3 or more points; Administer PHQ-A if positive    0      Q1: Little interest or pleasure in doing things Not at all  Not at all    Not at all       Q2: Feeling down, depressed or hopeless Not at all  Not at all    Not at all       PHQ-2 Score 0  0    0         PHQ9:        No data to display              GAD2:       1/11/2024     3:10 PM   SOILA-2   Feeling nervous, anxious, or on edge 3   Not being able to stop or control worrying 1   SOILA-2 Total Score 4     GAD7:       1/11/2024     3:10 PM   SOILA-7 SCORE   Total Score 11 (moderate anxiety)   Total Score 11     CAGE-AID:       1/11/2024     2:58 PM   CAGE-AID Total Score   Total Score 0   Total Score MyChart 0 (A total score of 2 or greater is considered clinically significant)     PROMIS 10-Global Health (all questions and answers displayed):       1/11/2024     3:07 PM   PROMIS 10   In general, would you say your health is: Good   In general, would you say your  quality of life is: Good   In general, how would you rate your physical health? Good   In general, how would you rate your mental health, including your mood and your ability to think? Good   In general, how would you rate your satisfaction with your social activities and relationships? Good   In general, please rate how well you carry out your usual social activities and roles Good   To what extent are you able to carry out your everyday physical activities such as walking, climbing stairs, carrying groceries, or moving a chair? Completely   In the past 7 days, how often have you been bothered by emotional problems such as feeling anxious, depressed, or irritable? Always   In the past 7 days, how would you rate your fatigue on average? Mild   In the past 7 days, how would you rate your pain on average, where 0 means no pain, and 10 means worst imaginable pain? 3   In general, would you say your health is: 3   In general, would you say your quality of life is: 3   In general, how would you rate your physical health? 3   In general, how would you rate your mental health, including your mood and your ability to think? 3   In general, how would you rate your satisfaction with your social activities and relationships? 3   In general, please rate how well you carry out your usual social activities and roles. (This includes activities at home, at work and in your community, and responsibilities as a parent, child, spouse, employee, friend, etc.) 3   To what extent are you able to carry out your everyday physical activities such as walking, climbing stairs, carrying groceries, or moving a chair? 5   In the past 7 days, how often have you been bothered by emotional problems such as feeling anxious, depressed, or irritable? 5   In the past 7 days, how would you rate your fatigue on average? 2   In the past 7 days, how would you rate your pain on average, where 0 means no pain, and 10 means worst imaginable pain? 3   Global  Mental Health Score 10   Global Physical Health Score 16   PROMIS TOTAL - SUBSCORES 26     PROMIS 10-Global Health (only subscores and total score):       1/11/2024     3:07 PM   PROMIS-10 Scores Only   Global Mental Health Score 10   Global Physical Health Score 16   PROMIS TOTAL - SUBSCORES 26     [place] Suicide Severity Rating Scale (Lifetime/Recent)      1/12/2024     8:08 AM   [place] Suicide Severity Rating (Lifetime/Recent)   Q1 Wish to be Dead (Lifetime) Y   Wish to be Dead Description (Lifetime) 2000 when  went to [facility], no plan or intentions   1. Wish to be Dead (Past 1 Month) N   Q2 Non-Specific Active Suicidal Thoughts (Lifetime) N   Reasons for Ideation (Lifetime) 4   Actual Attempt (Lifetime) N   Has subject engaged in non-suicidal self-injurious behavior? (Lifetime) N   Interrupted Attempts (Lifetime) N   Aborted or Self-Interrupted Attempt (Lifetime) N   Preparatory Acts or Behavior (Lifetime) N   Calculated C-SSRS Risk Score (Lifetime/Recent) No Risk Indicated       Personal and Family Medical History:  Patient   report a family history of mental health concerns.  Patient reports family history includes Breast Cancer in her mother; Cancer in her mother; Cardiovascular in her father; Cerebrovascular Disease in her father; Diabetes in her father; Hypertension in her mother; Thyroid Disease in her mother..     Patient does not report Mental Health Diagnosis or Treatment.      Patient has had a physical exam to rule out medical causes for current symptoms.  Date of last physical exam was within the past year. Symptoms have developed since last physical exam and client was encouraged to follow up with PCP.  . The patient has a [place] Primary Care Provider, who is named Clement Jaime..  Patient reports no current medical concerns.  Patient denies any issues with pain..   There are not significant appetite / nutritional concerns / weight changes.   Patient does not report a  history of head injury / trauma / cognitive impairment.      Current Outpatient Medications   Medication    LORazepam (ATIVAN) 1 MG tablet    metoprolol succinate ER (TOPROL XL) 50 MG 24 hr tablet    predniSONE (DELTASONE) 20 MG tablet    rosuvastatin (CRESTOR) 10 MG tablet    triamterene-HCTZ (DYAZIDE) 37.5-25 MG capsule    vitamin D3 (CHOLECALCIFEROL) 50 mcg (2000 units) tablet     No current facility-administered medications for this visit.         Medication Adherence:  Patient reports  .  taking prescribed medications as prescribed.    Patient Allergies:    Allergies   Allergen Reactions    Adhesive Tape      blisters    Contrast Dye     Latex      blisters    Norvasc [Amlodipine Besylate]        Medical History:    Past Medical History:   Diagnosis Date    Breast cancer (H) 10/10    R s/p mast/tram flap, reconstruction    Carpal tunnel syndrome     Gastro-oesophageal reflux disease     GERD (gastroesophageal reflux disease)     s/p EGD 5/06    Hematuria 2003    HX: benign breast biopsy     L, papilloma    Osteopenia     DXA 12/16/10    PONV (postoperative nausea and vomiting)     Scope patch on pre-op    S/P hysterectomy with oophorectomy 2001 for fibroids    S/P tonsillectomy          Current Mental Status Exam:   Appearance:  Appropriate    Eye Contact:  Good   Psychomotor:  Normal       Gait / station:  no problem  Attitude / Demeanor: Cooperative   Speech      Rate / Production: Normal/ Responsive      Volume:  Normal  volume      Language:  intact  Mood:   Anxious  Depressed   Affect:   Appropriate    Thought Content: Clear   Thought Process: Coherent  Logical       Associations: No loosening of associations  Insight:   Fair   Judgment:  Intact   Orientation:  All  Attention/concentration: Fair    Substance Use:   Patient did not report a family history of substance use concerns; see medical history section for details.  Patient has not received chemical dependency treatment in the past.  Patient has not  ever been to detox.      Patient is not currently receiving any chemical dependency treatment. Patient reported the following problems as a result of their substance use:   na .    Patient reports using alcohol 1 times per month and has 1 mixed drinks at a time. Patient first started drinking at age  .  Patient reported date of last use was  .  Patient reports heaviest use is current use.  Patient denies using tobacco.  Patient denies using cannabis.  Patient denies using caffeine.  Patient reports using/abusing the following substance(s). Patient reported no other substance use.     Substance Use: No symptoms    Based on the negative CAGE score and clinical interview there  are not indications of drug or alcohol abuse.    Significant Losses / Trauma / Abuse / Neglect Issues:   Patient   did not serve in the .  There are indications or report of significant loss, trauma, abuse or neglect issues related to: are indications or report of significant loss, trauma, abuse or neglect issues related to, divorce / relational changes 2000 first  [place] [employer] [place], resulting in her losing most everything, and mother very ill when she was a child, almost dying of cancer .  Concerns for possible neglect are not present.     Safety Assessment:   Patient denies current homicidal ideation and behaviors.  Patient denies current self-injurious ideation and behaviors.    Patient denied risk behaviors associated with substance use.   Patient denies any high risk behaviors associated with mental health symptoms.  Patient reports the following current concerns for their personal safety: None.  Patient reports there   firearms in the house.       There are no firearms in the home..    History of Safety Concerns:  Patient denied a history of homicidal ideation.     Patient denied a history of personal safety concerns.    Patient denied a history of assaultive behaviors.    Patient denied a history of sexual  assault behaviors.     Patient denied a history of risk behaviors associated with substance use.  Patient denies any history of high risk behaviors associated with mental health symptoms.  Patient reports the following protective factors:      Risk Plan:  See Recommendations for Safety and Risk Management Plan    Review of Symptoms per patient report:   Depression: No symptoms, Change in sleep, Change in energy level, Difficulties concentrating, Feelings of hopelessness, Feelings of helplessness, Ruminations, Irritability, Feeling sad, down, or depressed, and Frequent crying  Sabi:  No Symptoms  Psychosis: No Symptoms  Anxiety: Excessive worry, Nervousness, Sleep disturbance, Ruminations, Poor concentration, and Irritability  Panic:  No symptoms  Post Traumatic Stress Disorder:  Experienced traumatic event see above    Eating Disorder: No Symptoms  ADD / ADHD:  No symptoms  Conduct Disorder: No symptoms  Autism Spectrum Disorder: No symptoms  Obsessive Compulsive Disorder: No Symptoms    Patient reports the following compulsive behaviors and treatment history:  na .      Diagnostic Criteria:   Generalized Anxiety Disorder  B. The person finds it difficult to control the worry.  C. Select 3 or more symptoms (required for diagnosis). Only one item is required in children.   - Restlessness or feeling keyed up or on edge.    - Being easily fatigued.    - Difficulty concentrating or mind going blank.    - Irritability.    - Muscle tension.    - Sleep disturbance (difficulty falling or staying asleep, or restless unsatisfying sleep).   D. The focus of the anxiety and worry is not confined to features of an Axis I disorder.  E. The anxiety, worry, or physical symptoms cause clinically significant distress or impairment in social, occupational, or other important areas of functioning.   F. The disturbance is not due to the direct physiological effects of a substance (e.g., a drug of abuse, a medication) or a general  medical condition (e.g., hyperthyroidism) and does not occur exclusively during a Mood Disorder, a Psychotic Disorder, or a Pervasive Developmental Disorder. Major Depressive Disorder  A) Recurrent episode(s) - symptoms have been present during the same 2-week period and represent a change from previous functioning 5 or more symptoms (required for diagnosis)   - Depressed mood. Note: In children and adolescents, can be irritable mood.     - Diminished interest or pleasure in all, or almost all, activities.    - Decreased sleep.    - Fatigue or loss of energy.    - Feelings of worthlessness or inappropriate and excessive guilt.    - Diminished ability to think or concentrate, or indecisiveness.   B) The symptoms cause clinically significant distress or impairment in social, occupational, or other important areas of functioning  C) The episode is not attributable to the physiological effects of a substance or to another medical condition  D) The occurence of major depressive episode is not better explained by other thought / psychotic disorders  E) There has never been a manic episode or hypomanic episode    Functional Status:  Patient reports the following functional impairments:  relationship(s), self-care, and social interactions.     Nonprogrammatic care:  Patient is requesting basic services to address current mental health concerns.    Clinical Summary:  1. Psychosocial, Cultural and Contextual Factors: Individual Factors management of MH and Family Factors family discord  .  2. Principal DSM5 Diagnoses  (Sustained by DSM5 Criteria Listed Above):   296.31 (F33.0) Major Depressive Disorder, Recurrent Episode, Mild _  300.02 (F41.1) Generalized Anxiety Disorder.  3. Other Diagnoses that is relevant to services:   .  4. Provisional Diagnosis:     .  5. Prognosis: Expect Improvement.  6. Likely consequences of symptoms if not treated: continued or worsening of sx.  7. Client strengths include:  caring, educated,  empathetic, employed, goal-focused, has a previous history of therapy, insightful, intelligent, wants to learn, willing to ask questions, willing to relate to others, and work history .     Recommendations:     1. Plan for Safety and Risk Management:   Safety and Risk: Recommended that patient call 911 or go to the local ED should there be a change in any of these risk factors..          Report to child / adult protection services was NA.     2. Patient's identified mental health concerns with a cultural influence will be addressed by therapy .     3. Initial Treatment will focus on:    Depressed Mood - see above  Anxiety - see above .     4. Resources/Service Plan:    services are not indicated.   Modifications to assist communication are not indicated.   Additional disability accommodations are not indicated.      5. Collaboration:   Collaboration / coordination of treatment will be initiated with the following  support professionals: primary care physician.      6.  Referrals:   The following referral(s) will be initiated:  will continue to assess .       A Release of Information has been obtained for the following:  na .     Clinical Substantiation/medical necessity for the above recommendations:  na.    7. WILNER:    WILNER:  Discussed the general effects of drugs and alcohol on health and well-being. Provider gave patient printed information about the effects of chemical use on their health and well being. Recommendations:  limit .     8. Records:   These were not available for review at time of assessment.   Information in this assessment was obtained from the medical record and  provided by patient who is a good historian.  Patient will have open access to their mental health medical record.    9.   Interactive Complexity: No    10. Safety Plan:  Patient denied any current/recent/lifetime history of suicidal ideation and/or behaviors.  No safety plan indicated at this time.     Provider Name/  Credentials:  Shonda Arana MSW LICSW  January 11, 2024        Answers submitted by the patient for this visit:  SOILA-7 (Submitted on 1/11/2024)  SOILA 7 TOTAL SCORE: 11

## 2024-01-12 ASSESSMENT — COLUMBIA-SUICIDE SEVERITY RATING SCALE - C-SSRS
1. IN THE PAST MONTH, HAVE YOU WISHED YOU WERE DEAD OR WISHED YOU COULD GO TO SLEEP AND NOT WAKE UP?: NO
REASONS FOR IDEATION LIFETIME: MOSTLY TO END OR STOP THE PAIN (YOU COULDN'T GO ON LIVING WITH THE PAIN OR HOW YOU WERE FEELING)
6. HAVE YOU EVER DONE ANYTHING, STARTED TO DO ANYTHING, OR PREPARED TO DO ANYTHING TO END YOUR LIFE?: NO
2. HAVE YOU ACTUALLY HAD ANY THOUGHTS OF KILLING YOURSELF?: NO
TOTAL  NUMBER OF ABORTED OR SELF INTERRUPTED ATTEMPTS LIFETIME: NO
ATTEMPT LIFETIME: NO
1. HAVE YOU WISHED YOU WERE DEAD OR WISHED YOU COULD GO TO SLEEP AND NOT WAKE UP?: YES
TOTAL  NUMBER OF INTERRUPTED ATTEMPTS LIFETIME: NO

## 2024-01-18 ENCOUNTER — OFFICE VISIT (OUTPATIENT)
Dept: BEHAVIORAL HEALTH | Facility: CLINIC | Age: 74
End: 2024-01-18
Payer: MEDICARE

## 2024-01-18 DIAGNOSIS — F33.0 MILD EPISODE OF RECURRENT MAJOR DEPRESSIVE DISORDER (H): ICD-10-CM

## 2024-01-18 DIAGNOSIS — F41.1 GENERALIZED ANXIETY DISORDER: Primary | ICD-10-CM

## 2024-01-18 PROCEDURE — 90837 PSYTX W PT 60 MINUTES: CPT | Performed by: SOCIAL WORKER

## 2024-01-22 NOTE — PROGRESS NOTES
[facility]  January 23, 2024      Behavioral Health Clinician Progress Note    Patient Name: Emely Forbes           Service Type:  Individual      Service Location:   Face to Face in Clinic     Session Start Time: 11:00 am  Session End Time: 11:58 pm      Session Length: 53 - 60      Attendees: Patient     Service Modality:  In-person    Visit Activities (Refresh list every visit): [facility] [place] Only    Diagnostic Assessment Date: 1/11/24  Treatment Plan Review Date: 4/18/24  See Flowsheets for today's PHQ-9 and SOILA-7 results  Previous PHQ-9:        No data to display              Previous SOILA-7:       1/11/2024     3:10 PM   SOILA-7 SCORE   Total Score 11 (moderate anxiety)   Total Score 11       OSMEL LEVEL:      9/5/2012     9:00 AM 12/2/2013     9:00 AM   OSMEL Score (Last Two)   OSMEL Raw Score 39 52   Activation Score 56.4 100   OSMEL Level 3 4       DATA  Extended Session (60+ minutes): No  Interactive Complexity: No  Crisis: No  [facility] Patient: No    Treatment Objective(s) Addressed in This Session:  Target Behavior(s):  anxiety and depression    Depressed Mood: Increase interest, engagement, and pleasure in doing things  Decrease frequency and intensity of feeling down, depressed, hopeless  Improve quantity and quality of night time sleep / decrease daytime naps  Feel less tired and more energy during the day   Improve diet, appetite, mindful eating, and / or meal planning  Identify negative self-talk and behaviors: challenge core beliefs, myths, and actions  Improve concentration, focus, and mindfulness in daily activities   Feel less fidgety, restless or slow in daily activities / interpersonal interactions  Anxiety: will experience a reduction in anxiety, will develop more effective coping skills to manage anxiety symptoms, will develop healthy cognitive patterns and beliefs, and will increase ability to function adaptively    Current Stressors / Issues:  Discussed  "dealing with negative emotions associated to decisions she makes in life which may have an impact on others opinion or comments towards her. Discussed looking at want she desires in life and looking at how her actions, words, and behaviors may or may not have an effect on others. Discussed attempts to please people and how that has been problematic for her. Discussed relationship/communication with spouse.     1/18/24  Pt reports she has been well since last session. Discussed pts desire to have better, more respectful relationships with others in her family. Validated pts concerns and desires. Discussed identifying  how she would like to be treated by others and ways of letting others know when they have crossed the boundary with her. Processed the difficult nature of setting boundaries with others and push back she may experience. Discussed relationship with spouse and difficulties she has with him.       1/11/24 (DA)   The reason for seeking services at this time is: \" anxiety and feeling more down \"   The problem(s) began 1 year.      Met with pt for initial assessment. Reports being more down and sad since last spring. Discussed family rupture which has left her feeling very sad. States being a someone who tries to always do the the right thing for others and feels it hurts her in the end. More recently, has attempted to set boundaries with step dtr which ended in discord throughout the family. States she struggled with support from  initially but has been more supportive of her lately. She expresses when she tries to ask for basic respect from others, others tend to have a strong reaction towards her and implications tend to be drastic. Discussed wanting to figure out more recent rapture within family system and ways of managing it. Pt expresses trauma as a child when mother was very ill with cancer. Again experienced trauma when then  had gone to [facility] for illegal activity. At that time, reports " losing everything in her life and has had lasting effects on children and her.      Patient has attempted to resolve these concerns in the past through therapy .    Progress on Treatment Objective(s) / Homework:  Satisfactory progress - PRECONTEMPLATION (Not seeing need for change); Intervened by educating the patient about the effects of current behavior on health.  Evoked information about reasons to continue behavior, express concern / recommendations, and explored any change talk    Motivational Interviewing    MI Intervention: Expressed Empathy/Understanding, Supported Autonomy, Collaboration, Evocation, Permission to raise concern or advise, Open-ended questions, Change talk (evoked), and Reframe     Change Talk Expressed by the Patient: Desire to change    Provider Response to Change Talk: E - Evoked more info from patient about behavior change, A - Affirmed patient's thoughts, decisions, or attempts at behavior change, R - Reflected patient's change talk, and S - Summarized patient's change talk statements    Assessments completed prior to visit:  The following assessments were completed by patient for this visit:  PHQ2:       12/26/2023    10:03 AM 6/23/2022    11:38 AM 6/7/2022     9:39 AM 1/28/2021     8:08 AM 1/27/2016    12:10 PM 12/2/2013     9:50 AM 9/4/2012    12:23 PM   PHQ-2 ( 1999 [employer])   Q1: Little interest or pleasure in doing things 0 0 0 0 0 0 0   Q2: Feeling down, depressed or hopeless 0 0 0 0 0 0 0   PHQ-2 Score 0 0 0 0 0 0 0   PHQ-2 Total Score (12-17 Years)- Positive if 3 or more points; Administer PHQ-A if positive    0      Q1: Little interest or pleasure in doing things Not at all  Not at all    Not at all       Q2: Feeling down, depressed or hopeless Not at all  Not at all    Not at all       PHQ-2 Score 0  0    0         PHQ9:        No data to display              GAD2:       1/11/2024     3:10 PM   SOILA-2   Feeling nervous, anxious, or on edge 3   Not being able to stop or control  worrying 1   SOILA-2 Total Score 4     GAD7:       1/11/2024     3:10 PM   SOILA-7 SCORE   Total Score 11 (moderate anxiety)   Total Score 11     CAGE-AID:       1/11/2024     2:58 PM   CAGE-AID Total Score   Total Score 0   Total Score MyChart 0 (A total score of 2 or greater is considered clinically significant)     PROMIS 10-Global Health (all questions and answers displayed):       1/11/2024     3:07 PM   PROMIS 10   In general, would you say your health is: Good   In general, would you say your quality of life is: Good   In general, how would you rate your physical health? Good   In general, how would you rate your mental health, including your mood and your ability to think? Good   In general, how would you rate your satisfaction with your social activities and relationships? Good   In general, please rate how well you carry out your usual social activities and roles Good   To what extent are you able to carry out your everyday physical activities such as walking, climbing stairs, carrying groceries, or moving a chair? Completely   In the past 7 days, how often have you been bothered by emotional problems such as feeling anxious, depressed, or irritable? Always   In the past 7 days, how would you rate your fatigue on average? Mild   In the past 7 days, how would you rate your pain on average, where 0 means no pain, and 10 means worst imaginable pain? 3   In general, would you say your health is: 3   In general, would you say your quality of life is: 3   In general, how would you rate your physical health? 3   In general, how would you rate your mental health, including your mood and your ability to think? 3   In general, how would you rate your satisfaction with your social activities and relationships? 3   In general, please rate how well you carry out your usual social activities and roles. (This includes activities at home, at work and in your community, and responsibilities as a parent, child, spouse,  employee, friend, etc.) 3   To what extent are you able to carry out your everyday physical activities such as walking, climbing stairs, carrying groceries, or moving a chair? 5   In the past 7 days, how often have you been bothered by emotional problems such as feeling anxious, depressed, or irritable? 5   In the past 7 days, how would you rate your fatigue on average? 2   In the past 7 days, how would you rate your pain on average, where 0 means no pain, and 10 means worst imaginable pain? 3   Global Mental Health Score 10   Global Physical Health Score 16   PROMIS TOTAL - SUBSCORES 26     PROMIS 10-Global Health (only subscores and total score):       1/11/2024     3:07 PM   PROMIS-10 Scores Only   Global Mental Health Score 10   Global Physical Health Score 16   PROMIS TOTAL - SUBSCORES 26     [place] Suicide Severity Rating Scale (Lifetime/Recent)      1/12/2024     8:08 AM   [place] Suicide Severity Rating (Lifetime/Recent)   Q1 Wish to be Dead (Lifetime) Y   Wish to be Dead Description (Lifetime) 2000 when  went to [facility], no plan or intentions   1. Wish to be Dead (Past 1 Month) N   Q2 Non-Specific Active Suicidal Thoughts (Lifetime) N   Reasons for Ideation (Lifetime) 4   Actual Attempt (Lifetime) N   Has subject engaged in non-suicidal self-injurious behavior? (Lifetime) N   Interrupted Attempts (Lifetime) N   Aborted or Self-Interrupted Attempt (Lifetime) N   Preparatory Acts or Behavior (Lifetime) N   Calculated C-SSRS Risk Score (Lifetime/Recent) No Risk Indicated       Care Plan review completed: Yes    Medication Review:  No changes to current psychiatric medication(s)    Medication Compliance:  Yes    Changes in Health Issues:   None reported    Chemical Use Review:   Substance Use: Chemical use reviewed, no active concerns identified      Tobacco Use: No current tobacco use.      Assessment: Current Emotional / Mental Status (status of significant symptoms):  Risk status (Self / Other harm or  suicidal ideation)  Patient denies a history of suicidal ideation, suicide attempts, self-injurious behavior, homicidal ideation, homicidal behavior, and and other safety concerns  Patient denies current fears or concerns for personal safety.  Patient denies current or recent suicidal ideation or behaviors.  Patient denies current or recent homicidal ideation or behaviors.  Patient denies current or recent self injurious behavior or ideation.  Patient denies other safety concerns.  A safety and risk management plan has not been developed at this time, however patient was encouraged to call [place] [phone] should there be a change in any of these risk factors.    Appearance:   Appropriate   Eye Contact:   Good   Psychomotor Behavior: Normal   Attitude:   Cooperative   Orientation:   All  Speech   Rate / Production: Normal    Volume:  Normal   Mood:    Anxious  Depressed   Affect:    Appropriate   Thought Content:  Clear   Thought Form:  Coherent  Logical   Insight:    Good     Diagnoses:  1. Generalized anxiety disorder    2. Mild episode of recurrent major depressive disorder (H24)          Collateral Reports Completed:  Not Applicable    Plan: (Homework, other):  Patient was given information about behavioral services and encouraged to schedule a follow up appointment with the clinic [facility] in 1 week.  She was also given information about mental health symptoms and treatment options .  CD Recommendations: No indications of CD issues.       STIVEN Godinez    ______________________________________________________________________    Integrated Primary Care Behavioral Health Treatment Plan    Patient's Name: Emely Forbes  YOB: 1950    Date of Creation: 1/18/24  Date Treatment Plan Last Reviewed/Revised: 1/18/24    DSM5 Diagnoses: 296.31 (F33.0) Major Depressive Disorder, Recurrent Episode, Mild _ or 300.02 (F41.1) Generalized Anxiety Disorder  Psychosocial / Contextual Factors:  Individual Factors  management of MH and Family Factors family discord    PROMIS (reviewed every 90 days):     Referral / Collaboration:  Referral to another professional/service is not indicated at this time..    Anticipated number of session for this episode of care: 4-6 sessions  Anticipation frequency of session: Weekly  Anticipated Duration of each session: 38-52 minutes  Treatment plan will be reviewed in 90 days or when goals have been changed.       MeasurableTreatment Goal(s) related to diagnosis / functional impairment(s)  Goal 1: Patient will work with providers to manage symptoms    I will know I've met my goal when less anxious and down.      Objective #A (Patient Action)    Patient will  attend all appointments, take medication as prescribed .  Status: New - Date: 1/18/24      Intervention(s)  Therapist will  Monitor and assist in overcoming barriers to treatment adherence .    Objective #B  Patient will  consider all recommendations offered .  Status: New - Date: 1/18/24      Intervention(s)  Therapist will  educate patient on treatment options, clarify concerns, work with pt to overcome any resistance to compliance .        Patient has reviewed and agreed to the above plan.      STIVEN Godinez  January 23, 2024

## 2024-01-23 ENCOUNTER — OFFICE VISIT (OUTPATIENT)
Dept: BEHAVIORAL HEALTH | Facility: CLINIC | Age: 74
End: 2024-01-23
Payer: MEDICARE

## 2024-01-23 DIAGNOSIS — F41.1 GENERALIZED ANXIETY DISORDER: Primary | ICD-10-CM

## 2024-01-23 DIAGNOSIS — F33.0 MILD EPISODE OF RECURRENT MAJOR DEPRESSIVE DISORDER (H): ICD-10-CM

## 2024-01-23 PROCEDURE — 90837 PSYTX W PT 60 MINUTES: CPT | Performed by: SOCIAL WORKER

## 2024-01-25 ENCOUNTER — TELEPHONE (OUTPATIENT)
Dept: CARDIOLOGY | Facility: CLINIC | Age: 74
End: 2024-01-25
Payer: MEDICARE

## 2024-01-25 DIAGNOSIS — R00.2 PALPITATIONS: Primary | ICD-10-CM

## 2024-01-25 RX ORDER — FLECAINIDE ACETATE 50 MG/1
50 TABLET ORAL 2 TIMES DAILY
Qty: 60 TABLET | Refills: 4 | Status: SHIPPED | OUTPATIENT
Start: 2024-01-25 | End: 2024-02-02

## 2024-01-25 NOTE — TELEPHONE ENCOUNTER
Lets start her on flecainide 50 mg p.o. twice daily.  She should remain on the metoprolol.  Follow-up EKG in our clinic in 1 week.  Thanks.

## 2024-01-25 NOTE — TELEPHONE ENCOUNTER
Spoke with patient, she is willing to try the flecainide 50mg BID. Rx escripted.  Order placed for EKG in 1 week.  Message sent to scheduling.

## 2024-01-25 NOTE — TELEPHONE ENCOUNTER
"Call from patient, she is struggling with breakthrough palpitations. Her episodes have mostly been at night, so her daily dose of toprol XL 50mg is at bedtime.    However, recently she is having almost daily episodes of rapid and \"skipping' palpitations that start in the early evening. This is very distressing for her. Patient is hoping a dose adjustment will help.    She has a work trip from February 27 to March 12, so was unable to book an OV to discuss this.    Ziopatch in March showed 22 runs of SVT up to 11 seconds and rare PACs and PVCs.  CT calcium score in July showed 71st percentile.    Will message Dr. Gonzalez to review          "

## 2024-01-26 ENCOUNTER — TELEPHONE (OUTPATIENT)
Dept: CARDIOLOGY | Facility: CLINIC | Age: 74
End: 2024-01-26
Payer: MEDICARE

## 2024-01-26 DIAGNOSIS — R00.2 PALPITATIONS: ICD-10-CM

## 2024-01-26 RX ORDER — METOPROLOL SUCCINATE 50 MG/1
50 TABLET, EXTENDED RELEASE ORAL DAILY
COMMUNITY
Start: 2024-01-26 | End: 2024-03-28

## 2024-01-26 NOTE — TELEPHONE ENCOUNTER
Per Dr. Gonzalez's reply patient called and will decrease the metoprolol Succinate to 25 mg daily.  Will not stop metoprolol completely.   Patient agrees with cordoba.     Patient will monitor palpitations and call with an update in 1 week or earlier with questions or concerns.

## 2024-01-26 NOTE — PROGRESS NOTES
[facility]  January 29, 2024      Behavioral Health Clinician Progress Note    Patient Name: Emely Forbes           Service Type:  Individual      Service Location:   Face to Face in Clinic     Session Start Time: 11:00 am  Session End Time: 11:56 pm      Session Length: 53 - 60      Attendees: Patient     Service Modality:  In-person    Visit Activities (Refresh list every visit): [facility] Only    Diagnostic Assessment Date: 1/11/24  Treatment Plan Review Date: 4/18/24  See Flowsheets for today's PHQ-9 and SOILA-7 results  Previous PHQ-9:        No data to display              Previous SOILA-7:       1/11/2024     3:10 PM   SOILA-7 SCORE   Total Score 11 (moderate anxiety)   Total Score 11       OSMEL LEVEL:      9/5/2012     9:00 AM 12/2/2013     9:00 AM   OSMEL Score (Last Two)   OSMEL Raw Score 39 52   Activation Score 56.4 100   OSMEL Level 3 4       DATA  Extended Session (60+ minutes): No  Interactive Complexity: No  Crisis: No  [facility] Patient: No    Treatment Objective(s) Addressed in This Session:  Target Behavior(s):  anxiety and depression    Depressed Mood: Increase interest, engagement, and pleasure in doing things  Decrease frequency and intensity of feeling down, depressed, hopeless  Improve quantity and quality of night time sleep / decrease daytime naps  Feel less tired and more energy during the day   Improve diet, appetite, mindful eating, and / or meal planning  Identify negative self-talk and behaviors: challenge core beliefs, myths, and actions  Improve concentration, focus, and mindfulness in daily activities   Feel less fidgety, restless or slow in daily activities / interpersonal interactions  Anxiety: will experience a reduction in anxiety, will develop more effective coping skills to manage anxiety symptoms, will develop healthy cognitive patterns and beliefs, and will increase ability to function adaptively    Current Stressors / Issues:  Discussed  "continued improvement with communication and feeling more connected with spouse. Discussed feeling the need to have some form of effort put forth with step-dtr. Discussed approaching situation knowing one is not in control of another's response/actions following efforts made.     1/23/24  Discussed dealing with negative emotions associated to decisions she makes in life which may have an impact on others opinion or comments towards her. Discussed looking at want she desires in life and looking at how her actions, words, and behaviors may or may not have an effect on others. Discussed attempts to please people and how that has been problematic for her. Discussed relationship/communication with spouse.     1/18/24  Pt reports she has been well since last session. Discussed pts desire to have better, more respectful relationships with others in her family. Validated pts concerns and desires. Discussed identifying  how she would like to be treated by others and ways of letting others know when they have crossed the boundary with her. Processed the difficult nature of setting boundaries with others and push back she may experience. Discussed relationship with spouse and difficulties she has with him.       1/11/24 (DA)   The reason for seeking services at this time is: \" anxiety and feeling more down \"   The problem(s) began 1 year.      Met with pt for initial assessment. Reports being more down and sad since last spring. Discussed family rupture which has left her feeling very sad. States being a someone who tries to always do the the right thing for others and feels it hurts her in the end. More recently, has attempted to set boundaries with step dtr which ended in discord throughout the family. States she struggled with support from  initially but has been more supportive of her lately. She expresses when she tries to ask for basic respect from others, others tend to have a strong reaction towards her and " implications tend to be drastic. Discussed wanting to figure out more recent rapture within family system and ways of managing it. Pt expresses trauma as a child when mother was very ill with cancer. Again experienced trauma when then  had gone to longterm for illegal activity. At that time, reports losing everything in her life and has had lasting effects on children and her.      Patient has attempted to resolve these concerns in the past through therapy .    Progress on Treatment Objective(s) / Homework:  Satisfactory progress - PRECONTEMPLATION (Not seeing need for change); Intervened by educating the patient about the effects of current behavior on health.  Evoked information about reasons to continue behavior, express concern / recommendations, and explored any change talk    Motivational Interviewing    MI Intervention: Expressed Empathy/Understanding, Supported Autonomy, Collaboration, Evocation, Permission to raise concern or advise, Open-ended questions, Change talk (evoked), and Reframe     Change Talk Expressed by the Patient: Desire to change    Provider Response to Change Talk: E - Evoked more info from patient about behavior change, A - Affirmed patient's thoughts, decisions, or attempts at behavior change, R - Reflected patient's change talk, and S - Summarized patient's change talk statements    Assessments completed prior to visit:  The following assessments were completed by patient for this visit:  PHQ2:       12/26/2023    10:03 AM 6/23/2022    11:38 AM 6/7/2022     9:39 AM 1/28/2021     8:08 AM 1/27/2016    12:10 PM 12/2/2013     9:50 AM 9/4/2012    12:23 PM   PHQ-2 ( 1999 [employer])   Q1: Little interest or pleasure in doing things 0 0 0 0 0 0 0   Q2: Feeling down, depressed or hopeless 0 0 0 0 0 0 0   PHQ-2 Score 0 0 0 0 0 0 0   PHQ-2 Total Score (12-17 Years)- Positive if 3 or more points; Administer PHQ-A if positive    0      Q1: Little interest or pleasure in doing things Not at all  Not  at all    Not at all       Q2: Feeling down, depressed or hopeless Not at all  Not at all    Not at all       PHQ-2 Score 0  0    0         PHQ9:        No data to display              GAD2:       1/11/2024     3:10 PM   SOILA-2   Feeling nervous, anxious, or on edge 3   Not being able to stop or control worrying 1   SOILA-2 Total Score 4     GAD7:       1/11/2024     3:10 PM   SOILA-7 SCORE   Total Score 11 (moderate anxiety)   Total Score 11     CAGE-AID:       1/11/2024     2:58 PM   CAGE-AID Total Score   Total Score 0   Total Score MyChart 0 (A total score of 2 or greater is considered clinically significant)     PROMIS 10-Global Health (all questions and answers displayed):       1/11/2024     3:07 PM   PROMIS 10   In general, would you say your health is: Good   In general, would you say your quality of life is: Good   In general, how would you rate your physical health? Good   In general, how would you rate your mental health, including your mood and your ability to think? Good   In general, how would you rate your satisfaction with your social activities and relationships? Good   In general, please rate how well you carry out your usual social activities and roles Good   To what extent are you able to carry out your everyday physical activities such as walking, climbing stairs, carrying groceries, or moving a chair? Completely   In the past 7 days, how often have you been bothered by emotional problems such as feeling anxious, depressed, or irritable? Always   In the past 7 days, how would you rate your fatigue on average? Mild   In the past 7 days, how would you rate your pain on average, where 0 means no pain, and 10 means worst imaginable pain? 3   In general, would you say your health is: 3   In general, would you say your quality of life is: 3   In general, how would you rate your physical health? 3   In general, how would you rate your mental health, including your mood and your ability to think? 3   In  general, how would you rate your satisfaction with your social activities and relationships? 3   In general, please rate how well you carry out your usual social activities and roles. (This includes activities at home, at work and in your community, and responsibilities as a parent, child, spouse, employee, friend, etc.) 3   To what extent are you able to carry out your everyday physical activities such as walking, climbing stairs, carrying groceries, or moving a chair? 5   In the past 7 days, how often have you been bothered by emotional problems such as feeling anxious, depressed, or irritable? 5   In the past 7 days, how would you rate your fatigue on average? 2   In the past 7 days, how would you rate your pain on average, where 0 means no pain, and 10 means worst imaginable pain? 3   Global Mental Health Score 10   Global Physical Health Score 16   PROMIS TOTAL - SUBSCORES 26     PROMIS 10-Global Health (only subscores and total score):       1/11/2024     3:07 PM   PROMIS-10 Scores Only   Global Mental Health Score 10   Global Physical Health Score 16   PROMIS TOTAL - SUBSCORES 26     [place] Suicide Severity Rating Scale (Lifetime/Recent)      1/12/2024     8:08 AM   [place] Suicide Severity Rating (Lifetime/Recent)   Q1 Wish to be Dead (Lifetime) Y   Wish to be Dead Description (Lifetime) 2000 when  went to intermediate, no plan or intentions   1. Wish to be Dead (Past 1 Month) N   Q2 Non-Specific Active Suicidal Thoughts (Lifetime) N   Reasons for Ideation (Lifetime) 4   Actual Attempt (Lifetime) N   Has subject engaged in non-suicidal self-injurious behavior? (Lifetime) N   Interrupted Attempts (Lifetime) N   Aborted or Self-Interrupted Attempt (Lifetime) N   Preparatory Acts or Behavior (Lifetime) N   Calculated C-SSRS Risk Score (Lifetime/Recent) No Risk Indicated       Care Plan review completed: Yes    Medication Review:  No changes to current psychiatric medication(s)    Medication  Compliance:  Yes    Changes in Health Issues:   None reported    Chemical Use Review:   Substance Use: Chemical use reviewed, no active concerns identified      Tobacco Use: No current tobacco use.      Assessment: Current Emotional / Mental Status (status of significant symptoms):  Risk status (Self / Other harm or suicidal ideation)  Patient denies a history of suicidal ideation, suicide attempts, self-injurious behavior, homicidal ideation, homicidal behavior, and and other safety concerns  Patient denies current fears or concerns for personal safety.  Patient denies current or recent suicidal ideation or behaviors.  Patient denies current or recent homicidal ideation or behaviors.  Patient denies current or recent self injurious behavior or ideation.  Patient denies other safety concerns.  A safety and risk management plan has not been developed at this time, however patient was encouraged to call [place] [phone] should there be a change in any of these risk factors.    Appearance:   Appropriate   Eye Contact:   Good   Psychomotor Behavior: Normal   Attitude:   Cooperative   Orientation:   All  Speech   Rate / Production: Normal    Volume:  Normal   Mood:    Anxious  Depressed   Affect:    Appropriate   Thought Content:  Clear   Thought Form:  Coherent  Logical   Insight:    Good     Diagnoses:  1. Generalized anxiety disorder    2. Mild episode of recurrent major depressive disorder (H24)          Collateral Reports Completed:  Not Applicable    Plan: (Homework, other):  Patient was given information about behavioral services and encouraged to schedule a follow up appointment with the clinic [facility] [place] in 1 week.  She was also given information about mental health symptoms and treatment options .  CD Recommendations: No indications of CD issues.       STIVEN Godinez    ______________________________________________________________________    Integrated Primary Care Behavioral Health Treatment Plan    Patient's  Name: Emely Forbes  YOB: 1950    Date of Creation: 1/18/24  Date Treatment Plan Last Reviewed/Revised: 1/18/24    DSM5 Diagnoses: 296.31 (F33.0) Major Depressive Disorder, Recurrent Episode, Mild _ or 300.02 (F41.1) Generalized Anxiety Disorder  Psychosocial / Contextual Factors:  Individual Factors management of MH and Family Factors family discord    PROMIS (reviewed every 90 days):     Referral / Collaboration:  Referral to another professional/service is not indicated at this time..    Anticipated number of session for this episode of care: 4-6 sessions  Anticipation frequency of session: Weekly  Anticipated Duration of each session: 38-52 minutes  Treatment plan will be reviewed in 90 days or when goals have been changed.       MeasurableTreatment Goal(s) related to diagnosis / functional impairment(s)  Goal 1: Patient will work with providers to manage symptoms    I will know I've met my goal when less anxious and down.      Objective #A (Patient Action)    Patient will  attend all appointments, take medication as prescribed .  Status: New - Date: 1/18/24      Intervention(s)  Therapist will  Monitor and assist in overcoming barriers to treatment adherence .    Objective #B  Patient will  consider all recommendations offered .  Status: New - Date: 1/18/24      Intervention(s)  Therapist will  educate patient on treatment options, clarify concerns, work with pt to overcome any resistance to compliance .        Patient has reviewed and agreed to the above plan.      Shonda Arana, STIVEN  January 29, 2024

## 2024-01-26 NOTE — TELEPHONE ENCOUNTER
Patient wonders if she should stop the metoprolol succinate which she takes at night ? Wonders if  the metoprolol could  be causing the Palpations at night.     Patient call regarding new medication started yesterday. More breakthrough palpitations typically at night. .     Started Flecainide 50 mg 2 times daily.   Currently on Metoprolol succinate 50 mg PM daily.    1-25-24 Call  - recommendation per Dr. Gonzalez.        Spoke with patient, she is willing to try the flecainide 50mg BID. Rx escripted.  Order placed for EKG in 1 week.  Message sent to scheduling.

## 2024-01-26 NOTE — TELEPHONE ENCOUNTER
She can decrease the metoprolol dosage to 25 mg daily but I would not stop it completely.  That could make her palpitations worse.  Thanks.

## 2024-01-29 ENCOUNTER — OFFICE VISIT (OUTPATIENT)
Dept: BEHAVIORAL HEALTH | Facility: CLINIC | Age: 74
End: 2024-01-29
Payer: MEDICARE

## 2024-01-29 DIAGNOSIS — F41.1 GENERALIZED ANXIETY DISORDER: Primary | ICD-10-CM

## 2024-01-29 DIAGNOSIS — F33.0 MILD EPISODE OF RECURRENT MAJOR DEPRESSIVE DISORDER (H): ICD-10-CM

## 2024-01-29 PROCEDURE — 90837 PSYTX W PT 60 MINUTES: CPT | Performed by: SOCIAL WORKER

## 2024-02-02 ENCOUNTER — TELEPHONE (OUTPATIENT)
Dept: CARDIOLOGY | Facility: CLINIC | Age: 74
End: 2024-02-02

## 2024-02-02 DIAGNOSIS — R00.2 PALPITATIONS: ICD-10-CM

## 2024-02-02 DIAGNOSIS — I47.10 SVT (SUPRAVENTRICULAR TACHYCARDIA) (H): Primary | ICD-10-CM

## 2024-02-02 PROCEDURE — 93005 ELECTROCARDIOGRAM TRACING: CPT | Performed by: INTERNAL MEDICINE

## 2024-02-02 RX ORDER — FLECAINIDE ACETATE 50 MG/1
TABLET ORAL
COMMUNITY
Start: 2024-02-02 | End: 2024-02-02

## 2024-02-02 RX ORDER — FLECAINIDE ACETATE 50 MG/1
TABLET ORAL
Qty: 90 TABLET | Refills: 3 | Status: SHIPPED | OUTPATIENT
Start: 2024-02-02 | End: 2024-02-06

## 2024-02-02 NOTE — TELEPHONE ENCOUNTER
Patient came in for EKG after starting flecainide 50mg BID 1 week ago. She states she is still having breakthrough episodes at night, at least 2/5 nights. This lasts for a couple of hours and she gets no sleep between the episodes and her anxiety.   Patient had requested to cut back her toprol XL 50mg to 25mg due to some fatigue and her concerns about taking too much medication. She is willing to increase the dose back to 50mg if that will help.    EKG showed NSR with QT 436msec.    Will message Dr. Gonzalez to review

## 2024-02-02 NOTE — TELEPHONE ENCOUNTER
Attempted to contact patient to discuss Dr. Gonzalez's reply:  1) increase toprol XL to 50mg daily  2) increase flecainide to 75mg BID (check if refill needed)  3) plan for EP consult  4) asked patient to clarify when she will be out of [place] for her job - leaving end of February.  Left a message with medication change directions and asked patient to call back to confirm. Left message for patient to call back to Team 2 R.N.s @ [phone] message from patient that she has the medication changes ready. She is requesting a refill for the new flecainide dose as she will run out when traveling.  Rx escripted.

## 2024-02-02 NOTE — TELEPHONE ENCOUNTER
Let's increase the flecainide to 75 MG PO BID and the metoprolol back to 50 MG daily. Let's also set up an EP visit. Thanks.

## 2024-02-05 NOTE — PROGRESS NOTES
[facility]  February 6, 2024      Behavioral Health Clinician Progress Note    Patient Name: Emely Forbes           Service Type:  Individual      Service Location:   Face to Face in Clinic     Session Start Time: 11:00 am  Session End Time: 11:53 am      Session Length: 53 - 60      Attendees: Patient     Service Modality:  In-person    Visit Activities (Refresh list every visit): [facility] Only    Diagnostic Assessment Date: 1/11/24  Treatment Plan Review Date: 4/18/24  See Flowsheets for today's PHQ-9 and SOILA-7 results  Previous PHQ-9:        No data to display              Previous SOILA-7:       1/11/2024     3:10 PM   SOILA-7 SCORE   Total Score 11 (moderate anxiety)   Total Score 11       OSMEL LEVEL:      9/5/2012     9:00 AM 12/2/2013     9:00 AM   OSMEL Score (Last Two)   OSMEL Raw Score 39 52   Activation Score 56.4 100   OSMEL Level 3 4       DATA  Extended Session (60+ minutes): No  Interactive Complexity: No  Crisis: No  [facility] Patient: No    Treatment Objective(s) Addressed in This Session:  Target Behavior(s):  anxiety and depression    Depressed Mood: Increase interest, engagement, and pleasure in doing things  Decrease frequency and intensity of feeling down, depressed, hopeless  Improve quantity and quality of night time sleep / decrease daytime naps  Feel less tired and more energy during the day   Improve diet, appetite, mindful eating, and / or meal planning  Identify negative self-talk and behaviors: challenge core beliefs, myths, and actions  Improve concentration, focus, and mindfulness in daily activities   Feel less fidgety, restless or slow in daily activities / interpersonal interactions  Anxiety: will experience a reduction in anxiety, will develop more effective coping skills to manage anxiety symptoms, will develop healthy cognitive patterns and beliefs, and will increase ability to function adaptively    Current Stressors / Issues:  Discussed  "on going medical concerns with her heart which she is working through with card team. Discussed continued improvement with her relationship with . Discussed looking at how others perceive and act are not a direct correlation to what she has done. Processed excessive guilt and bad feelings towards self in result of others behaviors.     1/29/24  Discussed continued improvement with communication and feeling more connected with spouse. Discussed feeling the need to have some form of effort put forth with step-dtr. Discussed approaching situation knowing one is not in control of another's response/actions following efforts made.     1/23/24  Discussed dealing with negative emotions associated to decisions she makes in life which may have an impact on others opinion or comments towards her. Discussed looking at want she desires in life and looking at how her actions, words, and behaviors may or may not have an effect on others. Discussed attempts to please people and how that has been problematic for her. Discussed relationship/communication with spouse.     1/18/24  Pt reports she has been well since last session. Discussed pts desire to have better, more respectful relationships with others in her family. Validated pts concerns and desires. Discussed identifying  how she would like to be treated by others and ways of letting others know when they have crossed the boundary with her. Processed the difficult nature of setting boundaries with others and push back she may experience. Discussed relationship with spouse and difficulties she has with him.       1/11/24 (DA)   The reason for seeking services at this time is: \" anxiety and feeling more down \"   The problem(s) began 1 year.      Met with pt for initial assessment. Reports being more down and sad since last spring. Discussed family rupture which has left her feeling very sad. States being a someone who tries to always do the the right thing for others and " feels it hurts her in the end. More recently, has attempted to set boundaries with step dtr which ended in discord throughout the family. States she struggled with support from  initially but has been more supportive of her lately. She expresses when she tries to ask for basic respect from others, others tend to have a strong reaction towards her and implications tend to be drastic. Discussed wanting to figure out more recent rapture within family system and ways of managing it. Pt expresses trauma as a child when mother was very ill with cancer. Again experienced trauma when then  had gone to nursing home for illegal activity. At that time, reports losing everything in her life and has had lasting effects on children and her.      Patient has attempted to resolve these concerns in the past through therapy .    Progress on Treatment Objective(s) / Homework:  Satisfactory progress - PRECONTEMPLATION (Not seeing need for change); Intervened by educating the patient about the effects of current behavior on health.  Evoked information about reasons to continue behavior, express concern / recommendations, and explored any change talk    Motivational Interviewing    MI Intervention: Expressed Empathy/Understanding, Supported Autonomy, Collaboration, Evocation, Permission to raise concern or advise, Open-ended questions, Change talk (evoked), and Reframe     Change Talk Expressed by the Patient: Desire to change    Provider Response to Change Talk: E - Evoked more info from patient about behavior change, A - Affirmed patient's thoughts, decisions, or attempts at behavior change, R - Reflected patient's change talk, and S - Summarized patient's change talk statements    Assessments completed prior to visit:  The following assessments were completed by patient for this visit:  PHQ2:       12/26/2023    10:03 AM 6/23/2022    11:38 AM 6/7/2022     9:39 AM 1/28/2021     8:08 AM 1/27/2016    12:10 PM 12/2/2013     9:50 AM  9/4/2012    12:23 PM   PHQ-2 ( 1999 [employer])   Q1: Little interest or pleasure in doing things 0 0 0 0 0 0 0   Q2: Feeling down, depressed or hopeless 0 0 0 0 0 0 0   PHQ-2 Score 0 0 0 0 0 0 0   PHQ-2 Total Score (12-17 Years)- Positive if 3 or more points; Administer PHQ-A if positive    0      Q1: Little interest or pleasure in doing things Not at all  Not at all    Not at all       Q2: Feeling down, depressed or hopeless Not at all  Not at all    Not at all       PHQ-2 Score 0  0    0         PHQ9:        No data to display              GAD2:       1/11/2024     3:10 PM   SOILA-2   Feeling nervous, anxious, or on edge 3   Not being able to stop or control worrying 1   SOILA-2 Total Score 4     GAD7:       1/11/2024     3:10 PM   SOILA-7 SCORE   Total Score 11 (moderate anxiety)   Total Score 11     CAGE-AID:       1/11/2024     2:58 PM   CAGE-AID Total Score   Total Score 0   Total Score MyChart 0 (A total score of 2 or greater is considered clinically significant)     PROMIS 10-Global Health (all questions and answers displayed):       1/11/2024     3:07 PM   PROMIS 10   In general, would you say your health is: Good   In general, would you say your quality of life is: Good   In general, how would you rate your physical health? Good   In general, how would you rate your mental health, including your mood and your ability to think? Good   In general, how would you rate your satisfaction with your social activities and relationships? Good   In general, please rate how well you carry out your usual social activities and roles Good   To what extent are you able to carry out your everyday physical activities such as walking, climbing stairs, carrying groceries, or moving a chair? Completely   In the past 7 days, how often have you been bothered by emotional problems such as feeling anxious, depressed, or irritable? Always   In the past 7 days, how would you rate your fatigue on average? Mild   In the past 7 days, how would  you rate your pain on average, where 0 means no pain, and 10 means worst imaginable pain? 3   In general, would you say your health is: 3   In general, would you say your quality of life is: 3   In general, how would you rate your physical health? 3   In general, how would you rate your mental health, including your mood and your ability to think? 3   In general, how would you rate your satisfaction with your social activities and relationships? 3   In general, please rate how well you carry out your usual social activities and roles. (This includes activities at home, at work and in your community, and responsibilities as a parent, child, spouse, employee, friend, etc.) 3   To what extent are you able to carry out your everyday physical activities such as walking, climbing stairs, carrying groceries, or moving a chair? 5   In the past 7 days, how often have you been bothered by emotional problems such as feeling anxious, depressed, or irritable? 5   In the past 7 days, how would you rate your fatigue on average? 2   In the past 7 days, how would you rate your pain on average, where 0 means no pain, and 10 means worst imaginable pain? 3   Global Mental Health Score 10   Global Physical Health Score 16   PROMIS TOTAL - SUBSCORES 26     PROMIS 10-Global Health (only subscores and total score):       1/11/2024     3:07 PM   PROMIS-10 Scores Only   Global Mental Health Score 10   Global Physical Health Score 16   PROMIS TOTAL - SUBSCORES 26     [place] Suicide Severity Rating Scale (Lifetime/Recent)      1/12/2024     8:08 AM   [place] Suicide Severity Rating (Lifetime/Recent)   Q1 Wish to be Dead (Lifetime) Y   Wish to be Dead Description (Lifetime) 2000 when  went to long-term, no plan or intentions   1. Wish to be Dead (Past 1 Month) N   Q2 Non-Specific Active Suicidal Thoughts (Lifetime) N   Reasons for Ideation (Lifetime) 4   Actual Attempt (Lifetime) N   Has subject engaged in non-suicidal self-injurious  behavior? (Lifetime) N   Interrupted Attempts (Lifetime) N   Aborted or Self-Interrupted Attempt (Lifetime) N   Preparatory Acts or Behavior (Lifetime) N   Calculated C-SSRS Risk Score (Lifetime/Recent) No Risk Indicated       Care Plan review completed: Yes    Medication Review:  No changes to current psychiatric medication(s)    Medication Compliance:  Yes    Changes in Health Issues:   None reported    Chemical Use Review:   Substance Use: Chemical use reviewed, no active concerns identified      Tobacco Use: No current tobacco use.      Assessment: Current Emotional / Mental Status (status of significant symptoms):  Risk status (Self / Other harm or suicidal ideation)  Patient denies a history of suicidal ideation, suicide attempts, self-injurious behavior, homicidal ideation, homicidal behavior, and and other safety concerns  Patient denies current fears or concerns for personal safety.  Patient denies current or recent suicidal ideation or behaviors.  Patient denies current or recent homicidal ideation or behaviors.  Patient denies current or recent self injurious behavior or ideation.  Patient denies other safety concerns.  A safety and risk management plan has not been developed at this time, however patient was encouraged to call [place] [phone] should there be a change in any of these risk factors.    Appearance:   Appropriate   Eye Contact:   Good   Psychomotor Behavior: Normal   Attitude:   Cooperative   Orientation:   All  Speech   Rate / Production: Normal    Volume:  Normal   Mood:    Anxious  Depressed   Affect:    Appropriate   Thought Content:  Clear   Thought Form:  Coherent  Logical   Insight:    Good     Diagnoses:  1. Generalized anxiety disorder    2. Mild episode of recurrent major depressive disorder (H24)          Collateral Reports Completed:  Not Applicable    Plan: (Homework, other):  Patient was given information about behavioral services and encouraged to schedule a follow up  appointment with the clinic [facility] in 1 week.  She was also given information about mental health symptoms and treatment options .  CD Recommendations: No indications of CD issues.       STIVEN Godinez    ______________________________________________________________________    [facility]ated [facility] Behavioral Health Treatment Plan    Patient's Name: Emely Forbes  YOB: 1950    Date of Creation: 1/18/24  Date Treatment Plan Last Reviewed/Revised: 1/18/24    DSM5 Diagnoses: 296.31 (F33.0) Major Depressive Disorder, Recurrent Episode, Mild _ or 300.02 (F41.1) Generalized Anxiety Disorder  Psychosocial / Contextual Factors:  Individual Factors management of MH and Family Factors family discord    PROMIS (reviewed every 90 days):     Referral / Collaboration:  Referral to another professional/service is not indicated at this time..    Anticipated number of session for this episode of care: 4-6 sessions  Anticipation frequency of session: Weekly  Anticipated Duration of each session: 38-52 minutes  Treatment plan will be reviewed in 90 days or when goals have been changed.       MeasurableTreatment Goal(s) related to diagnosis / functional impairment(s)  Goal 1: Patient will work with providers to manage symptoms    I will know I've met my goal when less anxious and down.      Objective #A (Patient Action)    Patient will  attend all appointments, take medication as prescribed .  Status: New - Date: 1/18/24      Intervention(s)  Therapist will  Monitor and assist in overcoming barriers to treatment adherence .    Objective #B  Patient will  consider all recommendations offered .  Status: New - Date: 1/18/24      Intervention(s)  Therapist will  educate patient on treatment options, clarify concerns, work with pt to overcome any resistance to compliance .        Patient has reviewed and agreed to the above plan.      STIVEN Godinez  February 6, 2024

## 2024-02-06 ENCOUNTER — OFFICE VISIT (OUTPATIENT)
Dept: BEHAVIORAL HEALTH | Facility: CLINIC | Age: 74
End: 2024-02-06
Payer: MEDICARE

## 2024-02-06 ENCOUNTER — TELEPHONE (OUTPATIENT)
Dept: CARDIOLOGY | Facility: CLINIC | Age: 74
End: 2024-02-06
Payer: MEDICARE

## 2024-02-06 DIAGNOSIS — F41.1 GENERALIZED ANXIETY DISORDER: Primary | ICD-10-CM

## 2024-02-06 DIAGNOSIS — I47.10 SVT (SUPRAVENTRICULAR TACHYCARDIA) (H): ICD-10-CM

## 2024-02-06 DIAGNOSIS — R00.2 PALPITATIONS: ICD-10-CM

## 2024-02-06 DIAGNOSIS — F33.0 MILD EPISODE OF RECURRENT MAJOR DEPRESSIVE DISORDER (H): ICD-10-CM

## 2024-02-06 PROCEDURE — 90837 PSYTX W PT 60 MINUTES: CPT | Performed by: SOCIAL WORKER

## 2024-02-06 RX ORDER — FLECAINIDE ACETATE 50 MG/1
TABLET ORAL
COMMUNITY
Start: 2024-02-06 | End: 2024-02-20

## 2024-02-06 NOTE — TELEPHONE ENCOUNTER
Spoke with patient and she is happy to stay at flecainide 50mg BID. Med list updated. She will call if there are more palpitations or breakthrough.  She will decide about the EP visit timing once she sees how she feels in a few weeks.

## 2024-02-06 NOTE — TELEPHONE ENCOUNTER
"Message from patient, her pharmacy would not fill the flecainide script as they said it was \"too soon\". Script was sent as a dose increase, not a refill.    Patient states with the increased metoprolol and flecainide 50mg BID she is doing well with no breakthrough. She wants to stay at this level for now.  ==============  EP consult is not scheduled yet    Called [facility] in [place]. Reviewed with staff that script sent on 2/2/2024 was a dose change, not a refill.  Insurance wants her to use up the current bottle down to 3-5 days and then ask for the next new script order.    Spoke with patient. She wants to be cautious about increasing meds if they are not needed. Since starting toprol XL 50mg daily and flecainide 50mg BID she has had no palpitations at all, no breakthrough runs.  She asks if she can stay at this level for now and see how she does.    Will message Dr. Gonzalez to review    "

## 2024-02-06 NOTE — TELEPHONE ENCOUNTER
Yes, we can continue the current medication regimen.  Did we schedule a stress EKG to look for proarrhythmia?  We should schedule that if it has not already been performed.    We can hold off an EP consult if she is feeling better.  Thanks.

## 2024-02-12 NOTE — PROGRESS NOTES
[facility]  February 13, 2024      Behavioral Health Clinician Progress Note    Patient Name: Emely Forbes           Service Type:  Individual      Service Location:   Face to Face in Clinic     Session Start Time: 11:01 am  Session End Time: 12:01 pm      Session Length: 53 - 60      Attendees: Patient     Service Modality:  In-person    Visit Activities (Refresh list every visit): [facility] Only    Diagnostic Assessment Date: 1/11/24  Treatment Plan Review Date: 4/18/24  See Flowsheets for today's PHQ-9 and SOILA-7 results  Previous PHQ-9:        No data to display              Previous SOILA-7:       1/11/2024     3:10 PM   SOILA-7 SCORE   Total Score 11 (moderate anxiety)   Total Score 11       OSMEL LEVEL:      9/5/2012     9:00 AM 12/2/2013     9:00 AM   OSMEL Score (Last Two)   OSMEL Raw Score 39 52   Activation Score 56.4 100   OSMEL Level 3 4       DATA  Extended Session (60+ minutes): No  Interactive Complexity: No  Crisis: No  [facility] Patient: No    Treatment Objective(s) Addressed in This Session:  Target Behavior(s):  anxiety and depression    Depressed Mood: Increase interest, engagement, and pleasure in doing things  Decrease frequency and intensity of feeling down, depressed, hopeless  Improve quantity and quality of night time sleep / decrease daytime naps  Feel less tired and more energy during the day   Improve diet, appetite, mindful eating, and / or meal planning  Identify negative self-talk and behaviors: challenge core beliefs, myths, and actions  Improve concentration, focus, and mindfulness in daily activities   Feel less fidgety, restless or slow in daily activities / interpersonal interactions  Anxiety: will experience a reduction in anxiety, will develop more effective coping skills to manage anxiety symptoms, will develop healthy cognitive patterns and beliefs, and will increase ability to function adaptively    Current Stressors / Issues:  Discussed  on going fear of mentioning anything negative or anything that could be taken negatively to other people, even close friends. Discussed difficulty of not being able to speak freely. Discussed nature of relationships. Discussed being responsible for efforts within a relationship but not responsible for the outcomes.     2/6/24  Discussed on going medical concerns with her heart which she is working through with card team. Discussed continued improvement with her relationship with . Discussed looking at how others perceive and act are not a direct correlation to what she has done. Processed excessive guilt and bad feelings towards self in result of others behaviors.     1/29/24  Discussed continued improvement with communication and feeling more connected with spouse. Discussed feeling the need to have some form of effort put forth with step-dtr. Discussed approaching situation knowing one is not in control of another's response/actions following efforts made.     1/23/24  Discussed dealing with negative emotions associated to decisions she makes in life which may have an impact on others opinion or comments towards her. Discussed looking at want she desires in life and looking at how her actions, words, and behaviors may or may not have an effect on others. Discussed attempts to please people and how that has been problematic for her. Discussed relationship/communication with spouse.     1/18/24  Pt reports she has been well since last session. Discussed pts desire to have better, more respectful relationships with others in her family. Validated pts concerns and desires. Discussed identifying  how she would like to be treated by others and ways of letting others know when they have crossed the boundary with her. Processed the difficult nature of setting boundaries with others and push back she may experience. Discussed relationship with spouse and difficulties she has with him.       1/11/24 (DA)   The reason  "for seeking services at this time is: \" anxiety and feeling more down \"   The problem(s) began 1 year.      Met with pt for initial assessment. Reports being more down and sad since last spring. Discussed family rupture which has left her feeling very sad. States being a someone who tries to always do the the right thing for others and feels it hurts her in the end. More recently, has attempted to set boundaries with step dtr which ended in discord throughout the family. States she struggled with support from  initially but has been more supportive of her lately. She expresses when she tries to ask for basic respect from others, others tend to have a strong reaction towards her and implications tend to be drastic. Discussed wanting to figure out more recent rapture within family system and ways of managing it. Pt expresses trauma as a child when mother was very ill with cancer. Again experienced trauma when then  had gone to correction for illegal activity. At that time, reports losing everything in her life and has had lasting effects on children and her.      Patient has attempted to resolve these concerns in the past through therapy .    Progress on Treatment Objective(s) / Homework:  Satisfactory progress - PRECONTEMPLATION (Not seeing need for change); Intervened by educating the patient about the effects of current behavior on health.  Evoked information about reasons to continue behavior, express concern / recommendations, and explored any change talk    Motivational Interviewing    MI Intervention: Expressed Empathy/Understanding, Supported Autonomy, Collaboration, Evocation, Permission to raise concern or advise, Open-ended questions, Change talk (evoked), and Reframe     Change Talk Expressed by the Patient: Desire to change    Provider Response to Change Talk: E - Evoked more info from patient about behavior change, A - Affirmed patient's thoughts, decisions, or attempts at behavior change, R - " Reflected patient's change talk, and S - Summarized patient's change talk statements    Assessments completed prior to visit:  The following assessments were completed by patient for this visit:  PHQ2:       12/26/2023    10:03 AM 6/23/2022    11:38 AM 6/7/2022     9:39 AM 1/28/2021     8:08 AM 1/27/2016    12:10 PM 12/2/2013     9:50 AM 9/4/2012    12:23 PM   PHQ-2 ( 1999 [employer])   Q1: Little interest or pleasure in doing things 0 0 0 0 0 0 0   Q2: Feeling down, depressed or hopeless 0 0 0 0 0 0 0   PHQ-2 Score 0 0 0 0 0 0 0   PHQ-2 Total Score (12-17 Years)- Positive if 3 or more points; Administer PHQ-A if positive    0      Q1: Little interest or pleasure in doing things Not at all  Not at all    Not at all       Q2: Feeling down, depressed or hopeless Not at all  Not at all    Not at all       PHQ-2 Score 0  0    0         PHQ9:        No data to display              GAD2:       1/11/2024     3:10 PM   SOILA-2   Feeling nervous, anxious, or on edge 3   Not being able to stop or control worrying 1   SOILA-2 Total Score 4     GAD7:       1/11/2024     3:10 PM   SOILA-7 SCORE   Total Score 11 (moderate anxiety)   Total Score 11     CAGE-AID:       1/11/2024     2:58 PM   CAGE-AID Total Score   Total Score 0   Total Score MyChart 0 (A total score of 2 or greater is considered clinically significant)     PROMIS 10-Global Health (all questions and answers displayed):       1/11/2024     3:07 PM   PROMIS 10   In general, would you say your health is: Good   In general, would you say your quality of life is: Good   In general, how would you rate your physical health? Good   In general, how would you rate your mental health, including your mood and your ability to think? Good   In general, how would you rate your satisfaction with your social activities and relationships? Good   In general, please rate how well you carry out your usual social activities and roles Good   To what extent are you able to carry out your everyday  physical activities such as walking, climbing stairs, carrying groceries, or moving a chair? Completely   In the past 7 days, how often have you been bothered by emotional problems such as feeling anxious, depressed, or irritable? Always   In the past 7 days, how would you rate your fatigue on average? Mild   In the past 7 days, how would you rate your pain on average, where 0 means no pain, and 10 means worst imaginable pain? 3   In general, would you say your health is: 3   In general, would you say your quality of life is: 3   In general, how would you rate your physical health? 3   In general, how would you rate your mental health, including your mood and your ability to think? 3   In general, how would you rate your satisfaction with your social activities and relationships? 3   In general, please rate how well you carry out your usual social activities and roles. (This includes activities at home, at work and in your community, and responsibilities as a parent, child, spouse, employee, friend, etc.) 3   To what extent are you able to carry out your everyday physical activities such as walking, climbing stairs, carrying groceries, or moving a chair? 5   In the past 7 days, how often have you been bothered by emotional problems such as feeling anxious, depressed, or irritable? 5   In the past 7 days, how would you rate your fatigue on average? 2   In the past 7 days, how would you rate your pain on average, where 0 means no pain, and 10 means worst imaginable pain? 3   Global Mental Health Score 10   Global Physical Health Score 16   PROMIS TOTAL - SUBSCORES 26     PROMIS 10-Global Health (only subscores and total score):       1/11/2024     3:07 PM   PROMIS-10 Scores Only   Global Mental Health Score 10   Global Physical Health Score 16   PROMIS TOTAL - SUBSCORES 26     Tompkins Suicide Severity Rating Scale (Lifetime/Recent)      1/12/2024     8:08 AM   Tompkins Suicide Severity Rating (Lifetime/Recent)   Q1  Wish to be Dead (Lifetime) Y   Wish to be Dead Description (Lifetime) 2000 when  went to [facility], no plan or intentions   1. Wish to be Dead (Past 1 Month) N   Q2 Non-Specific Active Suicidal Thoughts (Lifetime) N   Reasons for Ideation (Lifetime) 4   Actual Attempt (Lifetime) N   Has subject engaged in non-suicidal self-injurious behavior? (Lifetime) N   Interrupted Attempts (Lifetime) N   Aborted or Self-Interrupted Attempt (Lifetime) N   Preparatory Acts or Behavior (Lifetime) N   Calculated C-SSRS Risk Score (Lifetime/Recent) No Risk Indicated       Care Plan review completed: Yes    Medication Review:  No changes to current psychiatric medication(s)    Medication Compliance:  Yes    Changes in Health Issues:   None reported    Chemical Use Review:   Substance Use: Chemical use reviewed, no active concerns identified      Tobacco Use: No current tobacco use.      Assessment: Current Emotional / Mental Status (status of significant symptoms):  Risk status (Self / Other harm or suicidal ideation)  Patient denies a history of suicidal ideation, suicide attempts, self-injurious behavior, homicidal ideation, homicidal behavior, and and other safety concerns  Patient denies current fears or concerns for personal safety.  Patient denies current or recent suicidal ideation or behaviors.  Patient denies current or recent homicidal ideation or behaviors.  Patient denies current or recent self injurious behavior or ideation.  Patient denies other safety concerns.  A safety and risk management plan has not been developed at this time, however patient was encouraged to call [facility] / [facility] should there be a change in any of these risk factors.    Appearance:   Appropriate   Eye Contact:   Good   Psychomotor Behavior: Normal   Attitude:   Cooperative   Orientation:   All  Speech   Rate / Production: Normal    Volume:  Normal   Mood:    Anxious  Depressed   Affect:    Appropriate   Thought Content:  Clear   Thought  Form:  Coherent  Logical   Insight:    Good     Diagnoses:  1. Generalized anxiety disorder    2. Mild episode of recurrent major depressive disorder (H24)          Collateral Reports Completed:  Not Applicable    Plan: (Homework, other):  Patient was given information about behavioral services and encouraged to schedule a follow up appointment with the clinic [facility] in 1 week.  She was also given information about mental health symptoms and treatment options .  CD Recommendations: No indications of CD issues.       Shonda Arana, LICSW    ______________________________________________________________________    Integrated Primary Care Behavioral Health Treatment Plan    Patient's Name: Emely Forbes  YOB: 1950    Date of Creation: 1/18/24  Date Treatment Plan Last Reviewed/Revised: 1/18/24    DSM5 Diagnoses: 296.31 (F33.0) Major Depressive Disorder, Recurrent Episode, Mild _ or 300.02 (F41.1) Generalized Anxiety Disorder  Psychosocial / Contextual Factors:  Individual Factors management of MH and Family Factors family discord    PROMIS (reviewed every 90 days):     Referral / Collaboration:  Referral to another professional/service is not indicated at this time..    Anticipated number of session for this episode of care: 4-6 sessions  Anticipation frequency of session: Weekly  Anticipated Duration of each session: 38-52 minutes  Treatment plan will be reviewed in 90 days or when goals have been changed.       MeasurableTreatment Goal(s) related to diagnosis / functional impairment(s)  Goal 1: Patient will work with providers to manage symptoms    I will know I've met my goal when less anxious and down.      Objective #A (Patient Action)    Patient will  attend all appointments, take medication as prescribed .  Status: New - Date: 1/18/24      Intervention(s)  Therapist will  Monitor and assist in overcoming barriers to treatment adherence .    Objective #B  Patient will  consider all recommendations offered  .  Status: New - Date: 1/18/24      Intervention(s)  Therapist will  educate patient on treatment options, clarify concerns, work with pt to overcome any resistance to compliance .        Patient has reviewed and agreed to the above plan.      STIVEN Godinez  February 13, 2024

## 2024-02-13 ENCOUNTER — OFFICE VISIT (OUTPATIENT)
Dept: BEHAVIORAL HEALTH | Facility: CLINIC | Age: 74
End: 2024-02-13
Payer: MEDICARE

## 2024-02-13 DIAGNOSIS — F33.0 MILD EPISODE OF RECURRENT MAJOR DEPRESSIVE DISORDER (H): ICD-10-CM

## 2024-02-13 DIAGNOSIS — F41.1 GENERALIZED ANXIETY DISORDER: Primary | ICD-10-CM

## 2024-02-13 PROCEDURE — 90837 PSYTX W PT 60 MINUTES: CPT | Performed by: SOCIAL WORKER

## 2024-02-14 NOTE — PROGRESS NOTES
[facility] Behavioral Health  February 20, 2024      Behavioral Health Clinician Progress Note    Patient Name: Emely Forbes           Service Type:  Individual      Service Location:   Face to Face in Clinic     Session Start Time: 11:00 am  Session End Time: 11:57 am      Session Length: 53 - 60      Attendees: Patient     Service Modality:  In-person    Visit Activities (Refresh list every visit): [facility] Only    Diagnostic Assessment Date: 1/11/24  Treatment Plan Review Date: 4/18/24  See Flowsheets for today's PHQ-9 and SOILA-7 results  Previous PHQ-9:        No data to display              Previous SOILA-7:       1/11/2024     3:10 PM   SOILA-7 SCORE   Total Score 11 (moderate anxiety)   Total Score 11       OSMEL LEVEL:      9/5/2012     9:00 AM 12/2/2013     9:00 AM   OSMEL Score (Last Two)   OSMEL Raw Score 39 52   Activation Score 56.4 100   OSMEL Level 3 4       DATA  Extended Session (60+ minutes): No  Interactive Complexity: No  Crisis: No  [facility] Patient: No    Treatment Objective(s) Addressed in This Session:  Target Behavior(s):  anxiety and depression    Depressed Mood: Increase interest, engagement, and pleasure in doing things  Decrease frequency and intensity of feeling down, depressed, hopeless  Improve quantity and quality of night time sleep / decrease daytime naps  Feel less tired and more energy during the day   Improve diet, appetite, mindful eating, and / or meal planning  Identify negative self-talk and behaviors: challenge core beliefs, myths, and actions  Improve concentration, focus, and mindfulness in daily activities   Feel less fidgety, restless or slow in daily activities / interpersonal interactions  Anxiety: will experience a reduction in anxiety, will develop more effective coping skills to manage anxiety symptoms, will develop healthy cognitive patterns and beliefs, and will increase ability to function adaptively    Current Stressors / Issues:  Discussed  on going relationship dynamics within family system. Discussed talking with spouse about fears of him resenting her for his poor relationship with dtr. Discussed ongoing struggles with letting go of emotional distress of negative interactions with others.     2/13/24  Discussed on going fear of mentioning anything negative or anything that could be taken negatively to other people, even close friends. Discussed difficulty of not being able to speak freely. Discussed nature of relationships. Discussed being responsible for efforts within a relationship but not responsible for the outcomes.     2/6/24  Discussed on going medical concerns with her heart which she is working through with card team. Discussed continued improvement with her relationship with . Discussed looking at how others perceive and act are not a direct correlation to what she has done. Processed excessive guilt and bad feelings towards self in result of others behaviors.     1/29/24  Discussed continued improvement with communication and feeling more connected with spouse. Discussed feeling the need to have some form of effort put forth with step-dtr. Discussed approaching situation knowing one is not in control of another's response/actions following efforts made.     1/23/24  Discussed dealing with negative emotions associated to decisions she makes in life which may have an impact on others opinion or comments towards her. Discussed looking at want she desires in life and looking at how her actions, words, and behaviors may or may not have an effect on others. Discussed attempts to please people and how that has been problematic for her. Discussed relationship/communication with spouse.     1/18/24  Pt reports she has been well since last session. Discussed pts desire to have better, more respectful relationships with others in her family. Validated pts concerns and desires. Discussed identifying  how she would like to be treated by others  "and ways of letting others know when they have crossed the boundary with her. Processed the difficult nature of setting boundaries with others and push back she may experience. Discussed relationship with spouse and difficulties she has with him.       1/11/24 (DA)   The reason for seeking services at this time is: \" anxiety and feeling more down \"   The problem(s) began 1 year.      Met with pt for initial assessment. Reports being more down and sad since last spring. Discussed family rupture which has left her feeling very sad. States being a someone who tries to always do the the right thing for others and feels it hurts her in the end. More recently, has attempted to set boundaries with step dtr which ended in discord throughout the family. States she struggled with support from  initially but has been more supportive of her lately. She expresses when she tries to ask for basic respect from others, others tend to have a strong reaction towards her and implications tend to be drastic. Discussed wanting to figure out more recent rapture within family system and ways of managing it. Pt expresses trauma as a child when mother was very ill with cancer. Again experienced trauma when then  had gone to long-term for illegal activity. At that time, reports losing everything in her life and has had lasting effects on children and her.      Patient has attempted to resolve these concerns in the past through therapy .    Progress on Treatment Objective(s) / Homework:  Satisfactory progress - PRECONTEMPLATION (Not seeing need for change); Intervened by educating the patient about the effects of current behavior on health.  Evoked information about reasons to continue behavior, express concern / recommendations, and explored any change talk    Motivational Interviewing    MI Intervention: Expressed Empathy/Understanding, Supported Autonomy, Collaboration, Evocation, Permission to raise concern or advise, Open-ended " questions, Change talk (evoked), and Reframe     Change Talk Expressed by the Patient: Desire to change    Provider Response to Change Talk: E - Evoked more info from patient about behavior change, A - Affirmed patient's thoughts, decisions, or attempts at behavior change, R - Reflected patient's change talk, and S - Summarized patient's change talk statements    Assessments completed prior to visit:  The following assessments were completed by patient for this visit:  PHQ2:       12/26/2023    10:03 AM 6/23/2022    11:38 AM 6/7/2022     9:39 AM 1/28/2021     8:08 AM 1/27/2016    12:10 PM 12/2/2013     9:50 AM 9/4/2012    12:23 PM   PHQ-2 ( 1999 [employer])   Q1: Little interest or pleasure in doing things 0 0 0 0 0 0 0   Q2: Feeling down, depressed or hopeless 0 0 0 0 0 0 0   PHQ-2 Score 0 0 0 0 0 0 0   PHQ-2 Total Score (12-17 Years)- Positive if 3 or more points; Administer PHQ-A if positive    0      Q1: Little interest or pleasure in doing things Not at all  Not at all    Not at all       Q2: Feeling down, depressed or hopeless Not at all  Not at all    Not at all       PHQ-2 Score 0  0    0         PHQ9:        No data to display              GAD2:       1/11/2024     3:10 PM   SOILA-2   Feeling nervous, anxious, or on edge 3   Not being able to stop or control worrying 1   SOILA-2 Total Score 4     GAD7:       1/11/2024     3:10 PM   SOILA-7 SCORE   Total Score 11 (moderate anxiety)   Total Score 11     CAGE-AID:       1/11/2024     2:58 PM   CAGE-AID Total Score   Total Score 0   Total Score MyChart 0 (A total score of 2 or greater is considered clinically significant)     PROMIS 10-Global Health (all questions and answers displayed):       1/11/2024     3:07 PM   PROMIS 10   In general, would you say your health is: Good   In general, would you say your quality of life is: Good   In general, how would you rate your physical health? Good   In general, how would you rate your mental health, including your mood and your  ability to think? Good   In general, how would you rate your satisfaction with your social activities and relationships? Good   In general, please rate how well you carry out your usual social activities and roles Good   To what extent are you able to carry out your everyday physical activities such as walking, climbing stairs, carrying groceries, or moving a chair? Completely   In the past 7 days, how often have you been bothered by emotional problems such as feeling anxious, depressed, or irritable? Always   In the past 7 days, how would you rate your fatigue on average? Mild   In the past 7 days, how would you rate your pain on average, where 0 means no pain, and 10 means worst imaginable pain? 3   In general, would you say your health is: 3   In general, would you say your quality of life is: 3   In general, how would you rate your physical health? 3   In general, how would you rate your mental health, including your mood and your ability to think? 3   In general, how would you rate your satisfaction with your social activities and relationships? 3   In general, please rate how well you carry out your usual social activities and roles. (This includes activities at home, at work and in your community, and responsibilities as a parent, child, spouse, employee, friend, etc.) 3   To what extent are you able to carry out your everyday physical activities such as walking, climbing stairs, carrying groceries, or moving a chair? 5   In the past 7 days, how often have you been bothered by emotional problems such as feeling anxious, depressed, or irritable? 5   In the past 7 days, how would you rate your fatigue on average? 2   In the past 7 days, how would you rate your pain on average, where 0 means no pain, and 10 means worst imaginable pain? 3   Global Mental Health Score 10   Global Physical Health Score 16   PROMIS TOTAL - SUBSCORES 26     PROMIS 10-Global Health (only subscores and total score):       1/11/2024      3:07 PM   PROMIS-10 Scores Only   Global Mental Health Score 10   Global Physical Health Score 16   PROMIS TOTAL - SUBSCORES 26     [place] Suicide Severity Rating Scale (Lifetime/Recent)      1/12/2024     8:08 AM   [place] Suicide Severity Rating (Lifetime/Recent)   Q1 Wish to be Dead (Lifetime) Y   Wish to be Dead Description (Lifetime) 2000 when  went to retirement, no plan or intentions   1. Wish to be Dead (Past 1 Month) N   Q2 Non-Specific Active Suicidal Thoughts (Lifetime) N   Reasons for Ideation (Lifetime) 4   Actual Attempt (Lifetime) N   Has subject engaged in non-suicidal self-injurious behavior? (Lifetime) N   Interrupted Attempts (Lifetime) N   Aborted or Self-Interrupted Attempt (Lifetime) N   Preparatory Acts or Behavior (Lifetime) N   Calculated C-SSRS Risk Score (Lifetime/Recent) No Risk Indicated       Care Plan review completed: Yes    Medication Review:  No changes to current psychiatric medication(s)    Medication Compliance:  Yes    Changes in Health Issues:   None reported    Chemical Use Review:   Substance Use: Chemical use reviewed, no active concerns identified      Tobacco Use: No current tobacco use.      Assessment: Current Emotional / Mental Status (status of significant symptoms):  Risk status (Self / Other harm or suicidal ideation)  Patient denies a history of suicidal ideation, suicide attempts, self-injurious behavior, homicidal ideation, homicidal behavior, and and other safety concerns  Patient denies current fears or concerns for personal safety.  Patient denies current or recent suicidal ideation or behaviors.  Patient denies current or recent homicidal ideation or behaviors.  Patient denies current or recent self injurious behavior or ideation.  Patient denies other safety concerns.  A safety and risk management plan has not been developed at this time, however patient was encouraged to call [place] [phone] should there be a change in any of these risk  factors.    Appearance:   Appropriate   Eye Contact:   Good   Psychomotor Behavior: Normal   Attitude:   Cooperative   Orientation:   All  Speech   Rate / Production: Normal    Volume:  Normal   Mood:    Anxious  Depressed   Affect:    Appropriate   Thought Content:  Clear   Thought Form:  Coherent  Logical   Insight:    Good     Diagnoses:  1. Generalized anxiety disorder    2. Mild episode of recurrent major depressive disorder (H24)          Collateral Reports Completed:  Not Applicable    Plan: (Homework, other):  Patient was given information about behavioral services and encouraged to schedule a follow up appointment with the clinic [facility] in 1 week.  She was also given information about mental health symptoms and treatment options .  CD Recommendations: No indications of CD issues.       STEPHANIE GodinezSW    ______________________________________________________________________    Integrated Primary Care Behavioral Health Treatment Plan    Patient's Name: Emely Forbes  YOB: 1950    Date of Creation: 1/18/24  Date Treatment Plan Last Reviewed/Revised: 1/18/24    DSM5 Diagnoses: 296.31 (F33.0) Major Depressive Disorder, Recurrent Episode, Mild _ or 300.02 (F41.1) Generalized Anxiety Disorder  Psychosocial / Contextual Factors:  Individual Factors management of MH and Family Factors family discord    PROMIS (reviewed every 90 days):     Referral / Collaboration:  Referral to another professional/service is not indicated at this time..    Anticipated number of session for this episode of care: 4-6 sessions  Anticipation frequency of session: Weekly  Anticipated Duration of each session: 38-52 minutes  Treatment plan will be reviewed in 90 days or when goals have been changed.       MeasurableTreatment Goal(s) related to diagnosis / functional impairment(s)  Goal 1: Patient will work with providers to manage symptoms    I will know I've met my goal when less anxious and down.      Objective #A (Patient  Action)    Patient will  attend all appointments, take medication as prescribed .  Status: New - Date: 1/18/24      Intervention(s)  Therapist will  Monitor and assist in overcoming barriers to treatment adherence .    Objective #B  Patient will  consider all recommendations offered .  Status: New - Date: 1/18/24      Intervention(s)  Therapist will  educate patient on treatment options, clarify concerns, work with pt to overcome any resistance to compliance .        Patient has reviewed and agreed to the above plan.      STIVEN Godinez  February 20, 2024

## 2024-02-20 ENCOUNTER — OFFICE VISIT (OUTPATIENT)
Dept: BEHAVIORAL HEALTH | Facility: CLINIC | Age: 74
End: 2024-02-20
Payer: MEDICARE

## 2024-02-20 ENCOUNTER — TELEPHONE (OUTPATIENT)
Dept: CARDIOLOGY | Facility: CLINIC | Age: 74
End: 2024-02-20
Payer: MEDICARE

## 2024-02-20 DIAGNOSIS — I47.10 SVT (SUPRAVENTRICULAR TACHYCARDIA) (H): ICD-10-CM

## 2024-02-20 DIAGNOSIS — R00.2 PALPITATIONS: ICD-10-CM

## 2024-02-20 DIAGNOSIS — F33.0 MILD EPISODE OF RECURRENT MAJOR DEPRESSIVE DISORDER (H): ICD-10-CM

## 2024-02-20 DIAGNOSIS — F41.1 GENERALIZED ANXIETY DISORDER: Primary | ICD-10-CM

## 2024-02-20 PROCEDURE — 90837 PSYTX W PT 60 MINUTES: CPT | Performed by: SOCIAL WORKER

## 2024-02-20 RX ORDER — FLECAINIDE ACETATE 50 MG/1
TABLET ORAL
Qty: 60 TABLET | Refills: 1 | Status: SHIPPED | OUTPATIENT
Start: 2024-02-20 | End: 2024-03-19

## 2024-02-20 NOTE — TELEPHONE ENCOUNTER
Patient called left message requesting a refill on Flecainide 50 mg twice daily.  Spoke with Patient who states she is feeling well. Unaware of any palpitations.   Refill sent into Pharmacy.       Phone note 2-6-24 -        Yes, we can continue the current medication regimen.  Did we schedule a stress EKG to look for proarrhythmia?  We should schedule that if it has not already been performed.     We can hold off an EP consult if she is feeling better.  Thanks.          Last KI OV 8-28-23   Changes today: No medication changes made today   Follow up plan: Follow up with Dr. Gonzalez in 1 year with a fasting lipid panel prior     Last Dr. Gonzalez OV 3-1-23.

## 2024-03-14 ENCOUNTER — TELEPHONE (OUTPATIENT)
Dept: CARDIOLOGY | Facility: CLINIC | Age: 74
End: 2024-03-14
Payer: MEDICARE

## 2024-03-14 DIAGNOSIS — I47.10 SVT (SUPRAVENTRICULAR TACHYCARDIA) (H): Primary | ICD-10-CM

## 2024-03-14 NOTE — TELEPHONE ENCOUNTER
Refill request received from [facility] in [place] for flecainide 50mg BID. Per chart review, Dr Gonzalez asked if patient had stress ECG on 2/6/24 to assess for proarrhythmia. Patient had resting ECG on 2/2/24 after starting flecainide 1/26/24. Will confirm that stress portion is not needed.     ECG 2/2/24  Sinus Rhythm  HR 60  -Negative precordial T-waves.  WITHIN NORMAL LIMITS

## 2024-03-15 NOTE — TELEPHONE ENCOUNTER
Order placed for stress EKG. Patient will hold metoprolol for 24 hours.     Patient will call the [facility] to schedule this. She states she can walk on a treadmill, no hip or knee issues.

## 2024-03-18 NOTE — PROGRESS NOTES
[facility]al [facility]  March 19, 2024      Behavioral Health Clinician Progress Note    Patient Name: Emely Forbes           Service Type:  Individual      Service Location:   Face to Face in Clinic     Session Start Time: 10:51 am  Session End Time: 11:53 am      Session Length: 53 - 60      Attendees: Patient     Service Modality:  In-person    Visit Activities (Refresh list every visit): [facility] Only    Diagnostic Assessment Date: 1/11/24  Treatment Plan Review Date: 4/18/24  See Flowsheets for today's PHQ-9 and SOILA-7 results  Previous PHQ-9:        No data to display              Previous SOILA-7:       1/11/2024     3:10 PM   SOILA-7 SCORE   Total Score 11 (moderate anxiety)   Total Score 11       OSMEL LEVEL:      9/5/2012     9:00 AM 12/2/2013     9:00 AM   OSMEL Score (Last Two)   OSMEL Raw Score 39 52   Activation Score 56.4 100   OSMEL Level 3 4       DATA  Extended Session (60+ minutes): No  Interactive Complexity: No  Crisis: No  [facility] Patient: No    Treatment Objective(s) Addressed in This Session:  Target Behavior(s):  anxiety and depression    Depressed Mood: Increase interest, engagement, and pleasure in doing things  Decrease frequency and intensity of feeling down, depressed, hopeless  Improve quantity and quality of night time sleep / decrease daytime naps  Feel less tired and more energy during the day   Improve diet, appetite, mindful eating, and / or meal planning  Identify negative self-talk and behaviors: challenge core beliefs, myths, and actions  Improve concentration, focus, and mindfulness in daily activities   Feel less fidgety, restless or slow in daily activities / interpersonal interactions  Anxiety: will experience a reduction in anxiety, will develop more effective coping skills to manage anxiety symptoms, will develop healthy cognitive patterns and beliefs, and will increase ability to function adaptively    Current Stressors / Issues:  Pt reports  when on trip her dog was not doing well and ultimately had to be put down. When she was gone husbands dtr Deb came to talk with her dad but they did not address the issue at hand or discuss the distance for the past 9 months. This frustrated pt as it represented past behaviors. She will be having testing on her heart this coming week and will be stopping medications prior to test. This is anxiety provoking for pt.     2/20/24  Discussed on going relationship dynamics within family system. Discussed talking with spouse about fears of him resenting her for his poor relationship with dtr. Discussed ongoing struggles with letting go of emotional distress of negative interactions with others.     Progress on Treatment Objective(s) / Homework:  Satisfactory progress - PRECONTEMPLATION (Not seeing need for change); Intervened by educating the patient about the effects of current behavior on health.  Evoked information about reasons to continue behavior, express concern / recommendations, and explored any change talk    Motivational Interviewing    MI Intervention: Expressed Empathy/Understanding, Supported Autonomy, Collaboration, Evocation, Permission to raise concern or advise, Open-ended questions, Change talk (evoked), and Reframe     Change Talk Expressed by the Patient: Desire to change    Provider Response to Change Talk: E - Evoked more info from patient about behavior change, A - Affirmed patient's thoughts, decisions, or attempts at behavior change, R - Reflected patient's change talk, and S - Summarized patient's change talk statements    Assessments completed prior to visit:  The following assessments were completed by patient for this visit:  PHQ2:       12/26/2023    10:03 AM 6/23/2022    11:38 AM 6/7/2022     9:39 AM 1/28/2021     8:08 AM 1/27/2016    12:10 PM 12/2/2013     9:50 AM 9/4/2012    12:23 PM   PHQ-2 ( 1999 [employer])   Q1: Little interest or pleasure in doing things 0 0 0 0 0 0 0   Q2: Feeling  down, depressed or hopeless 0 0 0 0 0 0 0   PHQ-2 Score 0 0 0 0 0 0 0   PHQ-2 Total Score (12-17 Years)- Positive if 3 or more points; Administer PHQ-A if positive    0      Q1: Little interest or pleasure in doing things Not at all  Not at all    Not at all       Q2: Feeling down, depressed or hopeless Not at all  Not at all    Not at all       PHQ-2 Score 0  0    0         PHQ9:        No data to display              GAD2:       1/11/2024     3:10 PM   SOILA-2   Feeling nervous, anxious, or on edge 3   Not being able to stop or control worrying 1   SOILA-2 Total Score 4     GAD7:       1/11/2024     3:10 PM   SOILA-7 SCORE   Total Score 11 (moderate anxiety)   Total Score 11     CAGE-AID:       1/11/2024     2:58 PM   CAGE-AID Total Score   Total Score 0   Total Score MyChart 0 (A total score of 2 or greater is considered clinically significant)     PROMIS 10-Global Health (all questions and answers displayed):       1/11/2024     3:07 PM   PROMIS 10   In general, would you say your health is: Good   In general, would you say your quality of life is: Good   In general, how would you rate your physical health? Good   In general, how would you rate your mental health, including your mood and your ability to think? Good   In general, how would you rate your satisfaction with your social activities and relationships? Good   In general, please rate how well you carry out your usual social activities and roles Good   To what extent are you able to carry out your everyday physical activities such as walking, climbing stairs, carrying groceries, or moving a chair? Completely   In the past 7 days, how often have you been bothered by emotional problems such as feeling anxious, depressed, or irritable? Always   In the past 7 days, how would you rate your fatigue on average? Mild   In the past 7 days, how would you rate your pain on average, where 0 means no pain, and 10 means worst imaginable pain? 3   In general, would you say  your health is: 3   In general, would you say your quality of life is: 3   In general, how would you rate your physical health? 3   In general, how would you rate your mental health, including your mood and your ability to think? 3   In general, how would you rate your satisfaction with your social activities and relationships? 3   In general, please rate how well you carry out your usual social activities and roles. (This includes activities at home, at work and in your community, and responsibilities as a parent, child, spouse, employee, friend, etc.) 3   To what extent are you able to carry out your everyday physical activities such as walking, climbing stairs, carrying groceries, or moving a chair? 5   In the past 7 days, how often have you been bothered by emotional problems such as feeling anxious, depressed, or irritable? 5   In the past 7 days, how would you rate your fatigue on average? 2   In the past 7 days, how would you rate your pain on average, where 0 means no pain, and 10 means worst imaginable pain? 3   Global Mental Health Score 10   Global Physical Health Score 16   PROMIS TOTAL - SUBSCORES 26     PROMIS 10-Global Health (only subscores and total score):       1/11/2024     3:07 PM   PROMIS-10 Scores Only   Global Mental Health Score 10   Global Physical Health Score 16   PROMIS TOTAL - SUBSCORES 26     [place] Suicide Severity Rating Scale (Lifetime/Recent)      1/12/2024     8:08 AM   [place] Suicide Severity Rating (Lifetime/Recent)   Q1 Wish to be Dead (Lifetime) Y   Wish to be Dead Description (Lifetime) 2000 when  went to shelter, no plan or intentions   1. Wish to be Dead (Past 1 Month) N   Q2 Non-Specific Active Suicidal Thoughts (Lifetime) N   Reasons for Ideation (Lifetime) 4   Actual Attempt (Lifetime) N   Has subject engaged in non-suicidal self-injurious behavior? (Lifetime) N   Interrupted Attempts (Lifetime) N   Aborted or Self-Interrupted Attempt (Lifetime) N   Preparatory  Acts or Behavior (Lifetime) N   Calculated C-SSRS Risk Score (Lifetime/Recent) No Risk Indicated       Care Plan review completed: Yes    Medication Review:  No changes to current psychiatric medication(s)    Medication Compliance:  Yes    Changes in Health Issues:   None reported    Chemical Use Review:   Substance Use: Chemical use reviewed, no active concerns identified      Tobacco Use: No current tobacco use.      Assessment: Current Emotional / Mental Status (status of significant symptoms):  Risk status (Self / Other harm or suicidal ideation)  Patient denies a history of suicidal ideation, suicide attempts, self-injurious behavior, homicidal ideation, homicidal behavior, and and other safety concerns  Patient denies current fears or concerns for personal safety.  Patient denies current or recent suicidal ideation or behaviors.  Patient denies current or recent homicidal ideation or behaviors.  Patient denies current or recent self injurious behavior or ideation.  Patient denies other safety concerns.  A safety and risk management plan has not been developed at this time, however patient was encouraged to call [place] [phone] should there be a change in any of these risk factors.    Appearance:   Appropriate   Eye Contact:   Good   Psychomotor Behavior: Normal   Attitude:   Cooperative   Orientation:   All  Speech   Rate / Production: Normal    Volume:  Normal   Mood:    Anxious  Depressed   Affect:    Appropriate   Thought Content:  Clear   Thought Form:  Coherent  Logical   Insight:    Good     Diagnoses:  1. Generalized anxiety disorder    2. Mild episode of recurrent major depressive disorder (H24)          Collateral Reports Completed:  Not Applicable    Plan: (Homework, other):  Patient was given information about behavioral services and encouraged to schedule a follow up appointment with the clinic [facility] in 1 week.  She was also given information about mental health symptoms and treatment options .   CD Recommendations: No indications of CD issues.       Shonda FuSTIVEN snyder    ______________________________________________________________________    Integrated Primary Care Behavioral Health Treatment Plan    Patient's Name: Emely Forbes  YOB: 1950    Date of Creation: 1/18/24  Date Treatment Plan Last Reviewed/Revised: 1/18/24    DSM5 Diagnoses: 296.31 (F33.0) Major Depressive Disorder, Recurrent Episode, Mild _ or 300.02 (F41.1) Generalized Anxiety Disorder  Psychosocial / Contextual Factors:  Individual Factors management of MH and Family Factors family discord    PROMIS (reviewed every 90 days):     Referral / Collaboration:  Referral to another professional/service is not indicated at this time..    Anticipated number of session for this episode of care: 4-6 sessions  Anticipation frequency of session: Weekly  Anticipated Duration of each session: 38-52 minutes  Treatment plan will be reviewed in 90 days or when goals have been changed.       MeasurableTreatment Goal(s) related to diagnosis / functional impairment(s)  Goal 1: Patient will work with providers to manage symptoms    I will know I've met my goal when less anxious and down.      Objective #A (Patient Action)    Patient will  attend all appointments, take medication as prescribed .  Status: New - Date: 1/18/24      Intervention(s)  Therapist will  Monitor and assist in overcoming barriers to treatment adherence .    Objective #B  Patient will  consider all recommendations offered .  Status: New - Date: 1/18/24      Intervention(s)  Therapist will  educate patient on treatment options, clarify concerns, work with pt to overcome any resistance to compliance .        Patient has reviewed and agreed to the above plan.      Shonda STIVEN Arana  March 19, 2024

## 2024-03-19 ENCOUNTER — TELEPHONE (OUTPATIENT)
Dept: CARDIOLOGY | Facility: CLINIC | Age: 74
End: 2024-03-19
Payer: MEDICARE

## 2024-03-19 ENCOUNTER — OFFICE VISIT (OUTPATIENT)
Dept: BEHAVIORAL HEALTH | Facility: CLINIC | Age: 74
End: 2024-03-19
Payer: MEDICARE

## 2024-03-19 DIAGNOSIS — R00.2 PALPITATIONS: ICD-10-CM

## 2024-03-19 DIAGNOSIS — F41.1 GENERALIZED ANXIETY DISORDER: Primary | ICD-10-CM

## 2024-03-19 DIAGNOSIS — F33.0 MILD EPISODE OF RECURRENT MAJOR DEPRESSIVE DISORDER (H): ICD-10-CM

## 2024-03-19 DIAGNOSIS — I47.10 SVT (SUPRAVENTRICULAR TACHYCARDIA) (H): ICD-10-CM

## 2024-03-19 PROCEDURE — 90837 PSYTX W PT 60 MINUTES: CPT | Performed by: SOCIAL WORKER

## 2024-03-19 RX ORDER — FLECAINIDE ACETATE 50 MG/1
TABLET ORAL
Qty: 180 TABLET | Refills: 0 | Status: SHIPPED | OUTPATIENT
Start: 2024-03-19 | End: 2024-06-18

## 2024-03-19 NOTE — TELEPHONE ENCOUNTER
Spoke with Patient who requests a Flecainide 50 mg tablet  twice daily be sent to local Pharmacy.  Reviewed with Dr. Lisa ghosh to refill.    Reminded Patient of upcoming stress test.

## 2024-03-27 ENCOUNTER — HOSPITAL ENCOUNTER (OUTPATIENT)
Dept: CARDIOLOGY | Facility: CLINIC | Age: 74
Discharge: HOME | End: 2024-03-27
Attending: INTERNAL MEDICINE
Payer: MEDICARE

## 2024-03-27 ENCOUNTER — TELEPHONE (OUTPATIENT)
Dept: CARDIOLOGY | Facility: CLINIC | Age: 74
End: 2024-03-27
Payer: MEDICARE

## 2024-03-27 DIAGNOSIS — I47.10 SVT (SUPRAVENTRICULAR TACHYCARDIA) (H): ICD-10-CM

## 2024-03-27 NOTE — PROGRESS NOTES
[facility] Behavioral Health  April 2, 2024      Behavioral Health Clinician Progress Note    Patient Name: Emely Forbes           Service Type:  Individual      Service Location:   Face to Face in Clinic     Session Start Time: 11:00 am  Session End Time: 11:59 am      Session Length: 53 - 60      Attendees: Patient     Service Modality:  In-person    Visit Activities (Refresh list every visit): [facility] Only    Diagnostic Assessment Date: 1/11/24  Treatment Plan Review Date: 4/18/24  See Flowsheets for today's PHQ-9 and SOILA-7 results  Previous PHQ-9:        No data to display              Previous SOILA-7:       1/11/2024     3:10 PM   SOILA-7 SCORE   Total Score 11 (moderate anxiety)   Total Score 11       OSMEL LEVEL:      9/5/2012     9:00 AM 12/2/2013     9:00 AM   OSMEL Score (Last Two)   OSMEL Raw Score 39 52   Activation Score 56.4 100   OSMEL Level 3 4       DATA  Extended Session (60+ minutes): No  Interactive Complexity: No  Crisis: No  [facility] Patient: No    Treatment Objective(s) Addressed in This Session:  Target Behavior(s):  anxiety and depression    Depressed Mood: Increase interest, engagement, and pleasure in doing things  Decrease frequency and intensity of feeling down, depressed, hopeless  Improve quantity and quality of night time sleep / decrease daytime naps  Feel less tired and more energy during the day   Improve diet, appetite, mindful eating, and / or meal planning  Identify negative self-talk and behaviors: challenge core beliefs, myths, and actions  Improve concentration, focus, and mindfulness in daily activities   Feel less fidgety, restless or slow in daily activities / interpersonal interactions  Anxiety: will experience a reduction in anxiety, will develop more effective coping skills to manage anxiety symptoms, will develop healthy cognitive patterns and beliefs, and will increase ability to function adaptively    Current Stressors / Issues:  Discussed pts  families concern and desire to have pt have less of an emotional reaction to difficult situations. Discussed pts desires be able to express emotions without back lash from family. Pt wants to have conversation with  regarding gaining validation of her feelings and getting support from him. Validated pts concerns about expressing her needs with family. Discussed seeing couples therapist with .     3/19/24  Pt reports when on trip her dog was not doing well and ultimately had to be put down. When she was gone husbands dtr Deb came to talk with her dad but they did not address the issue at hand or discuss the distance for the past 9 months. This frustrated pt as it represented past behaviors. She will be having testing on her heart this coming week and will be stopping medications prior to test. This is anxiety provoking for pt.     2/20/24  Discussed on going relationship dynamics within family system. Discussed talking with spouse about fears of him resenting her for his poor relationship with dtr. Discussed ongoing struggles with letting go of emotional distress of negative interactions with others.     Progress on Treatment Objective(s) / Homework:  Satisfactory progress - PRECONTEMPLATION (Not seeing need for change); Intervened by educating the patient about the effects of current behavior on health.  Evoked information about reasons to continue behavior, express concern / recommendations, and explored any change talk    Motivational Interviewing    MI Intervention: Expressed Empathy/Understanding, Supported Autonomy, Collaboration, Evocation, Permission to raise concern or advise, Open-ended questions, Change talk (evoked), and Reframe     Change Talk Expressed by the Patient: Desire to change    Provider Response to Change Talk: E - Evoked more info from patient about behavior change, A - Affirmed patient's thoughts, decisions, or attempts at behavior change, R - Reflected patient's change  talk, and S - Summarized patient's change talk statements    Assessments completed prior to visit:  The following assessments were completed by patient for this visit:  PHQ2:       12/26/2023    10:03 AM 6/23/2022    11:38 AM 6/7/2022     9:39 AM 1/28/2021     8:08 AM 1/27/2016    12:10 PM 12/2/2013     9:50 AM 9/4/2012    12:23 PM   PHQ-2 ( 1999 [employer])   Q1: Little interest or pleasure in doing things 0 0 0 0 0 0 0   Q2: Feeling down, depressed or hopeless 0 0 0 0 0 0 0   PHQ-2 Score 0 0 0 0 0 0 0   PHQ-2 Total Score (12-17 Years)- Positive if 3 or more points; Administer PHQ-A if positive    0      Q1: Little interest or pleasure in doing things Not at all  Not at all    Not at all       Q2: Feeling down, depressed or hopeless Not at all  Not at all    Not at all       PHQ-2 Score 0  0    0         PHQ9:        No data to display              GAD2:       1/11/2024     3:10 PM   SOILA-2   Feeling nervous, anxious, or on edge 3   Not being able to stop or control worrying 1   SOILA-2 Total Score 4     GAD7:       1/11/2024     3:10 PM   SOILA-7 SCORE   Total Score 11 (moderate anxiety)   Total Score 11     CAGE-AID:       1/11/2024     2:58 PM   CAGE-AID Total Score   Total Score 0   Total Score MyChart 0 (A total score of 2 or greater is considered clinically significant)     PROMIS 10-Global Health (all questions and answers displayed):       1/11/2024     3:07 PM   PROMIS 10   In general, would you say your health is: Good   In general, would you say your quality of life is: Good   In general, how would you rate your physical health? Good   In general, how would you rate your mental health, including your mood and your ability to think? Good   In general, how would you rate your satisfaction with your social activities and relationships? Good   In general, please rate how well you carry out your usual social activities and roles Good   To what extent are you able to carry out your everyday physical activities such as  walking, climbing stairs, carrying groceries, or moving a chair? Completely   In the past 7 days, how often have you been bothered by emotional problems such as feeling anxious, depressed, or irritable? Always   In the past 7 days, how would you rate your fatigue on average? Mild   In the past 7 days, how would you rate your pain on average, where 0 means no pain, and 10 means worst imaginable pain? 3   In general, would you say your health is: 3   In general, would you say your quality of life is: 3   In general, how would you rate your physical health? 3   In general, how would you rate your mental health, including your mood and your ability to think? 3   In general, how would you rate your satisfaction with your social activities and relationships? 3   In general, please rate how well you carry out your usual social activities and roles. (This includes activities at home, at work and in your community, and responsibilities as a parent, child, spouse, employee, friend, etc.) 3   To what extent are you able to carry out your everyday physical activities such as walking, climbing stairs, carrying groceries, or moving a chair? 5   In the past 7 days, how often have you been bothered by emotional problems such as feeling anxious, depressed, or irritable? 5   In the past 7 days, how would you rate your fatigue on average? 2   In the past 7 days, how would you rate your pain on average, where 0 means no pain, and 10 means worst imaginable pain? 3   Global Mental Health Score 10   Global Physical Health Score 16   PROMIS TOTAL - SUBSCORES 26     PROMIS 10-Global Health (only subscores and total score):       1/11/2024     3:07 PM   PROMIS-10 Scores Only   Global Mental Health Score 10   Global Physical Health Score 16   PROMIS TOTAL - SUBSCORES 26     [place] Suicide Severity Rating Scale (Lifetime/Recent)      1/12/2024     8:08 AM   [place] Suicide Severity Rating (Lifetime/Recent)   Q1 Wish to be Dead (Lifetime) Y    Wish to be Dead Description (Lifetime) 2000 when  went to [facility], no plan or intentions   1. Wish to be Dead (Past 1 Month) N   Q2 Non-Specific Active Suicidal Thoughts (Lifetime) N   Reasons for Ideation (Lifetime) 4   Actual Attempt (Lifetime) N   Has subject engaged in non-suicidal self-injurious behavior? (Lifetime) N   Interrupted Attempts (Lifetime) N   Aborted or Self-Interrupted Attempt (Lifetime) N   Preparatory Acts or Behavior (Lifetime) N   Calculated C-SSRS Risk Score (Lifetime/Recent) No Risk Indicated       Care Plan review completed: Yes    Medication Review:  No changes to current psychiatric medication(s)    Medication Compliance:  Yes    Changes in Health Issues:   None reported    Chemical Use Review:   Substance Use: Chemical use reviewed, no active concerns identified      Tobacco Use: No current tobacco use.      Assessment: Current Emotional / Mental Status (status of significant symptoms):  Risk status (Self / Other harm or suicidal ideation)  Patient denies a history of suicidal ideation, suicide attempts, self-injurious behavior, homicidal ideation, homicidal behavior, and and other safety concerns  Patient denies current fears or concerns for personal safety.  Patient denies current or recent suicidal ideation or behaviors.  Patient denies current or recent homicidal ideation or behaviors.  Patient denies current or recent self injurious behavior or ideation.  Patient denies other safety concerns.  A safety and risk management plan has not been developed at this time, however patient was encouraged to call [facility] should there be a change in any of these risk factors.    Appearance:   Appropriate   Eye Contact:   Good   Psychomotor Behavior: Normal   Attitude:   Cooperative   Orientation:   All  Speech   Rate / Production: Normal    Volume:  Normal   Mood:    Anxious  Depressed   Affect:    Appropriate   Thought Content:  Clear   Thought Form:  Coherent  Logical    Insight:    Good     Diagnoses:  1. Generalized anxiety disorder    2. Mild episode of recurrent major depressive disorder (H24)          Collateral Reports Completed:  Not Applicable    Plan: (Homework, other):  Patient was given information about behavioral services and encouraged to schedule a follow up appointment with the clinic [facility] in 1 week.  She was also given information about mental health symptoms and treatment options .  CD Recommendations: No indications of CD issues.       Shonda Sumit, LICSW    ______________________________________________________________________    Integrated Primary Care Behavioral Health Treatment Plan    Patient's Name: Emely Forbes  YOB: 1950    Date of Creation: 1/18/24  Date Treatment Plan Last Reviewed/Revised: 1/18/24    DSM5 Diagnoses: 296.31 (F33.0) Major Depressive Disorder, Recurrent Episode, Mild _ or 300.02 (F41.1) Generalized Anxiety Disorder  Psychosocial / Contextual Factors:  Individual Factors management of MH and Family Factors family discord    PROMIS (reviewed every 90 days):     Referral / Collaboration:  Referral to another professional/service is not indicated at this time..    Anticipated number of session for this episode of care: 4-6 sessions  Anticipation frequency of session: Weekly  Anticipated Duration of each session: 38-52 minutes  Treatment plan will be reviewed in 90 days or when goals have been changed.       MeasurableTreatment Goal(s) related to diagnosis / functional impairment(s)  Goal 1: Patient will work with providers to manage symptoms    I will know I've met my goal when less anxious and down.      Objective #A (Patient Action)    Patient will  attend all appointments, take medication as prescribed .  Status: New - Date: 1/18/24      Intervention(s)  Therapist will  Monitor and assist in overcoming barriers to treatment adherence .    Objective #B  Patient will  consider all recommendations offered .  Status: New - Date:  1/18/24      Intervention(s)  Therapist will  educate patient on treatment options, clarify concerns, work with pt to overcome any resistance to compliance .        Patient has reviewed and agreed to the above plan.      STIVEN Godinez  April 2, 2024

## 2024-03-27 NOTE — TELEPHONE ENCOUNTER
I looked at it and discussed it with Dr. Rios.  I suspect that this is a rate related bundle given the sudden change in QRS morphology.  If she is doing well I would continue the flecainide at 50 mg twice daily and metoprolol.  Thanks.

## 2024-03-27 NOTE — TELEPHONE ENCOUNTER
"Spoke with patient. She states she feels wonderful on the medications and is glad she can stay on the flecainide. She states her heart isn't fluttering or \"rolling in my chest\" and she feel much better.  Patient will call the [facility] to set up her next visit in the fall.  "

## 2024-03-27 NOTE — TELEPHONE ENCOUNTER
Stress EKG routed to Dr Gonzalez for review, done for follow up on flecainide therapy, taked 50mg BID.     Interpretation Summary  This was a treadmill ECG for Flecainide monitoring.  Target HR achieved.  No CP or ST changes  There is evidence of significant QRS prolongation with exercise consistent  with use dependence phenomenon with Flecainide vs less likely rate related  aberrancy. QRS duration increasing from 90 ms to 130 ms.

## 2024-03-28 ENCOUNTER — TELEPHONE (OUTPATIENT)
Dept: CARDIOLOGY | Facility: CLINIC | Age: 74
End: 2024-03-28
Payer: MEDICARE

## 2024-03-28 DIAGNOSIS — R00.2 PALPITATIONS: ICD-10-CM

## 2024-03-28 RX ORDER — METOPROLOL SUCCINATE 50 MG/1
50 TABLET, EXTENDED RELEASE ORAL DAILY
Qty: 90 TABLET | Refills: 0 | Status: SHIPPED | OUTPATIENT
Start: 2024-03-28 | End: 2024-04-01

## 2024-03-28 NOTE — TELEPHONE ENCOUNTER
Patient called requesting refill on Metoprolol.   Patient confirms she is taking Metoprolol Succinate 50 mg once daily.  Refill e scribed to local pharmacy.    Alo to call and make annual OV for August 2024.       Last Metoprolol Succinate 50 mg 1 tablet daily refill sent 1-26-24.     Per Dr. Gonzalez Phone note  3-27-24 - If she is doing well I would continue the flecainide at 50 mg twice daily and metoprolol     Per Phone note 2-2-24-Dr. Gonzalez's reply:  1) increase toprol XL to 50mg daily  F/Up in 1 year.     Last Dr. Gonzalez OV 8-28-23 -   Metoprolol Succinate 50 mg Oral DAILY

## 2024-04-01 ENCOUNTER — NURSE TRIAGE (OUTPATIENT)
Dept: FAMILY MEDICINE | Facility: CLINIC | Age: 74
End: 2024-04-01
Payer: MEDICARE

## 2024-04-01 ENCOUNTER — TELEPHONE (OUTPATIENT)
Dept: CARDIOLOGY | Facility: CLINIC | Age: 74
End: 2024-04-01
Payer: MEDICARE

## 2024-04-01 DIAGNOSIS — R00.2 PALPITATIONS: ICD-10-CM

## 2024-04-01 RX ORDER — METOPROLOL SUCCINATE 50 MG/1
TABLET, EXTENDED RELEASE ORAL
COMMUNITY
Start: 2024-04-01 | End: 2024-04-09

## 2024-04-01 NOTE — TELEPHONE ENCOUNTER
"Message from patient regarding an episode on Saturday AM. She states she got up, went to the kitchen and felt disoriented. She is worried this is her medications.    Flecainide was started 1/25/2024 for palpitations (SVT) with metoprolol.    0900 called patient to discuss. She states Saturday AM she got up (post-coital) and in the kitchen she did not recognize the Easter treats she made the day before. This lasted less than 1 minute. Her spouse was with her and said she \"snapped back\" right away. Patient states her watch shows a steady regular rate. During the event she had no palpitations.  She has not been checking BP routinely for awhile. Patient states in the past she had an issue with orthostatic hypotension and dizziness. Her ENT sent her to the [facility] for treatment. She has been much better since then.  Patient notes she does have high anxiety and recently some increased family stressors.    Patient was concerned that either the flecainide or the triamterene-hydrochlorothiazide were a possible side effect issue. She wonders if she has been on the dyazide for too long?  Bmp on 3/1/2024 was WNL.  Stress test on 3/27/2024 was read as compatible with her flecainide.    Will message Dr. Gonzalez to review          "

## 2024-04-01 NOTE — TELEPHONE ENCOUNTER
Called patient to review Dr. Gonzalez's recommendation. She will monitor BP at home, she has a smart watch to check palpitations. She will decrease the toprol XL to 25mg (take 1/2 tab 50mg). Patient will call with any questions or concerns.

## 2024-04-01 NOTE — TELEPHONE ENCOUNTER
Reason for Call:  Other appointment    Detailed comments: patient called and would like to only see Carlie Marina CNP at [facility]/  tomorrow.    Reason:  Episode of confusion.  Concerned.    This provider needs to give approval to see patient for this reason.    FNA declined.    Please contact patient.  Thank you.    Phone Number Patient can be reached at: Cell number on file:    Telephone Information:   Mobile [phone]       Best Time: any    Can we leave a detailed message on this number? YES    Call taken on 4/1/2024 at 8:08 AM by Sheron Barbour

## 2024-04-01 NOTE — TELEPHONE ENCOUNTER
It looks like she is hypertensive most of the time, so I am reluctant to discontinue her Dyazide.  Can she decrease her metoprolol to 25 mg daily and see how she feels?  Thanks.

## 2024-04-02 ENCOUNTER — OFFICE VISIT (OUTPATIENT)
Dept: BEHAVIORAL HEALTH | Facility: CLINIC | Age: 74
End: 2024-04-02
Payer: MEDICARE

## 2024-04-02 DIAGNOSIS — F33.0 MILD EPISODE OF RECURRENT MAJOR DEPRESSIVE DISORDER (H): ICD-10-CM

## 2024-04-02 DIAGNOSIS — F41.1 GENERALIZED ANXIETY DISORDER: Primary | ICD-10-CM

## 2024-04-02 PROCEDURE — 90837 PSYTX W PT 60 MINUTES: CPT | Performed by: SOCIAL WORKER

## 2024-04-02 NOTE — TELEPHONE ENCOUNTER
Triage Patient Outreach    Attempt # 1    Was call answered?  No.  Left message on voicemail with information to call triage back.    On callback - please triage confusion     Verna Villatoro RN  [facility]

## 2024-04-03 NOTE — TELEPHONE ENCOUNTER
Patient Contact    Attempt # 2    Was call answered?  No.  Left message on voicemail with information to call triage back.

## 2024-04-04 NOTE — TELEPHONE ENCOUNTER
Patient Contact     Attempt # 3     Was call answered?  No.  Left message on voicemail with information to call triage back.    Looks like patient had followed up with Cardiology regarding disorientation per 4/1/24 Cardiology telephone note.

## 2024-04-04 NOTE — TELEPHONE ENCOUNTER
Pt returned call to the clinic.   She states she has not had an episode of confusion, or any other symptoms, since initial episode on Saturday.     Pt states Cardiology advised her to try taking 25 mg of metoprolol instead of 50 mg. She states she has not done this yet, but will try this beginning tonight.     She has an appt in August to establish care, but is hoping that her previous provider, Carlie Marina PA-C, can review her symptoms and advise if there is anything further she should do to follow-up regarding her confusion episode (see 4/1 Cardiology encounter).    Routing to provider for any potential recommendations per pt request.     Can we leave a detailed message on this number? YES  Phone number patient can be reached at: Home number on file [phone] (home)    Chiara Waldrop RN  [facility] Triage

## 2024-04-04 NOTE — TELEPHONE ENCOUNTER
Call attempt #1.     Left message for th epateitn to call back and speak with triage.       Iliana Tovar RN  [facility]

## 2024-04-05 ENCOUNTER — APPOINTMENT (OUTPATIENT)
Dept: MRI IMAGING | Facility: CLINIC | Age: 74
End: 2024-04-05
Attending: EMERGENCY MEDICINE
Payer: MEDICARE

## 2024-04-05 ENCOUNTER — HOSPITAL ENCOUNTER (EMERGENCY)
Facility: CLINIC | Age: 74
Discharge: HOME | End: 2024-04-05
Attending: EMERGENCY MEDICINE | Admitting: EMERGENCY MEDICINE
Payer: MEDICARE

## 2024-04-05 VITALS
RESPIRATION RATE: 18 BRPM | OXYGEN SATURATION: 95 % | HEART RATE: 55 BPM | DIASTOLIC BLOOD PRESSURE: 84 MMHG | WEIGHT: 140 LBS | BODY MASS INDEX: 24.03 KG/M2 | SYSTOLIC BLOOD PRESSURE: 128 MMHG | TEMPERATURE: 97.7 F

## 2024-04-05 DIAGNOSIS — R42 VERTIGO: ICD-10-CM

## 2024-04-05 DIAGNOSIS — R40.4 TRANSIENT ALTERATION OF AWARENESS: ICD-10-CM

## 2024-04-05 DIAGNOSIS — Q28.3 CEREBRAL CAVERNOMA: ICD-10-CM

## 2024-04-05 LAB
ALBUMIN SERPL BCG-MCNC: 4.5 G/DL (ref 3.5–5.2)
ALP SERPL-CCNC: 80 U/L (ref 40–150)
ALT SERPL W P-5'-P-CCNC: 19 U/L (ref 0–50)
ANION GAP SERPL CALCULATED.3IONS-SCNC: 12 MMOL/L (ref 7–15)
AST SERPL W P-5'-P-CCNC: 20 U/L (ref 0–45)
ATRIAL RATE - MUSE: 59 BPM
BASOPHILS # BLD AUTO: 0.1 10E3/UL (ref 0–0.2)
BASOPHILS NFR BLD AUTO: 1 %
BILIRUB SERPL-MCNC: 0.6 MG/DL
BUN SERPL-MCNC: 18.7 MG/DL (ref 8–23)
CALCIUM SERPL-MCNC: 9.4 MG/DL (ref 8.8–10.2)
CHLORIDE SERPL-SCNC: 102 MMOL/L (ref 98–107)
CREAT SERPL-MCNC: 1.05 MG/DL (ref 0.51–0.95)
DEPRECATED HCO3 PLAS-SCNC: 27 MMOL/L (ref 22–29)
DIASTOLIC BLOOD PRESSURE - MUSE: NORMAL MMHG
EGFRCR SERPLBLD CKD-EPI 2021: 57 ML/MIN/1.73M2
EOSINOPHIL # BLD AUTO: 0.2 10E3/UL (ref 0–0.7)
EOSINOPHIL NFR BLD AUTO: 3 %
ERYTHROCYTE [DISTWIDTH] IN BLOOD BY AUTOMATED COUNT: 12.9 % (ref 10–15)
GLUCOSE SERPL-MCNC: 128 MG/DL (ref 70–99)
HCT VFR BLD AUTO: 43.5 % (ref 35–47)
HGB BLD-MCNC: 14.7 G/DL (ref 11.7–15.7)
HOLD SPECIMEN: NORMAL
HOLD SPECIMEN: NORMAL
IMM GRANULOCYTES # BLD: 0 10E3/UL
IMM GRANULOCYTES NFR BLD: 0 %
INTERPRETATION ECG - MUSE: NORMAL
LYMPHOCYTES # BLD AUTO: 2 10E3/UL (ref 0.8–5.3)
LYMPHOCYTES NFR BLD AUTO: 25 %
MCH RBC QN AUTO: 30.4 PG (ref 26.5–33)
MCHC RBC AUTO-ENTMCNC: 33.8 G/DL (ref 31.5–36.5)
MCV RBC AUTO: 90 FL (ref 78–100)
MONOCYTES # BLD AUTO: 0.6 10E3/UL (ref 0–1.3)
MONOCYTES NFR BLD AUTO: 7 %
NEUTROPHILS # BLD AUTO: 5.2 10E3/UL (ref 1.6–8.3)
NEUTROPHILS NFR BLD AUTO: 64 %
NRBC # BLD AUTO: 0 10E3/UL
NRBC BLD AUTO-RTO: 0 /100
P AXIS - MUSE: 49 DEGREES
PLATELET # BLD AUTO: 194 10E3/UL (ref 150–450)
POTASSIUM SERPL-SCNC: 3.2 MMOL/L (ref 3.4–5.3)
PR INTERVAL - MUSE: 164 MS
PROT SERPL-MCNC: 7.3 G/DL (ref 6.4–8.3)
QRS DURATION - MUSE: 104 MS
QT - MUSE: 466 MS
QTC - MUSE: 461 MS
R AXIS - MUSE: 8 DEGREES
RBC # BLD AUTO: 4.84 10E6/UL (ref 3.8–5.2)
SODIUM SERPL-SCNC: 141 MMOL/L (ref 135–145)
SYSTOLIC BLOOD PRESSURE - MUSE: NORMAL MMHG
T AXIS - MUSE: 58 DEGREES
TROPONIN T SERPL HS-MCNC: 8 NG/L
VENTRICULAR RATE- MUSE: 59 BPM
WBC # BLD AUTO: 8 10E3/UL (ref 4–11)

## 2024-04-05 PROCEDURE — 99285 EMERGENCY DEPT VISIT HI MDM: CPT | Mod: 25

## 2024-04-05 PROCEDURE — 80053 COMPREHEN METABOLIC PANEL: CPT | Performed by: EMERGENCY MEDICINE

## 2024-04-05 PROCEDURE — 70547 MR ANGIOGRAPHY NECK W/O DYE: CPT | Mod: MA

## 2024-04-05 PROCEDURE — 70544 MR ANGIOGRAPHY HEAD W/O DYE: CPT | Mod: MA,XU

## 2024-04-05 PROCEDURE — 93005 ELECTROCARDIOGRAM TRACING: CPT

## 2024-04-05 PROCEDURE — 36415 COLL VENOUS BLD VENIPUNCTURE: CPT | Performed by: EMERGENCY MEDICINE

## 2024-04-05 PROCEDURE — 96374 THER/PROPH/DIAG INJ IV PUSH: CPT

## 2024-04-05 PROCEDURE — 84484 ASSAY OF TROPONIN QUANT: CPT | Performed by: EMERGENCY MEDICINE

## 2024-04-05 PROCEDURE — 250N000013 HC RX MED GY IP 250 OP 250 PS 637: Performed by: EMERGENCY MEDICINE

## 2024-04-05 PROCEDURE — 70551 MRI BRAIN STEM W/O DYE: CPT | Mod: MA

## 2024-04-05 PROCEDURE — 250N000011 HC RX IP 250 OP 636: Performed by: EMERGENCY MEDICINE

## 2024-04-05 PROCEDURE — 85025 COMPLETE CBC W/AUTO DIFF WBC: CPT | Performed by: EMERGENCY MEDICINE

## 2024-04-05 RX ORDER — LORAZEPAM 2 MG/ML
0.5 INJECTION INTRAMUSCULAR
Status: COMPLETED | OUTPATIENT
Start: 2024-04-05 | End: 2024-04-05

## 2024-04-05 RX ORDER — MECLIZINE HYDROCHLORIDE 25 MG/1
25 TABLET ORAL ONCE
Status: COMPLETED | OUTPATIENT
Start: 2024-04-05 | End: 2024-04-05

## 2024-04-05 RX ORDER — MECLIZINE HYDROCHLORIDE 25 MG/1
25 TABLET ORAL 3 TIMES DAILY PRN
Qty: 20 TABLET | Refills: 0 | Status: SHIPPED | OUTPATIENT
Start: 2024-04-05 | End: 2024-08-26

## 2024-04-05 RX ADMIN — MECLIZINE HYDROCHLORIDE 25 MG: 25 TABLET ORAL at 13:58

## 2024-04-05 RX ADMIN — LORAZEPAM 0.5 MG: 2 INJECTION INTRAMUSCULAR; INTRAVENOUS at 15:31

## 2024-04-05 ASSESSMENT — ACTIVITIES OF DAILY LIVING (ADL)
ADLS_ACUITY_SCORE: 33
ADLS_ACUITY_SCORE: 35

## 2024-04-05 ASSESSMENT — COLUMBIA-SUICIDE SEVERITY RATING SCALE - C-SSRS
2. HAVE YOU ACTUALLY HAD ANY THOUGHTS OF KILLING YOURSELF IN THE PAST MONTH?: NO
6. HAVE YOU EVER DONE ANYTHING, STARTED TO DO ANYTHING, OR PREPARED TO DO ANYTHING TO END YOUR LIFE?: NO
1. IN THE PAST MONTH, HAVE YOU WISHED YOU WERE DEAD OR WISHED YOU COULD GO TO SLEEP AND NOT WAKE UP?: NO

## 2024-04-05 NOTE — TELEPHONE ENCOUNTER
I am sorry, that appointment is too far out.   I have not seen this patient before, I can not order tests without evaluating them.   I believe they should go to the ER as all workup for TIA will be done.

## 2024-04-05 NOTE — PROGRESS NOTES
[facility]  April 9, 2024      Behavioral Health Clinician Progress Note    Patient Name: Emely Forbes           Service Type:  Individual      Service Location:   Face to Face in Clinic     Session Start Time: 2:01 am  Session End Time: 2:48 am      Session Length: 53 - 60      Attendees: Patient     Service Modality:  In-person    Visit Activities (Refresh list every visit): [facility] Only    Diagnostic Assessment Date: 1/11/24  Treatment Plan Review Date: 4/18/24  See Flowsheets for today's PHQ-9 and SOILA-7 results  Previous PHQ-9:        No data to display              Previous SOILA-7:       1/11/2024     3:10 PM   SOILA-7 SCORE   Total Score 11 (moderate anxiety)   Total Score 11       OSMEL LEVEL:      9/5/2012     9:00 AM 12/2/2013     9:00 AM   OSMEL Score (Last Two)   OSMEL Raw Score 39 52   Activation Score 56.4 100   OSMEL Level 3 4       DATA  Extended Session (60+ minutes): No  Interactive Complexity: No  Crisis: No  [facility] Patient: No    Treatment Objective(s) Addressed in This Session:  Target Behavior(s):  anxiety and depression    Depressed Mood: Increase interest, engagement, and pleasure in doing things  Decrease frequency and intensity of feeling down, depressed, hopeless  Improve quantity and quality of night time sleep / decrease daytime naps  Feel less tired and more energy during the day   Improve diet, appetite, mindful eating, and / or meal planning  Identify negative self-talk and behaviors: challenge core beliefs, myths, and actions  Improve concentration, focus, and mindfulness in daily activities   Feel less fidgety, restless or slow in daily activities / interpersonal interactions  Anxiety: will experience a reduction in anxiety, will develop more effective coping skills to manage anxiety symptoms, will develop healthy cognitive patterns and beliefs, and will increase ability to function adaptively    Current Stressors / Issues:  Reports several  health issues this past week. Discussed wanting to reduce stress in home life as a result. Discussed having conversation with  about how she feels and what she wants moving forward. Discussed how difficult it can be to have to implement change in relationship and effort required.     4/2/24  Discussed pts families concern and desire to have pt have less of an emotional reaction to difficult situations. Discussed pts desires be able to express emotions without back lash from family. Pt wants to have conversation with  regarding gaining validation of her feelings and getting support from him. Validated pts concerns about expressing her needs with family. Discussed seeing couples therapist with .     3/19/24  Pt reports when on trip her dog was not doing well and ultimately had to be put down. When she was gone husbands dtr Deb came to talk with her dad but they did not address the issue at hand or discuss the distance for the past 9 months. This frustrated pt as it represented past behaviors. She will be having testing on her heart this coming week and will be stopping medications prior to test. This is anxiety provoking for pt.     2/20/24  Discussed on going relationship dynamics within family system. Discussed talking with spouse about fears of him resenting her for his poor relationship with dtr. Discussed ongoing struggles with letting go of emotional distress of negative interactions with others.     Progress on Treatment Objective(s) / Homework:  Satisfactory progress - PRECONTEMPLATION (Not seeing need for change); Intervened by educating the patient about the effects of current behavior on health.  Evoked information about reasons to continue behavior, express concern / recommendations, and explored any change talk    Motivational Interviewing    MI Intervention: Expressed Empathy/Understanding, Supported Autonomy, Collaboration, Evocation, Permission to raise concern or advise,  Open-ended questions, Change talk (evoked), and Reframe     Change Talk Expressed by the Patient: Desire to change    Provider Response to Change Talk: E - Evoked more info from patient about behavior change, A - Affirmed patient's thoughts, decisions, or attempts at behavior change, R - Reflected patient's change talk, and S - Summarized patient's change talk statements    Assessments completed prior to visit:  The following assessments were completed by patient for this visit:  PHQ2:       12/26/2023    10:03 AM 6/23/2022    11:38 AM 6/7/2022     9:39 AM 1/28/2021     8:08 AM 1/27/2016    12:10 PM 12/2/2013     9:50 AM 9/4/2012    12:23 PM   PHQ-2 ( 1999 [employer])   Q1: Little interest or pleasure in doing things 0 0 0 0 0 0 0   Q2: Feeling down, depressed or hopeless 0 0 0 0 0 0 0   PHQ-2 Score 0 0 0 0 0 0 0   PHQ-2 Total Score (12-17 Years)- Positive if 3 or more points; Administer PHQ-A if positive    0      Q1: Little interest or pleasure in doing things Not at all  Not at all    Not at all       Q2: Feeling down, depressed or hopeless Not at all  Not at all    Not at all       PHQ-2 Score 0  0    0         PHQ9:        No data to display              GAD2:       1/11/2024     3:10 PM   SOILA-2   Feeling nervous, anxious, or on edge 3   Not being able to stop or control worrying 1   SOILA-2 Total Score 4     GAD7:       1/11/2024     3:10 PM   SOILA-7 SCORE   Total Score 11 (moderate anxiety)   Total Score 11     CAGE-AID:       1/11/2024     2:58 PM   CAGE-AID Total Score   Total Score 0   Total Score MyChart 0 (A total score of 2 or greater is considered clinically significant)     PROMIS 10-Global Health (all questions and answers displayed):       1/11/2024     3:07 PM   PROMIS 10   In general, would you say your health is: Good   In general, would you say your quality of life is: Good   In general, how would you rate your physical health? Good   In general, how would you rate your mental health, including your  mood and your ability to think? Good   In general, how would you rate your satisfaction with your social activities and relationships? Good   In general, please rate how well you carry out your usual social activities and roles Good   To what extent are you able to carry out your everyday physical activities such as walking, climbing stairs, carrying groceries, or moving a chair? Completely   In the past 7 days, how often have you been bothered by emotional problems such as feeling anxious, depressed, or irritable? Always   In the past 7 days, how would you rate your fatigue on average? Mild   In the past 7 days, how would you rate your pain on average, where 0 means no pain, and 10 means worst imaginable pain? 3   In general, would you say your health is: 3   In general, would you say your quality of life is: 3   In general, how would you rate your physical health? 3   In general, how would you rate your mental health, including your mood and your ability to think? 3   In general, how would you rate your satisfaction with your social activities and relationships? 3   In general, please rate how well you carry out your usual social activities and roles. (This includes activities at home, at work and in your community, and responsibilities as a parent, child, spouse, employee, friend, etc.) 3   To what extent are you able to carry out your everyday physical activities such as walking, climbing stairs, carrying groceries, or moving a chair? 5   In the past 7 days, how often have you been bothered by emotional problems such as feeling anxious, depressed, or irritable? 5   In the past 7 days, how would you rate your fatigue on average? 2   In the past 7 days, how would you rate your pain on average, where 0 means no pain, and 10 means worst imaginable pain? 3   Global Mental Health Score 10   Global Physical Health Score 16   PROMIS TOTAL - SUBSCORES 26     PROMIS 10-Global Health (only subscores and total score):        1/11/2024     3:07 PM   PROMIS-10 Scores Only   Global Mental Health Score 10   Global Physical Health Score 16   PROMIS TOTAL - SUBSCORES 26     McClain Suicide Severity Rating Scale (Lifetime/Recent)      1/12/2024     8:08 AM   McClain Suicide Severity Rating (Lifetime/Recent)   Q1 Wish to be Dead (Lifetime) Y   Wish to be Dead Description (Lifetime) 2000 when  went to [facility], no plan or intentions   1. Wish to be Dead (Past 1 Month) N   Q2 Non-Specific Active Suicidal Thoughts (Lifetime) N   Reasons for Ideation (Lifetime) 4   Actual Attempt (Lifetime) N   Has subject engaged in non-suicidal self-injurious behavior? (Lifetime) N   Interrupted Attempts (Lifetime) N   Aborted or Self-Interrupted Attempt (Lifetime) N   Preparatory Acts or Behavior (Lifetime) N   Calculated C-SSRS Risk Score (Lifetime/Recent) No Risk Indicated       Care Plan review completed: Yes    Medication Review:  No changes to current psychiatric medication(s)    Medication Compliance:  Yes    Changes in Health Issues:   None reported    Chemical Use Review:   Substance Use: Chemical use reviewed, no active concerns identified      Tobacco Use: No current tobacco use.      Assessment: Current Emotional / Mental Status (status of significant symptoms):  Risk status (Self / Other harm or suicidal ideation)  Patient denies a history of suicidal ideation, suicide attempts, self-injurious behavior, homicidal ideation, homicidal behavior, and and other safety concerns  Patient denies current fears or concerns for personal safety.  Patient denies current or recent suicidal ideation or behaviors.  Patient denies current or recent homicidal ideation or behaviors.  Patient denies current or recent self injurious behavior or ideation.  Patient denies other safety concerns.  A safety and risk management plan has not been developed at this time, however patient was encouraged to call [facility] should there be a change in any of these risk  factors.    Appearance:   Appropriate   Eye Contact:   Good   Psychomotor Behavior: Normal   Attitude:   Cooperative   Orientation:   All  Speech   Rate / Production: Normal    Volume:  Normal   Mood:    Anxious  Depressed   Affect:    Appropriate   Thought Content:  Clear   Thought Form:  Coherent  Logical   Insight:    Good     Diagnoses:  1. Generalized anxiety disorder    2. Mild episode of recurrent major depressive disorder (H24)          Collateral Reports Completed:  Not Applicable    Plan: (Homework, other):  Patient was given information about behavioral services and encouraged to schedule a follow up appointment with the clinic [facility] in 1 week.  She was also given information about mental health symptoms and treatment options .  CD Recommendations: No indications of CD issues.       STEPHANIE GodinezSW    ______________________________________________________________________    Integrated Primary Care Behavioral Health Treatment Plan    Patient's Name: Emely Forbes  YOB: 1950    Date of Creation: 1/18/24  Date Treatment Plan Last Reviewed/Revised: 1/18/24    DSM5 Diagnoses: 296.31 (F33.0) Major Depressive Disorder, Recurrent Episode, Mild _ or 300.02 (F41.1) Generalized Anxiety Disorder  Psychosocial / Contextual Factors:  Individual Factors management of MH and Family Factors family discord    PROMIS (reviewed every 90 days):     Referral / Collaboration:  Referral to another professional/service is not indicated at this time..    Anticipated number of session for this episode of care: 4-6 sessions  Anticipation frequency of session: Weekly  Anticipated Duration of each session: 38-52 minutes  Treatment plan will be reviewed in 90 days or when goals have been changed.       MeasurableTreatment Goal(s) related to diagnosis / functional impairment(s)  Goal 1: Patient will work with providers to manage symptoms    I will know I've met my goal when less anxious and down.      Objective #A (Patient  Action)    Patient will  attend all appointments, take medication as prescribed .  Status: New - Date: 1/18/24      Intervention(s)  Therapist will  Monitor and assist in overcoming barriers to treatment adherence .    Objective #B  Patient will  consider all recommendations offered .  Status: New - Date: 1/18/24      Intervention(s)  Therapist will  educate patient on treatment options, clarify concerns, work with pt to overcome any resistance to compliance .        Patient has reviewed and agreed to the above plan.      STIVEN Godinez  April 9, 2024

## 2024-04-05 NOTE — TELEPHONE ENCOUNTER
Called and spoke with pt to assist with scheduling.     Scheduled soonest available appt at [facility].     Apr 15, 2024  3:30 PM  (Arrive by 3:10 PM)  Provider Visit with Bora Ortiz MD  [facility] ([facility] ) [phone]     Routing back to provider - okay to keep appt on 4/15? Or okay to use same/next day/approval required slots for pt to be seen sooner?    Pt is also wondering if any imaging can be ordered before appt so that results can be reviewed at appt.     Thank you,  Chiara Waldrop, RN

## 2024-04-05 NOTE — ED PROVIDER NOTES
History     Chief Complaint:  Stroke Symptoms       HPI   Emely Forbes is a 73 year old female with a history of hypertension and cancer who presents to the emergency department for stroke symptoms. She describes an isolated moment of confusion last Saturday, experiencing dizziness and feeling lightheaded. Her  witnessed it, where Emely woke up and went downstairs, uncertain of what was on the kitchen counter. The onset was sudden and didn't last long. The patient has a history of prior inner ear diagnoses, receiving adjustment to reposition the inner ear crystals. Of note, she stopped taking her blood pressure medication for a stress test last Thursday. Emely describes her symptoms at present as feeling a rotational sensation, similar to what she has experienced prior. She is active at baseline and is able to walk. The patient denies one-sided numbness or weakness, recent medication changes, or prior intervention of meclizine. Emely recalls prior allergic reaction to MRI contrast, tearful when sharing. She is followed by Dr. Serrano for prior ENT visits.    Independent Historian:   None - Patient Only    Review of External Notes:   Telephone notes from today reviewed where it was recommended the patient go to the ED for TIA workup.      Medications:    meclizine (ANTIVERT) 25 MG tablet  flecainide (TAMBOCOR) 50 MG tablet  LORazepam (ATIVAN) 1 MG tablet  metoprolol succinate ER (TOPROL XL) 50 MG 24 hr tablet  predniSONE (DELTASONE) 20 MG tablet  rosuvastatin (CRESTOR) 10 MG tablet  triamterene-HCTZ (DYAZIDE) 37.5-25 MG capsule  vitamin D3 (CHOLECALCIFEROL) 50 mcg (2000 units) tablet        Past Medical History:    Past Medical History:   Diagnosis Date    Breast cancer (H) 10/10    Carpal tunnel syndrome     Gastro-oesophageal reflux disease     GERD (gastroesophageal reflux disease)     Hematuria 2003    HX: benign breast biopsy     Osteopenia     PONV (postoperative nausea and vomiting)     S/P hysterectomy with  oophorectomy 2001 for fibroids    S/P tonsillectomy        Past Surgical History:    Past Surgical History:   Procedure Laterality Date    BREAST SURGERY      RIGHT BREAST BIOPSY, RIGHT MASTECTOMY WITH TRAM FLAP RECONSTRUCTION    CYSTOSCOPY, URETEROSCOPY, COMBINED  9/14/2012    Procedure: COMBINED CYSTOSCOPY, URETEROSCOPY;  VIDEO CYSTOSCOPY, RIGHT FLEXIBLE URETEROSCOPY, CAUTERIZATION OF PAPILLARY VEIN (LATEX IV CONTRAST TAPE ALLERGY);  Surgeon: Rey Bailon MD;  Location:  OR    ENT SURGERY      TONSILLECTOMY    GI SURGERY      EGD    GYN SURGERY      HYSTERECTOMY WITH OOPHORECTOMY    HYSTERECTOMY, PAP NO LONGER INDICATED      ORTHOPEDIC SURGERY      CARPAL TUNNEL RELEASE    REVISE RECONSTRUCTED BREAST  12/8/2011    Procedure:REVISE RECONSTRUCTED BREAST; REVISION RIGHT BREAST RECONSTRUCTION, LEFT BREAST STEROID INJECTION ; Surgeon:FRAN LEVIN; Location:[facility]        Physical Exam   Patient Vitals for the past 24 hrs:   BP Temp Temp src Pulse Resp SpO2 Weight   04/05/24 1725 128/84 97.7  F (36.5  C) Oral 55 18 95 % --   04/05/24 1325 (!) 154/83 98.6  F (37  C) Temporal 65 18 98 % 63.5 kg (140 lb)        Physical Exam    General: Sitting on the ED chair  HEENT: Normocephalic, atraumatic  Cardiac: Warm and well perfused  Pulm: Breathing comfortably, no accessory muscle usage, no conversational dyspnea  MSK: No bony deformities  Skin: Warm and dry  Neuro: Moves all extremities, CN II through XII intact  Psych: Pleasant mood and affect       Emergency Department Course   ECG  ECG taken at 1334, ECG read at 1400  Sinus bradycardia  No change as compared to prior, dated 2/26/23.  Rate 59 bpm. TN interval 164 ms. QRS duration 104 ms. QT/QTc 466/461 ms. P-R-T axes 49 8 58.      Imaging:  MR Brain w/o Contrast   Final Result   Impression:   1. No evidence of acute infarction or intracranial hemorrhage.   2. Mild leukoaraiosis. Subcentimeter focus of susceptibility in the   inferior right frontal lobe,  representing a cavernoma or old   microhemorrhage.   3. Head MRA demonstrates no definite aneurysm or stenosis of the major   intracranial arteries. Attenuation of flow-related signal in the   caudal half of the basilar artery, favored artifactual.   4. Neck MRA demonstrates patent major cervical arteries.             LIA GOOD MD            SYSTEM ID:  [id number]      MRA Brain ([place]) wo Contrast   Final Result   Impression:   1. No evidence of acute infarction or intracranial hemorrhage.   2. Mild leukoaraiosis. Subcentimeter focus of susceptibility in the   inferior right frontal lobe, representing a cavernoma or old   microhemorrhage.   3. Head MRA demonstrates no definite aneurysm or stenosis of the major   intracranial arteries. Attenuation of flow-related signal in the   caudal half of the basilar artery, favored artifactual.   4. Neck MRA demonstrates patent major cervical arteries.             LIA GOOD MD            SYSTEM ID:  [id number]      MRA Neck (Carotids) wo Contrast   Final Result   Impression:   1. No evidence of acute infarction or intracranial hemorrhage.   2. Mild leukoaraiosis. Subcentimeter focus of susceptibility in the   inferior right frontal lobe, representing a cavernoma or old   microhemorrhage.   3. Head MRA demonstrates no definite aneurysm or stenosis of the major   intracranial arteries. Attenuation of flow-related signal in the   caudal half of the basilar artery, favored artifactual.   4. Neck MRA demonstrates patent major cervical arteries.             LIA GOOD MD            SYSTEM ID:  [id number]             Laboratory:  Labs Ordered and Resulted from Time of ED Arrival to Time of ED Departure   COMPREHENSIVE METABOLIC PANEL - Abnormal       Result Value    Sodium 141      Potassium 3.2 (*)     Carbon Dioxide (CO2) 27      Anion Gap 12      Urea Nitrogen 18.7      Creatinine 1.05 (*)     GFR Estimate 57 (*)     Calcium 9.4      Chloride 102       Glucose 128 (*)     Alkaline Phosphatase 80      AST 20      ALT 19      Protein Total 7.3      Albumin 4.5      Bilirubin Total 0.6     TROPONIN T, HIGH SENSITIVITY - Normal    Troponin T, High Sensitivity 8     CBC WITH PLATELETS AND DIFFERENTIAL    WBC Count 8.0      RBC Count 4.84      Hemoglobin 14.7      Hematocrit 43.5      MCV 90      MCH 30.4      MCHC 33.8      RDW 12.9      Platelet Count 194      % Neutrophils 64      % Lymphocytes 25      % Monocytes 7      % Eosinophils 3      % Basophils 1      % Immature Granulocytes 0      NRBCs per 100 WBC 0      Absolute Neutrophils 5.2      Absolute Lymphocytes 2.0      Absolute Monocytes 0.6      Absolute Eosinophils 0.2      Absolute Basophils 0.1      Absolute Immature Granulocytes 0.0      Absolute NRBCs 0.0          Procedures   None    Emergency Department Course & Assessments:    Interventions:  Medications   meclizine (ANTIVERT) tablet 25 mg (25 mg Oral $Given 4/5/24 1358)   LORazepam (ATIVAN) injection 0.5 mg (0.5 mg Intravenous $Given 4/5/24 1531)        Assessments:  ED Course as of 04/05/24 2004 Fri Apr 05, 2024 1328 I saw the patient for an initial evaluation and exam.   1517 I rechecked and updated the patient.   1705 Reevaluation        Independent Interpretation (X-rays, CTs, rhythm strip):  None    Consultations/Discussion of Management or Tests:  None       Social Determinants of Health affecting care:   None    Disposition:  The patient was discharged.     Impression & Plan    CMS Diagnoses: None    National Institutes of Health Stroke Scale  Exam Interval: Baseline    Score    Level of consciousness: (0)   Alert, keenly responsive    LOC questions: (0)   Answers both questions correctly    LOC commands: (0)   Performs both tasks correctly    Best gaze: (0)   Normal    Visual: (0)   No visual loss    Facial palsy: (0)   Normal symmetrical movements    Motor arm (left): (0)   No drift    Motor arm (right): (0)   No drift    Motor leg (left):  (0)   No drift    Motor leg (right): (0)   No drift    Limb ataxia: (0)   Absent    Sensory: (0)   Normal- no sensory loss    Best language: (0)   Normal- no aphasia    Dysarthria: (0)   Normal    Extinction and inattention: (0)   No abnormality          Total Score:  0       MIPS (If applicable):  N/A    Medical Decision Makin old female presents with a transient episode of confusion on Saturday followed by few days of vertigo and balance issues.  History of vertigo in the past which seems quite likely.  However, with a constellation of her recent stress test, being on blood pressure medication, episode of confusion, also considering PRESS or posterior stroke.  EKG nonischemic, troponin within reference range, overall low suspicion for ACS.  Other lab work reassuringly unremarkable.  Patient was treated with meclizine with good symptomatic response.  MRI/MRA shows no evidence of stroke or other acute findings.  There is an incidental cavernoma which I discussed with the patient, I also provided a neurosurgery referral for further consultation.  Given the improvement of the patient's vertigo symptoms with meclizine, suspect that it is peripheral in nature, which is also consistent with her prior episodes and response to vestibular rehab.  With regard to the episode on Saturday, unclear etiology.  A parasomnia seems like 1 possibility, i.e. the patient was not fully awake when she went to the kitchen and screamed and then subsequently woke up fully.  And atypical complex partial seizure is another consideration, though felt less likely.  Either way, with the improvement of the patient's vertigo symptoms, encouraging workup here tonight, transient nature of the brief confusion episode, overall appropriate for further workup as an outpatient.  Plan is for discharge home with PCP follow-up, neurosurgery referral for cavernoma, as well as ENT follow-up for the patient's recurrent vertigo.      Diagnosis:     ICD-10-CM    1. Transient alteration of awareness  R40.4       2. Vertigo  R42       3. Cerebral cavernoma  Q28.3 Adult Neurosurgery  Referral           Discharge Medications:  Discharge Medication List as of 4/5/2024  5:19 PM        START taking these medications    Details   meclizine (ANTIVERT) 25 MG tablet Take 1 tablet (25 mg) by mouth 3 times daily as needed for dizziness, Disp-20 tablet, R-0, E-Prescribe                Scribe Disclosure:  I, Roby Pino, am serving as a scribe at 2:34 PM on 4/5/2024 to document services personally performed by Elian Holloway MD based on my observations and the provider's statements to me.      Elian Holloway MD  04/05/24 2004

## 2024-04-05 NOTE — TELEPHONE ENCOUNTER
Called and spoke with pt to relay provider recommendation below.   She verbalized understanding, and stated she would like to speak with her .   She requests a call back in about 10-15 minutes.     Writer will plan to follow-up with pt.   Thank you,  Chiara Waldrop RN

## 2024-04-05 NOTE — TELEPHONE ENCOUNTER
Called and spoke with pt.   She stated she is currently on her way to the [facility].     Closing encounter.  Thank you,  Chiara Waldrop RN

## 2024-04-05 NOTE — ED TRIAGE NOTES
Pt had episode of confusion on Saturday , lasted approximately 1 min or so. Says her head still feels funny- has dizziness/lightheadedness. Does have history of dizziness from inner ear problems. Not on blood thinners     Had a stress test on Thursday and had to be off all her BP meds

## 2024-04-08 ENCOUNTER — TELEPHONE (OUTPATIENT)
Dept: CARDIOLOGY | Facility: CLINIC | Age: 74
End: 2024-04-08
Payer: MEDICARE

## 2024-04-08 DIAGNOSIS — I47.10 SVT (SUPRAVENTRICULAR TACHYCARDIA) (H): ICD-10-CM

## 2024-04-08 DIAGNOSIS — R00.2 PALPITATIONS: Primary | ICD-10-CM

## 2024-04-08 NOTE — TELEPHONE ENCOUNTER
Yes, she can increase the metoprolol.  Lets get a 48-hour Holter monitor after she goes back on this dose.  Thanks.

## 2024-04-08 NOTE — TELEPHONE ENCOUNTER
Patient left a voicemail saying she talked to her PCP who told her to go to the ED last week for possible TIA. The testing did not reveal a TIA, vertigo suspected.     Called patient back, she says that she does not feel as well on the lower dose of metoprolol, felt more heart racing so she spoke to the on-call cardiologist last night who had her go back to 50mg which she did. She felt better after doing this, was able to relax/sleep. She says her symptoms usually come on in the evening, is fine during the day, exercises like normal. She wants to confirm that she should continue to go back up to the 50mg daily going forward. Dr Lisa bess.

## 2024-04-09 ENCOUNTER — TELEPHONE (OUTPATIENT)
Dept: NEUROSURGERY | Facility: CLINIC | Age: 74
End: 2024-04-09
Payer: MEDICARE

## 2024-04-09 ENCOUNTER — OFFICE VISIT (OUTPATIENT)
Dept: BEHAVIORAL HEALTH | Facility: CLINIC | Age: 74
End: 2024-04-09
Payer: MEDICARE

## 2024-04-09 DIAGNOSIS — F41.1 GENERALIZED ANXIETY DISORDER: Primary | ICD-10-CM

## 2024-04-09 DIAGNOSIS — F33.0 MILD EPISODE OF RECURRENT MAJOR DEPRESSIVE DISORDER (H): ICD-10-CM

## 2024-04-09 PROCEDURE — 90834 PSYTX W PT 45 MINUTES: CPT | Performed by: SOCIAL WORKER

## 2024-04-09 RX ORDER — METOPROLOL SUCCINATE 50 MG/1
50 TABLET, EXTENDED RELEASE ORAL DAILY
Qty: 90 TABLET | Refills: 1 | Status: SHIPPED | OUTPATIENT
Start: 2024-04-09 | End: 2024-06-26

## 2024-04-09 NOTE — TELEPHONE ENCOUNTER
Left a message for patient to call back. Order placed for 48hr holter. Will need to verify if patient needs a refill of metoprolol.       Addendum: Spoke to patient and reviewed recommendation from Dr Gonzalez. She isn't sure that she wants to do the monitor, says she is feeling fine now and has too many other things going on. She will think about it. Follow up with Sarika Mak is in June.

## 2024-04-09 NOTE — TELEPHONE ENCOUNTER
Patient has been scheduled incorrectly for dx cavernoma, cerebrovascular with Dr. Chowdhury per dat. Writer LV for patient to call [phone] to reschedule this appointment with the appropriate clinic which will be endovascular.

## 2024-04-09 NOTE — TELEPHONE ENCOUNTER
"Pt returned call.  There is nothing on the Neurosurgery protocols that refers to \"Endovascular\" so I am unable to reschedule Pt.  Please provide more details as to which provider Pt needs to see and call Pt back to let her know so she can be scheduled appropriately.  "

## 2024-04-10 NOTE — TELEPHONE ENCOUNTER
[facility] Call Center    Phone Message    May a detailed message be left on voicemail: yes     Reason for Call: Other: Patient is returning a call regarding scheduling. Please call back when available.     Action Taken: Other: Neurosurgery    Travel Screening: Not Applicable

## 2024-04-11 ENCOUNTER — TELEPHONE (OUTPATIENT)
Dept: NEUROSURGERY | Facility: CLINIC | Age: 74
End: 2024-04-11
Payer: MEDICARE

## 2024-04-11 NOTE — TELEPHONE ENCOUNTER
Pt called to check on the status of this message.  She is requesting a call back from a nurse to discuss getting scheduled with an appropriate provider.  Thanks.

## 2024-04-11 NOTE — TELEPHONE ENCOUNTER
Patient requests a call back from Dr. Pinedo's nurse team to discuss her dx. Patient is asking several questions about her dx and the severity of it and is anxious to know if waiting until May is ok to see the doctor. Please call patient back at [phone]. Thank you~

## 2024-04-11 NOTE — TELEPHONE ENCOUNTER
Called patient back to discuss. She is incredibly anxious regarding her imaging and diagnosis. RN let her know she will be in great hands with Dr. Pinedo and can speak with him via phone on 4/16/24 at 1015. Patient really wanted an in person visit but understands that a virtual appointment is a great way to connect sooner. She would like to keep her appt for 5/15/24 at [place] to meet him in person but is okay with phone visit on 4/16/24 at 1015. Appt held for patient. Rn calmed patient concerns as she was passed around by a lot of people due to referral entry being for different department. Informed patient she is now in the correct spot and we will speak with her on Tuesday. Patient agreed and was very appreciative of the call. RN spent 18 minutes on the phone with patient.   Traci Nelson RN 4/11/2024 11:51 AM

## 2024-04-12 NOTE — TELEPHONE ENCOUNTER
RECORDS RECEIVED FROM: internal   REASON FOR VISIT: Cerebral cavernoma    PROVIDER: Dr. Pinedo   DATE OF APPT: 4/16/24   NOTES (FOR ALL VISITS) STATUS DETAILS   OFFICE NOTE from referring provider Internal SEE INPATIENT NOTES   DISCHARGE REPORT from the ER Internal [facility]:  4/5/24   MEDICATION LIST Internal    IMAGING  (FOR ALL VISITS)     MRI (HEAD, NECK, SPINE) Internal [facility]:  MRA Neck Carotid 4/5/24  MRA Brain COW 4/5/24  MRI Brain 4/5/24

## 2024-04-16 ENCOUNTER — PRE VISIT (OUTPATIENT)
Dept: NEUROSURGERY | Facility: CLINIC | Age: 74
End: 2024-04-16

## 2024-04-16 ENCOUNTER — MYC MEDICAL ADVICE (OUTPATIENT)
Dept: NEUROLOGY | Facility: CLINIC | Age: 74
End: 2024-04-16

## 2024-04-16 ENCOUNTER — VIRTUAL VISIT (OUTPATIENT)
Dept: NEUROSURGERY | Facility: CLINIC | Age: 74
End: 2024-04-16
Payer: MEDICARE

## 2024-04-16 VITALS — HEIGHT: 65 IN | BODY MASS INDEX: 23.82 KG/M2 | WEIGHT: 143 LBS

## 2024-04-16 DIAGNOSIS — R41.0 CONFUSION: Primary | ICD-10-CM

## 2024-04-16 DIAGNOSIS — Z91.041 CONTRAST MEDIA ALLERGY: Primary | ICD-10-CM

## 2024-04-16 PROCEDURE — 99441 PR PHYSICIAN TELEPHONE EVALUATION 5-10 MIN: CPT | Mod: 4UV | Performed by: RADIOLOGY

## 2024-04-16 RX ORDER — DIPHENHYDRAMINE HCL 50 MG
CAPSULE ORAL
Qty: 1 CAPSULE | Refills: 0 | Status: SHIPPED | OUTPATIENT
Start: 2024-04-16 | End: 2024-08-26

## 2024-04-16 RX ORDER — METHYLPREDNISOLONE 32 MG/1
TABLET ORAL
Qty: 2 TABLET | Refills: 0 | Status: SHIPPED | OUTPATIENT
Start: 2024-04-16 | End: 2024-08-26

## 2024-04-16 ASSESSMENT — PATIENT HEALTH QUESTIONNAIRE - PHQ9: SUM OF ALL RESPONSES TO PHQ QUESTIONS 1-9: 6

## 2024-04-16 ASSESSMENT — PAIN SCALES - GENERAL: PAINLEVEL: NO PAIN (0)

## 2024-04-16 NOTE — PROGRESS NOTES
Subjective:: Emely Forbes is a 73 year old female with a past medical history of hypertension and cancer who was seen in the emergency department in early April due to stroke symptoms. She was placed on flecainide and required testing for that. She had a stress test done, to assess QRS length. She had to go off of metoprolol and she had the test done. On the next morning on Saturday she had an isolated moment of confusion and experiencing dizziness and lightheadedness lasted a few seconds. She did not have any unilateral numbness or weakness.  On noninvasive imaging the MRA of the head and the neck noted a attenuation of flow of the basilar artery that could either be due to artifact versus less likely nonocclusive thrombus. She did not have any speech changes, numbness or weakness.   She has had episode of dizziness before and treated for BPPV  She is from [place],     Past medical history:   Past Medical History:   Diagnosis Date    Breast cancer (H) 10/10    R s/p mast/tram flap, reconstruction    Carpal tunnel syndrome     Gastro-oesophageal reflux disease     GERD (gastroesophageal reflux disease)     s/p EGD 5/06    Hematuria 2003    HX: benign breast biopsy     L, papilloma    Osteopenia     DXA 12/16/10    PONV (postoperative nausea and vomiting)     Scope patch on pre-op    S/P hysterectomy with oophorectomy 2001 for fibroids    S/P tonsillectomy         Current outpatient Medication list:   Current Outpatient Medications:     flecainide (TAMBOCOR) 50 MG tablet, Take 50mg twice daily, Disp: 180 tablet, Rfl: 0    LORazepam (ATIVAN) 1 MG tablet, Take 1 tablet (1 mg) by mouth every 12 hours as needed for anxiety, Disp: 10 tablet, Rfl: 0    meclizine (ANTIVERT) 25 MG tablet, Take 1 tablet (25 mg) by mouth 3 times daily as needed for dizziness, Disp: 20 tablet, Rfl: 0    metoprolol succinate ER (TOPROL XL) 50 MG 24 hr tablet, Take 1 tablet (50 mg) by mouth daily, Disp: 90 tablet, Rfl: 1    predniSONE  (DELTASONE) 20 MG tablet, Take 1 tablet (20 mg) by mouth 2 times daily, Disp: 10 tablet, Rfl: 0    rosuvastatin (CRESTOR) 10 MG tablet, Take 1 tablet (10 mg) by mouth daily, Disp: 90 tablet, Rfl: 3    triamterene-HCTZ (DYAZIDE) 37.5-25 MG capsule, TAKE 1 CAPSULE BY MOUTH EVERY MORNING, Disp: , Rfl:     vitamin D3 (CHOLECALCIFEROL) 50 mcg (2000 units) tablet, Take 1 tablet (50 mcg) by mouth daily, Disp:  , Rfl:       Family history:   Family History   Problem Relation Age of Onset    Thyroid Disease Mother         cancer    Hypertension Mother     Breast Cancer Mother     Cancer Mother         uterine    Cardiovascular Father     Cerebrovascular Disease Father     Diabetes Father        Social history:   Social History     Tobacco Use    Smoking status: Never    Smokeless tobacco: Never   Vaping Use    Vaping status: Never Used   Substance Use Topics    Alcohol use: Not Currently     Alcohol/week: 0.0 - 0.8 standard drinks of alcohol    Drug use: No       Allergies:    Allergies   Allergen Reactions    Adhesive Tape      blisters    Contrast Dye Hives     Says she needs pre meds    Latex      blisters    Norvasc [Amlodipine Besylate]        Review of Systems: 5 point ROS performed and negative except for detailed above    Objective:  Telephone visit: Physical is limited due to telephone visit, patient is awake, conversational and has had no additional complaints or concerns.       Imaging Reviewed:      MRA head and neck Head MRA demonstrates no definite aneurysm or stenosis of the major   intracranial arteries. Attenuation of flow-related signal in the   caudal half of the basilar artery, favored artifactual.       Assessment:  Emely Forbes  is a 73 year old female who presents for symptoms of transient confusion, it lasted a few seconds.     Thank you for this referral, for any questions or concerns please don't hesitate to contact us.     Plan:  - CTA head and neck in 1 -2 week will require pre-medication      Angelina  MD Ac  Endovascular Surgical Neuroradiology Fellow  [facility]  [phone]    Endovascular Surgical Neuroradiology staff is Dr. Pinedo

## 2024-04-16 NOTE — TELEPHONE ENCOUNTER
Orders entered for patient contrast allergy per orders from Dr. Shen.   Traci Nelson, RN 4/16/2024 3:01 PM

## 2024-04-16 NOTE — PROGRESS NOTES
"Virtual Visit Details    Type of service:  Video Visit   Video Start Time: {video visit start/end time for provider to select:436309}  Video End Time:{video visit start/end time for provider to select:732147}    Originating Location (pt. Location): {video visit patient location:157386::\"Home\"}  {PROVIDER LOCATION On-site should be selected for visits conducted from your clinic location or adjoining [facility] hospital, academic office, or other nearby [facility] building. Off-site should be selected for all other provider locations, including home:[id number]}  Distant Location (provider location):  {virtual location provider:384527}  Platform used for Video Visit: {Virtual Visit Platforms:317870::\"[employer]\"}  "

## 2024-04-16 NOTE — LETTER
4/16/2024       RE: Emely Forbes  [address] [place] [address]       Dear Colleague,    Thank you for referring your patient, Emely Forbes, to the [facility] NEUROSURGERY CLINIC [place] at [facility]. Please see a copy of my visit note below.    Subjective:: Emely Forbes is a 73 year old female with a past medical history of hypertension and cancer who was seen in the emergency department in early April due to stroke symptoms. She was placed on flecainide and required testing for that. She had a stress test done, to assess QRS length. She had to go off of metoprolol and she had the test done. On the next morning on Saturday she had an isolated moment of confusion and experiencing dizziness and lightheadedness lasted a few seconds. She did not have any unilateral numbness or weakness.  On noninvasive imaging the MRA of the head and the neck noted a attenuation of flow of the basilar artery that could either be due to artifact versus less likely nonocclusive thrombus. She did not have any speech changes, numbness or weakness.   She has had episode of dizziness before and treated for BPPV  She is from [place],     Past medical history:   Past Medical History:   Diagnosis Date    Breast cancer (H) 10/10    R s/p mast/tram flap, reconstruction    Carpal tunnel syndrome     Gastro-oesophageal reflux disease     GERD (gastroesophageal reflux disease)     s/p EGD 5/06    Hematuria 2003    HX: benign breast biopsy     L, papilloma    Osteopenia     DXA 12/16/10    PONV (postoperative nausea and vomiting)     Scope patch on pre-op    S/P hysterectomy with oophorectomy 2001 for fibroids    S/P tonsillectomy         Current outpatient Medication list:   Current Outpatient Medications:     flecainide (TAMBOCOR) 50 MG tablet, Take 50mg twice daily, Disp: 180 tablet, Rfl: 0    LORazepam (ATIVAN) 1 MG tablet, Take 1 tablet (1 mg) by mouth every 12 hours as needed for  anxiety, Disp: 10 tablet, Rfl: 0    meclizine (ANTIVERT) 25 MG tablet, Take 1 tablet (25 mg) by mouth 3 times daily as needed for dizziness, Disp: 20 tablet, Rfl: 0    metoprolol succinate ER (TOPROL XL) 50 MG 24 hr tablet, Take 1 tablet (50 mg) by mouth daily, Disp: 90 tablet, Rfl: 1    predniSONE (DELTASONE) 20 MG tablet, Take 1 tablet (20 mg) by mouth 2 times daily, Disp: 10 tablet, Rfl: 0    rosuvastatin (CRESTOR) 10 MG tablet, Take 1 tablet (10 mg) by mouth daily, Disp: 90 tablet, Rfl: 3    triamterene-HCTZ (DYAZIDE) 37.5-25 MG capsule, TAKE 1 CAPSULE BY MOUTH EVERY MORNING, Disp: , Rfl:     vitamin D3 (CHOLECALCIFEROL) 50 mcg (2000 units) tablet, Take 1 tablet (50 mcg) by mouth daily, Disp:  , Rfl:       Family history:   Family History   Problem Relation Age of Onset    Thyroid Disease Mother         cancer    Hypertension Mother     Breast Cancer Mother     Cancer Mother         uterine    Cardiovascular Father     Cerebrovascular Disease Father     Diabetes Father        Social history:   Social History     Tobacco Use    Smoking status: Never    Smokeless tobacco: Never   Vaping Use    Vaping status: Never Used   Substance Use Topics    Alcohol use: Not Currently     Alcohol/week: 0.0 - 0.8 standard drinks of alcohol    Drug use: No       Allergies:    Allergies   Allergen Reactions    Adhesive Tape      blisters    Contrast Dye Hives     Says she needs pre meds    Latex      blisters    Norvasc [Amlodipine Besylate]        Review of Systems: 5 point ROS performed and negative except for detailed above    Objective:  Telephone visit: Physical is limited due to telephone visit, patient is awake, conversational and has had no additional complaints or concerns.     Imaging Reviewed:    MRA head and neck Head MRA demonstrates no definite aneurysm or stenosis of the major   intracranial arteries. Attenuation of flow-related signal in the   caudal half of the basilar artery, favored artifactual.      Assessment:  Emely Forbes  is a 73 year old female who presents for symptoms of transient confusion, it lasted a few seconds.     Thank you for this referral, for any questions or concerns please don't hesitate to contact us.     Plan:  - CTA head and neck in 1 -2 week will require pre-medication    Angelina Shen MD  Endovascular Surgical Neuroradiology Fellow  [facility]  [phone]    Endovascular Surgical Neuroradiology staff is Dr. Pinedo      Attestation signed by Tom Pinedo MD at 4/17/2024 12:12 PM:  I agree with the above note by Dr. Shen       on  4/16/24      Again, thank you for allowing me to participate in the care of your patient.      Sincerely,    Tom Pinedo MD

## 2024-04-16 NOTE — NURSING NOTE
Is the patient currently in the state of [place]? YES    Visit mode:TELEPHONE    If the visit is dropped, the patient can be reconnected by: TELEPHONE VISIT: Phone number: [phone]    Will anyone else be joining the visit? NO  (If patient encounters technical issues they should call [phone]    How would you like to obtain your AVS? MyChart    Are changes needed to the allergy or medication list? No    Are refills needed on medications prescribed by this physician? NO    Reason for visit: Consult    Deb NELSON

## 2024-04-17 ENCOUNTER — TELEPHONE (OUTPATIENT)
Dept: NEUROSURGERY | Facility: CLINIC | Age: 74
End: 2024-04-17
Payer: MEDICARE

## 2024-04-17 NOTE — TELEPHONE ENCOUNTER
Left Voicemail (1st Attempt) for the patient to call back and schedule the following:    Appointment type: Rtn vascular neurosurg  Provider: Dr. Pinedo  Return date: In 2 wks  Specialty phone number: [phone]  Additional appointment(s) needed: CTA prior  Additonal Notes: 2 wk follow up/ Confusion/ CTA prior

## 2024-04-17 NOTE — TELEPHONE ENCOUNTER
Called patient back to discuss. Patient missed call from Tamela this morning, let her know Tamela I our  and was calling to get appointment and CTA scheduled. Patient is concerned because she never heard from Dr. Pinedo yesterday like Dr. Shen said that she would. Patient is wondering why hospitalist at [facility] had different thoughts on her imaging than Dr. Shen and Dr. Pinedo. Let her know that Dr. Pinedo is the expert on reading these images and may catch some things that others may not. Patient concerned about the artifact on her MRA and if that is a common thing that is seen. Dr. Shen did a great ob speaking with patient but she would like to speak with Dr. Pinedo to confirm all of her thoughts and questions. Patient asks very intelligent questions and is very aware of the visit yesterday and what was told to her. She is feeling anxious and overwhelmed about everything as she feels as soon as one thing is solved, another comes up. She is concerned regarding the CTA due to her contrast allergy as she had a very bad experience and does not want to subject herself to contrast if it is not needed. She is willing to do what it takes to put her concerns to rest she is just wanting to do the very best thing and not subject herself to more harm. RN gave patient the number to call to schedule the CTA and will have Dr. Pinedo call patient. Message sent to Dr. Pinedo and he says he will call her. Patient had no further questions for me at this time and will await call from Dr. Pinedo.   Traci Nelson RN 4/17/2024 3:24 PM

## 2024-04-17 NOTE — TELEPHONE ENCOUNTER
Pt returned call.  She said that she never had an appointment yesterday with Dr. Pinedo.  She has an appointment scheduled with Dr. Pinedo on 5/15.  Should she keep that appointment?  She is also unsure if she wants to schedule the CTA.  She has several questions she would like answered prior to scheduling.  Please call her to discuss.  Thanks.

## 2024-04-19 ENCOUNTER — TELEPHONE (OUTPATIENT)
Dept: NEUROSURGERY | Facility: CLINIC | Age: 74
End: 2024-04-19
Payer: MEDICARE

## 2024-04-23 ENCOUNTER — OFFICE VISIT (OUTPATIENT)
Dept: BEHAVIORAL HEALTH | Facility: CLINIC | Age: 74
End: 2024-04-23
Payer: MEDICARE

## 2024-04-23 DIAGNOSIS — F33.0 MILD EPISODE OF RECURRENT MAJOR DEPRESSIVE DISORDER (H): ICD-10-CM

## 2024-04-23 DIAGNOSIS — F41.1 GENERALIZED ANXIETY DISORDER: Primary | ICD-10-CM

## 2024-04-23 PROCEDURE — 90837 PSYTX W PT 60 MINUTES: CPT | Performed by: SOCIAL WORKER

## 2024-04-23 NOTE — PROGRESS NOTES
[facility]  April 23, 2024      Behavioral Health Clinician Progress Note    Patient Name: Emely Forbes           Service Type:  Individual      Service Location:   Face to Face in Clinic     Session Start Time: 11:00 am  Session End Time: 12:00 pm      Session Length: 53 - 60      Attendees: Patient     Service Modality:  In-person    Visit Activities (Refresh list every visit): [facility] Only    Diagnostic Assessment Date: 1/11/24  Treatment Plan Review Date: 7/23/24  See Flowsheets for today's PHQ-9 and SOILA-7 results  Previous PHQ-9:       4/16/2024     9:41 AM   PHQ-9 SCORE   PHQ-9 Total Score 6     Previous SOILA-7:       1/11/2024     3:10 PM   SOILA-7 SCORE   Total Score 11 (moderate anxiety)   Total Score 11       OSMEL LEVEL:      9/5/2012     9:00 AM 12/2/2013     9:00 AM   OSMEL Score (Last Two)   OSMEL Raw Score 39 52   Activation Score 56.4 100   OSMEL Level 3 4       DATA  Extended Session (60+ minutes): No  Interactive Complexity: No  Crisis: No  [facility] Patient: No    Treatment Objective(s) Addressed in This Session:  Target Behavior(s):  anxiety and depression    Depressed Mood: Increase interest, engagement, and pleasure in doing things  Decrease frequency and intensity of feeling down, depressed, hopeless  Improve quantity and quality of night time sleep / decrease daytime naps  Feel less tired and more energy during the day   Improve diet, appetite, mindful eating, and / or meal planning  Identify negative self-talk and behaviors: challenge core beliefs, myths, and actions  Improve concentration, focus, and mindfulness in daily activities   Feel less fidgety, restless or slow in daily activities / interpersonal interactions  Anxiety: will experience a reduction in anxiety, will develop more effective coping skills to manage anxiety symptoms, will develop healthy cognitive patterns and beliefs, and will increase ability to function adaptively    Current  Stressors / Issues:  Pt reports working hard on managing how others treat her and reinforcing her feelings. She has an upcoming birthday party for grandson which is a bit anxiety provoking. States she plans on being kind to others and enjoying grandson. Pt continues to work on health issues with providers. Notes better control with heart medications.     4/9/23  Reports several health issues this past week. Discussed wanting to reduce stress in home life as a result. Discussed having conversation with  about how she feels and what she wants moving forward. Discussed how difficult it can be to have to implement change in relationship and effort required.     4/2/24  Discussed pts families concern and desire to have pt have less of an emotional reaction to difficult situations. Discussed pts desires be able to express emotions without back lash from family. Pt wants to have conversation with  regarding gaining validation of her feelings and getting support from him. Validated pts concerns about expressing her needs with family. Discussed seeing couples therapist with .     3/19/24  Pt reports when on trip her dog was not doing well and ultimately had to be put down. When she was gone husbands dtr Deb came to talk with her dad but they did not address the issue at hand or discuss the distance for the past 9 months. This frustrated pt as it represented past behaviors. She will be having testing on her heart this coming week and will be stopping medications prior to test. This is anxiety provoking for pt.     2/20/24  Discussed on going relationship dynamics within family system. Discussed talking with spouse about fears of him resenting her for his poor relationship with dtr. Discussed ongoing struggles with letting go of emotional distress of negative interactions with others.     Progress on Treatment Objective(s) / Homework:  Satisfactory progress - PRECONTEMPLATION (Not seeing need for  change); Intervened by educating the patient about the effects of current behavior on health.  Evoked information about reasons to continue behavior, express concern / recommendations, and explored any change talk    Motivational Interviewing    MI Intervention: Expressed Empathy/Understanding, Supported Autonomy, Collaboration, Evocation, Permission to raise concern or advise, Open-ended questions, Change talk (evoked), and Reframe     Change Talk Expressed by the Patient: Desire to change    Provider Response to Change Talk: E - Evoked more info from patient about behavior change, A - Affirmed patient's thoughts, decisions, or attempts at behavior change, R - Reflected patient's change talk, and S - Summarized patient's change talk statements    Assessments completed prior to visit:  The following assessments were completed by patient for this visit:  PHQ2:       4/16/2024     9:41 AM 12/26/2023    10:03 AM 6/23/2022    11:38 AM 6/7/2022     9:39 AM 1/28/2021     8:08 AM 1/27/2016    12:10 PM 12/2/2013     9:50 AM   PHQ-2 ( 1999 [employer])   Q1: Little interest or pleasure in doing things 0 0 0 0 0 0 0   Q2: Feeling down, depressed or hopeless 3 0 0 0 0 0 0   PHQ-2 Score 3 0 0 0 0 0 0   PHQ-2 Total Score (12-17 Years)- Positive if 3 or more points; Administer PHQ-A if positive     0     Q1: Little interest or pleasure in doing things  Not at all  Not at all    Not at all      Q2: Feeling down, depressed or hopeless  Not at all  Not at all    Not at all      PHQ-2 Score  0  0    0        PHQ9:       4/16/2024     9:41 AM   PHQ-9 SCORE   PHQ-9 Total Score 6     GAD2:       1/11/2024     3:10 PM   SOILA-2   Feeling nervous, anxious, or on edge 3   Not being able to stop or control worrying 1   SOILA-2 Total Score 4     GAD7:       1/11/2024     3:10 PM   SOILA-7 SCORE   Total Score 11 (moderate anxiety)   Total Score 11     CAGE-AID:       1/11/2024     2:58 PM   CAGE-AID Total Score   Total Score 0   Total Score MyChart 0 (A  total score of 2 or greater is considered clinically significant)     PROMIS 10-Global Health (all questions and answers displayed):       1/11/2024     3:07 PM   PROMIS 10   In general, would you say your health is: Good   In general, would you say your quality of life is: Good   In general, how would you rate your physical health? Good   In general, how would you rate your mental health, including your mood and your ability to think? Good   In general, how would you rate your satisfaction with your social activities and relationships? Good   In general, please rate how well you carry out your usual social activities and roles Good   To what extent are you able to carry out your everyday physical activities such as walking, climbing stairs, carrying groceries, or moving a chair? Completely   In the past 7 days, how often have you been bothered by emotional problems such as feeling anxious, depressed, or irritable? Always   In the past 7 days, how would you rate your fatigue on average? Mild   In the past 7 days, how would you rate your pain on average, where 0 means no pain, and 10 means worst imaginable pain? 3   In general, would you say your health is: 3   In general, would you say your quality of life is: 3   In general, how would you rate your physical health? 3   In general, how would you rate your mental health, including your mood and your ability to think? 3   In general, how would you rate your satisfaction with your social activities and relationships? 3   In general, please rate how well you carry out your usual social activities and roles. (This includes activities at home, at work and in your community, and responsibilities as a parent, child, spouse, employee, friend, etc.) 3   To what extent are you able to carry out your everyday physical activities such as walking, climbing stairs, carrying groceries, or moving a chair? 5   In the past 7 days, how often have you been bothered by emotional problems  such as feeling anxious, depressed, or irritable? 5   In the past 7 days, how would you rate your fatigue on average? 2   In the past 7 days, how would you rate your pain on average, where 0 means no pain, and 10 means worst imaginable pain? 3   Global Mental Health Score 10   Global Physical Health Score 16   PROMIS TOTAL - SUBSCORES 26     PROMIS 10-Global Health (only subscores and total score):       1/11/2024     3:07 PM   PROMIS-10 Scores Only   Global Mental Health Score 10   Global Physical Health Score 16   PROMIS TOTAL - SUBSCORES 26     [place] Suicide Severity Rating Scale (Lifetime/Recent)      1/12/2024     8:08 AM 4/5/2024     1:22 PM   [place] Suicide Severity Rating (Lifetime/Recent)   Q1 Wished to be Dead (Past Month)  0-->no   Q2 Suicidal Thoughts (Past Month)  0-->no   Q6 Suicide Behavior (Lifetime)  0-->no   Level of Risk per Screen  no risks indicated   Q1 Wish to be Dead (Lifetime) Y    Wish to be Dead Description (Lifetime) 2000 when  went to half-way, no plan or intentions    1. Wish to be Dead (Past 1 Month) N    Q2 Non-Specific Active Suicidal Thoughts (Lifetime) N    Reasons for Ideation (Lifetime) 4    Actual Attempt (Lifetime) N    Has subject engaged in non-suicidal self-injurious behavior? (Lifetime) N    Interrupted Attempts (Lifetime) N    Aborted or Self-Interrupted Attempt (Lifetime) N    Preparatory Acts or Behavior (Lifetime) N    Calculated C-SSRS Risk Score (Lifetime/Recent) No Risk Indicated        Care Plan review completed: Yes    Medication Review:  No changes to current psychiatric medication(s)    Medication Compliance:  Yes    Changes in Health Issues:   None reported    Chemical Use Review:   Substance Use: Chemical use reviewed, no active concerns identified      Tobacco Use: No current tobacco use.      Assessment: Current Emotional / Mental Status (status of significant symptoms):  Risk status (Self / Other harm or suicidal ideation)  Patient denies a history  of suicidal ideation, suicide attempts, self-injurious behavior, homicidal ideation, homicidal behavior, and and other safety concerns  Patient denies current fears or concerns for personal safety.  Patient denies current or recent suicidal ideation or behaviors.  Patient denies current or recent homicidal ideation or behaviors.  Patient denies current or recent self injurious behavior or ideation.  Patient denies other safety concerns.  A safety and risk management plan has not been developed at this time, however patient was encouraged to call [place] [phone] should there be a change in any of these risk factors.    Appearance:   Appropriate   Eye Contact:   Good   Psychomotor Behavior: Normal   Attitude:   Cooperative   Orientation:   All  Speech   Rate / Production: Normal    Volume:  Normal   Mood:    Anxious  Depressed   Affect:    Appropriate   Thought Content:  Clear   Thought Form:  Coherent  Logical   Insight:    Good     Diagnoses:  1. Generalized anxiety disorder    2. Mild episode of recurrent major depressive disorder (H24)          Collateral Reports Completed:  Not Applicable    Plan: (Homework, other):  Patient was given information about behavioral services and encouraged to schedule a follow up appointment with the clinic [facility] [place] in 1 week.  She was also given information about mental health symptoms and treatment options .  CD Recommendations: No indications of CD issues.       STIVEN Godinez    ______________________________________________________________________    Integrated Primary Care Behavioral Health Treatment Plan    Patient's Name: Emely Forbes  YOB: 1950    Date of Creation: 1/18/24  Date Treatment Plan Last Reviewed/Revised: 4/23/24    DSM5 Diagnoses: 296.31 (F33.0) Major Depressive Disorder, Recurrent Episode, Mild _ or 300.02 (F41.1) Generalized Anxiety Disorder  Psychosocial / Contextual Factors:  Individual Factors management of MH and Family Factors family  ignacio    PROMIS (reviewed every 90 days):     Referral / Collaboration:  Referral to another professional/service is not indicated at this time..    Anticipated number of session for this episode of care: 4-6 sessions  Anticipation frequency of session: Weekly  Anticipated Duration of each session: 38-52 minutes  Treatment plan will be reviewed in 90 days or when goals have been changed.       MeasurableTreatment Goal(s) related to diagnosis / functional impairment(s)  Goal 1: Patient will work with providers to manage symptoms    I will know I've met my goal when less anxious and down.      Objective #A (Patient Action)    Patient will  attend all appointments, take medication as prescribed .  Status: Continued - Date(s):4/23/24     Intervention(s)  Therapist will  Monitor and assist in overcoming barriers to treatment adherence .    Objective #B  Patient will  consider all recommendations offered .  Status: Continued - Date(s):4/23/24     Intervention(s)  Therapist will  educate patient on treatment options, clarify concerns, work with pt to overcome any resistance to compliance .        Patient has reviewed and agreed to the above plan.      STIVEN Godinez  April 23, 2024

## 2024-04-30 ENCOUNTER — VIRTUAL VISIT (OUTPATIENT)
Dept: NEUROSURGERY | Facility: CLINIC | Age: 74
End: 2024-04-30
Payer: MEDICARE

## 2024-04-30 ENCOUNTER — ANCILLARY PROCEDURE (OUTPATIENT)
Dept: CT IMAGING | Facility: CLINIC | Age: 74
End: 2024-04-30
Attending: RADIOLOGY
Payer: MEDICARE

## 2024-04-30 VITALS — BODY MASS INDEX: 23.82 KG/M2 | WEIGHT: 143 LBS | HEIGHT: 65 IN

## 2024-04-30 DIAGNOSIS — R41.0 CONFUSION: ICD-10-CM

## 2024-04-30 DIAGNOSIS — R41.0 CONFUSION: Primary | ICD-10-CM

## 2024-04-30 PROCEDURE — 250N000011 HC RX IP 250 OP 636: Performed by: RADIOLOGY

## 2024-04-30 PROCEDURE — 250N000009 HC RX 250: Performed by: RADIOLOGY

## 2024-04-30 PROCEDURE — 99442 PR PHYSICIAN TELEPHONE EVALUATION 11-20 MIN: CPT | Mod: 93 | Performed by: RADIOLOGY

## 2024-04-30 PROCEDURE — 70496 CT ANGIOGRAPHY HEAD: CPT | Mod: MG

## 2024-04-30 RX ORDER — IOPAMIDOL 755 MG/ML
67 INJECTION, SOLUTION INTRAVASCULAR ONCE
Status: COMPLETED | OUTPATIENT
Start: 2024-04-30 | End: 2024-04-30

## 2024-04-30 RX ADMIN — IOPAMIDOL 67 ML: 755 INJECTION, SOLUTION INTRAVENOUS at 09:16

## 2024-04-30 RX ADMIN — SODIUM CHLORIDE 70 ML: 9 INJECTION, SOLUTION INTRAVENOUS at 09:16

## 2024-04-30 ASSESSMENT — PAIN SCALES - GENERAL: PAINLEVEL: SEVERE PAIN (6)

## 2024-04-30 NOTE — PROGRESS NOTES
Virtual Visit Details    Type of service:  Telephone Visit   Phone call duration: 15 minutes   Originating Location (pt. Location): Home    I called Ms Forbes today and discussed with her the results of her CT angiogram. The CT angiogram of her head and neck showed no evidence for stenosis or occlusion of her basilar artery as had been suggested by the brain MRI. There is mild ectasia of the proximal basilar artery, which is of no import. I do not think there is significant cerebrovascular pathology and she does not need any treatment from us. We will repeat the CTA in 3 years to monitor the slight enlargement of the lower basilar artery. I spent about 15 minutes on the phone with her, she was much relieved .

## 2024-04-30 NOTE — NURSING NOTE
Is the patient currently in the state of [place]? YES    Visit mode:TELEPHONE    If the visit is dropped, the patient can be reconnected by: TELEPHONE VISIT: Phone number: [phone]    Will anyone else be joining the visit? NO  (If patient encounters technical issues they should call [phone]    How would you like to obtain your AVS? MyChart    Are changes needed to the allergy or medication list? No    Are refills needed on medications prescribed by this physician? NO    Reason for visit: KACI NELSON

## 2024-04-30 NOTE — LETTER
4/30/2024       RE: Emely Forbes  [address]  [place] [address]       Dear Colleague,    Thank you for referring your patient, Emely Forbes, to the [facility]UROSURGERY CLINIC [facility]. Please see a copy of my visit note below.      I called Ms Forbes today and discussed with her the results of her CT angiogram. The CT angiogram of her head and neck showed no evidence for stenosis or occlusion of her basilar artery as had been suggested by the brain MRI. There is mild ectasia of the proximal basilar artery, which is of no import. I do not think there is significant cerebrovascular pathology and she does not need any treatment from us. We will repeat the CTA in 3 years to monitor the slight enlargement of the lower basilar artery. I spent about 15 minutes on the phone with her, she was much relieved .      Again, thank you for allowing me to participate in the care of your patient.      Sincerely,    Tom Pinedo MD

## 2024-05-06 NOTE — PROGRESS NOTES
[facility]  May 8, 2024      Behavioral Health Clinician Progress Note    Patient Name: Emely Forbes           Service Type:  Individual      Service Location:   Face to Face in Clinic     Session Start Time: 10:50 am  Session End Time: 11:50 pm      Session Length: 53 - 60      Attendees: Patient     Service Modality:  In-person    Visit Activities (Refresh list every visit): [facility] Only    Diagnostic Assessment Date: 1/11/24  Treatment Plan Review Date: 7/23/24  See Flowsheets for today's PHQ-9 and SOILA-7 results  Previous PHQ-9:       4/16/2024     9:41 AM   PHQ-9 SCORE   PHQ-9 Total Score 6     Previous SOILA-7:       1/11/2024     3:10 PM   SOILA-7 SCORE   Total Score 11 (moderate anxiety)   Total Score 11       OSMEL LEVEL:      9/5/2012     9:00 AM 12/2/2013     9:00 AM   OMSEL Score (Last Two)   OSMEL Raw Score 39 52   Activation Score 56.4 100   OSMEL Level 3 4       DATA  Extended Session (60+ minutes): No  Interactive Complexity: No  Crisis: No  [facility] Patient: No    Treatment Objective(s) Addressed in This Session:  Target Behavior(s):  anxiety and depression    Depressed Mood: Increase interest, engagement, and pleasure in doing things  Decrease frequency and intensity of feeling down, depressed, hopeless  Improve quantity and quality of night time sleep / decrease daytime naps  Feel less tired and more energy during the day   Improve diet, appetite, mindful eating, and / or meal planning  Identify negative self-talk and behaviors: challenge core beliefs, myths, and actions  Improve concentration, focus, and mindfulness in daily activities   Feel less fidgety, restless or slow in daily activities / interpersonal interactions  Anxiety: will experience a reduction in anxiety, will develop more effective coping skills to manage anxiety symptoms, will develop healthy cognitive patterns and beliefs, and will increase ability to function adaptively    Current  "Stressors / Issues:  Pt discussed ongoing struggles with in family dynamics. Discussed managing emotions she has when interactions with family members do not go as well or she is hurt by responses or reactions. Discussed pt feeling validated with feelings she has. Historically family feels she is \"too emotional.\" Pt often feels he helps others with negative feedback. This can be very hurtful for her when her intentions are out of love and wanting to help. Discussed attempts to not take others negative reactions personally. Recommended the Four Agreements for pt to read. Pt continues work with  on treating her how she would like to be treated.     4/23/24  Pt reports working hard on managing how others treat her and reinforcing her feelings. She has an upcoming birthday party for grandson which is a bit anxiety provoking. States she plans on being kind to others and enjoying grandson. Pt continues to work on health issues with providers. Notes better control with heart medications.     4/9/23  Reports several health issues this past week. Discussed wanting to reduce stress in home life as a result. Discussed having conversation with  about how she feels and what she wants moving forward. Discussed how difficult it can be to have to implement change in relationship and effort required.     4/2/24  Discussed pts families concern and desire to have pt have less of an emotional reaction to difficult situations. Discussed pts desires be able to express emotions without back lash from family. Pt wants to have conversation with  regarding gaining validation of her feelings and getting support from him. Validated pts concerns about expressing her needs with family. Discussed seeing couples therapist with .     3/19/24  Pt reports when on trip her dog was not doing well and ultimately had to be put down. When she was gone husbands heena Boyd came to talk with her dad but they did not address the " issue at hand or discuss the distance for the past 9 months. This frustrated pt as it represented past behaviors. She will be having testing on her heart this coming week and will be stopping medications prior to test. This is anxiety provoking for pt.     2/20/24  Discussed on going relationship dynamics within family system. Discussed talking with spouse about fears of him resenting her for his poor relationship with dtr. Discussed ongoing struggles with letting go of emotional distress of negative interactions with others.     Progress on Treatment Objective(s) / Homework:  Satisfactory progress - PRECONTEMPLATION (Not seeing need for change); Intervened by educating the patient about the effects of current behavior on health.  Evoked information about reasons to continue behavior, express concern / recommendations, and explored any change talk    Motivational Interviewing    MI Intervention: Expressed Empathy/Understanding, Supported Autonomy, Collaboration, Evocation, Permission to raise concern or advise, Open-ended questions, Change talk (evoked), and Reframe     Change Talk Expressed by the Patient: Desire to change    Provider Response to Change Talk: E - Evoked more info from patient about behavior change, A - Affirmed patient's thoughts, decisions, or attempts at behavior change, R - Reflected patient's change talk, and S - Summarized patient's change talk statements    Assessments completed prior to visit:  The following assessments were completed by patient for this visit:  PHQ2:       4/30/2024    10:23 AM 4/16/2024     9:41 AM 12/26/2023    10:03 AM 6/23/2022    11:38 AM 6/7/2022     9:39 AM 1/28/2021     8:08 AM 1/27/2016    12:10 PM   PHQ-2 ( 1999 [employer])   Q1: Little interest or pleasure in doing things 0 0 0 0 0 0 0   Q2: Feeling down, depressed or hopeless 1 3 0 0 0 0 0   PHQ-2 Score 1 3 0 0 0 0 0   PHQ-2 Total Score (12-17 Years)- Positive if 3 or more points; Administer PHQ-A if positive      0     Q1: Little interest or pleasure in doing things   Not at all  Not at all    Not at all     Q2: Feeling down, depressed or hopeless   Not at all  Not at all    Not at all     PHQ-2 Score   0  0    0       PHQ9:       4/16/2024     9:41 AM   PHQ-9 SCORE   PHQ-9 Total Score 6     GAD2:       1/11/2024     3:10 PM   SOILA-2   Feeling nervous, anxious, or on edge 3   Not being able to stop or control worrying 1   SOILA-2 Total Score 4     GAD7:       1/11/2024     3:10 PM   SOILA-7 SCORE   Total Score 11 (moderate anxiety)   Total Score 11     CAGE-AID:       1/11/2024     2:58 PM   CAGE-AID Total Score   Total Score 0   Total Score MyChart 0 (A total score of 2 or greater is considered clinically significant)     PROMIS 10-Global Health (all questions and answers displayed):       1/11/2024     3:07 PM   PROMIS 10   In general, would you say your health is: Good   In general, would you say your quality of life is: Good   In general, how would you rate your physical health? Good   In general, how would you rate your mental health, including your mood and your ability to think? Good   In general, how would you rate your satisfaction with your social activities and relationships? Good   In general, please rate how well you carry out your usual social activities and roles Good   To what extent are you able to carry out your everyday physical activities such as walking, climbing stairs, carrying groceries, or moving a chair? Completely   In the past 7 days, how often have you been bothered by emotional problems such as feeling anxious, depressed, or irritable? Always   In the past 7 days, how would you rate your fatigue on average? Mild   In the past 7 days, how would you rate your pain on average, where 0 means no pain, and 10 means worst imaginable pain? 3   In general, would you say your health is: 3   In general, would you say your quality of life is: 3   In general, how would you rate your physical health? 3   In general,  how would you rate your mental health, including your mood and your ability to think? 3   In general, how would you rate your satisfaction with your social activities and relationships? 3   In general, please rate how well you carry out your usual social activities and roles. (This includes activities at home, at work and in your community, and responsibilities as a parent, child, spouse, employee, friend, etc.) 3   To what extent are you able to carry out your everyday physical activities such as walking, climbing stairs, carrying groceries, or moving a chair? 5   In the past 7 days, how often have you been bothered by emotional problems such as feeling anxious, depressed, or irritable? 5   In the past 7 days, how would you rate your fatigue on average? 2   In the past 7 days, how would you rate your pain on average, where 0 means no pain, and 10 means worst imaginable pain? 3   Global Mental Health Score 10   Global Physical Health Score 16   PROMIS TOTAL - SUBSCORES 26     PROMIS 10-Global Health (only subscores and total score):       1/11/2024     3:07 PM   PROMIS-10 Scores Only   Global Mental Health Score 10   Global Physical Health Score 16   PROMIS TOTAL - SUBSCORES 26     [place] Suicide Severity Rating Scale (Lifetime/Recent)      1/12/2024     8:08 AM 4/5/2024     1:22 PM   [place] Suicide Severity Rating (Lifetime/Recent)   Q1 Wished to be Dead (Past Month)  0-->no   Q2 Suicidal Thoughts (Past Month)  0-->no   Q6 Suicide Behavior (Lifetime)  0-->no   Level of Risk per Screen  no risks indicated   Q1 Wish to be Dead (Lifetime) Y    Wish to be Dead Description (Lifetime) 2000 when  went to senior care, no plan or intentions    1. Wish to be Dead (Past 1 Month) N    Q2 Non-Specific Active Suicidal Thoughts (Lifetime) N    Reasons for Ideation (Lifetime) 4    Actual Attempt (Lifetime) N    Has subject engaged in non-suicidal self-injurious behavior? (Lifetime) N    Interrupted Attempts (Lifetime) N     Aborted or Self-Interrupted Attempt (Lifetime) N    Preparatory Acts or Behavior (Lifetime) N    Calculated C-SSRS Risk Score (Lifetime/Recent) No Risk Indicated        Care Plan review completed: Yes    Medication Review:  No changes to current psychiatric medication(s)    Medication Compliance:  Yes    Changes in Health Issues:   None reported    Chemical Use Review:   Substance Use: Chemical use reviewed, no active concerns identified      Tobacco Use: No current tobacco use.      Assessment: Current Emotional / Mental Status (status of significant symptoms):  Risk status (Self / Other harm or suicidal ideation)  Patient denies a history of suicidal ideation, suicide attempts, self-injurious behavior, homicidal ideation, homicidal behavior, and and other safety concerns  Patient denies current fears or concerns for personal safety.  Patient denies current or recent suicidal ideation or behaviors.  Patient denies current or recent homicidal ideation or behaviors.  Patient denies current or recent self injurious behavior or ideation.  Patient denies other safety concerns.  A safety and risk management plan has not been developed at this time, however patient was encouraged to call [place] [phone] should there be a change in any of these risk factors.    Appearance:   Appropriate   Eye Contact:   Good   Psychomotor Behavior: Normal   Attitude:   Cooperative   Orientation:   All  Speech   Rate / Production: Normal    Volume:  Normal   Mood:    Anxious  Depressed   Affect:    Appropriate   Thought Content:  Clear   Thought Form:  Coherent  Logical   Insight:    Good     Diagnoses:  1. Generalized anxiety disorder    2. Mild episode of recurrent major depressive disorder (H24)          Collateral Reports Completed:  Not Applicable    Plan: (Homework, other):  Patient was given information about behavioral services and encouraged to schedule a follow up appointment with the clinic [facility] in 1 week.  She was also  given information about mental health symptoms and treatment options .  CD Recommendations: No indications of CD issues.       STIVEN Godinez    ______________________________________________________________________    Integrated Primary Care Behavioral Health Treatment Plan    Patient's Name: Emely Forbes  YOB: 1950    Date of Creation: 1/18/24  Date Treatment Plan Last Reviewed/Revised: 4/23/24    DSM5 Diagnoses: 296.31 (F33.0) Major Depressive Disorder, Recurrent Episode, Mild _ or 300.02 (F41.1) Generalized Anxiety Disorder  Psychosocial / Contextual Factors:  Individual Factors management of MH and Family Factors family discord    PROMIS (reviewed every 90 days):     Referral / Collaboration:  Referral to another professional/service is not indicated at this time..    Anticipated number of session for this episode of care: 4-6 sessions  Anticipation frequency of session: Weekly  Anticipated Duration of each session: 38-52 minutes  Treatment plan will be reviewed in 90 days or when goals have been changed.       MeasurableTreatment Goal(s) related to diagnosis / functional impairment(s)  Goal 1: Patient will work with providers to manage symptoms    I will know I've met my goal when less anxious and down.      Objective #A (Patient Action)    Patient will  attend all appointments, take medication as prescribed .  Status: Continued - Date(s):4/23/24     Intervention(s)  Therapist will  Monitor and assist in overcoming barriers to treatment adherence .    Objective #B  Patient will  consider all recommendations offered .  Status: Continued - Date(s):4/23/24     Intervention(s)  Therapist will  educate patient on treatment options, clarify concerns, work with pt to overcome any resistance to compliance .        Patient has reviewed and agreed to the above plan.      STIVEN Godinez  May 8, 2024

## 2024-05-08 ENCOUNTER — OFFICE VISIT (OUTPATIENT)
Dept: BEHAVIORAL HEALTH | Facility: CLINIC | Age: 74
End: 2024-05-08
Payer: MEDICARE

## 2024-05-08 DIAGNOSIS — F41.1 GENERALIZED ANXIETY DISORDER: Primary | ICD-10-CM

## 2024-05-08 DIAGNOSIS — F33.0 MILD EPISODE OF RECURRENT MAJOR DEPRESSIVE DISORDER (H): ICD-10-CM

## 2024-05-08 PROCEDURE — 90837 PSYTX W PT 60 MINUTES: CPT | Performed by: SOCIAL WORKER

## 2024-05-08 NOTE — PATIENT INSTRUCTIONS
Follow-up with Dr. Pinedo in 3 years with a CTA prior to your appointment     St[facility]ovascular RN Care Coordinators:    Traci Nelson, RN, BSN  Elis Metcalf, RN, CNRN, SCRN    If you have any questions please contact the RN Care Coordinators at [phone], option 1.     After business hours call the  at [phone] and have the Neuro-Interventional Fellow paged.    Thank you for choosing [facility] for your health care needs.

## 2024-05-16 NOTE — PROGRESS NOTES
[facility]  May 21, 2024      Behavioral Health Clinician Progress Note    Patient Name: Emely Forbes           Service Type:  Individual      Service Location:   Face to Face in Clinic     Session Start Time: 10:00 am  Session End Time: 10:59 am      Session Length: 53 - 60      Attendees: Patient     Service Modality:  In-person    Visit Activities (Refresh list every visit): [facility] Only    Diagnostic Assessment Date: 1/11/24  Treatment Plan Review Date: 7/23/24  See Flowsheets for today's PHQ-9 and SOILA-7 results  Previous PHQ-9:       4/16/2024     9:41 AM   PHQ-9 SCORE   PHQ-9 Total Score 6     Previous SOILA-7:       1/11/2024     3:10 PM   SOILA-7 SCORE   Total Score 11 (moderate anxiety)   Total Score 11       OSMEL LEVEL:      9/5/2012     9:00 AM 12/2/2013     9:00 AM   OSMEL Score (Last Two)   OSMEL Raw Score 39 52   Activation Score 56.4 100   OSMLE Level 3 4       DATA  Extended Session (60+ minutes): No  Interactive Complexity: No  Crisis: No  [facility] Patient: No    Treatment Objective(s) Addressed in This Session:  Target Behavior(s):  anxiety and depression    Depressed Mood: Increase interest, engagement, and pleasure in doing things  Decrease frequency and intensity of feeling down, depressed, hopeless  Improve quantity and quality of night time sleep / decrease daytime naps  Feel less tired and more energy during the day   Improve diet, appetite, mindful eating, and / or meal planning  Identify negative self-talk and behaviors: challenge core beliefs, myths, and actions  Improve concentration, focus, and mindfulness in daily activities   Feel less fidgety, restless or slow in daily activities / interpersonal interactions  Anxiety: will experience a reduction in anxiety, will develop more effective coping skills to manage anxiety symptoms, will develop healthy cognitive patterns and beliefs, and will increase ability to function adaptively    Current  "Stressors / Issues:  Pt reports a difficult situation in which her  had a poor reaction to a very nice mothers day gift she had gotten from dtr. Discussed in the moment addressing with . She struggles with not understanding why he says what he does when it hurts her. Discussed couples therapy and direction in relationship. Pt is torn as relationship is very good with  but when children/family is involved is where conflict is born from. Discussed have open conversation with  about direction of relationship, investment in the relationship and hopefully setting goals. She will consider couples therapy again but is somewhat hesitant. Finally discussed feeling off with son and dtr in law who are close friends with her husbands dtr and son in law. Discussed inquiring about change in relationship with son or letting go of assumptions and emotions carried. She will have grandson's bday party this weekend where the entire family will be together again after a year. She is respectively anxious about this but is hoping to enjoy her time at the party.     5/8/24  Pt discussed ongoing struggles with in family dynamics. Discussed managing emotions she has when interactions with family members do not go as well or she is hurt by responses or reactions. Discussed pt feeling validated with feelings she has. Historically family feels she is \"too emotional.\" Pt often feels he helps others with negative feedback. This can be very hurtful for her when her intentions are out of love and wanting to help. Discussed attempts to not take others negative reactions personally. Recommended the Four Agreements for pt to read. Pt continues work with  on treating her how she would like to be treated.       Progress on Treatment Objective(s) / Homework:  Satisfactory progress - PRECONTEMPLATION (Not seeing need for change); Intervened by educating the patient about the effects of current behavior on health.  " Evoked information about reasons to continue behavior, express concern / recommendations, and explored any change talk    Motivational Interviewing    MI Intervention: Expressed Empathy/Understanding, Supported Autonomy, Collaboration, Evocation, Permission to raise concern or advise, Open-ended questions, Change talk (evoked), and Reframe     Change Talk Expressed by the Patient: Desire to change    Provider Response to Change Talk: E - Evoked more info from patient about behavior change, A - Affirmed patient's thoughts, decisions, or attempts at behavior change, R - Reflected patient's change talk, and S - Summarized patient's change talk statements    Assessments completed prior to visit:  The following assessments were completed by patient for this visit:  PHQ2:       4/30/2024    10:23 AM 4/16/2024     9:41 AM 12/26/2023    10:03 AM 6/23/2022    11:38 AM 6/7/2022     9:39 AM 1/28/2021     8:08 AM 1/27/2016    12:10 PM   PHQ-2 ( 1999 [employer])   Q1: Little interest or pleasure in doing things 0 0 0 0 0 0 0   Q2: Feeling down, depressed or hopeless 1 3 0 0 0 0 0   PHQ-2 Score 1 3 0 0 0 0 0   PHQ-2 Total Score (12-17 Years)- Positive if 3 or more points; Administer PHQ-A if positive      0    Q1: Little interest or pleasure in doing things   Not at all  Not at all    Not at all     Q2: Feeling down, depressed or hopeless   Not at all  Not at all    Not at all     PHQ-2 Score   0  0    0       PHQ9:       4/16/2024     9:41 AM   PHQ-9 SCORE   PHQ-9 Total Score 6     GAD2:       1/11/2024     3:10 PM   SOILA-2   Feeling nervous, anxious, or on edge 3   Not being able to stop or control worrying 1   SOILA-2 Total Score 4     GAD7:       1/11/2024     3:10 PM   SOILA-7 SCORE   Total Score 11 (moderate anxiety)   Total Score 11     CAGE-AID:       1/11/2024     2:58 PM   CAGE-AID Total Score   Total Score 0   Total Score MyChart 0 (A total score of 2 or greater is considered clinically significant)     PROMIS 10-Global Health  (all questions and answers displayed):       1/11/2024     3:07 PM   PROMIS 10   In general, would you say your health is: Good   In general, would you say your quality of life is: Good   In general, how would you rate your physical health? Good   In general, how would you rate your mental health, including your mood and your ability to think? Good   In general, how would you rate your satisfaction with your social activities and relationships? Good   In general, please rate how well you carry out your usual social activities and roles Good   To what extent are you able to carry out your everyday physical activities such as walking, climbing stairs, carrying groceries, or moving a chair? Completely   In the past 7 days, how often have you been bothered by emotional problems such as feeling anxious, depressed, or irritable? Always   In the past 7 days, how would you rate your fatigue on average? Mild   In the past 7 days, how would you rate your pain on average, where 0 means no pain, and 10 means worst imaginable pain? 3   In general, would you say your health is: 3   In general, would you say your quality of life is: 3   In general, how would you rate your physical health? 3   In general, how would you rate your mental health, including your mood and your ability to think? 3   In general, how would you rate your satisfaction with your social activities and relationships? 3   In general, please rate how well you carry out your usual social activities and roles. (This includes activities at home, at work and in your community, and responsibilities as a parent, child, spouse, employee, friend, etc.) 3   To what extent are you able to carry out your everyday physical activities such as walking, climbing stairs, carrying groceries, or moving a chair? 5   In the past 7 days, how often have you been bothered by emotional problems such as feeling anxious, depressed, or irritable? 5   In the past 7 days, how would you rate  your fatigue on average? 2   In the past 7 days, how would you rate your pain on average, where 0 means no pain, and 10 means worst imaginable pain? 3   Global Mental Health Score 10   Global Physical Health Score 16   PROMIS TOTAL - SUBSCORES 26     PROMIS 10-Global Health (only subscores and total score):       1/11/2024     3:07 PM   PROMIS-10 Scores Only   Global Mental Health Score 10   Global Physical Health Score 16   PROMIS TOTAL - SUBSCORES 26     Guin Suicide Severity Rating Scale (Lifetime/Recent)      1/12/2024     8:08 AM 4/5/2024     1:22 PM   Guin Suicide Severity Rating (Lifetime/Recent)   Q1 Wished to be Dead (Past Month)  0-->no   Q2 Suicidal Thoughts (Past Month)  0-->no   Q6 Suicide Behavior (Lifetime)  0-->no   Level of Risk per Screen  no risks indicated   Q1 Wish to be Dead (Lifetime) Y    Wish to be Dead Description (Lifetime) 2000 when  went to [facility], no plan or intentions    1. Wish to be Dead (Past 1 Month) N    Q2 Non-Specific Active Suicidal Thoughts (Lifetime) N    Reasons for Ideation (Lifetime) 4    Actual Attempt (Lifetime) N    Has subject engaged in non-suicidal self-injurious behavior? (Lifetime) N    Interrupted Attempts (Lifetime) N    Aborted or Self-Interrupted Attempt (Lifetime) N    Preparatory Acts or Behavior (Lifetime) N    Calculated C-SSRS Risk Score (Lifetime/Recent) No Risk Indicated        Care Plan review completed: Yes    Medication Review:  No changes to current psychiatric medication(s)    Medication Compliance:  Yes    Changes in Health Issues:   None reported    Chemical Use Review:   Substance Use: Chemical use reviewed, no active concerns identified      Tobacco Use: No current tobacco use.      Assessment: Current Emotional / Mental Status (status of significant symptoms):  Risk status (Self / Other harm or suicidal ideation)  Patient denies a history of suicidal ideation, suicide attempts, self-injurious behavior, homicidal ideation, homicidal  behavior, and and other safety concerns  Patient denies current fears or concerns for personal safety.  Patient denies current or recent suicidal ideation or behaviors.  Patient denies current or recent homicidal ideation or behaviors.  Patient denies current or recent self injurious behavior or ideation.  Patient denies other safety concerns.  A safety and risk management plan has not been developed at this time, however patient was encouraged to call [place] [phone] should there be a change in any of these risk factors.    Appearance:   Appropriate   Eye Contact:   Good   Psychomotor Behavior: Normal   Attitude:   Cooperative   Orientation:   All  Speech   Rate / Production: Normal    Volume:  Normal   Mood:    Anxious  Depressed   Affect:    Appropriate   Thought Content:  Clear   Thought Form:  Coherent  Logical   Insight:    Good     Diagnoses:  1. Generalized anxiety disorder    2. Mild episode of recurrent major depressive disorder (H24)            Collateral Reports Completed:  Not Applicable    Plan: (Homework, other):  Patient was given information about behavioral services and encouraged to schedule a follow up appointment with the clinic [facility] in 1 week.  She was also given information about mental health symptoms and treatment options .  CD Recommendations: No indications of CD issues.       STIVEN Godinez    ______________________________________________________________________    Integrated Primary Care Behavioral Health Treatment Plan    Patient's Name: Emely Forbes  YOB: 1950    Date of Creation: 1/18/24  Date Treatment Plan Last Reviewed/Revised: 4/23/24    DSM5 Diagnoses: 296.31 (F33.0) Major Depressive Disorder, Recurrent Episode, Mild _ or 300.02 (F41.1) Generalized Anxiety Disorder  Psychosocial / Contextual Factors:  Individual Factors management of MH and Family Factors family discord    PROMIS (reviewed every 90 days):     Referral / Collaboration:  Referral to another  professional/service is not indicated at this time..    Anticipated number of session for this episode of care: 4-6 sessions  Anticipation frequency of session: Weekly  Anticipated Duration of each session: 38-52 minutes  Treatment plan will be reviewed in 90 days or when goals have been changed.       MeasurableTreatment Goal(s) related to diagnosis / functional impairment(s)  Goal 1: Patient will work with providers to manage symptoms    I will know I've met my goal when less anxious and down.      Objective #A (Patient Action)    Patient will  attend all appointments, take medication as prescribed .  Status: Continued - Date(s):4/23/24     Intervention(s)  Therapist will  Monitor and assist in overcoming barriers to treatment adherence .    Objective #B  Patient will  consider all recommendations offered .  Status: Continued - Date(s):4/23/24     Intervention(s)  Therapist will  educate patient on treatment options, clarify concerns, work with pt to overcome any resistance to compliance .        Patient has reviewed and agreed to the above plan.      STIVEN Godinez  May 21, 2024

## 2024-05-21 ENCOUNTER — OFFICE VISIT (OUTPATIENT)
Dept: BEHAVIORAL HEALTH | Facility: CLINIC | Age: 74
End: 2024-05-21
Payer: MEDICARE

## 2024-05-21 DIAGNOSIS — F33.0 MILD EPISODE OF RECURRENT MAJOR DEPRESSIVE DISORDER (H): ICD-10-CM

## 2024-05-21 DIAGNOSIS — F41.1 GENERALIZED ANXIETY DISORDER: Primary | ICD-10-CM

## 2024-05-21 PROCEDURE — 90837 PSYTX W PT 60 MINUTES: CPT | Performed by: SOCIAL WORKER

## 2024-05-22 NOTE — PROGRESS NOTES
-She takes requisite for this.   [facility]  May 28, 2024      Behavioral Health Clinician Progress Note    Patient Name: Emely Forbes           Service Type:  Individual      Service Location:   Face to Face in Clinic     Session Start Time: 11:00 am  Session End Time: 11:32 am      Session Length: 16 - 37      Attendees: Patient     Service Modality:  In-person    Visit Activities (Refresh list every visit): [facility] [place] Only    Diagnostic Assessment Date: 1/11/24  Treatment Plan Review Date: 7/23/24  See Flowsheets for today's PHQ-9 and SOILA-7 results  Previous PHQ-9:       4/16/2024     9:41 AM   PHQ-9 SCORE   PHQ-9 Total Score 6     Previous SOILA-7:       1/11/2024     3:10 PM   SOILA-7 SCORE   Total Score 11 (moderate anxiety)   Total Score 11       OSMEL LEVEL:      9/5/2012     9:00 AM 12/2/2013     9:00 AM   OSMEL Score (Last Two)   OSMEL Raw Score 39 52   Activation Score 56.4 100   OSMEL Level 3 4       DATA  Extended Session (60+ minutes): No  Interactive Complexity: No  Crisis: No  [facility] Patient: No    Treatment Objective(s) Addressed in This Session:  Target Behavior(s):  anxiety and depression    Depressed Mood: Increase interest, engagement, and pleasure in doing things  Decrease frequency and intensity of feeling down, depressed, hopeless  Improve quantity and quality of night time sleep / decrease daytime naps  Feel less tired and more energy during the day   Improve diet, appetite, mindful eating, and / or meal planning  Identify negative self-talk and behaviors: challenge core beliefs, myths, and actions  Improve concentration, focus, and mindfulness in daily activities   Feel less fidgety, restless or slow in daily activities / interpersonal interactions  Anxiety: will experience a reduction in anxiety, will develop more effective coping skills to manage anxiety symptoms, will develop healthy cognitive patterns and beliefs, and will increase ability to function adaptively    Current  "Stressors / Issues:  Reports she has been having conversations with  Ery regarding how he approaches their relationship and family struggles. She too has been in the moment pointing out behaviors she does not like with him, generally how he assumes and presents issues. She has upcoming party for grandson where the entire family is likely to be together. She is attempting to remain in the moment and enjoy her grand children.     5/21/24  Pt reports a difficult situation in which her  had a poor reaction to a very nice mothers day gift she had gotten from dtr. Discussed in the moment addressing with . She struggles with not understanding why he says what he does when it hurts her. Discussed couples therapy and direction in relationship. Pt is torn as relationship is very good with  but when children/family is involved is where conflict is born from. Discussed have open conversation with  about direction of relationship, investment in the relationship and hopefully setting goals. She will consider couples therapy again but is somewhat hesitant. Finally discussed feeling off with son and dtr in law who are close friends with her husbands dtr and son in law. Discussed inquiring about change in relationship with son or letting go of assumptions and emotions carried. She will have grandson's bday party this weekend where the entire family will be together again after a year. She is respectively anxious about this but is hoping to enjoy her time at the party.     5/8/24  Pt discussed ongoing struggles with in family dynamics. Discussed managing emotions she has when interactions with family members do not go as well or she is hurt by responses or reactions. Discussed pt feeling validated with feelings she has. Historically family feels she is \"too emotional.\" Pt often feels he helps others with negative feedback. This can be very hurtful for her when her intentions are out of love and " wanting to help. Discussed attempts to not take others negative reactions personally. Recommended the Four Agreements for pt to read. Pt continues work with  on treating her how she would like to be treated.       Progress on Treatment Objective(s) / Homework:  Satisfactory progress - PRECONTEMPLATION (Not seeing need for change); Intervened by educating the patient about the effects of current behavior on health.  Evoked information about reasons to continue behavior, express concern / recommendations, and explored any change talk    Motivational Interviewing    MI Intervention: Expressed Empathy/Understanding, Supported Autonomy, Collaboration, Evocation, Permission to raise concern or advise, Open-ended questions, Change talk (evoked), and Reframe     Change Talk Expressed by the Patient: Desire to change    Provider Response to Change Talk: E - Evoked more info from patient about behavior change, A - Affirmed patient's thoughts, decisions, or attempts at behavior change, R - Reflected patient's change talk, and S - Summarized patient's change talk statements    Assessments completed prior to visit:  The following assessments were completed by patient for this visit:  PHQ2:       4/30/2024    10:23 AM 4/16/2024     9:41 AM 12/26/2023    10:03 AM 6/23/2022    11:38 AM 6/7/2022     9:39 AM 1/28/2021     8:08 AM 1/27/2016    12:10 PM   PHQ-2 ( 1999 [employer])   Q1: Little interest or pleasure in doing things 0 0 0 0 0 0 0   Q2: Feeling down, depressed or hopeless 1 3 0 0 0 0 0   PHQ-2 Score 1 3 0 0 0 0 0   PHQ-2 Total Score (12-17 Years)- Positive if 3 or more points; Administer PHQ-A if positive      0    Q1: Little interest or pleasure in doing things   Not at all  Not at all    Not at all     Q2: Feeling down, depressed or hopeless   Not at all  Not at all    Not at all     PHQ-2 Score   0  0    0       PHQ9:       4/16/2024     9:41 AM   PHQ-9 SCORE   PHQ-9 Total Score 6     GAD2:       1/11/2024     3:10 PM    SOILA-2   Feeling nervous, anxious, or on edge 3   Not being able to stop or control worrying 1   SOILA-2 Total Score 4     GAD7:       1/11/2024     3:10 PM   SOILA-7 SCORE   Total Score 11 (moderate anxiety)   Total Score 11     CAGE-AID:       1/11/2024     2:58 PM   CAGE-AID Total Score   Total Score 0   Total Score MyChart 0 (A total score of 2 or greater is considered clinically significant)     PROMIS 10-Global Health (all questions and answers displayed):       1/11/2024     3:07 PM   PROMIS 10   In general, would you say your health is: Good   In general, would you say your quality of life is: Good   In general, how would you rate your physical health? Good   In general, how would you rate your mental health, including your mood and your ability to think? Good   In general, how would you rate your satisfaction with your social activities and relationships? Good   In general, please rate how well you carry out your usual social activities and roles Good   To what extent are you able to carry out your everyday physical activities such as walking, climbing stairs, carrying groceries, or moving a chair? Completely   In the past 7 days, how often have you been bothered by emotional problems such as feeling anxious, depressed, or irritable? Always   In the past 7 days, how would you rate your fatigue on average? Mild   In the past 7 days, how would you rate your pain on average, where 0 means no pain, and 10 means worst imaginable pain? 3   In general, would you say your health is: 3   In general, would you say your quality of life is: 3   In general, how would you rate your physical health? 3   In general, how would you rate your mental health, including your mood and your ability to think? 3   In general, how would you rate your satisfaction with your social activities and relationships? 3   In general, please rate how well you carry out your usual social activities and roles. (This includes activities at home, at  work and in your community, and responsibilities as a parent, child, spouse, employee, friend, etc.) 3   To what extent are you able to carry out your everyday physical activities such as walking, climbing stairs, carrying groceries, or moving a chair? 5   In the past 7 days, how often have you been bothered by emotional problems such as feeling anxious, depressed, or irritable? 5   In the past 7 days, how would you rate your fatigue on average? 2   In the past 7 days, how would you rate your pain on average, where 0 means no pain, and 10 means worst imaginable pain? 3   Global Mental Health Score 10   Global Physical Health Score 16   PROMIS TOTAL - SUBSCORES 26     PROMIS 10-Global Health (only subscores and total score):       1/11/2024     3:07 PM   PROMIS-10 Scores Only   Global Mental Health Score 10   Global Physical Health Score 16   PROMIS TOTAL - SUBSCORES 26     [place] Suicide Severity Rating Scale (Lifetime/Recent)      1/12/2024     8:08 AM 4/5/2024     1:22 PM   [place] Suicide Severity Rating (Lifetime/Recent)   Q1 Wished to be Dead (Past Month)  0-->no   Q2 Suicidal Thoughts (Past Month)  0-->no   Q6 Suicide Behavior (Lifetime)  0-->no   Level of Risk per Screen  no risks indicated   Q1 Wish to be Dead (Lifetime) Y    Wish to be Dead Description (Lifetime) 2000 when  went to [facility], no plan or intentions    1. Wish to be Dead (Past 1 Month) N    Q2 Non-Specific Active Suicidal Thoughts (Lifetime) N    Reasons for Ideation (Lifetime) 4    Actual Attempt (Lifetime) N    Has subject engaged in non-suicidal self-injurious behavior? (Lifetime) N    Interrupted Attempts (Lifetime) N    Aborted or Self-Interrupted Attempt (Lifetime) N    Preparatory Acts or Behavior (Lifetime) N    Calculated C-SSRS Risk Score (Lifetime/Recent) No Risk Indicated        Care Plan review completed: Yes    Medication Review:  No changes to current psychiatric medication(s)    Medication Compliance:  Yes    Changes in  Health Issues:   None reported    Chemical Use Review:   Substance Use: Chemical use reviewed, no active concerns identified      Tobacco Use: No current tobacco use.      Assessment: Current Emotional / Mental Status (status of significant symptoms):  Risk status (Self / Other harm or suicidal ideation)  Patient denies a history of suicidal ideation, suicide attempts, self-injurious behavior, homicidal ideation, homicidal behavior, and and other safety concerns  Patient denies current fears or concerns for personal safety.  Patient denies current or recent suicidal ideation or behaviors.  Patient denies current or recent homicidal ideation or behaviors.  Patient denies current or recent self injurious behavior or ideation.  Patient denies other safety concerns.  A safety and risk management plan has not been developed at this time, however patient was encouraged to call [place] [phone] should there be a change in any of these risk factors.    Appearance:   Appropriate   Eye Contact:   Good   Psychomotor Behavior: Normal   Attitude:   Cooperative   Orientation:   All  Speech   Rate / Production: Normal    Volume:  Normal   Mood:    Anxious  Depressed   Affect:    Appropriate   Thought Content:  Clear   Thought Form:  Coherent  Logical   Insight:    Good     Diagnoses:  1. Generalized anxiety disorder    2. Mild episode of recurrent major depressive disorder (H24)            Collateral Reports Completed:  Not Applicable    Plan: (Homework, other):  Patient was given information about behavioral services and encouraged to schedule a follow up appointment with the clinic [facility] in 1 week.  She was also given information about mental health symptoms and treatment options .  CD Recommendations: No indications of CD issues.       TSIVEN Godinez    ______________________________________________________________________    Integrated Primary Care Behavioral Health Treatment Plan    Patient's Name: Emely FREDERICK Forbes  Date Of  Birth: 1950    Date of Creation: 1/18/24  Date Treatment Plan Last Reviewed/Revised: 4/23/24    DSM5 Diagnoses: 296.31 (F33.0) Major Depressive Disorder, Recurrent Episode, Mild _ or 300.02 (F41.1) Generalized Anxiety Disorder  Psychosocial / Contextual Factors:  Individual Factors management of MH and Family Factors family discord    PROMIS (reviewed every 90 days):     Referral / Collaboration:  Referral to another professional/service is not indicated at this time..    Anticipated number of session for this episode of care: 4-6 sessions  Anticipation frequency of session: Weekly  Anticipated Duration of each session: 38-52 minutes  Treatment plan will be reviewed in 90 days or when goals have been changed.       MeasurableTreatment Goal(s) related to diagnosis / functional impairment(s)  Goal 1: Patient will work with providers to manage symptoms    I will know I've met my goal when less anxious and down.      Objective #A (Patient Action)    Patient will  attend all appointments, take medication as prescribed .  Status: Continued - Date(s):4/23/24     Intervention(s)  Therapist will  Monitor and assist in overcoming barriers to treatment adherence .    Objective #B  Patient will  consider all recommendations offered .  Status: Continued - Date(s):4/23/24     Intervention(s)  Therapist will  educate patient on treatment options, clarify concerns, work with pt to overcome any resistance to compliance .        Patient has reviewed and agreed to the above plan.      Shonda Arana, [facility]  May 28, 2024

## 2024-05-28 ENCOUNTER — OFFICE VISIT (OUTPATIENT)
Dept: BEHAVIORAL HEALTH | Facility: CLINIC | Age: 74
End: 2024-05-28
Payer: MEDICARE

## 2024-05-28 DIAGNOSIS — F41.1 GENERALIZED ANXIETY DISORDER: Primary | ICD-10-CM

## 2024-05-28 DIAGNOSIS — F33.0 MILD EPISODE OF RECURRENT MAJOR DEPRESSIVE DISORDER (H): ICD-10-CM

## 2024-05-28 PROCEDURE — 90832 PSYTX W PT 30 MINUTES: CPT | Performed by: SOCIAL WORKER

## 2024-06-11 ENCOUNTER — OFFICE VISIT (OUTPATIENT)
Dept: URGENT CARE | Facility: URGENT CARE | Age: 74
End: 2024-06-11
Payer: MEDICARE

## 2024-06-11 VITALS
DIASTOLIC BLOOD PRESSURE: 91 MMHG | TEMPERATURE: 98.9 F | HEART RATE: 67 BPM | SYSTOLIC BLOOD PRESSURE: 147 MMHG | OXYGEN SATURATION: 99 %

## 2024-06-11 DIAGNOSIS — R30.0 DYSURIA: ICD-10-CM

## 2024-06-11 DIAGNOSIS — N30.01 ACUTE CYSTITIS WITH HEMATURIA: Primary | ICD-10-CM

## 2024-06-11 LAB
ALBUMIN UR-MCNC: 100 MG/DL
APPEARANCE UR: ABNORMAL
BACTERIA #/AREA URNS HPF: ABNORMAL /HPF
BILIRUB UR QL STRIP: NEGATIVE
COLOR UR AUTO: ABNORMAL
GLUCOSE UR STRIP-MCNC: NEGATIVE MG/DL
HGB UR QL STRIP: ABNORMAL
KETONES UR STRIP-MCNC: NEGATIVE MG/DL
LEUKOCYTE ESTERASE UR QL STRIP: ABNORMAL
NITRATE UR QL: NEGATIVE
PH UR STRIP: 6.5 [PH] (ref 5–7)
RBC #/AREA URNS AUTO: ABNORMAL /HPF
SP GR UR STRIP: 1.02 (ref 1–1.03)
SQUAMOUS #/AREA URNS AUTO: ABNORMAL /LPF
UROBILINOGEN UR STRIP-ACNC: 0.2 E.U./DL
WBC #/AREA URNS AUTO: >100 /HPF

## 2024-06-11 PROCEDURE — 81001 URINALYSIS AUTO W/SCOPE: CPT

## 2024-06-11 PROCEDURE — 87086 URINE CULTURE/COLONY COUNT: CPT

## 2024-06-11 PROCEDURE — 99213 OFFICE O/P EST LOW 20 MIN: CPT

## 2024-06-11 PROCEDURE — 87186 SC STD MICRODIL/AGAR DIL: CPT

## 2024-06-11 RX ORDER — CEPHALEXIN 500 MG/1
500 CAPSULE ORAL 3 TIMES DAILY
Qty: 21 CAPSULE | Refills: 0 | Status: SHIPPED | OUTPATIENT
Start: 2024-06-11 | End: 2024-06-18

## 2024-06-11 NOTE — PROGRESS NOTES
ASSESSMENT:   (N30.01) Acute cystitis with hematuria  (primary encounter diagnosis)  Plan: cephALEXin (KEFLEX) 500 MG capsule    (R30.0) Dysuria  Plan: UA Macroscopic with reflex to Microscopic and         Culture - Clinic Collect, UA Microscopic with         Reflex to Culture, Urine Culture    PLAN:  Informed the patient that the urine test shows a urinary tract infection.  Urinary tract infection patient instructions discussed and provided.  Informed the patient to take the antibiotic as prescribed and finish the full course even if symptoms improve.  We discussed trying yogurt with active cultures or probiotic such as Culturelle daily to help and diarrhea while taking the antibiotic.  We also discussed trying over-the-counter Azo and/or cranberry supplements for the urinary symptoms.  Informed the patient to go to the emergency department with any new or worsening symptoms given the severity of her pain and the amount of blood in the urine specimen coupled with her past medical history including breast cancer.  Patient acknowledged their understanding of the above plan.    The use of Dragon/Archetypes[employer] services may have been used to construct the content in this note; any grammatical or spelling errors are non-intentional. Please contact the author of this note directly if you are in need of any clarification.      Clement Pelaez, MICHAEL CNP     SUBJECTIVE:   Emely Forbes is a 73 year old female who  presents today for a possible UTI. Symptoms of dysuria, urgency, frequency, burning, and suprapubic pain and pressure have been going on for 1 day.  Hematuria yes.  gradual onsetand severe.  There is no history of fever, chills, nausea or vomiting.  This patient does have a history of urinary tract infections. Patient denies vaginal symptoms or STD concerns.    ROS:   Negative except noted above.    OBJECTIVE:  BP (!) 147/91   Pulse 67   Temp 98.9  F (37.2  C) (Tympanic)   SpO2 99%   GENERAL APPEARANCE:  healthy, alert and no distress  RESP: lungs clear to auscultation - no rales, rhonchi or wheezes  CV: regular rates and rhythm, normal S1 S2, no murmur noted  ABDOMEN:  soft, nontender, no HSM or masses and bowel sounds normal  BACK: No CVA tenderness  SKIN: no suspicious lesions or rashes    UA: positive for WBC's, positive for RBC's, positive for protein, positive for leukocytes, and positive for bacturia

## 2024-06-12 NOTE — PATIENT INSTRUCTIONS
Urine test shows a urinary tract infection.  Take the antibiotic as prescribed and finish the full course even if symptoms improve.  Try yogurt with active cultures or probiotics such as Culturelle daily to help prevent diarrhea while using antibiotics.  You can also try over the counter Azo and/or cranberry supplements for your urinary symptoms.  Go to the emergency department with any new or worsening symptoms.

## 2024-06-14 LAB — BACTERIA UR CULT: ABNORMAL

## 2024-06-18 DIAGNOSIS — I47.10 SVT (SUPRAVENTRICULAR TACHYCARDIA) (H): ICD-10-CM

## 2024-06-18 DIAGNOSIS — R00.2 PALPITATIONS: ICD-10-CM

## 2024-06-18 RX ORDER — FLECAINIDE ACETATE 50 MG/1
TABLET ORAL
Qty: 180 TABLET | Refills: 0 | Status: SHIPPED | OUTPATIENT
Start: 2024-06-18 | End: 2024-06-26

## 2024-06-23 ENCOUNTER — APPOINTMENT (OUTPATIENT)
Dept: CT IMAGING | Facility: CLINIC | Age: 74
End: 2024-06-23
Payer: MEDICARE

## 2024-06-23 ENCOUNTER — HOSPITAL ENCOUNTER (EMERGENCY)
Facility: CLINIC | Age: 74
Discharge: HOME | End: 2024-06-23
Attending: EMERGENCY MEDICINE | Admitting: EMERGENCY MEDICINE
Payer: MEDICARE

## 2024-06-23 VITALS
HEART RATE: 58 BPM | RESPIRATION RATE: 18 BRPM | TEMPERATURE: 98.1 F | DIASTOLIC BLOOD PRESSURE: 91 MMHG | SYSTOLIC BLOOD PRESSURE: 126 MMHG | OXYGEN SATURATION: 95 %

## 2024-06-23 DIAGNOSIS — N76.0 VAGINITIS AND VULVOVAGINITIS: ICD-10-CM

## 2024-06-23 DIAGNOSIS — K21.9 GASTROESOPHAGEAL REFLUX DISEASE, UNSPECIFIED WHETHER ESOPHAGITIS PRESENT: ICD-10-CM

## 2024-06-23 DIAGNOSIS — Z76.89 ESTABLISHING CARE WITH NEW DOCTOR, ENCOUNTER FOR: ICD-10-CM

## 2024-06-23 LAB
ALBUMIN SERPL BCG-MCNC: 4.5 G/DL (ref 3.5–5.2)
ALBUMIN UR-MCNC: NEGATIVE MG/DL
ALP SERPL-CCNC: 79 U/L (ref 40–150)
ALT SERPL W P-5'-P-CCNC: 22 U/L (ref 0–50)
ANION GAP SERPL CALCULATED.3IONS-SCNC: 11 MMOL/L (ref 7–15)
APPEARANCE UR: CLEAR
AST SERPL W P-5'-P-CCNC: 20 U/L (ref 0–45)
BASOPHILS # BLD AUTO: 0.1 10E3/UL (ref 0–0.2)
BASOPHILS NFR BLD AUTO: 1 %
BILIRUB SERPL-MCNC: 0.6 MG/DL
BILIRUB UR QL STRIP: NEGATIVE
BUN SERPL-MCNC: 20.5 MG/DL (ref 8–23)
CALCIUM SERPL-MCNC: 10 MG/DL (ref 8.8–10.2)
CHLORIDE SERPL-SCNC: 104 MMOL/L (ref 98–107)
COLOR UR AUTO: NORMAL
CREAT SERPL-MCNC: 1.13 MG/DL (ref 0.51–0.95)
DEPRECATED HCO3 PLAS-SCNC: 29 MMOL/L (ref 22–29)
EGFRCR SERPLBLD CKD-EPI 2021: 52 ML/MIN/1.73M2
EOSINOPHIL # BLD AUTO: 0.2 10E3/UL (ref 0–0.7)
EOSINOPHIL NFR BLD AUTO: 2 %
ERYTHROCYTE [DISTWIDTH] IN BLOOD BY AUTOMATED COUNT: 13.2 % (ref 10–15)
GLUCOSE SERPL-MCNC: 94 MG/DL (ref 70–99)
GLUCOSE UR STRIP-MCNC: NEGATIVE MG/DL
HCT VFR BLD AUTO: 43 % (ref 35–47)
HGB BLD-MCNC: 14.2 G/DL (ref 11.7–15.7)
HGB UR QL STRIP: NEGATIVE
IMM GRANULOCYTES # BLD: 0 10E3/UL
IMM GRANULOCYTES NFR BLD: 0 %
KETONES UR STRIP-MCNC: NEGATIVE MG/DL
LEUKOCYTE ESTERASE UR QL STRIP: NEGATIVE
LYMPHOCYTES # BLD AUTO: 1.6 10E3/UL (ref 0.8–5.3)
LYMPHOCYTES NFR BLD AUTO: 19 %
MCH RBC QN AUTO: 30.5 PG (ref 26.5–33)
MCHC RBC AUTO-ENTMCNC: 33 G/DL (ref 31.5–36.5)
MCV RBC AUTO: 93 FL (ref 78–100)
MONOCYTES # BLD AUTO: 0.8 10E3/UL (ref 0–1.3)
MONOCYTES NFR BLD AUTO: 9 %
NEUTROPHILS # BLD AUTO: 5.8 10E3/UL (ref 1.6–8.3)
NEUTROPHILS NFR BLD AUTO: 69 %
NITRATE UR QL: NEGATIVE
NRBC # BLD AUTO: 0 10E3/UL
NRBC BLD AUTO-RTO: 0 /100
PH UR STRIP: 6.5 [PH] (ref 5–7)
PLATELET # BLD AUTO: 194 10E3/UL (ref 150–450)
POTASSIUM SERPL-SCNC: 3.9 MMOL/L (ref 3.4–5.3)
PROT SERPL-MCNC: 7.4 G/DL (ref 6.4–8.3)
RBC # BLD AUTO: 4.65 10E6/UL (ref 3.8–5.2)
RBC URINE: <1 /HPF
SODIUM SERPL-SCNC: 144 MMOL/L (ref 135–145)
SP GR UR STRIP: 1.01 (ref 1–1.03)
SQUAMOUS EPITHELIAL: <1 /HPF
UROBILINOGEN UR STRIP-MCNC: NORMAL MG/DL
WBC # BLD AUTO: 8.4 10E3/UL (ref 4–11)
WBC URINE: 2 /HPF

## 2024-06-23 PROCEDURE — 80053 COMPREHEN METABOLIC PANEL: CPT

## 2024-06-23 PROCEDURE — 36415 COLL VENOUS BLD VENIPUNCTURE: CPT

## 2024-06-23 PROCEDURE — 85025 COMPLETE CBC W/AUTO DIFF WBC: CPT

## 2024-06-23 PROCEDURE — 99284 EMERGENCY DEPT VISIT MOD MDM: CPT | Mod: 25

## 2024-06-23 PROCEDURE — 81001 URINALYSIS AUTO W/SCOPE: CPT

## 2024-06-23 PROCEDURE — 74176 CT ABD & PELVIS W/O CONTRAST: CPT | Mod: MG

## 2024-06-23 RX ORDER — ESTRADIOL 0.1 MG/G
2 CREAM VAGINAL
Qty: 42.5 G | Refills: 0 | Status: SHIPPED | OUTPATIENT
Start: 2024-06-24

## 2024-06-23 ASSESSMENT — ACTIVITIES OF DAILY LIVING (ADL)
ADLS_ACUITY_SCORE: 33

## 2024-06-23 NOTE — ED PROVIDER NOTES
Emergency Department Note      History of Present Illness     Chief Complaint  Hematuria and Abdominal Pain    HPI  Emely Forbes is a 73 year old female who presents to the emergency department for the evaluation of lower abdominal pain and burning sensation with urination.  Past medical history includes total hysterectomy and breast cancer.  Patient was recently seen on 06/11/2024 for hematuria.  Patient was diagnosed with urinary tract infection and was prescribed oral Keflex.  Patient reports that her hematuria and painful urination did improve after taking the prescription.  However 2 days ago she was seen in an urgent care where she was diagnosed with a suspected yeast infection and prescribed a dose of Diflucan.  Patient's culture during that visit was negative.  Her hematuria has since resolved however she continues to have this lower abdominal pain and burning sensation which prompted her to present to the emergency department for further evaluation and workup.  She denies any recent fever, chills, nausea, vomiting.  She does reports 1 episode of diarrhea but does not report any blood in her stool and she thinks this is related to the Diflucan.     Independent Historian  None    Review of External Notes  I reviewed the urgent care note from 6/21/2024 where patient was treated for lower abdominal pain and diagnosed with a yeast infection.   Past Medical History   Medical History and Problem List  Past Medical History:   Diagnosis Date    Breast cancer (H) 10/10    Carpal tunnel syndrome     Gastro-oesophageal reflux disease     GERD (gastroesophageal reflux disease)     Hematuria 2003    HX: benign breast biopsy     Osteopenia     PONV (postoperative nausea and vomiting)     S/P hysterectomy with oophorectomy 2001 for fibroids    S/P tonsillectomy        Medications  [START ON 6/24/2024] estradiol (ESTRACE) 0.1 MG/GM vaginal cream  diphenhydrAMINE (BENADRYL) 50 MG capsule  flecainide (TAMBOCOR) 50 MG  tablet  LORazepam (ATIVAN) 1 MG tablet  meclizine (ANTIVERT) 25 MG tablet  methylPREDNISolone (MEDROL) 32 MG tablet  metoprolol succinate ER (TOPROL XL) 50 MG 24 hr tablet  predniSONE (DELTASONE) 20 MG tablet  rosuvastatin (CRESTOR) 10 MG tablet  triamterene-HCTZ (DYAZIDE) 37.5-25 MG capsule  vitamin D3 (CHOLECALCIFEROL) 50 mcg (2000 units) tablet        Surgical History   Past Surgical History:   Procedure Laterality Date    BREAST SURGERY      RIGHT BREAST BIOPSY, RIGHT MASTECTOMY WITH TRAM FLAP RECONSTRUCTION    CYSTOSCOPY, URETEROSCOPY, COMBINED  9/14/2012    Procedure: COMBINED CYSTOSCOPY, URETEROSCOPY;  VIDEO CYSTOSCOPY, RIGHT FLEXIBLE URETEROSCOPY, CAUTERIZATION OF PAPILLARY VEIN (LATEX IV CONTRAST TAPE ALLERGY);  Surgeon: Rey Bailon MD;  Location:  OR    ENT SURGERY      TONSILLECTOMY    GI SURGERY      EGD    GYN SURGERY      HYSTERECTOMY WITH OOPHORECTOMY    HYSTERECTOMY, PAP NO LONGER INDICATED      ORTHOPEDIC SURGERY      CARPAL TUNNEL RELEASE    REVISE RECONSTRUCTED BREAST  12/8/2011    Procedure:REVISE RECONSTRUCTED BREAST; REVISION RIGHT BREAST RECONSTRUCTION, LEFT BREAST STEROID INJECTION ; Surgeon:FRAN LEVIN; Location:[facility]     Physical Exam   Patient Vitals for the past 24 hrs:   BP Temp Temp src Pulse Resp SpO2   06/23/24 1323 -- 98.1  F (36.7  C) Oral -- -- --   06/23/24 1321 (!) 153/82 -- -- 58 18 92 %     Physical Exam  General: Awake, alert, non-toxic.  Head:  Scalp is NC/AT  Eyes:  Conjunctiva normal, PERRL  ENT:  The external nose and ears are normal.     Oropharynx clear, uvula midline.  Neck:  Normal range of motion without rigidity.  CV:  Regular rate and rhythm    No pathologic murmur, rubs, or gallops.  Resp:  Breath sounds are clear bilaterally    Non-labored, no retractions or accessory muscle use  Abdomen: Abdomen is soft, no distension, no masses. Tenderness noted to bilateral lower quadrants. No CVA tenderness.  MS:  No lower extremity edema/swelling. No midline  cervical, thoracic, or lumbar tenderness.  Extremities without joint swelling or redness.  Skin:  Warm and dry, No rash or lesions noted.  Neuro:  Alert and oriented.  GCS 15 Moves all extremities normal.  No facial asymmetry. Gait normal.  Psych: Awake. Alert. Normal affect. Appropriate interactions.   Diagnostics   Lab Results   Labs Ordered and Resulted from Time of ED Arrival to Time of ED Departure   COMPREHENSIVE METABOLIC PANEL - Abnormal       Result Value    Sodium 144      Potassium 3.9      Carbon Dioxide (CO2) 29      Anion Gap 11      Urea Nitrogen 20.5      Creatinine 1.13 (*)     GFR Estimate 52 (*)     Calcium 10.0      Chloride 104      Glucose 94      Alkaline Phosphatase 79      AST 20      ALT 22      Protein Total 7.4      Albumin 4.5      Bilirubin Total 0.6     ROUTINE UA WITH MICROSCOPIC REFLEX TO CULTURE - Normal    Color Urine Straw      Appearance Urine Clear      Glucose Urine Negative      Bilirubin Urine Negative      Ketones Urine Negative      Specific Gravity Urine 1.010      Blood Urine Negative      pH Urine 6.5      Protein Albumin Urine Negative      Urobilinogen Urine Normal      Nitrite Urine Negative      Leukocyte Esterase Urine Negative      RBC Urine <1      WBC Urine 2      Squamous Epithelials Urine <1     CBC WITH PLATELETS AND DIFFERENTIAL    WBC Count 8.4      RBC Count 4.65      Hemoglobin 14.2      Hematocrit 43.0      MCV 93      MCH 30.5      MCHC 33.0      RDW 13.2      Platelet Count 194      % Neutrophils 69      % Lymphocytes 19      % Monocytes 9      % Eosinophils 2      % Basophils 1      % Immature Granulocytes 0      NRBCs per 100 WBC 0      Absolute Neutrophils 5.8      Absolute Lymphocytes 1.6      Absolute Monocytes 0.8      Absolute Eosinophils 0.2      Absolute Basophils 0.1      Absolute Immature Granulocytes 0.0      Absolute NRBCs 0.0         Imaging  CT Abdomen Pelvis w/o Contrast   Final Result   IMPRESSION:       1.  No acute abnormality, within  the limitations of the noncontrast technique.       2.  Nonobstructing 3 mm right renal calculus. No hydronephrosis.       3.  Sequela of remote granulomatous disease.        Independent Interpretation  None  ED Course    Medications Administered  Medications - No data to display    Procedures  Procedures     Discussion of Management  None    Social Determinants of Health adding to complexity of care  None    ED Course     Medical Decision Making / Diagnosis   CMS Diagnoses: None    MIPS     None    [facility]  Emely Forbes is a 73 year old female who presents emergency department for the evaluation of abdominal pain and burning sensation with urination.  Upon initial examination patient is awake and alert.  Abdomen is soft, there is tenderness noted to bilateral lower quadrants of the abdomen.  External vaginal tissue was visually examined by my attending Dr. Fitch which revealed significant erythema of the labia majora and labia minora.  Urinalysis was obtained to rule out any reoccurring urinary tract infection, this was negative for any acute infection.  There was no hematuria noted.  Chemistry was negative for any electrolyte abnormality.  Kidney function is mildly elevated, however these values were compared to patient's previous chemistry and were reflective of the patient's baseline.  I did elect to obtain a abdominal CT without contrast, this was negative for any obstructing ureteral stone.  There was found to be 3 mm right renal calculus.  No hydro nephrosis observed.  At this time I think her pain and burning sensation is most likely caused by vaginitis.  I prescribed a topical estrogen cream that the patient may use twice weekly.  I also did put in a referral to an OB/GYN for further evaluation and follow-up.  I also wrote her a referral to a primary care provider per her request so she may establish.  All pertinent lab values and radiological findings were discussed with the patient and patient verbalized  understanding.  All questions and concerns were addressed.  Red flag symptoms return criteria were discussed and patient verbalized understanding.  Patient was discharged.     Disposition  The patient was discharged.     ICD-10 Codes:    ICD-10-CM    1. Vaginitis and vulvovaginitis  N76.0 Ob/Gyn  Referral      2. Gastroesophageal reflux disease, unspecified whether esophagitis present  K21.9       3. Establishing care with new doctor, encounter for  Z76.89 Primary Care Referral           Discharge Medications  New Prescriptions    ESTRADIOL (ESTRACE) 0.1 MG/GM VAGINAL CREAM    Place 2 g vaginally twice a week         MICHAEL Gordillo CNP, [place], APRN CNP  06/23/24 [id number]

## 2024-06-23 NOTE — ED PROVIDER NOTES
ED ATTENDING PHYSICIAN NOTE:   I evaluated this patient in conjunction with Roberto Hunt NP  I have participated in the care of the patient and personally performed key elements of the history, exam, and medical decision making.      HPI:   Emely Forbes is a 73 year old female who presents to the emergency room with a burning sensation when she urinates and lower abdominal pain.  She was seen at urgent care back on June 11 for dysuria and abdominal pain.  She was diagnosed with an E. coli UTI which was pansensitive she was put on Keflex and improved her symptoms 2 days ago she then went to urgent care again for vaginal burning.  They treated her for yeast infection with Diflucan.  They did do swabs which is negative for yeast trichomonas bacterial vaginitis.  She is not complaining of lower abdominal pain frequency and burning as urine passes across the vaginal vulvar area.  No back pain.  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                Independent Historian:   None    Review of External Notes: none      EXAM:     Physical Exam   Constitutional:  Patient is oriented to person, place, and time. They appear well-developed and well-nourished.    HENT:   Mouth/Throat:   Oropharynx is clear and moist.   Eyes:    Conjunctivae normal and EOM are normal. Pupils are equal, round, and reactive to light.   Neck:    Normal range of motion.   Cardiovascular: Normal rate, regular rhythm and normal heart sounds.  Exam reveals no gallop and no friction rub.  No murmur heard.  Pulmonary/Chest:  Effort normal and breath sounds normal. Patient has no wheezes. Patient has no rales.   Abdominal:   Soft. Bowel sounds are normal. Patient exhibits no mass. There is no tenderness. There is no rebound and no guarding.   Genitourinary:  Patient's vulvar area has mild inflammation discomfort pinkness no satellite lesions no evidence of  cellulitis.  Musculoskeletal:  Normal range of motion. Patient exhibits no edema.   Neurological:   Patient is alert and oriented to person, place, and time. Patient has normal strength. No cranial nerve deficit or sensory deficit. GCS 15  Skin:   Skin is warm and dry. No rash noted. No erythema.   Psychiatric:   Patient has a normal mood and affect. Patient's behavior is normal. Judgment and thought content normal.       Independent Interpretation (X-rays, CTs, rhythm strip):  None    Consultations/Discussion of Management or Tests:  None     Social Determinants of Health affecting care:   None     MEDICAL DECISION MAKING/ASSESSMENT AND PLAN:   Emely Forbes is a 73-year-old female presenting with burning when she pees however it is not dysuria its pain as the urine passes over the vulvovaginal area.  She does have some mild inflammation there is no evidence of yeast infection.  Her urine is clear so I think vulvovaginitis is why she is having pain.  CT was done which did not show anything acute.  Her urine analysis and metabolic panel is normal.  I think at this time with the reassuring workup that she is safe to be discharged.  Will place her on estrogen cream twice a week to see if that cannot improve her discomfort and close follow-up with gynecology.       DIAGNOSIS:     ICD-10-CM    1. Vaginitis and vulvovaginitis  N76.0 Ob/Gyn  Referral      2. Gastroesophageal reflux disease, unspecified whether esophagitis present  K21.9       3. Establishing care with new doctor, encounter for  Z76.89 Primary Care Referral               DISPOSITION:   Discharge     Scribe Disclosure:  I, Edith Sexton, am serving as a scribe at 2:21 PM on 6/23/2024 to document services personally performed by Deb Fitch MD based on my observations and the provider's statements to me.   6/23/2024  [facility] EMERGENCY DEPT       Deb Fitch MD  06/23/24 [id number]

## 2024-06-23 NOTE — ED TRIAGE NOTES
Beginning on the 11th, the pt has had blood in her urine, burning with urination, vaginal burning and lower abdominal/pelvic pain. She has been seen at 2 UC, first treated for UTI, took ABX, no relief. The second time she was treated for a yeast infection, and results came back today negative for those results.

## 2024-06-25 ENCOUNTER — LAB (OUTPATIENT)
Dept: LAB | Facility: CLINIC | Age: 74
End: 2024-06-25
Payer: MEDICARE

## 2024-06-25 DIAGNOSIS — E78.5 HYPERLIPIDEMIA WITH TARGET LDL LESS THAN 70: ICD-10-CM

## 2024-06-25 LAB
ALT SERPL W P-5'-P-CCNC: 23 U/L (ref 0–50)
CHOLEST SERPL-MCNC: 160 MG/DL
FASTING STATUS PATIENT QL REPORTED: YES
HDLC SERPL-MCNC: 53 MG/DL
LDLC SERPL CALC-MCNC: 92 MG/DL
NONHDLC SERPL-MCNC: 107 MG/DL
TRIGL SERPL-MCNC: 75 MG/DL

## 2024-06-25 PROCEDURE — 84460 ALANINE AMINO (ALT) (SGPT): CPT | Performed by: NURSE PRACTITIONER

## 2024-06-25 PROCEDURE — 36415 COLL VENOUS BLD VENIPUNCTURE: CPT | Performed by: NURSE PRACTITIONER

## 2024-06-25 PROCEDURE — 80061 LIPID PANEL: CPT | Performed by: NURSE PRACTITIONER

## 2024-06-25 NOTE — PROGRESS NOTES
Cardiology Clinic Progress Note  Emely Forbes MRN# [id number]   YOB: 1950 Age: 73 year old   Primary Cardiologist: Dr. Gonzalez Reason for visit: Follow up medication changes             Assessment and Plan:     1. Hyperlipidemia      Elevated ASCVD risk score  -ASCVD risk score 10.0%, optimal risk score 6.9%  -7/2023 CT calcium score of 91 and rosuvastatin 10 mg initiated  -6/2024 LDL 92    2.  Palpitations       SVT        Rare PACs/PVCs  -2/2023 holter monitor-> sinus rhythm with occasional PACs and PVCs and a few short runs of SVT  -3/2023 zio patch -> 22 runs of SVT, longest lasting 11 seconds.  She had rare PACs and PVCs  -3/2023 exercise stress echocardiogram negative for ischemia and chronotropic incompetence  -1/2024 Started on flecanide with symptomatic improvement   -2/2024 Stress EKG without ST changes, QRS prolongation with exercise,sudden change in QRS morphology consistent with rate related bundle after review with EP    3.  Hypertension, controlled  -On triametrene/HCTZ and metoprolol   -Home blood pressure log showing systolic readings 109-128/70s-80s    4.  History of breast cancer     Plan:  Increase rosuvastatin to 20mg daily  Continue flecanide 50mg BID and metoprolol XL 50mg daily  Discussed wearing 48 hour holter monitor, however patient declines as she is feeling well and historically had significant skin reaction to adhesive patches, which is reasonable    Follow up plan: Follow up with Dr. Gonzalez in 6 months with a repeat EKG        History of Presenting Illness:    Emely Forbes is a very pleasant 72 year old female with a history of breast cancer treated with mastectomy (2010), and palpitations.    Patient had ongoing palpitations since December 2022 increasing in frequency.  In February 2023 they worsened to a point she presented to the emergency room with episode that lasted over an hour.  She was noted to be hypertensive upon presentation, her EKG was within normal limits.  They  completed blood work including a TSH which was all within normal limits.  She returned to the emergency department approximately 2 weeks later on 2/26/2023 with recurrence of her symptoms.  Labs at that time showed hypokalemia and EKG again was unremarkable.  She was hypertensive at the time presentation.  She was given potassium supplementation.  Of note she did wear a Holter monitor after the first episode for 48 hours, which demonstrated sinus rhythm with occasional PACs and PVCs and a few short runs of SVT, she was asymptomatic during those episodes.    Patient was seen by Dr. Gonzalez and on 3/1/2023.  She reported feeling better after her emergency room visits, however her symptoms had recurred.  She brought with her a record of her Apple Watch recordings and EKGs that demonstrated sinus rhythm with occasional PACs and PVCs.   She was started metoprolol 25 mg twice daily.      She underwent a stress echocardiogram 3/3/2023 that was normal. She exercised 9 minutes of Christopher protocol without any symptoms.  EKG was negative for inducible ischemia.  She had normal resting wall motion and no stress-induced wall motion abnormality on imaging.  Her LV function was preserved.    Subsequent 14-day Zio patch monitor demonstrated 22 runs of SVT, longest lasting 11 seconds.  She had rare PACs and PVCs.  Her predominant underlying rhythm was sinus rhythm.  No symptoms were reported.    She was seen again in April 2023 and continued to feel episodes of palpitations most often occurring at night.  Her symptoms occur at rest.  She was overall feeling better since initiation of metoprolol.  BMP was done at that time showing normal renal function and potassium 3.7.  She was switched to Toprol 50 mg every evening.    When I met this patient in May 2023 she felt significantly better after switching to metoprolol XL.  Given her elevated ASCVD risk score we mutually decided to perform a CT calcium score for risk stratification.  This  showed moderate coronary calcifications and a total ACE and calcium score of 91 placing the patient in the 71st percentile compared to age and gender.  Scores were specifically 62.9 left anterior descending artery and 20 but within the right coronary artery.  I recommend starting a moderate intensity rosuvastatin 10 mg daily.      I met with her again in August of 2023 at which point she was having minimal palpitations.     In January 2024 she contacted our office with symptoms of rapid and skipping palpitations in the evening and was started on flecanide 50mg BID and metoprolol reduced to 25 mg daily.  She continues to have palpitations in the evening and her flecainide was increased to 75 mg twice per day and metoprolol was increased back to 50 mg daily.  Follow-up with electrophysiology was recommended.  She was feeling better and declined the dose increase in flecainide as well as follow-up with EP.  A subsequent stress ECG was done which showed no concerning ST changes.  During exercise her QRS prolonged from 90 ms 130 ms.  This was reviewed with Dr. Rios, felt this was likely a rate related bundle given the sudden change in QRS morphology and recommendation was to continue flecainide.    In April she was having some dizziness and confusion. She walked into her kitchen post-coital and felt a head spinning sensation then became confused and did not know where she was. Episode lasted seconds and she felt at baseline immediately following. Incidentally, this occurred the day following her stress EKG when she was holding her metoprolol. She was seen in the ER for this as well.  Workup included an MRI which revealed an incidental cavernoma.  ER provider felt her symptoms were most likely consistent with vertigo. She was also seen by neurology in follow up who recommended a follow up CTA in 3 years.     Patient is here today for follow up of her palpitations. She has 3 acres and has been more active that she was in  the past. She continues to exercise riding her bike, goes to the gym, lifts weights.     She has been having sporadic episodes of shortness of breath, sometimes associated with exertion, others when she is at rest. No associated palpitations.     She has times where she feels her heart beating, not pounding. No recent issues with irregular beats, flip flopping/rolling sensation, or heart racing.     Patient denies chest pain or chest tightness. Denies dizziness, lightheadedness or other presyncopal symptoms. Denies shortness of breath, leg swelling, or orthopnea.     Blood pressure 136/84 and HR 70 in clinic today.    She typically eats chicken, pork, vegetables, and fruit. Monitors salt closely. Does not drink alcohol or caffeine. Drinks herbal tea. Stays well hydrated.     Labs from 6/25/2024 showing total cholesterol 106, HDL 53, LDL 92, triglycerides 95, ALT 23. Denies any myalgias with her statin therapy.         Recent Hospitalizations   4/2024 ER visit for stroke symptoms/confusion/dizziness          Social History      Social History     Socioeconomic History    Marital status:      Spouse name: Thomas    Number of children: 2    Years of education: Not on file    Highest education level: Not on file   Occupational History    Occupation:      Employer: UNKNOWN   Tobacco Use    Smoking status: Never    Smokeless tobacco: Never   Vaping Use    Vaping status: Never Used   Substance and Sexual Activity    Alcohol use: Not Currently     Alcohol/week: 0.0 - 0.8 standard drinks of alcohol    Drug use: No    Sexual activity: Yes     Partners: Male   Other Topics Concern    Parent/sibling w/ CABG, MI or angioplasty before 65F 55M? 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     Social Determinants of Health     Financial Resource Strain: Low Risk   (12/26/2023)    Financial Resource Strain     Within the past 12 months, have you or your family members you live with been unable to get utilities (heat, electricity) when it was really needed?: No   Food Insecurity: Low Risk  (12/26/2023)    Food Insecurity     Within the past 12 months, did you worry that your food would run out before you got money to buy more?: No     Within the past 12 months, did the food you bought just not last and you didn t have money to get more?: No   Transportation Needs: Low Risk  (12/26/2023)    Transportation Needs     Within the past 12 months, has lack of transportation kept you from medical appointments, getting your medicines, non-medical meetings or appointments, work, or from getting things that you need?: No   Physical Activity: Not on file   Stress: Not on file   Social Connections: Unknown (2/20/2024)    [facility]    Social Connections     Frequency of Communication with Friends and Family: Not on file   Interpersonal Safety: Low Risk  (12/26/2023)    Interpersonal Safety     Do you feel physically and emotionally safe where you currently live?: Yes     Within the past 12 months, have you been hit, slapped, kicked or otherwise physically hurt by someone?: No     Within the past 12 months, have you been humiliated or emotionally abused in other ways by your partner or ex-partner?: No   Housing Stability: Low Risk  (12/26/2023)    Housing Stability     Do you have housing? : Yes     Are you worried about losing your housing?: No            Review of Systems:   Skin:  not assessed     Eyes:  not assessed    ENT:  not assessed    Respiratory:  Negative    Cardiovascular:  Negative    Gastroenterology: not assessed    Genitourinary:  not assessed    Musculoskeletal:  not assessed    Neurologic:  Negative    Psychiatric:  not assessed    Heme/Lymph/Imm:  not assessed    Endocrine:   "Negative           Physical Exam:   Vitals: /84   Pulse 58   Resp 18   Ht 1.626 m (5' 4\")   Wt 65.8 kg (145 lb)   SpO2 100%   BMI 24.89 kg/m     Wt Readings from Last 4 Encounters:   06/26/24 65.8 kg (145 lb)   04/30/24 64.9 kg (143 lb)   04/16/24 64.9 kg (143 lb)   04/05/24 63.5 kg (140 lb)     GEN: well nourished, in no acute distress.  HEENT:  Pupils equal, round. Sclerae nonicteric.   NECK: Supple, no masses appreciated. No JVD with patient supine.  C/V:  Regular rate and rhythm, no murmur, rub or gallop.    RESP: Respirations are unlabored. Clear to auscultation bilaterally without wheezing, rales, or rhonchi.  GI: Abdomen soft, nontender.  EXTREM: No LE edema.  NEURO: Alert and oriented, cooperative.  SKIN: Warm and dry.        Data:     LIPID RESULTS:  Lab Results   Component Value Date    CHOL 160 06/25/2024    CHOL 231 (H) 01/28/2021    HDL 53 06/25/2024    HDL 63 01/28/2021    LDL 92 06/25/2024     (H) 01/28/2021    TRIG 75 06/25/2024    TRIG 78 01/28/2021    CHOLHDLRATIO 3.8 12/02/2013     LIVER ENZYME RESULTS:  Lab Results   Component Value Date    AST 20 06/23/2024    AST 15 01/28/2021    ALT 23 06/25/2024    ALT 23 01/28/2021     CBC RESULTS:  Lab Results   Component Value Date    WBC 8.4 06/23/2024    WBC 5.7 12/02/2013    RBC 4.65 06/23/2024    RBC 4.64 12/02/2013    HGB 14.2 06/23/2024    HGB 14.1 12/02/2013    HCT 43.0 06/23/2024    HCT 42.3 12/02/2013    MCV 93 06/23/2024    MCV 91 12/02/2013    MCH 30.5 06/23/2024    MCH 30.4 12/02/2013    MCHC 33.0 06/23/2024    MCHC 33.3 12/02/2013    RDW 13.2 06/23/2024    RDW 13.0 12/02/2013     06/23/2024     12/02/2013     BMP RESULTS:  Lab Results   Component Value Date     06/23/2024     01/28/2021    POTASSIUM 3.9 06/23/2024    POTASSIUM 3.4 06/07/2022    POTASSIUM 3.8 01/28/2021    CHLORIDE 104 06/23/2024    CHLORIDE 104 06/07/2022    CHLORIDE 105 01/28/2021    CO2 29 06/23/2024    CO2 31 06/07/2022    CO2 31 " "01/28/2021    ANIONGAP 11 06/23/2024    ANIONGAP 3 06/07/2022    ANIONGAP 3 01/28/2021    GLC 94 06/23/2024    GLC 99 06/07/2022     (H) 01/28/2021    BUN 20.5 06/23/2024    BUN 17 06/07/2022    BUN 21 01/28/2021    CR 1.13 (H) 06/23/2024    CR 0.93 01/28/2021    GFRESTIMATED 52 (L) 06/23/2024    GFRESTIMATED 63 01/28/2021    GFRESTBLACK 73 01/28/2021    KRISTEL 10.0 06/23/2024    KRISTEL 10.0 01/28/2021      A1C RESULTS:  No results found for: \"A1C\"  INR RESULTS:  No results found for: \"INR\"         Medications     Current Outpatient Medications   Medication Sig Dispense Refill    diphenhydrAMINE (BENADRYL) 50 MG capsule Please take 1 tab 1 hour prior to scan. 1 capsule 0    estradiol (ESTRACE) 0.1 MG/GM vaginal cream Place 2 g vaginally twice a week 42.5 g 0    flecainide (TAMBOCOR) 50 MG tablet Take 50mg twice daily 180 tablet 2    LORazepam (ATIVAN) 1 MG tablet Take 1 tablet (1 mg) by mouth every 12 hours as needed for anxiety 10 tablet 0    meclizine (ANTIVERT) 25 MG tablet Take 1 tablet (25 mg) by mouth 3 times daily as needed for dizziness 20 tablet 0    methylPREDNISolone (MEDROL) 32 MG tablet Please take 1 tab 12 hours prior to scan and 1 tab 2 hours prior to scan. 2 tablet 0    metoprolol succinate ER (TOPROL XL) 50 MG 24 hr tablet Take 1 tablet (50 mg) by mouth daily 180 tablet 1    predniSONE (DELTASONE) 20 MG tablet Take 1 tablet (20 mg) by mouth 2 times daily 10 tablet 0    rosuvastatin (CRESTOR) 20 MG tablet Take 1 tablet (20 mg) by mouth daily 90 tablet 3    triamterene-HCTZ (DYAZIDE) 37.5-25 MG capsule TAKE 1 CAPSULE BY MOUTH EVERY MORNING      vitamin D3 (CHOLECALCIFEROL) 50 mcg (2000 units) tablet Take 1 tablet (50 mcg) by mouth daily            Past Medical History     Past Medical History:   Diagnosis Date    Breast cancer (H) 10/10    R s/p mast/tram flap, reconstruction    Carpal tunnel syndrome     Gastro-oesophageal reflux disease     GERD (gastroesophageal reflux disease)     s/p EGD 5/06    " Hematuria 2003    HX: benign breast biopsy     L, papilloma    Osteopenia     DXA 12/16/10    PONV (postoperative nausea and vomiting)     Scope patch on pre-op    S/P hysterectomy with oophorectomy 2001 for fibroids    S/P tonsillectomy      Past Surgical History:   Procedure Laterality Date    BREAST SURGERY      RIGHT BREAST BIOPSY, RIGHT MASTECTOMY WITH TRAM FLAP RECONSTRUCTION    CYSTOSCOPY, URETEROSCOPY, COMBINED  9/14/2012    Procedure: COMBINED CYSTOSCOPY, URETEROSCOPY;  VIDEO CYSTOSCOPY, RIGHT FLEXIBLE URETEROSCOPY, CAUTERIZATION OF PAPILLARY VEIN (LATEX IV CONTRAST TAPE ALLERGY);  Surgeon: Rey Bailon MD;  Location:  OR    ENT SURGERY      TONSILLECTOMY    GI SURGERY      EGD    GYN SURGERY      HYSTERECTOMY WITH OOPHORECTOMY    HYSTERECTOMY, PAP NO LONGER INDICATED      ORTHOPEDIC SURGERY      CARPAL TUNNEL RELEASE    REVISE RECONSTRUCTED BREAST  12/8/2011    Procedure:REVISE RECONSTRUCTED BREAST; REVISION RIGHT BREAST RECONSTRUCTION, LEFT BREAST STEROID INJECTION ; Surgeon:FRAN LEVIN; Location:[facility]     Family History   Problem Relation Age of Onset    Thyroid Disease Mother         cancer    Hypertension Mother     Breast Cancer Mother     Cancer Mother         uterine    Cardiovascular Father     Cerebrovascular Disease Father     Diabetes Father             Allergies   Adhesive tape, Contrast dye, Latex, and Norvasc [amlodipine besylate]        Urvashi Mak NP  [facility]  Pager: [phone]

## 2024-06-26 ENCOUNTER — OFFICE VISIT (OUTPATIENT)
Dept: CARDIOLOGY | Facility: CLINIC | Age: 74
End: 2024-06-26
Payer: MEDICARE

## 2024-06-26 VITALS
HEIGHT: 64 IN | BODY MASS INDEX: 24.75 KG/M2 | RESPIRATION RATE: 18 BRPM | HEART RATE: 58 BPM | OXYGEN SATURATION: 100 % | DIASTOLIC BLOOD PRESSURE: 84 MMHG | WEIGHT: 145 LBS | SYSTOLIC BLOOD PRESSURE: 136 MMHG

## 2024-06-26 DIAGNOSIS — R00.2 PALPITATIONS: ICD-10-CM

## 2024-06-26 DIAGNOSIS — I25.10 ASCVD (ARTERIOSCLEROTIC CARDIOVASCULAR DISEASE): ICD-10-CM

## 2024-06-26 DIAGNOSIS — I47.10 SVT (SUPRAVENTRICULAR TACHYCARDIA) (H): ICD-10-CM

## 2024-06-26 DIAGNOSIS — E78.5 HYPERLIPIDEMIA WITH TARGET LDL LESS THAN 70: ICD-10-CM

## 2024-06-26 PROCEDURE — 99214 OFFICE O/P EST MOD 30 MIN: CPT | Performed by: NURSE PRACTITIONER

## 2024-06-26 RX ORDER — METOPROLOL SUCCINATE 50 MG/1
50 TABLET, EXTENDED RELEASE ORAL DAILY
Qty: 180 TABLET | Refills: 1 | Status: SHIPPED | OUTPATIENT
Start: 2024-06-26

## 2024-06-26 RX ORDER — FLECAINIDE ACETATE 50 MG/1
TABLET ORAL
Qty: 180 TABLET | Refills: 2 | Status: SHIPPED | OUTPATIENT
Start: 2024-06-26

## 2024-06-26 RX ORDER — ROSUVASTATIN CALCIUM 20 MG/1
20 TABLET, COATED ORAL DAILY
Qty: 90 TABLET | Refills: 3 | Status: SHIPPED | OUTPATIENT
Start: 2024-06-26

## 2024-06-26 NOTE — LETTER
6/26/2024    Clement Jaime MD  [address] [address]    RE: Emely Forbes       Dear Colleague,     I had the pleasure of seeing Emely Forbes in the [facility].  Cardiology Clinic Progress Note  Emely Forbes MRN# [id number]   YOB: 1950 Age: 73 year old   Primary Cardiologist: Dr. Gonzalez Reason for visit: Follow up medication changes             Assessment and Plan:     1. Hyperlipidemia      Elevated ASCVD risk score  -ASCVD risk score 10.0%, optimal risk score 6.9%  -7/2023 CT calcium score of 91 and rosuvastatin 10 mg initiated  -6/2024 LDL 92    2.  Palpitations       SVT        Rare PACs/PVCs  -2/2023 holter monitor-> sinus rhythm with occasional PACs and PVCs and a few short runs of SVT  -3/2023 zio patch -> 22 runs of SVT, longest lasting 11 seconds.  She had rare PACs and PVCs  -3/2023 exercise stress echocardiogram negative for ischemia and chronotropic incompetence  -1/2024 Started on flecanide with symptomatic improvement   -2/2024 Stress EKG without ST changes, QRS prolongation with exercise,sudden change in QRS morphology consistent with rate related bundle after review with EP    3.  Hypertension, controlled  -On triametrene/HCTZ and metoprolol   -Home blood pressure log showing systolic readings 109-128/70s-80s    4.  History of breast cancer     Plan:  Increase rosuvastatin to 20mg daily  Continue flecanide 50mg BID and metoprolol XL 50mg daily  Discussed wearing 48 hour holter monitor, however patient declines as she is feeling well and historically had significant skin reaction to adhesive patches, which is reasonable    Follow up plan: Follow up with Dr. Gonzalez in 6 months with a repeat EKG        History of Presenting Illness:    Emely Forbes is a very pleasant 72 year old female with a history of breast cancer treated with mastectomy (2010), and palpitations.    Patient had ongoing palpitations since December 2022  increasing in frequency.  In February 2023 they worsened to a point she presented to the emergency room with episode that lasted over an hour.  She was noted to be hypertensive upon presentation, her EKG was within normal limits.  They completed blood work including a TSH which was all within normal limits.  She returned to the emergency department approximately 2 weeks later on 2/26/2023 with recurrence of her symptoms.  Labs at that time showed hypokalemia and EKG again was unremarkable.  She was hypertensive at the time presentation.  She was given potassium supplementation.  Of note she did wear a Holter monitor after the first episode for 48 hours, which demonstrated sinus rhythm with occasional PACs and PVCs and a few short runs of SVT, she was asymptomatic during those episodes.    Patient was seen by Dr. Gonzalez and on 3/1/2023.  She reported feeling better after her emergency room visits, however her symptoms had recurred.  She brought with her a record of her Apple Watch recordings and EKGs that demonstrated sinus rhythm with occasional PACs and PVCs.   She was started metoprolol 25 mg twice daily.      She underwent a stress echocardiogram 3/3/2023 that was normal. She exercised 9 minutes of Christopher protocol without any symptoms.  EKG was negative for inducible ischemia.  She had normal resting wall motion and no stress-induced wall motion abnormality on imaging.  Her LV function was preserved.    Subsequent 14-day Zio patch monitor demonstrated 22 runs of SVT, longest lasting 11 seconds.  She had rare PACs and PVCs.  Her predominant underlying rhythm was sinus rhythm.  No symptoms were reported.    She was seen again in April 2023 and continued to feel episodes of palpitations most often occurring at night.  Her symptoms occur at rest.  She was overall feeling better since initiation of metoprolol.  BMP was done at that time showing normal renal function and potassium 3.7.  She was switched to Toprol 50 mg  every evening.    When I met this patient in May 2023 she felt significantly better after switching to metoprolol XL.  Given her elevated ASCVD risk score we mutually decided to perform a CT calcium score for risk stratification.  This showed moderate coronary calcifications and a total ACE and calcium score of 91 placing the patient in the 71st percentile compared to age and gender.  Scores were specifically 62.9 left anterior descending artery and 20 but within the right coronary artery.  I recommend starting a moderate intensity rosuvastatin 10 mg daily.      I met with her again in August of 2023 at which point she was having minimal palpitations.     In January 2024 she contacted our office with symptoms of rapid and skipping palpitations in the evening and was started on flecanide 50mg BID and metoprolol reduced to 25 mg daily.  She continues to have palpitations in the evening and her flecainide was increased to 75 mg twice per day and metoprolol was increased back to 50 mg daily.  Follow-up with electrophysiology was recommended.  She was feeling better and declined the dose increase in flecainide as well as follow-up with EP.  A subsequent stress ECG was done which showed no concerning ST changes.  During exercise her QRS prolonged from 90 ms 130 ms.  This was reviewed with Dr. Rios, felt this was likely a rate related bundle given the sudden change in QRS morphology and recommendation was to continue flecainide.    In April she was having some dizziness and confusion. She walked into her kitchen post-coital and felt a head spinning sensation then became confused and did not know where she was. Episode lasted seconds and she felt at baseline immediately following. Incidentally, this occurred the day following her stress EKG when she was holding her metoprolol. She was seen in the ER for this as well.  Workup included an MRI which revealed an incidental cavernoma.  ER provider felt her symptoms were most  likely consistent with vertigo. She was also seen by neurology in follow up who recommended a follow up CTA in 3 years.     Patient is here today for follow up of her palpitations. She has 3 acres and has been more active that she was in the past. She continues to exercise riding her bike, goes to the gym, lifts weights.     She has been having sporadic episodes of shortness of breath, sometimes associated with exertion, others when she is at rest. No associated palpitations.     She has times where she feels her heart beating, not pounding. No recent issues with irregular beats, flip flopping/rolling sensation, or heart racing.     Patient denies chest pain or chest tightness. Denies dizziness, lightheadedness or other presyncopal symptoms. Denies shortness of breath, leg swelling, or orthopnea.     Blood pressure 136/84 and HR 70 in clinic today.    She typically eats chicken, pork, vegetables, and fruit. Monitors salt closely. Does not drink alcohol or caffeine. Drinks herbal tea. Stays well hydrated.     Labs from 6/25/2024 showing total cholesterol 106, HDL 53, LDL 92, triglycerides 95, ALT 23. Denies any myalgias with her statin therapy.         Recent Hospitalizations   4/2024 ER visit for stroke symptoms/confusion/dizziness          Social History      Social History     Socioeconomic History    Marital status:      Spouse name: Thomas    Number of children: 2    Years of education: Not on file    Highest education level: Not on file   Occupational History    Occupation:      Employer: UNKNOWN   Tobacco Use    Smoking status: Never    Smokeless tobacco: Never   Vaping Use    Vaping status: Never Used   Substance and Sexual Activity    Alcohol use: Not Currently     Alcohol/week: 0.0 - 0.8 standard drinks of alcohol    Drug use: No    Sexual activity: Yes     Partners: Male   Other Topics Concern    Parent/sibling w/ CABG, MI or angioplasty before 65F 55M? 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     Social Determinants of Health     Financial Resource Strain: Low Risk  (12/26/2023)    Financial Resource Strain     Within the past 12 months, have you or your family members you live with been unable to get utilities (heat, electricity) when it was really needed?: No   Food Insecurity: Low Risk  (12/26/2023)    Food Insecurity     Within the past 12 months, did you worry that your food would run out before you got money to buy more?: No     Within the past 12 months, did the food you bought just not last and you didn t have money to get more?: No   Transportation Needs: Low Risk  (12/26/2023)    Transportation Needs     Within the past 12 months, has lack of transportation kept you from medical appointments, getting your medicines, non-medical meetings or appointments, work, or from getting things that you need?: No   Physical Activity: Not on file   Stress: Not on file   Social Connections: Unknown (2/20/2024)    Received from [facility]    Social Connections     Frequency of Communication with Friends and Family: Not on file   Interpersonal Safety: Low Risk  (12/26/2023)    Interpersonal Safety     Do you feel physically and emotionally safe where you currently live?: Yes     Within the past 12 months, have you been hit, slapped, kicked or otherwise physically hurt by someone?: No     Within the past 12 months, have you been humiliated or emotionally abused in other ways by your partner or ex-partner?: No   Housing Stability: Low Risk  (12/26/2023)    Housing Stability     Do you have housing? : Yes     Are you worried about losing your housing?: No            Review of Systems:   Skin:  not assessed     Eyes:  not assessed    ENT:  not assessed    Respiratory:  Negative   "  Cardiovascular:  Negative    Gastroenterology: not assessed    Genitourinary:  not assessed    Musculoskeletal:  not assessed    Neurologic:  Negative    Psychiatric:  not assessed    Heme/Lymph/Imm:  not assessed    Endocrine:  Negative           Physical Exam:   Vitals: /84   Pulse 58   Resp 18   Ht 1.626 m (5' 4\")   Wt 65.8 kg (145 lb)   SpO2 100%   BMI 24.89 kg/m     Wt Readings from Last 4 Encounters:   06/26/24 65.8 kg (145 lb)   04/30/24 64.9 kg (143 lb)   04/16/24 64.9 kg (143 lb)   04/05/24 63.5 kg (140 lb)     GEN: well nourished, in no acute distress.  HEENT:  Pupils equal, round. Sclerae nonicteric.   NECK: Supple, no masses appreciated. No JVD with patient supine.  C/V:  Regular rate and rhythm, no murmur, rub or gallop.    RESP: Respirations are unlabored. Clear to auscultation bilaterally without wheezing, rales, or rhonchi.  GI: Abdomen soft, nontender.  EXTREM: No LE edema.  NEURO: Alert and oriented, cooperative.  SKIN: Warm and dry.        Data:     LIPID RESULTS:  Lab Results   Component Value Date    CHOL 160 06/25/2024    CHOL 231 (H) 01/28/2021    HDL 53 06/25/2024    HDL 63 01/28/2021    LDL 92 06/25/2024     (H) 01/28/2021    TRIG 75 06/25/2024    TRIG 78 01/28/2021    CHOLHDLRATIO 3.8 12/02/2013     LIVER ENZYME RESULTS:  Lab Results   Component Value Date    AST 20 06/23/2024    AST 15 01/28/2021    ALT 23 06/25/2024    ALT 23 01/28/2021     CBC RESULTS:  Lab Results   Component Value Date    WBC 8.4 06/23/2024    WBC 5.7 12/02/2013    RBC 4.65 06/23/2024    RBC 4.64 12/02/2013    HGB 14.2 06/23/2024    HGB 14.1 12/02/2013    HCT 43.0 06/23/2024    HCT 42.3 12/02/2013    MCV 93 06/23/2024    MCV 91 12/02/2013    MCH 30.5 06/23/2024    MCH 30.4 12/02/2013    MCHC 33.0 06/23/2024    MCHC 33.3 12/02/2013    RDW 13.2 06/23/2024    RDW 13.0 12/02/2013     06/23/2024     12/02/2013     BMP RESULTS:  Lab Results   Component Value Date     06/23/2024    NA " "139 01/28/2021    POTASSIUM 3.9 06/23/2024    POTASSIUM 3.4 06/07/2022    POTASSIUM 3.8 01/28/2021    CHLORIDE 104 06/23/2024    CHLORIDE 104 06/07/2022    CHLORIDE 105 01/28/2021    CO2 29 06/23/2024    CO2 31 06/07/2022    CO2 31 01/28/2021    ANIONGAP 11 06/23/2024    ANIONGAP 3 06/07/2022    ANIONGAP 3 01/28/2021    GLC 94 06/23/2024    GLC 99 06/07/2022     (H) 01/28/2021    BUN 20.5 06/23/2024    BUN 17 06/07/2022    BUN 21 01/28/2021    CR 1.13 (H) 06/23/2024    CR 0.93 01/28/2021    GFRESTIMATED 52 (L) 06/23/2024    GFRESTIMATED 63 01/28/2021    GFRESTBLACK 73 01/28/2021    KRISTEL 10.0 06/23/2024    KRISTEL 10.0 01/28/2021      A1C RESULTS:  No results found for: \"A1C\"  INR RESULTS:  No results found for: \"INR\"         Medications     Current Outpatient Medications   Medication Sig Dispense Refill    diphenhydrAMINE (BENADRYL) 50 MG capsule Please take 1 tab 1 hour prior to scan. 1 capsule 0    estradiol (ESTRACE) 0.1 MG/GM vaginal cream Place 2 g vaginally twice a week 42.5 g 0    flecainide (TAMBOCOR) 50 MG tablet Take 50mg twice daily 180 tablet 2    LORazepam (ATIVAN) 1 MG tablet Take 1 tablet (1 mg) by mouth every 12 hours as needed for anxiety 10 tablet 0    meclizine (ANTIVERT) 25 MG tablet Take 1 tablet (25 mg) by mouth 3 times daily as needed for dizziness 20 tablet 0    methylPREDNISolone (MEDROL) 32 MG tablet Please take 1 tab 12 hours prior to scan and 1 tab 2 hours prior to scan. 2 tablet 0    metoprolol succinate ER (TOPROL XL) 50 MG 24 hr tablet Take 1 tablet (50 mg) by mouth daily 180 tablet 1    predniSONE (DELTASONE) 20 MG tablet Take 1 tablet (20 mg) by mouth 2 times daily 10 tablet 0    rosuvastatin (CRESTOR) 20 MG tablet Take 1 tablet (20 mg) by mouth daily 90 tablet 3    triamterene-HCTZ (DYAZIDE) 37.5-25 MG capsule TAKE 1 CAPSULE BY MOUTH EVERY MORNING      vitamin D3 (CHOLECALCIFEROL) 50 mcg (2000 units) tablet Take 1 tablet (50 mcg) by mouth daily            Past Medical History "     Past Medical History:   Diagnosis Date    Breast cancer (H) 10/10    R s/p mast/tram flap, reconstruction    Carpal tunnel syndrome     Gastro-oesophageal reflux disease     GERD (gastroesophageal reflux disease)     s/p EGD 5/06    Hematuria 2003    HX: benign breast biopsy     L, papilloma    Osteopenia     DXA 12/16/10    PONV (postoperative nausea and vomiting)     Scope patch on pre-op    S/P hysterectomy with oophorectomy 2001 for fibroids    S/P tonsillectomy      Past Surgical History:   Procedure Laterality Date    BREAST SURGERY      RIGHT BREAST BIOPSY, RIGHT MASTECTOMY WITH TRAM FLAP RECONSTRUCTION    CYSTOSCOPY, URETEROSCOPY, COMBINED  9/14/2012    Procedure: COMBINED CYSTOSCOPY, URETEROSCOPY;  VIDEO CYSTOSCOPY, RIGHT FLEXIBLE URETEROSCOPY, CAUTERIZATION OF PAPILLARY VEIN (LATEX IV CONTRAST TAPE ALLERGY);  Surgeon: Rey Bailon MD;  Location:  OR    ENT SURGERY      TONSILLECTOMY    GI SURGERY      EGD    GYN SURGERY      HYSTERECTOMY WITH OOPHORECTOMY    HYSTERECTOMY, PAP NO LONGER INDICATED      ORTHOPEDIC SURGERY      CARPAL TUNNEL RELEASE    REVISE RECONSTRUCTED BREAST  12/8/2011    Procedure:REVISE RECONSTRUCTED BREAST; REVISION RIGHT BREAST RECONSTRUCTION, LEFT BREAST STEROID INJECTION ; Surgeon:FRAN LEVIN; Location:[facility]     Family History   Problem Relation Age of Onset    Thyroid Disease Mother         cancer    Hypertension Mother     Breast Cancer Mother     Cancer Mother         uterine    Cardiovascular Father     Cerebrovascular Disease Father     Diabetes Father             Allergies   Adhesive tape, Contrast dye, Latex, and Norvasc [amlodipine besylate]        Urvashi Mak NP  [facility]  Pager: [phone]      Thank you for allowing me to participate in the care of your patient.      Sincerely,     Urvashi Mak NP     [facility]  cc:   Urvashi Mak NP  [address] [address]

## 2024-08-26 ENCOUNTER — OFFICE VISIT (OUTPATIENT)
Dept: FAMILY MEDICINE | Facility: CLINIC | Age: 74
End: 2024-08-26
Payer: MEDICARE

## 2024-08-26 ENCOUNTER — ORDERS ONLY (AUTO-RELEASED) (OUTPATIENT)
Dept: FAMILY MEDICINE | Facility: CLINIC | Age: 74
End: 2024-08-26

## 2024-08-26 VITALS
WEIGHT: 147.9 LBS | TEMPERATURE: 97.2 F | OXYGEN SATURATION: 98 % | HEART RATE: 52 BPM | DIASTOLIC BLOOD PRESSURE: 76 MMHG | RESPIRATION RATE: 16 BRPM | SYSTOLIC BLOOD PRESSURE: 138 MMHG | BODY MASS INDEX: 25.25 KG/M2 | HEIGHT: 64 IN

## 2024-08-26 DIAGNOSIS — Z12.11 COLON CANCER SCREENING: ICD-10-CM

## 2024-08-26 DIAGNOSIS — E78.5 HYPERLIPIDEMIA LDL GOAL <100: ICD-10-CM

## 2024-08-26 DIAGNOSIS — I47.10 SVT (SUPRAVENTRICULAR TACHYCARDIA) (H): ICD-10-CM

## 2024-08-26 DIAGNOSIS — C50.911 HORMONE RECEPTOR POSITIVE MALIGNANT NEOPLASM OF RIGHT BREAST (H): Primary | ICD-10-CM

## 2024-08-26 DIAGNOSIS — K21.9 GASTROESOPHAGEAL REFLUX DISEASE WITHOUT ESOPHAGITIS: ICD-10-CM

## 2024-08-26 DIAGNOSIS — M81.8 OTHER OSTEOPOROSIS, UNSPECIFIED PATHOLOGICAL FRACTURE PRESENCE: ICD-10-CM

## 2024-08-26 DIAGNOSIS — N30.10 BLADDER PAIN SYNDROME: ICD-10-CM

## 2024-08-26 PROCEDURE — 99213 OFFICE O/P EST LOW 20 MIN: CPT | Performed by: INTERNAL MEDICINE

## 2024-08-26 ASSESSMENT — PATIENT HEALTH QUESTIONNAIRE - PHQ9
SUM OF ALL RESPONSES TO PHQ QUESTIONS 1-9: 0
10. IF YOU CHECKED OFF ANY PROBLEMS, HOW DIFFICULT HAVE THESE PROBLEMS MADE IT FOR YOU TO DO YOUR WORK, TAKE CARE OF THINGS AT HOME, OR GET ALONG WITH OTHER PEOPLE: NOT DIFFICULT AT ALL
SUM OF ALL RESPONSES TO PHQ QUESTIONS 1-9: 0

## 2024-08-26 NOTE — PROGRESS NOTES
"  Assessment & Plan     Hormone receptor positive malignant neoplasm of right breast (H)  Stable. Yearly mammograms.     SVT (supraventricular tachycardia) (H24)  Stable. Continue medication. Following cardiology.    Hyperlipidemia LDL goal <100  Stable. Continue medication.    Other osteoporosis, unspecified pathological fracture presence  - DX Bone Density; Future    Gastroesophageal reflux disease without esophagitis  Discussed about taking pepcid as needed for GERD    Bladder pain syndrome  Discussed continuing vaginal estrogen.  Discussed medication amitriptyline for interstitial cystitis. She would like to continue vaginal estrogen at this time and if no improvement in next 4 weeks she will inform me.     Colon cancer screening  - COLOGUARD([employer]); Future    BMI  Estimated body mass index is 25.39 kg/m  as calculated from the following:    Height as of this encounter: 1.626 m (5' 4\").    Weight as of this encounter: 67.1 kg (147 lb 14.4 oz).         See Patient Instructions    Salvador Han is a 73 year old, presenting for the following health issues:  Establish Care    History of Present Illness       Reason for visit:  Abdominal pain and burning   new checkup  Symptom onset:  3-4 weeks ago   She is taking medications regularly.    Emely Forbes is here to establish care.   She has Hx of breast cancer - diagnosed in 2010, s/p mastectomy s/p letrozole for 7 years  Yearly mammograms  Palpitations SVT: on flecainide and metoprolol.   Smoking: none  ETOH: very minimal per patient, < 1 drink per week.  She had an episode of hematuria, followed by treatment of UTI, she continues to have suprapubic discomfort. She also have dysuria and is on vaginal estrogen through her Urogyn.   Workup of hematuria: CT scan showed 3 mm kidney stone  She is due for colon cancer screening  She has osteoporosis, per dexa in 2022. She's currently not on medication.         Objective    /76 (BP Location: Left arm, Patient " "Position: Sitting, Cuff Size: Adult Regular)   Pulse 52   Temp 97.2  F (36.2  C)   Resp 16   Ht 1.626 m (5' 4\")   Wt 67.1 kg (147 lb 14.4 oz)   SpO2 98%   BMI 25.39 kg/m    Body mass index is 25.39 kg/m .  Physical Exam           Signed Electronically by: Stacy Tariq MD    "

## 2024-09-07 LAB — NONINV COLON CA DNA+OCC BLD SCRN STL QL: NEGATIVE

## 2024-10-01 ENCOUNTER — HOSPITAL ENCOUNTER (EMERGENCY)
Facility: CLINIC | Age: 74
Discharge: HOME | End: 2024-10-02
Attending: EMERGENCY MEDICINE | Admitting: EMERGENCY MEDICINE
Payer: MEDICARE

## 2024-10-01 DIAGNOSIS — R00.2 PALPITATIONS: ICD-10-CM

## 2024-10-01 DIAGNOSIS — C44.90 SKIN CANCER: ICD-10-CM

## 2024-10-01 DIAGNOSIS — R03.0 ELEVATED BLOOD PRESSURE READING: ICD-10-CM

## 2024-10-01 DIAGNOSIS — E87.6 HYPOKALEMIA: ICD-10-CM

## 2024-10-01 DIAGNOSIS — F41.9 ANXIETY: ICD-10-CM

## 2024-10-01 LAB
ANION GAP SERPL CALCULATED.3IONS-SCNC: 12 MMOL/L (ref 7–15)
BUN SERPL-MCNC: 23.8 MG/DL (ref 8–23)
CALCIUM SERPL-MCNC: 9.3 MG/DL (ref 8.8–10.4)
CHLORIDE SERPL-SCNC: 102 MMOL/L (ref 98–107)
CREAT SERPL-MCNC: 0.9 MG/DL (ref 0.51–0.95)
EGFRCR SERPLBLD CKD-EPI 2021: 68 ML/MIN/1.73M2
GLUCOSE SERPL-MCNC: 122 MG/DL (ref 70–99)
HCO3 SERPL-SCNC: 24 MMOL/L (ref 22–29)
HOLD SPECIMEN: NORMAL
POTASSIUM SERPL-SCNC: 3.3 MMOL/L (ref 3.4–5.3)
SODIUM SERPL-SCNC: 138 MMOL/L (ref 135–145)
TROPONIN T SERPL HS-MCNC: 8 NG/L

## 2024-10-01 PROCEDURE — 80048 BASIC METABOLIC PNL TOTAL CA: CPT | Performed by: EMERGENCY MEDICINE

## 2024-10-01 PROCEDURE — 84484 ASSAY OF TROPONIN QUANT: CPT | Performed by: EMERGENCY MEDICINE

## 2024-10-01 PROCEDURE — 93005 ELECTROCARDIOGRAM TRACING: CPT

## 2024-10-01 PROCEDURE — 99284 EMERGENCY DEPT VISIT MOD MDM: CPT

## 2024-10-01 PROCEDURE — 36415 COLL VENOUS BLD VENIPUNCTURE: CPT | Performed by: EMERGENCY MEDICINE

## 2024-10-01 RX ORDER — POTASSIUM CHLORIDE 1.5 G/1.58G
40 POWDER, FOR SOLUTION ORAL ONCE
Status: COMPLETED | OUTPATIENT
Start: 2024-10-01 | End: 2024-10-02

## 2024-10-01 ASSESSMENT — ACTIVITIES OF DAILY LIVING (ADL): ADLS_ACUITY_SCORE: 35

## 2024-10-01 ASSESSMENT — COLUMBIA-SUICIDE SEVERITY RATING SCALE - C-SSRS
6. HAVE YOU EVER DONE ANYTHING, STARTED TO DO ANYTHING, OR PREPARED TO DO ANYTHING TO END YOUR LIFE?: NO
1. IN THE PAST MONTH, HAVE YOU WISHED YOU WERE DEAD OR WISHED YOU COULD GO TO SLEEP AND NOT WAKE UP?: NO
2. HAVE YOU ACTUALLY HAD ANY THOUGHTS OF KILLING YOURSELF IN THE PAST MONTH?: NO

## 2024-10-01 NOTE — PROCEDURE: COUNSELING
Hospital Medicine History & Physical      Date of Admission: 10/1/2024    Date of Service:  Pt seen/examined on 10/1/2024     []Admitted to Inpatient with expected LOS greater than two midnights due to medical therapy.  [x]Placed in Observation status.    Chief Admission Complaint:  Chest pain    Presenting Admission History:      54 y.o. female who presented to [facility] with chest pain on the right side with numbness and tingling the right upper extremity.  PMHx significant for diabetes mellitus type 2, COPD, anxiety, hyperlipidemia, hiatal hernia with chronic GERD.  Patient is presenting to the hospital for worsening right-sided chest pain was 10 out of 10 today with episode of dyspnea, she said her chest pain started few months ago and she was evaluated at that time in the hospital in May where she had cardiac echo showed normal ejection fraction was normal wall motion, with grade 1 diastolic dysfunction, she had CTA cardiac which was within normal limits at that time was no calcification, had pulmonary function test also in May which showed obstructive lung disease for which she was placed on Symbicort for possible COPD due to having dyspnea, patient never smoked in the past.  She has been diabetic on metformin but her glucose is still uncontrolled.  Had lost her father few weeks ago and since then her chest pain has worsened with having some increased dyspnea, she said her weight has been fluctuating ±5 pound, noticed some edema in lower extremity, patient works from home in front of the computer and spent most of her time sitting, she uses headphones, patient said that the chest pain mainly in the right side started this morning and continued throughout the day and has not been easing up since then she decided to come to the emergency room it was sharp pain with tingling sensation and numbness radiating in the right upper extremity, there was no chest pain on the left side there was no 
Detail Level: Detailed
patient w/ the Emergency Department Provider: Gamaliel Vu MD      Physical Exam Performed:      BP (!) 136/101   Pulse 86   Temp 97.7 °F (36.5 °C) (Oral)   Resp 16   Ht 1.702 m (5' 7\")   Wt 103 kg (227 lb)   SpO2 96%   BMI 35.55 kg/m²     General appearance:  No apparent distress, appears stated age and cooperative.  HEENT:  Pupils equal, round, and reactive to light.  Respiratory:  Normal respiratory effort. Clear to auscultation, bilaterally without Rales/Wheezes/Rhonchi.  Cardiovascular:  Regular rate and rhythm with normal S1/S2.  Abdomen:  Soft, non-tender, non-distended with normal bowel sounds.  Extremities: +1 pitting edema bilateral legs  Neurologic:  Neurovascularly intact without any focal motor deficits. Cranial nerves: II-XII intact, grossly non-focal.  Psychiatric:  Alert and oriented, thought content appropriate, normal insight       Diet: []Adult/General  []Cardiac  [x]Diabetic  []Low Fat  []NPO  []NPO after Midnight  []Other   DVT Prophylaxis: [x]PPx LMWH  []SQ Heparin  []IPC/SCDs  []Eliquis  []Xarelto  []Coumadin     Code status: [x]Full  []DNR/CCA  []Limited (DNR/CCA with Do Not Intubate)  []DNRCC  Surrogate Decision Maker: N/A  PT/OT Eval Status:   []NOT yet ordered  []Ordered and Pending   []Seen with Recommendations for:  []Home independently  []Home w/ assist  []HHC  []SNF  []Acute Rehab    Anticipated Discharge Day/Date:  10/2/2024    Anticipated Discharge Location: [x]Home  []HHC  []SNF  []Acute Rehab  []ECF  []LTAC  []Hospice    Consults:      None    I personally have obtained, updated and/or reviewed the patient’s medication list on 10/1/2024   --------------------------------------------------------------------------------------------------------------------------------------------------------------------    Imaging:     XR CHEST (2 VW)    Result Date: 10/1/2024  EXAMINATION: TWO XRAY VIEWS OF THE CHEST 10/1/2024 5:06 pm COMPARISON: CXR dated 09/16/2024 HISTORY: ORDERING 
Detail Level: Generalized
Detail Level: Simple

## 2024-10-02 ENCOUNTER — TELEPHONE (OUTPATIENT)
Dept: CARDIOLOGY | Facility: CLINIC | Age: 74
End: 2024-10-02

## 2024-10-02 ENCOUNTER — NURSE TRIAGE (OUTPATIENT)
Dept: FAMILY MEDICINE | Facility: CLINIC | Age: 74
End: 2024-10-02

## 2024-10-02 ENCOUNTER — OFFICE VISIT (OUTPATIENT)
Dept: URGENT CARE | Facility: URGENT CARE | Age: 74
End: 2024-10-02
Payer: MEDICARE

## 2024-10-02 VITALS
DIASTOLIC BLOOD PRESSURE: 82 MMHG | SYSTOLIC BLOOD PRESSURE: 126 MMHG | HEART RATE: 64 BPM | OXYGEN SATURATION: 97 % | BODY MASS INDEX: 25.46 KG/M2 | TEMPERATURE: 97.6 F | RESPIRATION RATE: 16 BRPM | WEIGHT: 148.3 LBS

## 2024-10-02 VITALS
HEIGHT: 64 IN | OXYGEN SATURATION: 95 % | SYSTOLIC BLOOD PRESSURE: 128 MMHG | WEIGHT: 140 LBS | DIASTOLIC BLOOD PRESSURE: 79 MMHG | TEMPERATURE: 98.1 F | HEART RATE: 56 BPM | RESPIRATION RATE: 19 BRPM | BODY MASS INDEX: 23.9 KG/M2

## 2024-10-02 DIAGNOSIS — C50.911 HORMONE RECEPTOR POSITIVE MALIGNANT NEOPLASM OF RIGHT BREAST (H): ICD-10-CM

## 2024-10-02 DIAGNOSIS — F41.1 GAD (GENERALIZED ANXIETY DISORDER): Primary | ICD-10-CM

## 2024-10-02 DIAGNOSIS — C50.911 MALIGNANT NEOPLASM OF RIGHT FEMALE BREAST, UNSPECIFIED ESTROGEN RECEPTOR STATUS, UNSPECIFIED SITE OF BREAST (H): ICD-10-CM

## 2024-10-02 LAB
ATRIAL RATE - MUSE: 57 BPM
DIASTOLIC BLOOD PRESSURE - MUSE: NORMAL MMHG
INTERPRETATION ECG - MUSE: NORMAL
P AXIS - MUSE: 62 DEGREES
PR INTERVAL - MUSE: 182 MS
QRS DURATION - MUSE: 104 MS
QT - MUSE: 450 MS
QTC - MUSE: 438 MS
R AXIS - MUSE: 24 DEGREES
SYSTOLIC BLOOD PRESSURE - MUSE: NORMAL MMHG
T AXIS - MUSE: 53 DEGREES
VENTRICULAR RATE- MUSE: 57 BPM

## 2024-10-02 PROCEDURE — 250N000013 HC RX MED GY IP 250 OP 250 PS 637: Performed by: EMERGENCY MEDICINE

## 2024-10-02 PROCEDURE — 99215 OFFICE O/P EST HI 40 MIN: CPT | Performed by: NURSE PRACTITIONER

## 2024-10-02 RX ORDER — LORAZEPAM 1 MG/1
1 TABLET ORAL EVERY 12 HOURS PRN
Qty: 10 TABLET | Refills: 0 | Status: SHIPPED | OUTPATIENT
Start: 2024-10-02 | End: 2024-10-02

## 2024-10-02 RX ORDER — LORAZEPAM 1 MG/1
1 TABLET ORAL EVERY 12 HOURS PRN
Qty: 10 TABLET | Refills: 0 | Status: SHIPPED | OUTPATIENT
Start: 2024-10-02

## 2024-10-02 RX ADMIN — POTASSIUM CHLORIDE 40 MEQ: 1.5 POWDER, FOR SOLUTION ORAL at 00:07

## 2024-10-02 ASSESSMENT — ANXIETY QUESTIONNAIRES
IF YOU CHECKED OFF ANY PROBLEMS ON THIS QUESTIONNAIRE, HOW DIFFICULT HAVE THESE PROBLEMS MADE IT FOR YOU TO DO YOUR WORK, TAKE CARE OF THINGS AT HOME, OR GET ALONG WITH OTHER PEOPLE: SOMEWHAT DIFFICULT
3. WORRYING TOO MUCH ABOUT DIFFERENT THINGS: NEARLY EVERY DAY
6. BECOMING EASILY ANNOYED OR IRRITABLE: NOT AT ALL
GAD7 TOTAL SCORE: 15
2. NOT BEING ABLE TO STOP OR CONTROL WORRYING: NEARLY EVERY DAY
1. FEELING NERVOUS, ANXIOUS, OR ON EDGE: NEARLY EVERY DAY
GAD7 TOTAL SCORE: 15
7. FEELING AFRAID AS IF SOMETHING AWFUL MIGHT HAPPEN: NEARLY EVERY DAY
5. BEING SO RESTLESS THAT IT IS HARD TO SIT STILL: NOT AT ALL

## 2024-10-02 ASSESSMENT — PATIENT HEALTH QUESTIONNAIRE - PHQ9: 5. POOR APPETITE OR OVEREATING: NEARLY EVERY DAY

## 2024-10-02 NOTE — ED TRIAGE NOTES
"Pt arrives from home via EMS with complaint of HTN and associated anxiety and headache. Pt took BP at home and the highest reading SBP was 194. EMS gave 1mg versed for anxiety. EKG was normal. Pt states she \"has been feeling off\" for the past week. Pt has had recent diagnosis of skin cancer and has added stress. . No prior hx of HTN.      Triage Assessment (Adult)       Row Name 10/01/24 7255          Triage Assessment    Airway WDL WDL        Respiratory WDL    Respiratory WDL WDL        Skin Circulation/Temperature WDL    Skin Circulation/Temperature WDL WDL        Peripheral/Neurovascular WDL    Peripheral Neurovascular WDL WDL        Cognitive/Neuro/Behavioral WDL    Cognitive/Neuro/Behavioral WDL WDL                     "

## 2024-10-02 NOTE — TELEPHONE ENCOUNTER
Patient left VM on team 2 nurse line reporting she was seen in ED the previous night for high BP and anxiety. Per ED note, patient advised to follow up with PCP for consideration of anxiolytic medication. Patient would like a return call from Cardiology as soon as possible to discuss starting an antihypertensive medication. Patient's last 3 visits have been with Sarika Mak CNP. Message routed to team 7 for review.

## 2024-10-02 NOTE — TELEPHONE ENCOUNTER
Eliazar Slaughter MD  [facility] Heart Team 75 minutes ago (4:25 PM)       I reviewed the ED notes and RN notes, BP normalized in the ED with no intervention after anxiety improved, I recommend she interface with her PCP regarding anxiety       I called pt back with recommendations from . Pt said she has a PCP visit in a couple of weeks, but it is to establish care with a new PCP as her previous one retired. I recommended pt call PCP clinic with an update that she was seen in ED for anxiety related high BP and see if she can at least get a PRN anxiety medication to take until she can have a visit to discuss longer term management of her anxiety. I also told pt if she has another episode of her BP spiking she can always call the on call cardiologist (if it's after business hours) to discuss whether she should take any additional BP metoprolol or other BP medication. Pt will call her PCP office to discuss. Makenzie SORIA October 2, 2024, 4:35 PM

## 2024-10-02 NOTE — ED PROVIDER NOTES
"  Emergency Department Note      History of Present Illness     Chief Complaint:  HTN    HPI   Emely Forbes is a 73 year old female who presents by EMS for evaluation of elevated blood pressure reading.  She has a history of SVT and takes flecainide as well as metoprolol once daily, also uses triamterene hydrochlorothiazide prescribed by an otolaryngologist for history of Ménière's disease, who states that for the past week, she has been having more frequent episodes of palpitations and feels that her heart is racing, typically around bedtime.  She volunteers that she has been under increased stress over the last few weeks since being diagnosed with skin cancer to her right forehead, has an upcoming procedure for this.  Very rare alcohol use.  She checks her blood pressure every week or so at home, it is normally in the 100s to 110s over 60s to 70s, which is what it was earlier this afternoon, but this evening while watching the vice presidential debate, she felt anxious and felt palpitations, and then measured a blood pressure of 160s systolic, she states she became \"scared\" and measured her blood pressure again, getting 180s and then 200s.  She felt \"tingly\" throughout her body.  She has no new pain.  She has a therapist.  She wonders whether she should go back and see her heart doctors about all of this.    Independent Historian: EMS, who reports that her blood sugar was 157.  She was given 1 mg of Versed during transport.    Review of External Notes: I personally reviewed prior records including MRI of the brain in April which was negative.  Cardiology note in June shows a blood pressure of 136/84, and a clinic note from August 26 at which time her blood pressure was 138/76.  Had a stress test in March 2023 that showed no ischemia.    Past Medical History     Medical History and Problem List   Past Medical History:   Diagnosis Date    Breast cancer (H) 10/10    Carpal tunnel syndrome     Gastro-oesophageal reflux " "disease     GERD (gastroesophageal reflux disease)     Hematuria 2003    HX: benign breast biopsy     Osteopenia     PONV (postoperative nausea and vomiting)     S/P hysterectomy with oophorectomy 2001 for fibroids    S/P tonsillectomy        Medications   estradiol (ESTRACE) 0.1 MG/GM vaginal cream  flecainide (TAMBOCOR) 50 MG tablet  metoprolol succinate ER (TOPROL XL) 50 MG 24 hr tablet  rosuvastatin (CRESTOR) 20 MG tablet  triamterene-HCTZ (DYAZIDE) 37.5-25 MG capsule  vitamin D3 (CHOLECALCIFEROL) 50 mcg (2000 units) tablet      Surgical History   Past Surgical History:   Procedure Laterality Date    BREAST SURGERY      RIGHT BREAST BIOPSY, RIGHT MASTECTOMY WITH TRAM FLAP RECONSTRUCTION    CYSTOSCOPY, URETEROSCOPY, COMBINED  9/14/2012    Procedure: COMBINED CYSTOSCOPY, URETEROSCOPY;  VIDEO CYSTOSCOPY, RIGHT FLEXIBLE URETEROSCOPY, CAUTERIZATION OF PAPILLARY VEIN (LATEX IV CONTRAST TAPE ALLERGY);  Surgeon: Rey Bailon MD;  Location:  OR    ENT SURGERY      TONSILLECTOMY    GI SURGERY      EGD    GYN SURGERY      HYSTERECTOMY WITH OOPHORECTOMY    HYSTERECTOMY, PAP NO LONGER INDICATED      ORTHOPEDIC SURGERY      CARPAL TUNNEL RELEASE    REVISE RECONSTRUCTED BREAST  12/8/2011    Procedure:REVISE RECONSTRUCTED BREAST; REVISION RIGHT BREAST RECONSTRUCTION, LEFT BREAST STEROID INJECTION ; Surgeon:FRAN LEVIN; Location:[facility]     Physical Exam     Patient Vitals for the past 24 hrs:   BP Temp Temp src Pulse Resp SpO2 Height Weight   10/02/24 0002 -- -- -- 56 19 95 % -- --   10/02/24 0001 -- -- -- 55 10 94 % -- --   10/02/24 0000 128/79 -- -- 56 12 94 % -- --   10/01/24 2348 -- -- -- 56 15 96 % -- --   10/01/24 2347 134/89 -- -- 54 -- -- -- --   10/01/24 2251 (!) 160/96 98.1  F (36.7  C) Temporal 59 20 97 % 1.626 m (5' 4\") 63.5 kg (140 lb)     Physical Exam  General: Nontoxic.  Woman sitting upright in room 22,  at bedside  HENT: mucous membranes moist, thyroid nonpalpable, subcentimeter skin " lesion to the right forehead  CV: rate as above, regular rhythm, no lower extremity edema, no JVD, palpable symmetric radial pulses, no murmur audible  Resp: normal effort, speaks in full phrases, no stridor, no cough observed  GI: abdomen soft and nontender  MSK: no bony tenderness   Neuro: awake, alert, clear speech, fully oriented, face symmetric,  normal, no pronator drift, strength and sensation intact in all extr, no nuchal rigidity  Psych: cooperative, anxious, intermittently tearful, appreciative    Diagnostics   Electrocardiogram  ECG taken at 2250, ECG interpreted at 2305 by IGOR Garcias MD  Sinus bradycardia, no ST elevation or depression  Rate 57 bpm. ME interval 182. QRS duration 104. QTc 438    Lab Results   Labs Ordered and Resulted from Time of ED Arrival to Time of ED Departure   BASIC METABOLIC PANEL - Abnormal       Result Value    Sodium 138      Potassium 3.3 (*)     Chloride 102      Carbon Dioxide (CO2) 24      Anion Gap 12      Urea Nitrogen 23.8 (*)     Creatinine 0.90      GFR Estimate 68      Calcium 9.3      Glucose 122 (*)    TROPONIN T, HIGH SENSITIVITY - Normal    Troponin T, High Sensitivity 8         ED Course      Medications Administered   Medications   potassium chloride (KLOR-CON) Packet 40 mEq (40 mEq Oral $Given 10/2/24 0007)       ED Course and Discussion of Management   ED Course as of 10/02/24 0629   Wed Oct 02, 2024   0002 I rechecked patient, discussed test results. /79.       Additional Documentation  None    Medical Decision Making / Diagnosis   Medical Decision Making:  Regarding her elevated blood pressure, I do not identify or suspect any acute endorgan injury, no focal neurologic signs or symptoms to suggest stroke.  Troponin is negative, I do not think that serial troponins or more aggressive cardiac workup is indicated based on current findings.  I note that her symptoms are primarily nocturnal,, she volunteers that she is experiencing significant  anxiety regarding her recent diagnosis of skin cancer on her forehead, I think this is likely playing a key role in her presentation though did not presume this, consider whether she might be experiencing a variety of types of arrhythmia, noting that she is on flecainide for history of SVT.  No signs of current arrhythmia.  Minimal hypokalemia that I do not think is playing a key role but given her history of arrhythmia, this was replaced orally.  Blood pressure improved dramatically without specific antihypertensive medication.  Attempted to reassure the patient, as did her , and ultimately we agreed on a plan for discharge home and close outpatient follow-up.  She is interested in following up with her cardiology team, I think this would be reasonable though I do not think that setting her up immediately for more advanced cardiac testing or prolonged cardiac monitoring is indicated based on current findings.  We discussed her anxiety, but does not meet criteria to be held against her will, I do not think she requires psychiatric hospitalization, and I think that consideration of specific anxiolytic medication is best deferred to her primary clinic.  Return here for unexpected sudden worsening at any hour.    Disposition   Discharge home    Diagnosis     ICD-10-CM    1. Elevated blood pressure reading  R03.0       2. Palpitations  R00.2       3. Anxiety  F41.9       4. Skin cancer  C44.90       5. Hypokalemia  E87.6          10/1/2024   MD Katerine Arambula Jeffrey Alan, MD  10/02/24 0631

## 2024-10-02 NOTE — PROGRESS NOTES
Assessment & Plan     1. SOILA (generalized anxiety disorder)  Recommend restarting therapy as this has been helpful in past. She is going through a lot currently with her health and would benefit from therapy she is in agreement with this.   Recommend continue use of ativan for severe anxiety likely panic attacks affecting her heart rate and blood pressure. Review of ER note no indication this is related to cardiac no further testing done in clinic tonight.   Discussed use of benzodiazapine's and short term recommendations   She was prescribed  lexapro in past however did not start this. We discussed if she decided to try a longer term medication this may then be appropriate she will discuss with her PCP on Oct. 9 th this is her scheduled appointment date.   She denies thoughts of self harm, suicide, homicide.   - Adult Mental Health  Referral; Future  - LORazepam (ATIVAN) 1 MG tablet; Take 1 tablet (1 mg) by mouth every 12 hours as needed for anxiety.  Dispense: 10 tablet; Refill: 0    40 minutes spent on the date of the encounter doing chart review, review of outside records, review of test results, interpretation of tests, patient visit, and documentation       MICHAEL Beach [facility] ISAEL Han is a 73 year old female who presents to clinic today for the following health issues:  Chief Complaint   Patient presents with    Urgent Care    Hypertension     X 2 weeks not feeling good  Racing heart this afternoon  Last night horrible HA - Took lorazepam half tablet around 2 PM   Seen at ED last night - /115 - was told it could be related to anxiety, BP normalized at ED  Appt on Oct 14th with PCP       Anxiety     Nervous about upcoming surgery squamous cell cancerous growth on face        HPI      1/11/2024     3:10 PM 10/2/2024     5:42 PM   SOILA-7 SCORE   Total Score 11 (moderate anxiety)    Total Score 11 15   Denies thoughts of suicide or self harm,  homicide or other.     Patient presents to clinic with symptoms of feeling hearth is racing, causing headache and blood pressure elevation.   She was seen in ER yesterday had a full work up with negative cardiac review noted likely anxiety driven.   She discussed with cardiology today Dr. Slaughter recommend follow up with her PCP for anxiety no change in cardiac plan of care.   She reached out to PCP  however did not have opening to see her.     Patient states recent diagnosis of skin ca she is due to have surgery (MOHS) procedure on Mon. 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   She was seeing a therapist in past for anxiety which was helpful however the  therapist moved . She also lost her PCP of many years who had prescribed her ativan she had never taken until today she took 1/2 a 1 mg tab states was somewhat helpful.     Review of Systems  Constitutional, HEENT, cardiovascular, pulmonary, gi and gu systems are negative, except as otherwise noted.      Objective    /82 (BP Location: Left arm, Patient Position: Sitting, Cuff Size: Adult Regular)   Pulse 64   Temp 97.6  F (36.4  C) (Oral)   Resp 16   Wt 67.3 kg (148 lb 4.8 oz)   SpO2 97%   Breastfeeding No   BMI 25.46 kg/m    Physical Exam   GENERAL: alert and no distress  NECK: no adenopathy, no asymmetry, masses, or scars  RESP: lungs clear to auscultation - no rales, rhonchi or wheezes  CV: regular rate and rhythm, normal S1 S2, no S3 or S4, no murmur, click or rub, no peripheral edema  ABDOMEN: soft, nontender, no hepatosplenomegaly, no masses and bowel sounds normal  MS: no gross musculoskeletal defects noted, no edema  PSYCH: mentation appears normal, tearful, anxious, judgement and insight intact, and appearance well groomed

## 2024-10-02 NOTE — TELEPHONE ENCOUNTER
"RN returned pt call regarding her ED visit last night for HTN. Pt states \"I need to know what to do about this blood pressure I've never had it so high.\"     Pt reports her BP in the AM yesterday was 111/70. Around 9:30 PM last night she was watching the  candidate debate. Pt states suddenly she felt wozzy in the head and like something was \"off\". She checked her BP and it was around 180 systolic. Pt states that did cause some anxiety and then upon recheck was 194/115 at home which prompted her to call the ambulance. Pt does endorse she is anxious over a new squamous cell skin cancer diagnosis, but she does not feel she was feeling anxious about that or the debate at the time her symptoms started last night.    Pt reports over the past week, at night, she has symptoms of hot flashes followed by palpitations. During the day she is fine. Pt reports BP and HR have been normal t/o the day and night up until about the past 1-2 weeks.     Pt states she would like adjustments made to her med program to prevent this from happening again. Pt wonders if she needs doses increased or if she needs to change the time of day she takes her meds. Routed update to Sarika Mak NP. Argenis Silva RN on 10/2/2024 at 10:45 AM      3:46 PM    Requested by Team 2 nurse to call pt back. Pt called Team 2 by mistake. RN called pt back. Pt states she is waiting to hear back on recommendations. Pt states her \"whole body is feeling like her heart is beating and I have a headache.\" Pt states she has not checked BP today because she was instructed by the ED providers yesterday not to d/t concerns her symptoms were r/t anxiety. Pt does appear anxious over the phone, easily worked up. Pt states she does not have chest pain, shortness of breath or other concerning symptoms such as any neurological changes. Pt is demanding treatment recommendations or an appt in clinic today. RN advised pt we are not set up for UC/ER advice over the phone and we " are limited w/outpatient cardiology to triage her symptoms and offer future appts for stable pts. RN informed pt if she needs treatment now she needs to present to the ED or UC. RN reviewed red flag signs/symptoms to watch for that are associated with hypertension or arrhythmias and to present to ED if any. Pt states her  is home and with her. She reports she does not need the ED and refuses to go at this time. RN informed pt she would send an update to the DOD as her usual providers are out of office today, and will look for a future appt to get pt in to be seen, however my recommendation stands that for worsening symptoms or if symptoms don't relieve she needs to go to the ED or UC.

## 2024-10-02 NOTE — TELEPHONE ENCOUNTER
"Nurse Triage SBAR    Is this a 2nd Level Triage? YES, LICENSED PRACTITIONER REVIEW IS REQUIRED    Situation: Patient calling in with high blood pressure concern    Background:     Assessment: Patient was seen yesterday in ED with blood pressure of 204/115 and a headache. Patient was sent home and was told not to check blood pressure today. Patient also told her cardiologist and he feels like it is anxiety related. . Patient calling stated still having a headache and \"feels like her right temple is going to explode.\" I asked patient if she as able to check her blood pressure now and patient stated she was almost at [facility] urgent care so she couldn't Patient decided she was going to go to urgent care as she was concerned    Protocol Recommended Disposition:   See Today In Office    Recommendation: Please advise if different disposition needed.  No openings at this time in clinic.     Routed to provider    Does the patient meet one of the following criteria for ADS visit consideration? No    Melissa Allen RN        Reason for Disposition   Patient wants to be seen    Answer Assessment - Initial Assessment Questions  1. BLOOD PRESSURE: \"What is the blood pressure?\" \"Did you take at least two measurements 5 minutes apart?\"      *No Answer*  2. ONSET: \"When did you take your blood pressure?\"      *No Answer*  3. HOW: \"How did you obtain the blood pressure?\" (e.g., visiting nurse, automatic home BP monitor)      *No Answer*  4. HISTORY: \"Do you have a history of high blood pressure?\"     No high blood pressure medication   5. MEDICATIONS: \"Are you taking any medications for blood pressure?\" \"Have you missed any doses recently?\"      Taking metoprolol for high pulse   6. OTHER SYMPTOMS: \"Do you have any symptoms?\" (e.g., headache, chest pain, blurred vision, difficulty breathing, weakness)      Headache with pressure, \"feels pulsating all over body\"  7. PREGNANCY: \"Is there any chance you are pregnant?\" \"When was your " "last menstrual period?\"      No    Protocols used: High Blood Pressure-A-[place]    "

## 2024-10-07 ENCOUNTER — TELEPHONE (OUTPATIENT)
Dept: CARDIOLOGY | Facility: CLINIC | Age: 74
End: 2024-10-07
Payer: MEDICARE

## 2024-10-07 DIAGNOSIS — I10 HTN (HYPERTENSION): ICD-10-CM

## 2024-10-07 DIAGNOSIS — I47.10 SVT (SUPRAVENTRICULAR TACHYCARDIA) (H): Primary | ICD-10-CM

## 2024-10-07 NOTE — TELEPHONE ENCOUNTER
I called pt back. She said that she had to go to ED and urgent care last week for hypertensive emergency. She was told to follow up with PCP to discuss anxiety management. Pt said she would like to see a cardiologist to discuss as she doesn't think anxiety is the cause. Pt is taking a hormone cream per her uro-gynecologist and has stopped that within the past couple of weeks. She also decreased her rosuvastatin back to 10 mg daily as pt thinks her HTN episodes and racing heart started after increasing the rosuvastatin. Pt said she has had several episodes of racing heart during the night along with her hypertensive episodes. She did see PCP last week and was given a PRN prescription for ativan if she does have another episode, but she would like to also discuss her episodes with  if possible. I was able to find an opening with  on 10/11 at 0815. I will have scheduling add pt to that slot. Pt will bring her list of BP And HR readings to the visit. Makenzie SORIA October 7, 2024, 9:32 AM

## 2024-10-07 NOTE — TELEPHONE ENCOUNTER
[facility] Call Center    Phone Message    May a detailed message be left on voicemail: yes     Reason for Call: Other: patient is calling as she is requesting to speak with her care team as she had two separate events that happened last week in concern to her heart that landed her in the ER on Tuesday 10/1 and again on Wednesday 10/2, patient didn't get in detail much as she called last week and spoke to the nurse was told she would get a call for an appt within a certain time and never did, please advise, thank      Action Taken: Patient transferred to: cardiology     Travel Screening: Not Applicable     Date of Service:

## 2024-10-11 ENCOUNTER — OFFICE VISIT (OUTPATIENT)
Dept: CARDIOLOGY | Facility: CLINIC | Age: 74
End: 2024-10-11
Attending: NURSE PRACTITIONER
Payer: MEDICARE

## 2024-10-11 ENCOUNTER — LAB (OUTPATIENT)
Dept: LAB | Facility: CLINIC | Age: 74
End: 2024-10-11
Payer: MEDICARE

## 2024-10-11 VITALS
DIASTOLIC BLOOD PRESSURE: 90 MMHG | WEIGHT: 146 LBS | HEART RATE: 62 BPM | HEIGHT: 64 IN | BODY MASS INDEX: 24.92 KG/M2 | SYSTOLIC BLOOD PRESSURE: 146 MMHG | OXYGEN SATURATION: 100 %

## 2024-10-11 DIAGNOSIS — I47.10 SVT (SUPRAVENTRICULAR TACHYCARDIA) (H): ICD-10-CM

## 2024-10-11 DIAGNOSIS — R03.0 ELEVATED BLOOD PRESSURE READING WITHOUT DIAGNOSIS OF HYPERTENSION: ICD-10-CM

## 2024-10-11 DIAGNOSIS — R09.89 BRUIT (ARTERIAL): ICD-10-CM

## 2024-10-11 DIAGNOSIS — R07.89 OTHER CHEST PAIN: Primary | ICD-10-CM

## 2024-10-11 LAB — TSH SERPL DL<=0.005 MIU/L-ACNC: 1.42 UIU/ML (ref 0.3–4.2)

## 2024-10-11 PROCEDURE — 84443 ASSAY THYROID STIM HORMONE: CPT | Performed by: INTERNAL MEDICINE

## 2024-10-11 PROCEDURE — 99000 SPECIMEN HANDLING OFFICE-LAB: CPT | Performed by: INTERNAL MEDICINE

## 2024-10-11 PROCEDURE — 83835 ASSAY OF METANEPHRINES: CPT | Mod: 90 | Performed by: INTERNAL MEDICINE

## 2024-10-11 PROCEDURE — G2211 COMPLEX E/M VISIT ADD ON: HCPCS | Performed by: INTERNAL MEDICINE

## 2024-10-11 PROCEDURE — 36415 COLL VENOUS BLD VENIPUNCTURE: CPT | Performed by: INTERNAL MEDICINE

## 2024-10-11 PROCEDURE — 99215 OFFICE O/P EST HI 40 MIN: CPT | Performed by: INTERNAL MEDICINE

## 2024-10-11 PROCEDURE — 82088 ASSAY OF ALDOSTERONE: CPT | Performed by: INTERNAL MEDICINE

## 2024-10-11 PROCEDURE — 84244 ASSAY OF RENIN: CPT | Mod: 90 | Performed by: INTERNAL MEDICINE

## 2024-10-11 RX ORDER — AMLODIPINE AND OLMESARTAN MEDOXOMIL 5; 20 MG/1; MG/1
1 TABLET ORAL DAILY
Qty: 90 TABLET | Refills: 3 | Status: SHIPPED | OUTPATIENT
Start: 2024-10-11 | End: 2024-10-15

## 2024-10-11 RX ORDER — METHYLPREDNISOLONE 32 MG/1
32 TABLET ORAL DAILY
Qty: 2 TABLET | Refills: 0 | Status: SHIPPED | OUTPATIENT
Start: 2024-10-11 | End: 2024-10-18

## 2024-10-11 RX ORDER — DIPHENHYDRAMINE HCL 50 MG
50 CAPSULE ORAL ONCE
Qty: 1 CAPSULE | Refills: 0 | Status: SHIPPED | OUTPATIENT
Start: 2024-10-11 | End: 2024-10-11

## 2024-10-11 NOTE — LETTER
10/11/2024    Stacy Tariq MD  [address] [place] [address]    RE: Emely Forbes       Dear Colleague,     I had the pleasure of seeing Emely Forbes in the [facility].  HPI and Plan:     I had the pleasure of seeing  in follow-up at the [facility] physicians heart today.  She is a very pleasant 74-year-old female with a past medical history significant for breast cancer that was treated with mastectomy in 2010.  I originally saw her in March 2023 for palpitations.    Her symptoms of palpitations had led to an emergency department presentation prior to her office visit at the time and subsequent Zio patch monitoring demonstrated symptomatic episodes of SVT that lasted approximately 11 seconds or so.  Ultimately I started her on flecainide since metoprolol was ineffective.  An exercise stress echocardiogram prior to the initiation of flecainide was negative for ischemia.    Her symptoms improved significantly after initiation of flecainide.  We did perform an exercise stress EKG in March of this year which demonstrated QRS prolongation with exercise.  I did review this with our electrophysiology team and it was thought that this probably represented a rate related bundle branch block and continuation of flecainide was recommended in addition to metoprolol.    She was also noted to have mild coronary artery calcification on a coronary artery calcium scoring study in 2023 and was started on rosuvastatin for lipid lowering.    She was last seen in our clinic in June of this year.  At that point she was doing well from a cardiovascular standpoint and had noted a significant improvement in her palpitations with flecainide.  Of note, she suffered a significant skin reaction to the Zio patch monitor and was understandably reluctant to repeat monitoring given her symptomatic improvement.    Unfortunately she presents today after 2 recent emergency department visits in the past week  or so.  In the past month she has noted nocturnal heart racing although her pulse as checked by her Apple Watch has been within normal limits.  She checked her blood pressure during 1 of these episodes on 10/1/2024 and found that she was hypertensive with systolic blood pressures in the 200s.  She presented to the emergency department where she was noted to be hypertensive with a blood pressure of 160/96 mmHg.  Cardiac troponins were negative and her blood pressure subsequently normalized.    She then presented to urgent care the next day with similar symptoms although she was normotensive at the time of this presentation.    She was concerned that her medications could be contributing to the symptoms and she reduced her rosuvastatin dosage back to 10 mg daily from 20 mg which had been recommended in June of this year.  She was also wondering if her Dyazide could be contributing to this, given that this was initially started to address vertigo.    She denies any chest discomfort, syncope or presyncope.  As stated above, the sensation of an irregular heartbeat that was related to her SVT has resolved.    She remains very active, working outside most days.  She denies any limitations in that regard.    Physical exam is dictated below.    I personally reviewed her EKG obtained on 10/1/2024 and agree that this represents normal sinus rhythm with no acute ST-T wave abnormalities and normal intervals.    IMPRESSION:    History of SVT, well-controlled on flecainide and metoprolol.  Episodic hypertension that led to an emergency department visit as described above.  Mild coronary artery calcification.    PLAN    At this point the etiology of these nocturnal symptoms is unclear.  She may have symptoms consistent with obstructive sleep apnea and I do think that further evaluation in this regard would be appropriate.  Given the paroxysmal nature of her hypertension, I have also recommended that we rule out secondary  hyperaldosteronism as well as a pheochromocytoma.  I have ordered serum metanephrines and an aldosterone/renin ratio for further evaluation in this regard.  In addition, renal artery Dopplers will be ordered.    I think it is certainly possible that some of her symptoms may be medication related, and given that Dyazide is not a particularly effective antihypertensive I have discontinued this agent and started her on amlodipine/olmesartan.  She previously experienced significant headaches with amlodipine, so we will need to think of an alternative if this is a recurrent issue.    Finally, I do think we need to exclude clinically significant obstructive coronary artery disease and I have placed an order for a CT coronary angiogram.  We will premedicate her for her contrast allergy and methylprednisolone/Benadryl have been ordered.    It was a pleasure seeing her in follow-up today.    Over 60 minutes today spent pre and post charting, reviewing pertinent previous records as well as imaging studies personally and discussing and coordinating further evaluation for the patient's symptoms.     The longitudinal plan of care for the diagnosis(es)/condition(s) as documented were addressed during this visit. Due to the added complexity in care, I will continue to support Emely in the subsequent management and with ongoing continuity of care.        Orders Placed This Encounter   Procedures     Metanephrines Plasma Free     TSH with free T4 reflex     Aldosterone Renin Ratio       Orders Placed This Encounter   Medications     amLODIPine-Olmesartan 5-20 MG TABS     Sig: Take 1 tablet by mouth daily.     Dispense:  90 tablet     Refill:  3     methylPREDNISolone (MEDROL) 32 MG tablet     Sig: Take 1 tablet (32 mg) by mouth daily. 12 hours prior to the procedure with IV contrast     Dispense:  2 tablet     Refill:  0     methylPREDNISolone (MEDROL) 32 MG tablet     Sig: Take 1 tablet (32 mg) by mouth daily. 2 hours prior to the  procedure with IV contrast     Dispense:  2 tablet     Refill:  0     diphenhydrAMINE (BENADRYL) 50 MG capsule     Sig: Take 1 capsule (50 mg) by mouth once for 1 dose. Administer 1 hour pre - IV contrast injection and patient must have a .     Dispense:  1 capsule     Refill:  0     Take one hour before your procedure       Medications Discontinued During This Encounter   Medication Reason     triamterene-HCTZ (DYAZIDE) 37.5-25 MG capsule          Encounter Diagnoses   Name Primary?     SVT (supraventricular tachycardia) (H)      Other chest pain Yes       CURRENT MEDICATIONS:  Current Outpatient Medications   Medication Sig Dispense Refill     amLODIPine-Olmesartan 5-20 MG TABS Take 1 tablet by mouth daily. 90 tablet 3     flecainide (TAMBOCOR) 50 MG tablet Take 50mg twice daily 180 tablet 2     metoprolol succinate ER (TOPROL XL) 50 MG 24 hr tablet Take 1 tablet (50 mg) by mouth daily 180 tablet 1     rosuvastatin (CRESTOR) 20 MG tablet Take 1 tablet (20 mg) by mouth daily 90 tablet 3     vitamin D3 (CHOLECALCIFEROL) 50 mcg (2000 units) tablet Take 1 tablet (50 mcg) by mouth daily       diphenhydrAMINE (BENADRYL) 50 MG capsule Take 1 capsule (50 mg) by mouth once for 1 dose. Administer 1 hour pre - IV contrast injection and patient must have a . 1 capsule 0     estradiol (ESTRACE) 0.1 MG/GM vaginal cream Place 2 g vaginally twice a week (Patient not taking: Reported on 10/11/2024) 42.5 g 0     LORazepam (ATIVAN) 1 MG tablet Take 1 tablet (1 mg) by mouth every 12 hours as needed for anxiety. (Patient not taking: Reported on 10/11/2024) 10 tablet 0     methylPREDNISolone (MEDROL) 32 MG tablet Take 1 tablet (32 mg) by mouth daily. 12 hours prior to the procedure with IV contrast 2 tablet 0     methylPREDNISolone (MEDROL) 32 MG tablet Take 1 tablet (32 mg) by mouth daily. 2 hours prior to the procedure with IV contrast 2 tablet 0       ALLERGIES     Allergies   Allergen Reactions     Adhesive Tape       blisters     Contrast Dye Hives     Says she needs pre meds     Latex      blisters     Norvasc [Amlodipine Besylate]        PAST MEDICAL HISTORY:  Past Medical History:   Diagnosis Date     Breast cancer (H) 10/10    R s/p mast/tram flap, reconstruction     Carpal tunnel syndrome      Gastro-oesophageal reflux disease      GERD (gastroesophageal reflux disease)     s/p EGD 5/06     Hematuria 2003     HX: benign breast biopsy     L, papilloma     Osteopenia     DXA 12/16/10     PONV (postoperative nausea and vomiting)     Scope patch on pre-op     S/P hysterectomy with oophorectomy 2001 for fibroids     S/P tonsillectomy        PAST SURGICAL HISTORY:  Past Surgical History:   Procedure Laterality Date     BREAST SURGERY      RIGHT BREAST BIOPSY, RIGHT MASTECTOMY WITH TRAM FLAP RECONSTRUCTION     CYSTOSCOPY, URETEROSCOPY, COMBINED  9/14/2012    Procedure: COMBINED CYSTOSCOPY, URETEROSCOPY;  VIDEO CYSTOSCOPY, RIGHT FLEXIBLE URETEROSCOPY, CAUTERIZATION OF PAPILLARY VEIN (LATEX IV CONTRAST TAPE ALLERGY);  Surgeon: Rey Bailon MD;  Location:  OR     ENT SURGERY      TONSILLECTOMY     GI SURGERY      EGD     GYN SURGERY      HYSTERECTOMY WITH OOPHORECTOMY     HYSTERECTOMY, PAP NO LONGER INDICATED       ORTHOPEDIC SURGERY      CARPAL TUNNEL RELEASE     REVISE RECONSTRUCTED BREAST  12/8/2011    Procedure:REVISE RECONSTRUCTED BREAST; REVISION RIGHT BREAST RECONSTRUCTION, LEFT BREAST STEROID INJECTION ; Surgeon:FRAN LEVIN; Location:[facility]       FAMILY HISTORY:  Family History   Problem Relation Age of Onset     Thyroid Disease Mother         cancer     Hypertension Mother      Breast Cancer Mother      Cancer Mother         uterine     Cardiovascular Father      Cerebrovascular Disease Father      Diabetes Father        SOCIAL HISTORY:  Social History     Socioeconomic History     Marital status:      Spouse name: Thomas     Number of children: 2   Occupational History     Occupation:       Employer: UNKNOWN   Tobacco Use     Smoking status: Never     Smokeless tobacco: Never   Vaping Use     Vaping status: Never Used   Substance and Sexual Activity     Alcohol use: Not Currently     Alcohol/week: 0.0 - 0.8 standard drinks of alcohol     Drug use: No     Sexual activity: Yes     Partners: Male   Social History Narrative    , 2 kids    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     Social Determinants of Health     Financial Resource Strain: Low Risk  (8/26/2024)    Financial Resource Strain      Within the past 12 months, have you or your family members you live with been unable to get utilities (heat, electricity) when it was really needed?: No   Food Insecurity: Low Risk  (8/26/2024)    Food Insecurity      Within the past 12 months, did you worry that your food would run out before you got money to buy more?: No      Within the past 12 months, did the food you bought just not last and you didn t have money to get more?: No   Transportation Needs: Low Risk  (8/26/2024)    Transportation Needs      Within the past 12 months, has lack of transportation kept you from medical appointments, getting your medicines, non-medical meetings or appointments, work, or from getting things that you need?: No    [facility]    Social Connections   Interpersonal Safety: Low Risk  (12/26/2023)    Interpersonal Safety      Do you feel physically and emotionally safe where you currently live?: Yes      Within the past 12 months, have you been hit, slapped, kicked or otherwise physically hurt by someone?: No      Within the past 12 months, have you been humiliated or emotionally abused in other ways by your partner or ex-partner?: No   Housing Stability: Low Risk  (8/26/2024)    Housing Stability      Do you have housing? : Yes       "Are you worried about losing your housing?: No       Review of Systems:  Skin:  not assessed       Eyes:  not assessed      ENT:  not assessed      Respiratory:  Negative       Cardiovascular:  Negative      Gastroenterology: not assessed      Genitourinary:  not assessed      Musculoskeletal:  not assessed      Neurologic:  Negative      Psychiatric:  not assessed      Heme/Lymph/Imm:  not assessed      Endocrine:  Negative        Physical Exam:  Vitals: BP (!) 146/90   Pulse 62   Ht 1.626 m (5' 4\")   Wt 66.2 kg (146 lb)   SpO2 100%   BMI 25.06 kg/m      Constitutional:           Skin:             Head:           Eyes:           Lymph:      ENT:           Neck:           Respiratory:            Cardiac:                                                           GI:           Extremities and Muscular Skeletal:                 Neurological:           Psych:           CC  Urvashi Mak NP  [address] [place] [address]                Thank you for allowing me to participate in the care of your patient.      Sincerely,     Rebecca Gonzalez MD     [facility]  cc:   Urvashi Mak NP  [address] [place]  [place] [address]      "

## 2024-10-11 NOTE — PROGRESS NOTES
HPI and Plan:     I had the pleasure of seeing  in follow-up at the [facility] physicians heart today.  She is a very pleasant 74-year-old female with a past medical history significant for breast cancer that was treated with mastectomy in 2010.  I originally saw her in March 2023 for palpitations.    Her symptoms of palpitations had led to an emergency department presentation prior to her office visit at the time and subsequent Zio patch monitoring demonstrated symptomatic episodes of SVT that lasted approximately 11 seconds or so.  Ultimately I started her on flecainide since metoprolol was ineffective.  An exercise stress echocardiogram prior to the initiation of flecainide was negative for ischemia.    Her symptoms improved significantly after initiation of flecainide.  We did perform an exercise stress EKG in March of this year which demonstrated QRS prolongation with exercise.  I did review this with our electrophysiology team and it was thought that this probably represented a rate related bundle branch block and continuation of flecainide was recommended in addition to metoprolol.    She was also noted to have mild coronary artery calcification on a coronary artery calcium scoring study in 2023 and was started on rosuvastatin for lipid lowering.    She was last seen in our clinic in June of this year.  At that point she was doing well from a cardiovascular standpoint and had noted a significant improvement in her palpitations with flecainide.  Of note, she suffered a significant skin reaction to the Zio patch monitor and was understandably reluctant to repeat monitoring given her symptomatic improvement.    Unfortunately she presents today after 2 recent emergency department visits in the past week or so.  In the past month she has noted nocturnal heart racing although her pulse as checked by her Apple Watch has been within normal limits.  She checked her blood pressure during 1 of these  episodes on 10/1/2024 and found that she was hypertensive with systolic blood pressures in the 200s.  She presented to the emergency department where she was noted to be hypertensive with a blood pressure of 160/96 mmHg.  Cardiac troponins were negative and her blood pressure subsequently normalized.    She then presented to urgent care the next day with similar symptoms although she was normotensive at the time of this presentation.    She was concerned that her medications could be contributing to the symptoms and she reduced her rosuvastatin dosage back to 10 mg daily from 20 mg which had been recommended in June of this year.  She was also wondering if her Dyazide could be contributing to this, given that this was initially started to address vertigo.    She denies any chest discomfort, syncope or presyncope.  As stated above, the sensation of an irregular heartbeat that was related to her SVT has resolved.    She remains very active, working outside most days.  She denies any limitations in that regard.    Physical exam is dictated below.    I personally reviewed her EKG obtained on 10/1/2024 and agree that this represents normal sinus rhythm with no acute ST-T wave abnormalities and normal intervals.    IMPRESSION:    History of SVT, well-controlled on flecainide and metoprolol.  Episodic hypertension that led to an emergency department visit as described above.  Mild coronary artery calcification.    PLAN    At this point the etiology of these nocturnal symptoms is unclear.  She may have symptoms consistent with obstructive sleep apnea and I do think that further evaluation in this regard would be appropriate.  Given the paroxysmal nature of her hypertension, I have also recommended that we rule out secondary hyperaldosteronism as well as a pheochromocytoma.  I have ordered serum metanephrines and an aldosterone/renin ratio for further evaluation in this regard.  In addition, renal artery Dopplers will be  ordered.    I think it is certainly possible that some of her symptoms may be medication related, and given that Dyazide is not a particularly effective antihypertensive I have discontinued this agent and started her on amlodipine/olmesartan.  She previously experienced significant headaches with amlodipine, so we will need to think of an alternative if this is a recurrent issue.    Finally, I do think we need to exclude clinically significant obstructive coronary artery disease and I have placed an order for a CT coronary angiogram.  We will premedicate her for her contrast allergy and methylprednisolone/Benadryl have been ordered.    It was a pleasure seeing her in follow-up today.    Over 60 minutes today spent pre and post charting, reviewing pertinent previous records as well as imaging studies personally and discussing and coordinating further evaluation for the patient's symptoms.     The longitudinal plan of care for the diagnosis(es)/condition(s) as documented were addressed during this visit. Due to the added complexity in care, I will continue to support Emely in the subsequent management and with ongoing continuity of care.        Orders Placed This Encounter   Procedures    Metanephrines Plasma Free    TSH with free T4 reflex    Aldosterone Renin Ratio       Orders Placed This Encounter   Medications    amLODIPine-Olmesartan 5-20 MG TABS     Sig: Take 1 tablet by mouth daily.     Dispense:  90 tablet     Refill:  3    methylPREDNISolone (MEDROL) 32 MG tablet     Sig: Take 1 tablet (32 mg) by mouth daily. 12 hours prior to the procedure with IV contrast     Dispense:  2 tablet     Refill:  0    methylPREDNISolone (MEDROL) 32 MG tablet     Sig: Take 1 tablet (32 mg) by mouth daily. 2 hours prior to the procedure with IV contrast     Dispense:  2 tablet     Refill:  0    diphenhydrAMINE (BENADRYL) 50 MG capsule     Sig: Take 1 capsule (50 mg) by mouth once for 1 dose. Administer 1 hour pre - IV contrast  injection and patient must have a .     Dispense:  1 capsule     Refill:  0     Take one hour before your procedure       Medications Discontinued During This Encounter   Medication Reason    triamterene-HCTZ (DYAZIDE) 37.5-25 MG capsule          Encounter Diagnoses   Name Primary?    SVT (supraventricular tachycardia) (H)     Other chest pain Yes       CURRENT MEDICATIONS:  Current Outpatient Medications   Medication Sig Dispense Refill    amLODIPine-Olmesartan 5-20 MG TABS Take 1 tablet by mouth daily. 90 tablet 3    flecainide (TAMBOCOR) 50 MG tablet Take 50mg twice daily 180 tablet 2    metoprolol succinate ER (TOPROL XL) 50 MG 24 hr tablet Take 1 tablet (50 mg) by mouth daily 180 tablet 1    rosuvastatin (CRESTOR) 20 MG tablet Take 1 tablet (20 mg) by mouth daily 90 tablet 3    vitamin D3 (CHOLECALCIFEROL) 50 mcg (2000 units) tablet Take 1 tablet (50 mcg) by mouth daily      diphenhydrAMINE (BENADRYL) 50 MG capsule Take 1 capsule (50 mg) by mouth once for 1 dose. Administer 1 hour pre - IV contrast injection and patient must have a . 1 capsule 0    estradiol (ESTRACE) 0.1 MG/GM vaginal cream Place 2 g vaginally twice a week (Patient not taking: Reported on 10/11/2024) 42.5 g 0    LORazepam (ATIVAN) 1 MG tablet Take 1 tablet (1 mg) by mouth every 12 hours as needed for anxiety. (Patient not taking: Reported on 10/11/2024) 10 tablet 0    methylPREDNISolone (MEDROL) 32 MG tablet Take 1 tablet (32 mg) by mouth daily. 12 hours prior to the procedure with IV contrast 2 tablet 0    methylPREDNISolone (MEDROL) 32 MG tablet Take 1 tablet (32 mg) by mouth daily. 2 hours prior to the procedure with IV contrast 2 tablet 0       ALLERGIES     Allergies   Allergen Reactions    Adhesive Tape      blisters    Contrast Dye Hives     Says she needs pre meds    Latex      blisters    Norvasc [Amlodipine Besylate]        PAST MEDICAL HISTORY:  Past Medical History:   Diagnosis Date    Breast cancer (H) 10/10    R s/p  mast/tram flap, reconstruction    Carpal tunnel syndrome     Gastro-oesophageal reflux disease     GERD (gastroesophageal reflux disease)     s/p EGD 5/06    Hematuria 2003    HX: benign breast biopsy     L, papilloma    Osteopenia     DXA 12/16/10    PONV (postoperative nausea and vomiting)     Scope patch on pre-op    S/P hysterectomy with oophorectomy 2001 for fibroids    S/P tonsillectomy        PAST SURGICAL HISTORY:  Past Surgical History:   Procedure Laterality Date    BREAST SURGERY      RIGHT BREAST BIOPSY, RIGHT MASTECTOMY WITH TRAM FLAP RECONSTRUCTION    CYSTOSCOPY, URETEROSCOPY, COMBINED  9/14/2012    Procedure: COMBINED CYSTOSCOPY, URETEROSCOPY;  VIDEO CYSTOSCOPY, RIGHT FLEXIBLE URETEROSCOPY, CAUTERIZATION OF PAPILLARY VEIN (LATEX IV CONTRAST TAPE ALLERGY);  Surgeon: Rey Bailon MD;  Location:  OR    ENT SURGERY      TONSILLECTOMY    GI SURGERY      EGD    GYN SURGERY      HYSTERECTOMY WITH OOPHORECTOMY    HYSTERECTOMY, PAP NO LONGER INDICATED      ORTHOPEDIC SURGERY      CARPAL TUNNEL RELEASE    REVISE RECONSTRUCTED BREAST  12/8/2011    Procedure:REVISE RECONSTRUCTED BREAST; REVISION RIGHT BREAST RECONSTRUCTION, LEFT BREAST STEROID INJECTION ; Surgeon:FRAN LEVIN; Location:[facility]       FAMILY HISTORY:  Family History   Problem Relation Age of Onset    Thyroid Disease Mother         cancer    Hypertension Mother     Breast Cancer Mother     Cancer Mother         uterine    Cardiovascular Father     Cerebrovascular Disease Father     Diabetes Father        SOCIAL HISTORY:  Social History     Socioeconomic History    Marital status:      Spouse name: Thomas    Number of children: 2   Occupational History    Occupation:      Employer: UNKNOWN   Tobacco Use    Smoking status: Never    Smokeless tobacco: Never   Vaping Use    Vaping status: Never Used   Substance and Sexual Activity    Alcohol use: Not Currently     Alcohol/week: 0.0 - 0.8 standard drinks of alcohol     Drug use: No    Sexual activity: Yes     Partners: Male   Social History Narrative    , 2 kids    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     Social Determinants of Health     Financial Resource Strain: Low Risk  (8/26/2024)    Financial Resource Strain     Within the past 12 months, have you or your family members you live with been unable to get utilities (heat, electricity) when it was really needed?: No   Food Insecurity: Low Risk  (8/26/2024)    Food Insecurity     Within the past 12 months, did you worry that your food would run out before you got money to buy more?: No     Within the past 12 months, did the food you bought just not last and you didn t have money to get more?: No   Transportation Needs: Low Risk  (8/26/2024)    Transportation Needs     Within the past 12 months, has lack of transportation kept you from medical appointments, getting your medicines, non-medical meetings or appointments, work, or from getting things that you need?: No    Received from [facility], [facility]    Social Connections   Interpersonal Safety: Low Risk  (12/26/2023)    Interpersonal Safety     Do you feel physically and emotionally safe where you currently live?: Yes     Within the past 12 months, have you been hit, slapped, kicked or otherwise physically hurt by someone?: No     Within the past 12 months, have you been humiliated or emotionally abused in other ways by your partner or ex-partner?: No   Housing Stability: Low Risk  (8/26/2024)    Housing Stability     Do you have housing? : Yes     Are you worried about losing your housing?: No       Review of Systems:  Skin:  not assessed       Eyes:  not assessed      ENT:  not assessed      Respiratory:  Negative       Cardiovascular:  Negative      Gastroenterology: not assessed      Genitourinary:  not  "assessed      Musculoskeletal:  not assessed      Neurologic:  Negative      Psychiatric:  not assessed      Heme/Lymph/Imm:  not assessed      Endocrine:  Negative        Physical Exam:  Vitals: BP (!) 146/90   Pulse 62   Ht 1.626 m (5' 4\")   Wt 66.2 kg (146 lb)   SpO2 100%   BMI 25.06 kg/m      Constitutional:           Skin:             Head:           Eyes:           Lymph:      ENT:           Neck:           Respiratory:            Cardiac:                                                           GI:           Extremities and Muscular Skeletal:                 Neurological:           Psych:           CC  Urvashi Mak, NP  [address] [place] [address]                "

## 2024-10-13 ENCOUNTER — HEALTH MAINTENANCE LETTER (OUTPATIENT)
Age: 74
End: 2024-10-13

## 2024-10-14 ENCOUNTER — OFFICE VISIT (OUTPATIENT)
Dept: FAMILY MEDICINE | Facility: CLINIC | Age: 74
End: 2024-10-14
Payer: MEDICARE

## 2024-10-14 VITALS
WEIGHT: 146.1 LBS | SYSTOLIC BLOOD PRESSURE: 136 MMHG | DIASTOLIC BLOOD PRESSURE: 79 MMHG | HEART RATE: 53 BPM | BODY MASS INDEX: 24.94 KG/M2 | HEIGHT: 64 IN | OXYGEN SATURATION: 98 % | RESPIRATION RATE: 16 BRPM | TEMPERATURE: 97.3 F

## 2024-10-14 DIAGNOSIS — F41.9 ANXIETY: ICD-10-CM

## 2024-10-14 DIAGNOSIS — Z23 NEED FOR PROPHYLACTIC VACCINATION AND INOCULATION AGAINST INFLUENZA: ICD-10-CM

## 2024-10-14 DIAGNOSIS — I10 PRIMARY HYPERTENSION: ICD-10-CM

## 2024-10-14 DIAGNOSIS — I47.10 SVT (SUPRAVENTRICULAR TACHYCARDIA) (H): Primary | ICD-10-CM

## 2024-10-14 LAB
ANNOTATION COMMENT IMP: NORMAL
METANEPHS SERPL-SCNC: 0.12 NMOL/L
NORMETANEPHRINE SERPL-SCNC: 0.56 NMOL/L
RENIN PLAS-CCNC: 5.7 NG/ML/HR

## 2024-10-14 PROCEDURE — G0008 ADMIN INFLUENZA VIRUS VAC: HCPCS | Performed by: INTERNAL MEDICINE

## 2024-10-14 PROCEDURE — 99214 OFFICE O/P EST MOD 30 MIN: CPT | Performed by: INTERNAL MEDICINE

## 2024-10-14 PROCEDURE — 90662 IIV NO PRSV INCREASED AG IM: CPT | Performed by: INTERNAL MEDICINE

## 2024-10-14 NOTE — PROGRESS NOTES
Assessment & Plan     SVT (supraventricular tachycardia) (H)  Continue flecainide and metoprolol    Primary hypertension  Take amlodipine if BP > 130/80  - amLODIPine (NORVASC) 2.5 MG tablet; Take 1 tablet (2.5 mg) by mouth daily.    Need for prophylactic vaccination and inoculation against influenza  Flu shot    Anxiety  I do think there is a component of anxiety making her symptoms worse.  Discussed with patient to take lorazepam if feeling palpitations at night.           See Patient Instructions    Salvador Han is a 74 year old, presenting for the following health issues:  RECHECK    History of Present Illness       Reason for visit:  General    She eats 4 or more servings of fruits and vegetables daily.She consumes 0 sweetened beverage(s) daily.She exercises with enough effort to increase her heart rate 30 to 60 minutes per day.  She exercises with enough effort to increase her heart rate 7 days per week.        ED/ Followup:    Facility:  [facility]  Date of visit: 10/02/24  Reason for visit: Hypertension/Anxiety  Current Status: Never took the Lorazepam.     Emely Forbes is here for ER follow up    She was recently diagnosed with SVT and is currently taking flecainide and metoprolol, following Dr Gonzalez in cardiology.    She presented to the ER 2 weeks ago with elevated BP. They presumed there was a component of anxiety as well.  Her palpitations/anxiety is mostly at night.  She was on triamterene for ear symptoms which was recently stopped.  She was started on amlodipine-olmesartan by cardiology.     Today:  BP: 90's to 110's she didn't take her BP med  HR: 49 in clinic, stays in 50's.    I reached out to her cardiologist with above information and he agreed to cut down BP med however did not think metoprolol should be decreased.     We discussed about checking BP and HR daily, discontinue amlodipine-olmesartan to amlodipine 2.5 mg only with holding parameters: hold medication if  "systolic BP < 110. However patient is wary of taking amlodipine and would only take it if blood pressure > 130/80 and we agreed upon this. She will send [employer] message in one week with readings.   Amlodipine allergy: headaches with high dose.           Objective    /79 (BP Location: Right arm, Patient Position: Sitting, Cuff Size: Adult Regular)   Pulse 53   Temp 97.3  F (36.3  C)   Resp 16   Ht 1.626 m (5' 4\")   Wt 66.3 kg (146 lb 1.6 oz)   SpO2 98%   BMI 25.08 kg/m    Body mass index is 25.08 kg/m .  Physical Exam           Signed Electronically by: Stacy Tariq MD    "

## 2024-10-15 ENCOUNTER — TELEPHONE (OUTPATIENT)
Dept: CARDIOLOGY | Facility: CLINIC | Age: 74
End: 2024-10-15
Payer: MEDICARE

## 2024-10-15 PROBLEM — F41.9 ANXIETY: Status: ACTIVE | Noted: 2024-10-15

## 2024-10-15 LAB
ALDOST SERPL-MCNC: 38.8 NG/DL (ref 0–31)
ALDOST/RENIN PLAS-RTO: 6.8 {RATIO} (ref 0–25)

## 2024-10-15 RX ORDER — AMLODIPINE BESYLATE 2.5 MG/1
2.5 TABLET ORAL DAILY
Qty: 90 TABLET | Refills: 0 | Status: SHIPPED | OUTPATIENT
Start: 2024-10-15 | End: 2024-10-17

## 2024-10-15 NOTE — TELEPHONE ENCOUNTER
Labs routed to Dr Gonzalez to review, ordered for investigation of HTN. Renal US is 10/17/24.       Component      Latest Ref Rng 10/11/2024  9:21 AM   Metanephrine      0.00 - 0.49 nmol/L 0.12    Normetanephrine      0.00 - 0.89 nmol/L 0.56    Metanephrines Interpretation See Note    TSH      0.30 - 4.20 uIU/mL 1.42    Aldosterone      0.0 - 31.0 ng/dL 38.8 (H)    Renin Activity      ng/mL/hr 5.7    Aldosterone Renin Ratio      0.0 - 25.0  6.8

## 2024-10-16 ENCOUNTER — MYC MEDICAL ADVICE (OUTPATIENT)
Dept: CARDIOLOGY | Facility: CLINIC | Age: 74
End: 2024-10-16
Payer: MEDICARE

## 2024-10-16 DIAGNOSIS — E26.9 HYPERALDOSTERONISM (H): Primary | ICD-10-CM

## 2024-10-16 NOTE — TELEPHONE ENCOUNTER
Message from patient:  Just questioning that aldosterone high level. They have given me potassium at the hospital twice because of the low levels. According to my research that is a side affect of high aldosterone along with heart palpitations. Just what I have been experiencing. I will have the scans tomorrow that should show anything else. Thank you Emely Forbes.       Reply to patient:    Ms. Leidy Navarro,    Dr. Gonzalez reviewed your message. He is recommending an consult with Endocrinology. If they do not call by Friday, you can reach out to [phone].    Thank you  Team 2 R.N.s  [phone]

## 2024-10-16 NOTE — TELEPHONE ENCOUNTER
Attempted to contact patient to discuss Dr. Gonzalez's review of her lab results. Left a detailed message with update. My chart message sent as follow up.    Ms. Leidy Navarro,    Dr. Gonzalez reviewed your lab results and said: metanephrines and aldosterone/renin ratio was normal. No changes to the plan.    Your US renal is scheduled at [place] on 10/17/2024 at 10:10 AM with arrival at 9:55 AM.    Your CTA coronary is schedule at [facility] on 11/21/2024 with arrival at 10:00 AM.    Thank you  Team 2 R.N.s  [phone]

## 2024-10-16 NOTE — TELEPHONE ENCOUNTER
I think we can place an endocrinology referral for further evaluation.  The clinical picture is not consistent with primary or secondary hyperaldosteronism, but this certainly could be related to her hyperkalemia.  We could also try Aldactone as an antihypertensive agent which would address this.  Thanks.

## 2024-10-17 ENCOUNTER — TELEPHONE (OUTPATIENT)
Dept: CARDIOLOGY | Facility: CLINIC | Age: 74
End: 2024-10-17

## 2024-10-17 ENCOUNTER — ANCILLARY PROCEDURE (OUTPATIENT)
Dept: ULTRASOUND IMAGING | Facility: CLINIC | Age: 74
End: 2024-10-17
Attending: INTERNAL MEDICINE
Payer: MEDICARE

## 2024-10-17 DIAGNOSIS — I25.10 ASCVD (ARTERIOSCLEROTIC CARDIOVASCULAR DISEASE): ICD-10-CM

## 2024-10-17 DIAGNOSIS — E26.9 HYPERALDOSTERONISM (H): Primary | ICD-10-CM

## 2024-10-17 DIAGNOSIS — I10 HTN (HYPERTENSION): ICD-10-CM

## 2024-10-17 DIAGNOSIS — I47.10 SVT (SUPRAVENTRICULAR TACHYCARDIA) (H): ICD-10-CM

## 2024-10-17 DIAGNOSIS — R03.0 ELEVATED BLOOD PRESSURE READING WITHOUT DIAGNOSIS OF HYPERTENSION: ICD-10-CM

## 2024-10-17 DIAGNOSIS — R09.89 BRUIT (ARTERIAL): ICD-10-CM

## 2024-10-17 DIAGNOSIS — R00.2 PALPITATIONS: ICD-10-CM

## 2024-10-17 PROCEDURE — 93975 VASCULAR STUDY: CPT | Performed by: RADIOLOGY

## 2024-10-17 RX ORDER — SPIRONOLACTONE 25 MG/1
12.5 TABLET ORAL DAILY
Qty: 45 TABLET | Refills: 3 | Status: SHIPPED | OUTPATIENT
Start: 2024-10-17

## 2024-10-17 NOTE — TELEPHONE ENCOUNTER
----- Message from Rebecca Gonzalez sent at 10/17/2024  9:18 AM CDT -----  Since it will take a while for her to see endocrinology, lets get a CT of the chest/abdomen/pelvis with contrast to rule out an adrenal adenoma.  We can also stop her amlodipine and start her on 12.5 mg of Aldactone daily.  Lets get a follow-up BMP in approximately 2 weeks.  Thanks.

## 2024-10-17 NOTE — TELEPHONE ENCOUNTER
Renal US now available, routed to Dr Gonzalez to review.     IMPRESSION:   1. No renal artery stenosis demonstrated.  2. Elevated right mid segmental artery resistive index. Other  segmental artery indices normal.  3. Subcentimeter renal cortical thicknesses. Otherwise negative  grayscale appearance of bilateral kidneys. Right renal stone  demonstrated in previous CT was not visualized in today's study.

## 2024-10-17 NOTE — TELEPHONE ENCOUNTER
----- Message from Rebecca Gonzalez sent at 10/17/2024  9:18 AM CDT -----  Since it will take a while for her to see endocrinology, lets get a CT of the chest/abdomen/pelvis with contrast to rule out an adrenal adenoma.  We can also stop her amlodipine and start her on 12.5 mg of Aldactone daily.  Lets get a follow-up BMP in approximately 2 weeks.  Thanks     -----------------------------------------------------------------------------------------------    Patient called and informed of above recommendation from Dr. Gonzalez. Extensive education performed on recommendations and medications. Patient is agreeable to plan and verbalizes understanding. Imaging department called and confirmed that CTA angiogram and CT chest, abdomen, pelvis cannot be performed at the same time. Methylprednisolone and benadryl reordered for contrast die allergy and patient educated on dosing instructions.

## 2024-10-18 ENCOUNTER — TELEPHONE (OUTPATIENT)
Dept: ENDOCRINOLOGY | Facility: CLINIC | Age: 74
End: 2024-10-18
Payer: MEDICARE

## 2024-10-18 RX ORDER — METHYLPREDNISOLONE 32 MG/1
32 TABLET ORAL DAILY
Qty: 2 TABLET | Refills: 0 | Status: SHIPPED | OUTPATIENT
Start: 2024-10-18

## 2024-10-18 NOTE — TELEPHONE ENCOUNTER
Spoke to patient, reviewed that there is no evidence of GAIL by US, no changes to plan at this time per Dr Gonzalez. She expressed understanding and was appreciative for the call.

## 2024-10-18 NOTE — TELEPHONE ENCOUNTER
Left Voicemail (1st Attempt) for the patient to call back and schedule the following:    Appointment type: New Endocrine   Provider: Jaimee Duran [facility]   Return date: next avail   Specialty phone number: [phone]  Additional appointment(s) needed:   Additonal Notes: ALEXANDRO, Parvin Pritchard, RN  P [id number]  Please help schedule open New Endocrine or new REINA spot within 3  months with [place], Dr. Weems, Dr. Desai, Dr. Hoff  Thank you.    Examples:  1/10 Tasma virtual csc  1/24 Tasma virtual csc  1/31 Tasma in person csc  2/14 Tasma virtual csc    Please note that the above appointment(s) will require manual scheduling as they are marked as REINA and will not appear using auto search. Do not schedule the patient if another patient has already been scheduled in the requested appointment slot.     Urvashi Kenny on 10/18/2024 at 11:17 AM

## 2024-10-21 NOTE — TELEPHONE ENCOUNTER
Left Voicemail (2nd Attempt) for the patient to call back and schedule the following:    Appointment type: New Endocrine   Provider: Jaimee Duran, [facility] Faustina   Return date: next avail   Specialty phone number: [phone]  Additional appointment(s) needed:   Additonal Notes: LVM x2, MyC x1   Parvin Donald, RN  P [facility]  Please help schedule open New Endocrine or new REINA spot within 3  months with [place], Dr. Weems, Dr. eDsai, Dr. Hoff  Thank you.     Examples:  1/24 Sunil virtual csc  1/31 Sunil in person csc  2/14 [place] virtual csc  2/20 Faustina in person csc  2/21 Tasma in person csc    Please note that the above appointment(s) will require manual scheduling as they are marked as REINA and will not appear using auto search. Do not schedule the patient if another patient has already been scheduled in the requested appointment slot.     Urvashi Kenny on 10/21/2024 at 10:37 AM

## 2024-10-21 NOTE — TELEPHONE ENCOUNTER
Pt returned call regarding the message below, pt declined to schedule the options in the message, Pt only wants an in person appt and only in [place]

## 2024-10-28 ENCOUNTER — TELEPHONE (OUTPATIENT)
Dept: CARDIOLOGY | Facility: CLINIC | Age: 74
End: 2024-10-28
Payer: MEDICARE

## 2024-10-28 NOTE — TELEPHONE ENCOUNTER
Return call to patient to discuss instructions for methylprednisone and benadryl prior to procedures with contrast. Instructions reviewed with patient per Dr. Gonzalez's orders. Patient verbalizes understanding.

## 2024-10-28 NOTE — TELEPHONE ENCOUNTER
Health Call Center    Phone Message    May a detailed message be left on voicemail: yes     Reason for Call: Medication Question or concern regarding medication   Prescription Clarification  Name of Medication: methylPREDNISolone (MEDROL) 32 MG tablet   Prescribing Provider: Dr. Gonzalez      What on the order needs clarification? Patient would like a call back regarding medication. Patient prescription bottle does not say when to take the second tablet. Please review and call patient back. Thank you       Action Taken: Other: cardiology     Travel Screening: Not Applicable    Thank you!  Specialty Access Center       Date of Service:

## 2024-10-30 ENCOUNTER — TELEPHONE (OUTPATIENT)
Dept: CARDIOLOGY | Facility: CLINIC | Age: 74
End: 2024-10-30

## 2024-10-30 ENCOUNTER — LAB (OUTPATIENT)
Dept: LAB | Facility: CLINIC | Age: 74
End: 2024-10-30
Payer: MEDICARE

## 2024-10-30 ENCOUNTER — HOSPITAL ENCOUNTER (OUTPATIENT)
Dept: CT IMAGING | Facility: CLINIC | Age: 74
Discharge: HOME | End: 2024-10-30
Attending: INTERNAL MEDICINE | Admitting: INTERNAL MEDICINE
Payer: MEDICARE

## 2024-10-30 DIAGNOSIS — I25.10 ASCVD (ARTERIOSCLEROTIC CARDIOVASCULAR DISEASE): ICD-10-CM

## 2024-10-30 DIAGNOSIS — I10 HTN (HYPERTENSION): ICD-10-CM

## 2024-10-30 DIAGNOSIS — R03.0 ELEVATED BLOOD PRESSURE READING WITHOUT DIAGNOSIS OF HYPERTENSION: ICD-10-CM

## 2024-10-30 DIAGNOSIS — E26.9 HYPERALDOSTERONISM (H): ICD-10-CM

## 2024-10-30 DIAGNOSIS — R00.2 PALPITATIONS: ICD-10-CM

## 2024-10-30 LAB
ANION GAP SERPL CALCULATED.3IONS-SCNC: 12 MMOL/L (ref 7–15)
BUN SERPL-MCNC: 17 MG/DL (ref 8–23)
CALCIUM SERPL-MCNC: 9.6 MG/DL (ref 8.8–10.4)
CHLORIDE SERPL-SCNC: 105 MMOL/L (ref 98–107)
CREAT SERPL-MCNC: 0.88 MG/DL (ref 0.51–0.95)
EGFRCR SERPLBLD CKD-EPI 2021: 69 ML/MIN/1.73M2
GLUCOSE SERPL-MCNC: 146 MG/DL (ref 70–99)
HCO3 SERPL-SCNC: 25 MMOL/L (ref 22–29)
POTASSIUM SERPL-SCNC: 4.1 MMOL/L (ref 3.4–5.3)
SODIUM SERPL-SCNC: 142 MMOL/L (ref 135–145)

## 2024-10-30 PROCEDURE — 80048 BASIC METABOLIC PNL TOTAL CA: CPT

## 2024-10-30 PROCEDURE — 250N000011 HC RX IP 250 OP 636: Performed by: INTERNAL MEDICINE

## 2024-10-30 PROCEDURE — G1010 CDSM STANSON: HCPCS

## 2024-10-30 PROCEDURE — 36415 COLL VENOUS BLD VENIPUNCTURE: CPT

## 2024-10-30 PROCEDURE — 74177 CT ABD & PELVIS W/CONTRAST: CPT | Mod: MG

## 2024-10-30 RX ORDER — IOPAMIDOL 755 MG/ML
71 INJECTION, SOLUTION INTRAVASCULAR ONCE
Status: COMPLETED | OUTPATIENT
Start: 2024-10-30 | End: 2024-10-30

## 2024-10-30 RX ADMIN — IOPAMIDOL 71 ML: 755 INJECTION, SOLUTION INTRAVENOUS at 11:58

## 2024-10-30 NOTE — TELEPHONE ENCOUNTER
CT chest/abd/pelvis and BMP routed to Dr Gonzalez to review, ordered to check for adrenal adenoma and after starting spironolactone 12.5mg daily, respectively.     CTA coronary is scheduled for November, endocrinology consult is 6/2024.       IMPRESSION:  1.  Scattered small pulmonary nodules are unchanged for greater than  one year and should be benign.  2.  No findings to explain the patient's symptoms.         Component      Latest Ref Rng 10/1/2024  11:00 PM 10/30/2024  10:33 AM   Sodium      135 - 145 mmol/L 138  142    Potassium      3.4 - 5.3 mmol/L 3.3 (L)  4.1    Chloride      98 - 107 mmol/L 102  105    Carbon Dioxide (CO2)      22 - 29 mmol/L 24  25    Anion Gap      7 - 15 mmol/L 12  12    Urea Nitrogen      8.0 - 23.0 mg/dL 23.8 (H)  17.0    Creatinine      0.51 - 0.95 mg/dL 0.90  0.88    GFR Estimate      >60 mL/min/1.73m2 68  69    Calcium      8.8 - 10.4 mg/dL 9.3  9.6    Glucose      70 - 99 mg/dL 122 (H)  146 (H)

## 2024-11-01 NOTE — TELEPHONE ENCOUNTER
Spoke to patient, reviewed results and message from Dr Gonzalez. She says she is feeling much better on the spironolactone, says she notes her heart is racing much less since starting the new med and she attributes this to the stabilization of her potassium. CTA coronary is scheduled for November. She will follow up with her PCP about the lung nodules, has OV in a few weeks, hx histoplasmosis.

## 2024-11-14 ENCOUNTER — TELEPHONE (OUTPATIENT)
Dept: CARDIOLOGY | Facility: CLINIC | Age: 74
End: 2024-11-14
Payer: MEDICARE

## 2024-11-14 DIAGNOSIS — I47.10 SVT (SUPRAVENTRICULAR TACHYCARDIA) (H): ICD-10-CM

## 2024-11-14 DIAGNOSIS — T78.40XA ALLERGIC REACTION: Primary | ICD-10-CM

## 2024-11-14 RX ORDER — DIPHENHYDRAMINE HCL 50 MG
50 CAPSULE ORAL ONCE
Qty: 1 CAPSULE | Refills: 0 | Status: SHIPPED | OUTPATIENT
Start: 2024-11-14 | End: 2024-11-14

## 2024-11-14 RX ORDER — METHYLPREDNISOLONE 32 MG/1
32 TABLET ORAL DAILY
Qty: 1 TABLET | Refills: 0 | Status: SHIPPED | OUTPATIENT
Start: 2024-11-14

## 2024-11-14 NOTE — TELEPHONE ENCOUNTER
Message from CT team:  This is Maria M SORIA from [facility] CT[facility] --this patient has a contrast dye allergy according to her records.     I did not see any pre-medications for this.     Could you help me out with this?   ================================    Patient is scheduled for a CTA coronary on 11/21/2024    Per Dr. Gonzalez's dictation 10/11/2024:  We will premedicate her for her contrast allergy and methylprednisolone/Benadryl have been ordered     Per [facility], patient called today to request an alternate pharmacy near her home - scripts were sent today        Message sent to CT team

## 2024-11-14 NOTE — TELEPHONE ENCOUNTER
Patient called reporting she never was able to fill the benadryl and methylprednisolone ordered by Dr. Gonzalez prior to CT Angiogram. Prescriptions were sent to [facility] in [place] and patient lives in [place]. Patient reports pharmacy was not able to transfer these. New prescriptions ordered and sent to patient's preferred pharmacy. Patient educated on indication and use of both medications. Patient has many question regarding coronary CTA. Appointment time and location, pre-visit instructions and post visit instructions reviewed in detail. Patient verbalizes understanding.

## 2024-11-20 ENCOUNTER — HOSPITAL ENCOUNTER (OUTPATIENT)
Dept: CARDIOLOGY | Facility: CLINIC | Age: 74
Discharge: HOME | End: 2024-11-20
Attending: INTERNAL MEDICINE
Payer: MEDICARE

## 2024-11-20 VITALS — HEART RATE: 66 BPM | SYSTOLIC BLOOD PRESSURE: 157 MMHG | DIASTOLIC BLOOD PRESSURE: 89 MMHG

## 2024-11-20 DIAGNOSIS — R07.89 OTHER CHEST PAIN: ICD-10-CM

## 2024-11-20 PROCEDURE — 250N000011 HC RX IP 250 OP 636: Performed by: INTERNAL MEDICINE

## 2024-11-20 PROCEDURE — 250N000013 HC RX MED GY IP 250 OP 250 PS 637: Performed by: INTERNAL MEDICINE

## 2024-11-20 PROCEDURE — G1010 CDSM STANSON: HCPCS

## 2024-11-20 PROCEDURE — 250N000009 HC RX 250: Performed by: INTERNAL MEDICINE

## 2024-11-20 PROCEDURE — 75574 CT ANGIO HRT W/3D IMAGE: CPT | Mod: MF

## 2024-11-20 RX ORDER — DILTIAZEM HYDROCHLORIDE 5 MG/ML
10-15 INJECTION INTRAVENOUS
Status: DISCONTINUED | OUTPATIENT
Start: 2024-11-20 | End: 2024-11-21 | Stop reason: HOSPADM

## 2024-11-20 RX ORDER — METOPROLOL TARTRATE 1 MG/ML
5-20 INJECTION, SOLUTION INTRAVENOUS
Status: DISCONTINUED | OUTPATIENT
Start: 2024-11-20 | End: 2024-11-21 | Stop reason: HOSPADM

## 2024-11-20 RX ORDER — IVABRADINE 5 MG/1
5-15 TABLET, FILM COATED ORAL
Status: DISCONTINUED | OUTPATIENT
Start: 2024-11-20 | End: 2024-11-21 | Stop reason: HOSPADM

## 2024-11-20 RX ORDER — ONDANSETRON 2 MG/ML
4 INJECTION INTRAMUSCULAR; INTRAVENOUS
Status: DISCONTINUED | OUTPATIENT
Start: 2024-11-20 | End: 2024-11-21 | Stop reason: HOSPADM

## 2024-11-20 RX ORDER — LIDOCAINE 40 MG/G
CREAM TOPICAL
Status: DISCONTINUED | OUTPATIENT
Start: 2024-11-20 | End: 2024-11-21 | Stop reason: HOSPADM

## 2024-11-20 RX ORDER — DILTIAZEM HCL 60 MG
120 TABLET ORAL
Status: DISCONTINUED | OUTPATIENT
Start: 2024-11-20 | End: 2024-11-21 | Stop reason: HOSPADM

## 2024-11-20 RX ORDER — IOPAMIDOL 755 MG/ML
500 INJECTION, SOLUTION INTRAVASCULAR ONCE
Status: COMPLETED | OUTPATIENT
Start: 2024-11-20 | End: 2024-11-20

## 2024-11-20 RX ORDER — NITROGLYCERIN 0.4 MG/1
0.4 TABLET SUBLINGUAL
Status: DISCONTINUED | OUTPATIENT
Start: 2024-11-20 | End: 2024-11-21 | Stop reason: HOSPADM

## 2024-11-20 RX ORDER — METOPROLOL TARTRATE 25 MG/1
25-100 TABLET, FILM COATED ORAL
Status: DISCONTINUED | OUTPATIENT
Start: 2024-11-20 | End: 2024-11-21 | Stop reason: HOSPADM

## 2024-11-20 RX ADMIN — METOPROLOL TARTRATE 10 MG: 5 INJECTION INTRAVENOUS at 10:51

## 2024-11-20 RX ADMIN — METOPROLOL TARTRATE 10 MG: 5 INJECTION INTRAVENOUS at 11:04

## 2024-11-20 RX ADMIN — METOPROLOL TARTRATE 10 MG: 5 INJECTION INTRAVENOUS at 10:59

## 2024-11-20 RX ADMIN — IOPAMIDOL 110 ML: 755 INJECTION, SOLUTION INTRAVENOUS at 11:22

## 2024-11-20 RX ADMIN — METOPROLOL TARTRATE 10 MG: 5 INJECTION INTRAVENOUS at 10:55

## 2024-11-20 RX ADMIN — NITROGLYCERIN 0.4 MG: 0.4 TABLET SUBLINGUAL at 11:10

## 2024-12-02 ENCOUNTER — OFFICE VISIT (OUTPATIENT)
Dept: FAMILY MEDICINE | Facility: CLINIC | Age: 74
End: 2024-12-02
Payer: MEDICARE

## 2024-12-02 VITALS
SYSTOLIC BLOOD PRESSURE: 158 MMHG | DIASTOLIC BLOOD PRESSURE: 86 MMHG | HEIGHT: 64 IN | RESPIRATION RATE: 16 BRPM | BODY MASS INDEX: 24.92 KG/M2 | HEART RATE: 54 BPM | WEIGHT: 146 LBS | OXYGEN SATURATION: 99 % | TEMPERATURE: 97.4 F

## 2024-12-02 DIAGNOSIS — I25.10 CORONARY ARTERY DISEASE INVOLVING NATIVE CORONARY ARTERY OF NATIVE HEART WITHOUT ANGINA PECTORIS: Primary | ICD-10-CM

## 2024-12-02 DIAGNOSIS — F41.1 GAD (GENERALIZED ANXIETY DISORDER): ICD-10-CM

## 2024-12-02 PROCEDURE — G2211 COMPLEX E/M VISIT ADD ON: HCPCS | Performed by: INTERNAL MEDICINE

## 2024-12-02 PROCEDURE — 99215 OFFICE O/P EST HI 40 MIN: CPT | Performed by: INTERNAL MEDICINE

## 2024-12-02 RX ORDER — BUSPIRONE HYDROCHLORIDE 5 MG/1
5 TABLET ORAL 2 TIMES DAILY
Qty: 180 TABLET | Refills: 0 | Status: SHIPPED | OUTPATIENT
Start: 2024-12-02

## 2024-12-02 RX ORDER — ASPIRIN 81 MG/1
81 TABLET ORAL DAILY
Qty: 90 TABLET | Refills: 0 | Status: SHIPPED | OUTPATIENT
Start: 2024-12-02

## 2024-12-02 NOTE — PROGRESS NOTES
Assessment & Plan     Coronary artery disease involving native coronary artery of native heart without angina pectoris  Continue statin  Add aspirin.   Discussed if it causes GERD, she can use pepcid.  - aspirin 81 MG EC tablet; Take 1 tablet (81 mg) by mouth daily.    SOILA (generalized anxiety disorder)  Referred to mental health for therapy.   Started buspirone 5 mg bid.   Follow up in 6 weeks.  - busPIRone (BUSPAR) 5 MG tablet; Take 1 tablet (5 mg) by mouth 2 times daily.  - Adult Mental Health  Referral; Future      See Patient Instructions    > 45 min spent on the date of this encounter doing chart review, documentation, history/exam, and further activities as noted above    Salvador Han is a 74 year old, presenting for the following health issues:  RECHECK    HPI     BP readings at home are normal.   Workup done by cardiology, medication changes to aldactone 25 mg daily.   She is having a headache almost every day since starting aldactone.   Today she did not take the medicine and BP reading is elevated.  She has anxiety and she's fearful of an adverse health outcome due to her past medical history. Today she is shaking, teary eyed. I believe anxiety is playing a big role in her symptoms.   We discussed medications, checked drug interactions with her current medications. Discussed risks vs benefits of medicines.     Also discussed the angiogram results from 11/20 which show moderate LAD stenosis. She is currently on a statin, she is taking half the dose as she is worried about taking too many medicines.     The 10-year ASCVD risk score (Miri CANO, et al., 2019) is: 22.3%    Values used to calculate the score:      Age: 74 years      Sex: Female      Is Non- : No      Diabetic: No      Tobacco smoker: No      Systolic Blood Pressure: 160 mmHg      Is BP treated: Yes      HDL Cholesterol: 53 mg/dL      Total Cholesterol: 160 mg/dL          Objective    BP (!) 160/82 (BP  "Location: Left arm, Patient Position: Sitting, Cuff Size: Adult Regular)   Pulse 54   Temp 97.4  F (36.3  C)   Resp 16   Ht 1.626 m (5' 4\")   Wt 66.2 kg (146 lb)   SpO2 99%   BMI 25.06 kg/m    Body mass index is 25.06 kg/m .  Physical Exam           Signed Electronically by: Stacy Tariq MD    "

## 2025-01-09 ENCOUNTER — HOSPITAL ENCOUNTER (OUTPATIENT)
Dept: MAMMOGRAPHY | Facility: CLINIC | Age: 75
Discharge: HOME | End: 2025-01-09
Attending: INTERNAL MEDICINE
Payer: MEDICARE

## 2025-01-09 DIAGNOSIS — Z12.31 VISIT FOR SCREENING MAMMOGRAM: ICD-10-CM

## 2025-01-09 PROCEDURE — 77063 BREAST TOMOSYNTHESIS BI: CPT | Mod: 52

## 2025-01-14 ENCOUNTER — HOSPITAL ENCOUNTER (OUTPATIENT)
Dept: BONE DENSITY | Facility: CLINIC | Age: 75
Discharge: HOME | End: 2025-01-14
Attending: INTERNAL MEDICINE
Payer: MEDICARE

## 2025-01-14 DIAGNOSIS — M81.8 OTHER OSTEOPOROSIS, UNSPECIFIED PATHOLOGICAL FRACTURE PRESENCE: ICD-10-CM

## 2025-01-14 PROCEDURE — 77080 DXA BONE DENSITY AXIAL: CPT

## 2025-01-22 ENCOUNTER — OFFICE VISIT (OUTPATIENT)
Dept: FAMILY MEDICINE | Facility: CLINIC | Age: 75
End: 2025-01-22
Payer: MEDICARE

## 2025-01-22 VITALS
HEIGHT: 64 IN | SYSTOLIC BLOOD PRESSURE: 136 MMHG | OXYGEN SATURATION: 97 % | TEMPERATURE: 97.6 F | DIASTOLIC BLOOD PRESSURE: 84 MMHG | WEIGHT: 147 LBS | RESPIRATION RATE: 16 BRPM | BODY MASS INDEX: 25.1 KG/M2 | HEART RATE: 67 BPM

## 2025-01-22 DIAGNOSIS — I25.10 ASCVD (ARTERIOSCLEROTIC CARDIOVASCULAR DISEASE): ICD-10-CM

## 2025-01-22 DIAGNOSIS — M81.8 OTHER OSTEOPOROSIS, UNSPECIFIED PATHOLOGICAL FRACTURE PRESENCE: ICD-10-CM

## 2025-01-22 DIAGNOSIS — I25.10 CORONARY ARTERY DISEASE INVOLVING NATIVE CORONARY ARTERY OF NATIVE HEART WITHOUT ANGINA PECTORIS: ICD-10-CM

## 2025-01-22 DIAGNOSIS — E78.5 HYPERLIPIDEMIA WITH TARGET LDL LESS THAN 70: ICD-10-CM

## 2025-01-22 DIAGNOSIS — I10 PRIMARY HYPERTENSION: Primary | ICD-10-CM

## 2025-01-22 DIAGNOSIS — F41.9 ANXIETY: ICD-10-CM

## 2025-01-22 DIAGNOSIS — I47.10 SVT (SUPRAVENTRICULAR TACHYCARDIA): ICD-10-CM

## 2025-01-22 DIAGNOSIS — R42 DIZZINESS: ICD-10-CM

## 2025-01-22 PROCEDURE — 99214 OFFICE O/P EST MOD 30 MIN: CPT | Performed by: INTERNAL MEDICINE

## 2025-01-22 RX ORDER — ASPIRIN 81 MG/1
81 TABLET ORAL DAILY
Qty: 90 TABLET | Refills: 3 | Status: SHIPPED | OUTPATIENT
Start: 2025-01-22

## 2025-01-22 RX ORDER — ROSUVASTATIN CALCIUM 20 MG/1
20 TABLET, COATED ORAL DAILY
COMMUNITY
Start: 2025-01-22

## 2025-01-22 ASSESSMENT — PAIN SCALES - GENERAL: PAINLEVEL_OUTOF10: NO PAIN (0)

## 2025-01-22 NOTE — PROGRESS NOTES
Assessment & Plan     Primary hypertension  Referred to [facility] for medication review. Patient wants to know if her medications interact or if her symptoms are due to medication adverse effects.   - Med Therapy Management Referral    SVT (supraventricular tachycardia)  - Med Therapy Management Referral    Other osteoporosis, unspecified pathological fracture presence  Continue vitamin D supplementation  Continue calcium in diet  Continue weight bearing exercises  Patient agreed to follow this  She declines treatment for osteoporosis.  - Med Therapy Management Referral    Anxiety  Discussed about buspirone. Recommended to continue.   - Med Therapy Management Referral    Dizziness  Improvement in dizzy spells.   - Med Therapy Management Referral  - CBC with platelets and differential; Future    Coronary artery disease involving native coronary artery of native heart without angina pectoris  Continue asa and statin.   - aspirin 81 MG EC tablet; Take 1 tablet (81 mg) by mouth daily.    ASCVD (arteriosclerotic cardiovascular disease)  - rosuvastatin (CRESTOR) 20 MG tablet; Take 1 tablet (20 mg) by mouth daily.    Hyperlipidemia with target LDL less than 70  - rosuvastatin (CRESTOR) 20 MG tablet; Take 1 tablet (20 mg) by mouth daily.      CBC with differential ordered due to hx of hot flashes.       See Patient Instructions    Salvador Han is a 74 year old, presenting for the following health issues:  Follow Up    History of Present Illness       Reason for visit:  Follow up   She is taking medications regularly.     Emely Forbes is here for follow up    She reports that she continues to feel palpitations now and then but it doesn't affect her day to day function and does not cause dizziness.  She reports hot flashes, drenching sweats at night.   BP has been within normal range, she is on aldactone.    Buspirone: she feels it isnt doing anything. Although she reports that anxiety is better.     Reviewed dexa scan results  "including T scores and frax scores  She has osteoporosis.  I discussed mechanism of action, benefits, side effects of medications.  Patient declined treatment at this time.     She declined covid booster and pneumonia vaccine.   Shingles information given.               Objective    /84 (BP Location: Left arm, Patient Position: Sitting, Cuff Size: Adult Regular)   Pulse 67   Temp 97.6  F (36.4  C) (Oral)   Resp 16   Ht 1.626 m (5' 4\")   Wt 66.7 kg (147 lb)   SpO2 97%   BMI 25.23 kg/m    Body mass index is 25.23 kg/m .  Physical Exam           Signed Electronically by: Stacy Tariq MD    "

## 2025-02-07 NOTE — PROCEDURE: MOHS SURGERY
Patient with anemia on iron tabs daily  Has not had outpatient colonoscopy, has had decreased PO intake and weight loss  Likely AOCD  Poor outpatient f/u, unlikely uptodate with cancer screening     Plan:  - Iron studies not conclusive, normal ferritin, iron, folate, elevated b12, ldh wnl. 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   - Venofer 200mg x 5 given BCx NGTD  - age appropriate screening as outpatient (patient hasn't had colonoscopy etc) Consent 3/Introductory Paragraph: I gave the patient a chance to ask questions they had about the procedure.  Following this I explained the Mohs procedure and consent was obtained. The risks, benefits and alternatives to therapy were discussed in detail. Specifically, the risks of infection, scarring, bleeding, prolonged wound healing, incomplete removal, allergy to anesthesia, nerve injury and recurrence were addressed. Prior to the procedure, the treatment site was clearly identified and confirmed by the patient. All components of Universal Protocol/PAUSE Rule completed.

## 2025-02-09 NOTE — PROGRESS NOTES
HPI and Plan:     I had the pleasure of seeing  in follow-up at the [facility] physicians heart today.  She is a very pleasant 74-year-old female with a past medical history significant for breast cancer that was treated with mastectomy in 2010.  I originally saw her in March 2023 for palpitations.    Her symptoms of palpitations had led to an emergency department presentation prior to her office visit at the time and subsequent Zio patch monitoring demonstrated symptomatic episodes of SVT that lasted approximately 11 seconds or so.  Ultimately I started her on flecainide since metoprolol was ineffective.  An exercise stress echocardiogram prior to the initiation of flecainide was negative for ischemia.    Her symptoms improved significantly after initiation of flecainide.  We did perform an exercise stress EKG in March of this year which demonstrated QRS prolongation with exercise.  I did review this with our electrophysiology team and it was thought that this probably represented a rate related bundle branch block and continuation of flecainide was recommended in addition to metoprolol.    She was also noted to have mild coronary artery calcification on a coronary artery calcium scoring study in 2023 and was started on rosuvastatin for lipid lowering.    In June 2024 she was doing well from a cardiovascular standpoint and had noted a significant improvement in her palpitations with flecainide.  Of note, she suffered a significant skin reaction to the Zio patch monitor and was understandably reluctant to repeat monitoring given her symptomatic improvement.    Unfortunately she was seen in October 2024 after 2 emergency department visits for nocturnal heart racing and hypertension.     She cut her rosuvastatin dose in half due to concerns that this may be contributing to her symptoms.  We also initiated a cardiac workup that included a CT coronary angiogram which demonstrated a moderate ostial  LAD stenosis which was not flow-limiting by FFR.  We also initiated a secondary hypertension workup which was unremarkable with the exception of an elevated aldosterone level.  This was followed by a CT of the chest/abdomen/pelvis which demonstrated no evidence of an adrenal adenoma.  I started her on Aldactone and an endocrinology referral has been placed.    Currently she is on a antihypertensive regimen that includes spironolactone and metoprolol.  Amlodipine led to headaches and was discontinued for this purpose.  She brought in a record of her blood pressures over the past week or so and she has been normotensive at home.    Today she feels well overall from a cardiovascular standpoint, but is still troubled by occasional palpitations.  These are brief and self-limiting and do not impair her functional capacity in any way.  She is actually a very active individual who exercises regularly without any limitations.  There is no history of syncope or presyncope, but she does admit to anxiety regarding the persistent nature of her occasional palpitations.    Physical exam is dictated below.    A basic metabolic panel on 10/30/2024 demonstrated a potassium of 4.0 sodium 142 and creatinine of 0.88.    IMPRESSION:    History of SVT, well-controlled on flecainide and metoprolol.  Episodic hypertension and palpitations that led to an emergency department visit as described above.  Mild to moderate nonobstructive coronary artery disease based on CT coronary angiography as described above.    PLAN    Ms. Forbes presents today for follow-up feeling better overall from an SVT standpoint, although she is still troubled by her occasional palpitations.  Her blood pressure appears to have improved with the initiation of spironolactone.    I do think it would be reasonable to repeat Zio patch monitoring to assess her arrhythmic burden given her persistent symptoms.  It appears that with proper application her cutaneous skin  reaction was avoided the last time she wore a Zio patch monitor, so we will repeat this process and hopefully get some additional data.  Given her mild to moderate nonobstructive coronary artery disease she may need an alternative to flecainide in any case.    I have placed an order for repeat Zio patch monitoring and a repeat echocardiogram and have also placed a referral to our electrophysiology colleagues.  This will be scheduled after the results of the above-mentioned investigations are available.    It was a pleasure seeing her in follow-up today.    The longitudinal plan of care for the diagnosis(es)/condition(s) as documented were addressed during this visit. Due to the added complexity in care, I will continue to support Emely in the subsequent management and with ongoing continuity of care.      CURRENT MEDICATIONS:  Current Outpatient Medications   Medication Sig Dispense Refill    aspirin 81 MG EC tablet Take 1 tablet (81 mg) by mouth daily. 90 tablet 3    busPIRone (BUSPAR) 5 MG tablet TAKE 1 TABLET BY MOUTH TWICE A  tablet 0    estradiol (ESTRACE) 0.1 MG/GM vaginal cream Place 2 g vaginally twice a week 42.5 g 0    flecainide (TAMBOCOR) 50 MG tablet Take 50mg twice daily 180 tablet 2    LORazepam (ATIVAN) 1 MG tablet Take 1 tablet (1 mg) by mouth every 12 hours as needed for anxiety. 10 tablet 0    methylPREDNISolone (MEDROL) 32 MG tablet Take 1 tablet (32 mg) by mouth daily. 2 hours prior to the procedure with IV contrast 1 tablet 0    methylPREDNISolone (MEDROL) 32 MG tablet Take 1 tablet (32 mg) by mouth daily. 12 hours prior to the procedure with IV contrast 1 tablet 0    metoprolol succinate ER (TOPROL XL) 50 MG 24 hr tablet Take 1 tablet (50 mg) by mouth daily 180 tablet 1    rosuvastatin (CRESTOR) 20 MG tablet Take 1 tablet (20 mg) by mouth daily.      spironolactone (ALDACTONE) 25 MG tablet Take 0.5 tablets (12.5 mg) by mouth daily. 45 tablet 3    vitamin D3 (CHOLECALCIFEROL) 50 mcg (2000  units) tablet Take 1 tablet (50 mcg) by mouth daily         ALLERGIES     Allergies   Allergen Reactions    Adhesive Tape      blisters    Contrast Dye Hives     Says she needs pre meds    Latex      blisters    Norvasc [Amlodipine Besylate]        PAST MEDICAL HISTORY:  Past Medical History:   Diagnosis Date    Breast cancer (H) 10/10    R s/p mast/tram flap, reconstruction    Carpal tunnel syndrome     Gastro-oesophageal reflux disease     GERD (gastroesophageal reflux disease)     s/p EGD 5/06    Hematuria 2003    HX: benign breast biopsy     L, papilloma    Osteopenia     DXA 12/16/10    PONV (postoperative nausea and vomiting)     Scope patch on pre-op    S/P hysterectomy with oophorectomy 2001 for fibroids    S/P tonsillectomy        PAST SURGICAL HISTORY:  Past Surgical History:   Procedure Laterality Date    BREAST SURGERY      RIGHT BREAST BIOPSY, RIGHT MASTECTOMY WITH TRAM FLAP RECONSTRUCTION    CYSTOSCOPY, URETEROSCOPY, COMBINED  9/14/2012    Procedure: COMBINED CYSTOSCOPY, URETEROSCOPY;  VIDEO CYSTOSCOPY, RIGHT FLEXIBLE URETEROSCOPY, CAUTERIZATION OF PAPILLARY VEIN (LATEX IV CONTRAST TAPE ALLERGY);  Surgeon: Rey Bailon MD;  Location:  OR    ENT SURGERY      TONSILLECTOMY    GI SURGERY      EGD    GYN SURGERY      HYSTERECTOMY WITH OOPHORECTOMY    HYSTERECTOMY, PAP NO LONGER INDICATED      ORTHOPEDIC SURGERY      CARPAL TUNNEL RELEASE    REVISE RECONSTRUCTED BREAST  12/8/2011    Procedure:REVISE RECONSTRUCTED BREAST; REVISION RIGHT BREAST RECONSTRUCTION, LEFT BREAST STEROID INJECTION ; Surgeon:FRAN LEVIN; Location:[facility]       FAMILY HISTORY:  Family History   Problem Relation Age of Onset    Thyroid Disease Mother         cancer    Hypertension Mother     Breast Cancer Mother     Cancer Mother         uterine    Cardiovascular Father     Cerebrovascular Disease Father     Diabetes Father        SOCIAL HISTORY:  Social History     Socioeconomic History    Marital status:       Spouse name: Thomas    Number of children: 2   Occupational History    Occupation:      Employer: UNKNOWN   Tobacco Use    Smoking status: Never    Smokeless tobacco: Never   Vaping Use    Vaping status: Never Used   Substance and Sexual Activity    Alcohol use: Not Currently     Alcohol/week: 0.0 - 0.8 standard drinks of alcohol    Drug use: No    Sexual activity: Yes     Partners: Male   Social History Narrative    , 2 kids    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     Social Drivers of Health     Financial Resource Strain: Low Risk  (8/26/2024)    Financial Resource Strain     Within the past 12 months, have you or your family members you live with been unable to get utilities (heat, electricity) when it was really needed?: No   Food Insecurity: Low Risk  (8/26/2024)    Food Insecurity     Within the past 12 months, did you worry that your food would run out before you got money to buy more?: No     Within the past 12 months, did the food you bought just not last and you didn t have money to get more?: No   Transportation Needs: Low Risk  (8/26/2024)    Transportation Needs     Within the past 12 months, has lack of transportation kept you from medical appointments, getting your medicines, non-medical meetings or appointments, work, or from getting things that you need?: No    Received from [facility], [facility]    Social Connections   Interpersonal Safety: Low Risk  (1/22/2025)    Interpersonal Safety     Do you feel physically and emotionally safe where you currently live?: Yes     Within the past 12 months, have you been hit, slapped, kicked or otherwise physically hurt by someone?: No     Within the past 12 months, have you been humiliated or emotionally abused in other ways by your partner or ex-partner?: No   Housing Stability:  Low Risk  (8/26/2024)    Housing Stability     Do you have housing? : Yes     Are you worried about losing your housing?: No       Review of Systems:  Skin:          Eyes:         ENT:         Respiratory:          Cardiovascular:         Gastroenterology:        Genitourinary:         Musculoskeletal:         Neurologic:         Psychiatric:         Heme/Lymph/Imm:         Endocrine:           Physical Exam:  Vitals: There were no vitals taken for this visit.    Constitutional:           Skin:             Head:           Eyes:           Lymph:      ENT:           Neck:           Respiratory:        Clear to auscultation    Cardiac:                Regular rate and rhythm                                           GI:           Extremities and Muscular Skeletal:                 Neurological:           Psych:           CC  Urvashi Mak, NP  [address] [place] [id number]

## 2025-02-13 ENCOUNTER — OFFICE VISIT (OUTPATIENT)
Dept: CARDIOLOGY | Facility: CLINIC | Age: 75
End: 2025-02-13
Attending: NURSE PRACTITIONER
Payer: MEDICARE

## 2025-02-13 ENCOUNTER — LAB (OUTPATIENT)
Dept: LAB | Facility: CLINIC | Age: 75
End: 2025-02-13
Payer: MEDICARE

## 2025-02-13 VITALS
BODY MASS INDEX: 25.1 KG/M2 | WEIGHT: 147 LBS | SYSTOLIC BLOOD PRESSURE: 143 MMHG | HEIGHT: 64 IN | DIASTOLIC BLOOD PRESSURE: 88 MMHG | HEART RATE: 58 BPM

## 2025-02-13 DIAGNOSIS — I47.10 SVT (SUPRAVENTRICULAR TACHYCARDIA): ICD-10-CM

## 2025-02-13 DIAGNOSIS — R00.2 PALPITATIONS: ICD-10-CM

## 2025-02-13 DIAGNOSIS — I25.10 ASCVD (ARTERIOSCLEROTIC CARDIOVASCULAR DISEASE): ICD-10-CM

## 2025-02-13 DIAGNOSIS — E78.5 HYPERLIPIDEMIA WITH TARGET LDL LESS THAN 70: ICD-10-CM

## 2025-02-13 NOTE — LETTER
2/13/2025    Stacy Tariq MD  [address] [address]    RE: Emely Forbes       Dear Colleague,     I had the pleasure of seeing Emely Forbes in the [facility].  HPI and Plan:     I had the pleasure of seeing  in follow-up at the [facility] physicians heart today.  She is a very pleasant 74-year-old female with a past medical history significant for breast cancer that was treated with mastectomy in 2010.  I originally saw her in March 2023 for palpitations.    Her symptoms of palpitations had led to an emergency department presentation prior to her office visit at the time and subsequent Zio patch monitoring demonstrated symptomatic episodes of SVT that lasted approximately 11 seconds or so.  Ultimately I started her on flecainide since metoprolol was ineffective.  An exercise stress echocardiogram prior to the initiation of flecainide was negative for ischemia.    Her symptoms improved significantly after initiation of flecainide.  We did perform an exercise stress EKG in March of this year which demonstrated QRS prolongation with exercise.  I did review this with our electrophysiology team and it was thought that this probably represented a rate related bundle branch block and continuation of flecainide was recommended in addition to metoprolol.    She was also noted to have mild coronary artery calcification on a coronary artery calcium scoring study in 2023 and was started on rosuvastatin for lipid lowering.    In June 2024 she was doing well from a cardiovascular standpoint and had noted a significant improvement in her palpitations with flecainide.  Of note, she suffered a significant skin reaction to the Zio patch monitor and was understandably reluctant to repeat monitoring given her symptomatic improvement.    Unfortunately she was seen in October 2024 after 2 emergency department visits for nocturnal heart racing and hypertension.     She cut her  rosuvastatin dose in half due to concerns that this may be contributing to her symptoms.  We also initiated a cardiac workup that included a CT coronary angiogram which demonstrated a moderate ostial LAD stenosis which was not flow-limiting by FFR.  We also initiated a secondary hypertension workup which was unremarkable with the exception of an elevated aldosterone level.  This was followed by a CT of the chest/abdomen/pelvis which demonstrated no evidence of an adrenal adenoma.  I started her on Aldactone and an endocrinology referral has been placed.    Currently she is on a antihypertensive regimen that includes spironolactone and metoprolol.  Amlodipine led to headaches and was discontinued for this purpose.  She brought in a record of her blood pressures over the past week or so and she has been normotensive at home.    Today she feels well overall from a cardiovascular standpoint, but is still troubled by occasional palpitations.  These are brief and self-limiting and do not impair her functional capacity in any way.  She is actually a very active individual who exercises regularly without any limitations.  There is no history of syncope or presyncope, but she does admit to anxiety regarding the persistent nature of her occasional palpitations.    Physical exam is dictated below.    A basic metabolic panel on 10/30/2024 demonstrated a potassium of 4.0 sodium 142 and creatinine of 0.88.    IMPRESSION:    History of SVT, well-controlled on flecainide and metoprolol.  Episodic hypertension and palpitations that led to an emergency department visit as described above.  Mild to moderate nonobstructive coronary artery disease based on CT coronary angiography as described above.    PLAN    Ms. Forbes presents today for follow-up feeling better overall from an SVT standpoint, although she is still troubled by her occasional palpitations.  Her blood pressure appears to have improved with the initiation of  spironolactone.    I do think it would be reasonable to repeat Zio patch monitoring to assess her arrhythmic burden given her persistent symptoms.  It appears that with proper application her cutaneous skin reaction was avoided the last time she wore a Zio patch monitor, so we will repeat this process and hopefully get some additional data.  Given her mild to moderate nonobstructive coronary artery disease she may need an alternative to flecainide in any case.    I have placed an order for repeat Zio patch monitoring and a repeat echocardiogram and have also placed a referral to our electrophysiology colleagues.  This will be scheduled after the results of the above-mentioned investigations are available.    It was a pleasure seeing her in follow-up today.    The longitudinal plan of care for the diagnosis(es)/condition(s) as documented were addressed during this visit. Due to the added complexity in care, I will continue to support Emely in the subsequent management and with ongoing continuity of care.      CURRENT MEDICATIONS:  Current Outpatient Medications   Medication Sig Dispense Refill     aspirin 81 MG EC tablet Take 1 tablet (81 mg) by mouth daily. 90 tablet 3     busPIRone (BUSPAR) 5 MG tablet TAKE 1 TABLET BY MOUTH TWICE A  tablet 0     estradiol (ESTRACE) 0.1 MG/GM vaginal cream Place 2 g vaginally twice a week 42.5 g 0     flecainide (TAMBOCOR) 50 MG tablet Take 50mg twice daily 180 tablet 2     LORazepam (ATIVAN) 1 MG tablet Take 1 tablet (1 mg) by mouth every 12 hours as needed for anxiety. 10 tablet 0     methylPREDNISolone (MEDROL) 32 MG tablet Take 1 tablet (32 mg) by mouth daily. 2 hours prior to the procedure with IV contrast 1 tablet 0     methylPREDNISolone (MEDROL) 32 MG tablet Take 1 tablet (32 mg) by mouth daily. 12 hours prior to the procedure with IV contrast 1 tablet 0     metoprolol succinate ER (TOPROL XL) 50 MG 24 hr tablet Take 1 tablet (50 mg) by mouth daily 180 tablet 1      rosuvastatin (CRESTOR) 20 MG tablet Take 1 tablet (20 mg) by mouth daily.       spironolactone (ALDACTONE) 25 MG tablet Take 0.5 tablets (12.5 mg) by mouth daily. 45 tablet 3     vitamin D3 (CHOLECALCIFEROL) 50 mcg (2000 units) tablet Take 1 tablet (50 mcg) by mouth daily         ALLERGIES     Allergies   Allergen Reactions     Adhesive Tape      blisters     Contrast Dye Hives     Says she needs pre meds     Latex      blisters     Norvasc [Amlodipine Besylate]        PAST MEDICAL HISTORY:  Past Medical History:   Diagnosis Date     Breast cancer (H) 10/10    R s/p mast/tram flap, reconstruction     Carpal tunnel syndrome      Gastro-oesophageal reflux disease      GERD (gastroesophageal reflux disease)     s/p EGD 5/06     Hematuria 2003     HX: benign breast biopsy     L, papilloma     Osteopenia     DXA 12/16/10     PONV (postoperative nausea and vomiting)     Scope patch on pre-op     S/P hysterectomy with oophorectomy 2001 for fibroids     S/P tonsillectomy        PAST SURGICAL HISTORY:  Past Surgical History:   Procedure Laterality Date     BREAST SURGERY      RIGHT BREAST BIOPSY, RIGHT MASTECTOMY WITH TRAM FLAP RECONSTRUCTION     CYSTOSCOPY, URETEROSCOPY, COMBINED  9/14/2012    Procedure: COMBINED CYSTOSCOPY, URETEROSCOPY;  VIDEO CYSTOSCOPY, RIGHT FLEXIBLE URETEROSCOPY, CAUTERIZATION OF PAPILLARY VEIN (LATEX IV CONTRAST TAPE ALLERGY);  Surgeon: Rey Bailon MD;  Location:  OR     ENT SURGERY      TONSILLECTOMY     GI SURGERY      EGD     GYN SURGERY      HYSTERECTOMY WITH OOPHORECTOMY     HYSTERECTOMY, PAP NO LONGER INDICATED       ORTHOPEDIC SURGERY      CARPAL TUNNEL RELEASE     REVISE RECONSTRUCTED BREAST  12/8/2011    Procedure:REVISE RECONSTRUCTED BREAST; REVISION RIGHT BREAST RECONSTRUCTION, LEFT BREAST STEROID INJECTION ; Surgeon:FRAN LEVIN; Location:[facility]       FAMILY HISTORY:  Family History   Problem Relation Age of Onset     Thyroid Disease Mother         cancer     Hypertension  Mother      Breast Cancer Mother      Cancer Mother         uterine     Cardiovascular Father      Cerebrovascular Disease Father      Diabetes Father        SOCIAL HISTORY:  Social History     Socioeconomic History     Marital status:      Spouse name: Thomas     Number of children: 2   Occupational History     Occupation:      Employer: UNKNOWN   Tobacco Use     Smoking status: Never     Smokeless tobacco: Never   Vaping Use     Vaping status: Never Used   Substance and Sexual Activity     Alcohol use: Not Currently     Alcohol/week: 0.0 - 0.8 standard drinks of alcohol     Drug use: No     Sexual activity: Yes     Partners: Male   Social History Narrative    , 2 kids    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     Social Drivers of Health     Financial Resource Strain: Low Risk  (8/26/2024)    Financial Resource Strain      Within the past 12 months, have you or your family members you live with been unable to get utilities (heat, electricity) when it was really needed?: No   Food Insecurity: Low Risk  (8/26/2024)    Food Insecurity      Within the past 12 months, did you worry that your food would run out before you got money to buy more?: No      Within the past 12 months, did the food you bought just not last and you didn t have money to get more?: No   Transportation Needs: Low Risk  (8/26/2024)    Transportation Needs      Within the past 12 months, has lack of transportation kept you from medical appointments, getting your medicines, non-medical meetings or appointments, work, or from getting things that you need?: No    Received from [facility], [facility]    Social Connections   Interpersonal Safety: Low Risk  (1/22/2025)    Interpersonal Safety      Do you feel physically and emotionally safe where you currently live?: Yes       Within the past 12 months, have you been hit, slapped, kicked or otherwise physically hurt by someone?: No      Within the past 12 months, have you been humiliated or emotionally abused in other ways by your partner or ex-partner?: No   Housing Stability: Low Risk  (8/26/2024)    Housing Stability      Do you have housing? : Yes      Are you worried about losing your housing?: No       Review of Systems:  Skin:          Eyes:         ENT:         Respiratory:          Cardiovascular:         Gastroenterology:        Genitourinary:         Musculoskeletal:         Neurologic:         Psychiatric:         Heme/Lymph/Imm:         Endocrine:           Physical Exam:  Vitals: There were no vitals taken for this visit.    Constitutional:           Skin:             Head:           Eyes:           Lymph:      ENT:           Neck:           Respiratory:        Clear to auscultation    Cardiac:                Regular rate and rhythm                                           GI:           Extremities and Muscular Skeletal:                 Neurological:           Psych:           CC  Urvashi Mak NP  [address]  [place] [address]                  Thank you for allowing me to participate in the care of your patient.      Sincerely,     Rebecca Gonzalez MD     [facility]  cc:   Urvashi Mak NP  [address] [address]

## 2025-02-25 ENCOUNTER — TELEPHONE (OUTPATIENT)
Dept: FAMILY MEDICINE | Facility: CLINIC | Age: 75
End: 2025-02-25
Payer: MEDICARE

## 2025-02-25 NOTE — TELEPHONE ENCOUNTER
Medication Question or Refill        What medication are you calling about (include dose and sig)?:   busPIRone (BUSPAR) 5 MG tablet     Preferred Pharmacy:   [facility] [id number] [place], [place] - [address]  [address] [address]  Phone: [phone] Fax: [phone]      Controlled Substance Agreement on file:   CSA -- Patient Level:    CSA: None found at the patient level.       Who prescribed the medication?: PCP    Do you need a refill? Yes    When did you use the medication last? 2/25/25    Patient offered an appointment? Yes, declined at this time she is already scheduled    Do you have any questions or concerns?  Yes: patient will run out on Friday 2/28/25      Could we send this information to you in [employer] or would you prefer to receive a phone call?:   Patient would prefer a phone call   Okay to leave a detailed message?: Yes at Cell number on file:    Telephone Information:   Mobile [phone]

## 2025-02-25 NOTE — TELEPHONE ENCOUNTER
Rx signed 01/10/2025 for 90 days. Patient requesting refill too soon. Patient should have enough medication until mid April 2025.

## 2025-02-27 ENCOUNTER — TELEPHONE (OUTPATIENT)
Dept: CARDIOLOGY | Facility: CLINIC | Age: 75
End: 2025-02-27

## 2025-02-27 ENCOUNTER — HOSPITAL ENCOUNTER (OUTPATIENT)
Dept: CARDIOLOGY | Facility: CLINIC | Age: 75
End: 2025-02-27
Attending: INTERNAL MEDICINE
Payer: COMMERCIAL

## 2025-02-27 DIAGNOSIS — I47.10 SVT (SUPRAVENTRICULAR TACHYCARDIA): Primary | ICD-10-CM

## 2025-02-27 DIAGNOSIS — R00.2 PALPITATIONS: ICD-10-CM

## 2025-02-27 DIAGNOSIS — I47.10 SVT (SUPRAVENTRICULAR TACHYCARDIA): ICD-10-CM

## 2025-02-27 DIAGNOSIS — R07.89 OTHER CHEST PAIN: ICD-10-CM

## 2025-02-27 DIAGNOSIS — E78.5 HYPERLIPIDEMIA WITH TARGET LDL LESS THAN 70: ICD-10-CM

## 2025-02-27 LAB — LVEF ECHO: NORMAL

## 2025-02-27 PROCEDURE — 93306 TTE W/DOPPLER COMPLETE: CPT

## 2025-02-27 NOTE — TELEPHONE ENCOUNTER
Patient is in clinic for zio patch placement. Clinic staff alerted nursing team that they are unable to find active order for 14-day zio patch ordered by Dr. Gonzalez at [facility] on 2/13/25. Chart reviewed and zio patch order is not under active orders. New order entered.

## 2025-02-27 NOTE — TELEPHONE ENCOUNTER
Writer was alerted by clinic staff that patient has had reaction to adhesive from zio patch in past and requires skin prep prior to application. This is not part of the process for application and staff would like to confirm with Dr. Gonzalez that he is aware and okay. Zio patch also ordered for 14 day wear time and patient has EP appointment scheduled 3/28/25.     Dr. Gonzalez called directly and confirmed he is aware of previous reaction and is okay with applying skin barrier prior to application. Dr. Gonzalez also would like to change wear time to 7 days as patient is scheduled for EP consult on 3/18/25 and would like results back in time. New order entered.    Writer cleansed patient skin with alcohol and applied Cavilon skin barrier prior to application of zio patch by clinic staff.      Emily Marcelino RN

## 2025-03-10 ENCOUNTER — VIRTUAL VISIT (OUTPATIENT)
Dept: PHARMACY | Facility: CLINIC | Age: 75
End: 2025-03-10
Attending: INTERNAL MEDICINE
Payer: COMMERCIAL

## 2025-03-10 DIAGNOSIS — E78.5 HYPERLIPIDEMIA LDL GOAL <100: ICD-10-CM

## 2025-03-10 DIAGNOSIS — I10 PRIMARY HYPERTENSION: ICD-10-CM

## 2025-03-10 DIAGNOSIS — N95.2 VAGINAL ATROPHY: ICD-10-CM

## 2025-03-10 DIAGNOSIS — I47.10 SVT (SUPRAVENTRICULAR TACHYCARDIA): ICD-10-CM

## 2025-03-10 DIAGNOSIS — I25.10 ASCVD (ARTERIOSCLEROTIC CARDIOVASCULAR DISEASE): ICD-10-CM

## 2025-03-10 DIAGNOSIS — J01.90 ACUTE NON-RECURRENT SINUSITIS, UNSPECIFIED LOCATION: Primary | ICD-10-CM

## 2025-03-10 DIAGNOSIS — M81.8 OTHER OSTEOPOROSIS, UNSPECIFIED PATHOLOGICAL FRACTURE PRESENCE: ICD-10-CM

## 2025-03-10 DIAGNOSIS — F41.1 GAD (GENERALIZED ANXIETY DISORDER): ICD-10-CM

## 2025-03-10 PROCEDURE — 99207 PR NO CHARGE LOS: CPT | Mod: 93 | Performed by: PHARMACIST

## 2025-03-10 RX ORDER — CEPHALEXIN 500 MG/1
500 CAPSULE ORAL 2 TIMES DAILY
COMMUNITY
Start: 2025-03-06

## 2025-03-10 NOTE — PROGRESS NOTES
Medication Therapy Management (MTM) Encounter    ASSESSMENT:                            Medication Adherence/Access: No issues identified.    Sinusitis   Improved - headaches may be from this and not buspirone, will need to monitor.    Hypertension/SVT  Patient is meeting blood pressure goal of < 130/80mmHg.    Plan in place with cardiology.    Hyperlipidemia/ASCVD   LDL is not at goal <70mg/dL.  Could consider increasing statin dose, not discussed in detail today.     Anxiety  Unclear how much benefit she's getting from buspirone - she wishes to try taper off.  Needed additional education regarding this.    Osteoporosis  Patient is meeting RDI of calcium 1200mg/day through dietary intake. Patient is meeting RDI of Vitamin D of 5614-0305 international unit(s) daily.  May benefit from additional pharmacotherapy, she declines.  May benefit from increasing weight bearing activity.    Vaginal Atrophy  Stable.     PLAN:                            Follow up with electrophysiology as planned.  We can connect if needed after that appointment.  If you decide to stop buspirone - consider taking once a day for 1-2 weeks before stopping completely.  Discussed pharmacologic treatment for osteoporosis, she declines at this time.  Recommended increasing weight bearing activity.    Future considerations - increase statin intensity    Follow-up: Return in about 9 days (around 3/19/2025) for check in via [employer].    SUBJECTIVE/OBJECTIVE:                          Emely Forbes is a 74 year old female seen for an initial visit. She was referred to me from Dr. Tariq.     Reason for visit: Comprehensive medication review.    Allergies/ADRs: Reviewed in chart  Past Medical History: Reviewed in chart  Tobacco: She reports that she has never smoked. She has never used smokeless tobacco.  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    Medication Adherence/Access: no issues  "reported - generally takes medications at 8am and 8pm (although sometimes 6:30-8pm)    Sinusitis  Cephalexin 500mg twice daily x10 days  She's been on this for a few days, reports headaches have improved.  No side effects reported.    Hypertension /SVT  Flecainide 50mg twice daily   Metoprolol ER 50mg daily (taking PM)   Spironolactone 12.5mg daily (taking PM)  Patient reports the following medication side effects: feeling like the medications are wearing off - feels heart races.  Has upcoming electrophysiology appointment to discuss further.  Doesn't get up at night to use the bathroom.  Patient self monitors blood pressure.  Home BP monitoring 127/80, 117/74, 123/77, 116/76, 110/72, 114/79, 119/76mmHg  Hasn't checked blood pressure when she's been having heart racing symptoms - says \"I'm too scared to.\"  She reports these episodes are very short in nature - she did have 3 episodes while recently wearing the Zio patch so is hoping to get some answers once that's reviewed by cardiology.       Hyperlipidemia /ASCVD  Aspirin 81mg daily  Rosuvastatin 20mg daily (taking PM)  Patient reports no significant myalgias or other side effects.      Mental Health   Anxiety  Buspirone 5mg twice daily  Patient reports the following possible medication side effects: headache, she did try stopping this and noticed headaches were worse.  It's been difficult for her to know if buspirone has helped her symptoms as there's been so much going on.  Wonders if she needs to stay on buspirone since she doesn't feel it's done much for her.     Bone Health   Osteoporosis  Vitamin D 2000 international unit(s) daily  Patient is not experiencing side effects.  She wishes to avoid additional pharmacotherapy for osteoporosis at this time.  DEXA History: 1/14/25  DXA RESULTS  -Lumbar Spine: L1-L3: BMD: 1.262 g/cm2. T-score: 0.7. Z-score: 2.4. Sclerotic changes of L4.  -RIGHT Hip Total: BMD: 0.799 g/cm2. T-score: -1.7. Z-score: 0.0.  -RIGHT Hip " Femoral neck: BMD: 0.692 g/cm2. T-score: -2.5. Z-score: -0.7.  -LEFT Hip Total: BMD: 0.772 g/cm2. T-score: -1.9. Z-score: -0.3.  -LEFT Hip Femoral neck: BMD: 0.665 g/cm2. T-score: -2.7. Z-score: -0.9.    INTERVAL CHANGE  -There has been a 1.3% decrease in lumbar spine BMD.  -There has been a 4.6% decrease in bilateral hip BMD.  Patient is getting  2-3 serving(s)/day of calcium in their diet.  It has been suggested she avoid calcium supplements by oncology.  Risk factors: post-menopausal     Vaginal Atrophy   Estradiol vaginal cream twice weekly as needed   She reports this is effective when needed.  No side effects reported.    Today's Vitals: There were no vitals taken for this visit.  ----------------    I spent 50 minutes with this patient today.     A summary of these recommendations was sent via [employer].    Lucia Dobson, PharmD, Psychiatric  Medication Therapy Management Provider  [phone]     Telemedicine Visit Details  The patient's medications can be safely assessed via a telemedicine encounter.  Type of service:  Telephone visit  Originating Location (pt. Location): Home    Distant Location (provider location):  On-site  Start Time: 10:03 AM  End Time: 10:53 AM     Medication Therapy Recommendations  Anxiety   1 Current Medication: busPIRone (BUSPAR) 5 MG tablet   Current Medication Sig: TAKE 1 TABLET BY MOUTH TWICE A DAY   Rationale: Patient prefers not to take - Adherence - Adherence   Recommendation: Discontinue Medication   Status: Accepted per CPA   Identified Date: 3/10/2025 Completed Date: 3/10/2025         Osteoporosis   1 Current Medication: vitamin D3 (CHOLECALCIFEROL) 50 mcg (2000 units) tablet   Current Medication Sig: Take 1 tablet (50 mcg) by mouth daily   Rationale: Synergistic therapy - Needs additional medication therapy - Indication   Recommendation: Start Medication - alendronate 70 MG tablet   Status: Declined per Patient   Identified Date: 3/10/2025 Completed Date: 3/10/2025

## 2025-03-11 DIAGNOSIS — E78.5 HYPERLIPIDEMIA WITH TARGET LDL LESS THAN 70: ICD-10-CM

## 2025-03-11 DIAGNOSIS — I25.10 ASCVD (ARTERIOSCLEROTIC CARDIOVASCULAR DISEASE): ICD-10-CM

## 2025-03-11 RX ORDER — ROSUVASTATIN CALCIUM 20 MG/1
20 TABLET, COATED ORAL DAILY
Qty: 90 TABLET | Refills: 3 | Status: SHIPPED | OUTPATIENT
Start: 2025-03-11

## 2025-03-12 NOTE — PATIENT INSTRUCTIONS
"Recommendations from today's MTM visit:                                                    MTM (medication therapy management) is a service provided by a clinical pharmacist designed to help you get the most of out of your medicines.   Today we reviewed what your medicines are for, how to know if they are working, that your medicines are safe and how to make your medicine regimen as easy as possible.      Follow up with electrophysiology as planned.  We can connect if needed after that appointment.  If you decide to stop buspirone - consider taking once a day for 1-2 weeks before stopping completely.  Recommended increasing weight bearing activity.    Follow-up: Return in about 9 days (around 3/19/2025) for check in via [employer]    It was great speaking with you today.  I value your experience and would be very thankful for your time in providing feedback in our clinic survey. In the next few days, you may receive an email or text message from [employer] with a link to a survey related to your  clinical pharmacist.\"     To schedule another MTM appointment, please call the clinic directly or you may call the MTM scheduling line at [phone] or toll-free at [phone].     My Clinical Pharmacist's contact information:                                                      Please feel free to contact me with any questions or concerns you have.      Lida Magdaleno, Mate[place]  Medication Therapy Management Pharmacy Resident  [facility]  [phone]    Lucia Dobson PharmD, [facility]  Medication Therapy Management Provider  [facility]  [phone]     "

## 2025-03-13 ENCOUNTER — APPOINTMENT (OUTPATIENT)
Dept: CT IMAGING | Facility: CLINIC | Age: 75
End: 2025-03-13
Attending: PHYSICIAN ASSISTANT
Payer: MEDICARE

## 2025-03-13 ENCOUNTER — HOSPITAL ENCOUNTER (EMERGENCY)
Facility: CLINIC | Age: 75
Discharge: HOME | End: 2025-03-13
Attending: PHYSICIAN ASSISTANT
Payer: MEDICARE

## 2025-03-13 VITALS
HEART RATE: 69 BPM | OXYGEN SATURATION: 98 % | SYSTOLIC BLOOD PRESSURE: 152 MMHG | RESPIRATION RATE: 16 BRPM | DIASTOLIC BLOOD PRESSURE: 130 MMHG | TEMPERATURE: 98 F

## 2025-03-13 DIAGNOSIS — M26.609 TMJ (TEMPOROMANDIBULAR JOINT SYNDROME): ICD-10-CM

## 2025-03-13 DIAGNOSIS — R51.9 RIGHT-SIDED HEADACHE: ICD-10-CM

## 2025-03-13 LAB
ANION GAP SERPL CALCULATED.3IONS-SCNC: 11 MMOL/L (ref 7–15)
BASOPHILS # BLD AUTO: 0.1 10E3/UL (ref 0–0.2)
BASOPHILS NFR BLD AUTO: 1 %
BUN SERPL-MCNC: 21.6 MG/DL (ref 8–23)
CALCIUM SERPL-MCNC: 9.6 MG/DL (ref 8.8–10.4)
CHLORIDE SERPL-SCNC: 102 MMOL/L (ref 98–107)
CREAT SERPL-MCNC: 0.9 MG/DL (ref 0.51–0.95)
CRP SERPL-MCNC: <3 MG/L
EGFRCR SERPLBLD CKD-EPI 2021: 68 ML/MIN/1.73M2
EOSINOPHIL # BLD AUTO: 0.1 10E3/UL (ref 0–0.7)
EOSINOPHIL NFR BLD AUTO: 2 %
ERYTHROCYTE [DISTWIDTH] IN BLOOD BY AUTOMATED COUNT: 13.2 % (ref 10–15)
ERYTHROCYTE [SEDIMENTATION RATE] IN BLOOD BY WESTERGREN METHOD: 5 MM/HR (ref 0–30)
GLUCOSE SERPL-MCNC: 148 MG/DL (ref 70–99)
HCO3 SERPL-SCNC: 26 MMOL/L (ref 22–29)
HCT VFR BLD AUTO: 45.7 % (ref 35–47)
HGB BLD-MCNC: 15.3 G/DL (ref 11.7–15.7)
HOLD SPECIMEN: NORMAL
IMM GRANULOCYTES # BLD: 0 10E3/UL
IMM GRANULOCYTES NFR BLD: 0 %
LYMPHOCYTES # BLD AUTO: 1.4 10E3/UL (ref 0.8–5.3)
LYMPHOCYTES NFR BLD AUTO: 23 %
MCH RBC QN AUTO: 30 PG (ref 26.5–33)
MCHC RBC AUTO-ENTMCNC: 33.5 G/DL (ref 31.5–36.5)
MCV RBC AUTO: 90 FL (ref 78–100)
MONOCYTES # BLD AUTO: 0.5 10E3/UL (ref 0–1.3)
MONOCYTES NFR BLD AUTO: 9 %
NEUTROPHILS # BLD AUTO: 4.1 10E3/UL (ref 1.6–8.3)
NEUTROPHILS NFR BLD AUTO: 66 %
NRBC # BLD AUTO: 0 10E3/UL
NRBC BLD AUTO-RTO: 0 /100
PLATELET # BLD AUTO: 169 10E3/UL (ref 150–450)
POTASSIUM SERPL-SCNC: 3.8 MMOL/L (ref 3.4–5.3)
RBC # BLD AUTO: 5.1 10E6/UL (ref 3.8–5.2)
SODIUM SERPL-SCNC: 139 MMOL/L (ref 135–145)
WBC # BLD AUTO: 6.1 10E3/UL (ref 4–11)

## 2025-03-13 PROCEDURE — 86140 C-REACTIVE PROTEIN: CPT | Performed by: PHYSICIAN ASSISTANT

## 2025-03-13 PROCEDURE — 250N000013 HC RX MED GY IP 250 OP 250 PS 637: Performed by: PHYSICIAN ASSISTANT

## 2025-03-13 PROCEDURE — 80048 BASIC METABOLIC PNL TOTAL CA: CPT | Performed by: PHYSICIAN ASSISTANT

## 2025-03-13 PROCEDURE — 36415 COLL VENOUS BLD VENIPUNCTURE: CPT | Performed by: PHYSICIAN ASSISTANT

## 2025-03-13 PROCEDURE — 70450 CT HEAD/BRAIN W/O DYE: CPT

## 2025-03-13 PROCEDURE — 70496 CT ANGIOGRAPHY HEAD: CPT

## 2025-03-13 PROCEDURE — 99285 EMERGENCY DEPT VISIT HI MDM: CPT | Mod: 25

## 2025-03-13 PROCEDURE — 85652 RBC SED RATE AUTOMATED: CPT | Performed by: PHYSICIAN ASSISTANT

## 2025-03-13 PROCEDURE — 82310 ASSAY OF CALCIUM: CPT | Performed by: PHYSICIAN ASSISTANT

## 2025-03-13 PROCEDURE — 250N000009 HC RX 250: Performed by: PHYSICIAN ASSISTANT

## 2025-03-13 PROCEDURE — 96361 HYDRATE IV INFUSION ADD-ON: CPT

## 2025-03-13 PROCEDURE — 96375 TX/PRO/DX INJ NEW DRUG ADDON: CPT

## 2025-03-13 PROCEDURE — 258N000003 HC RX IP 258 OP 636: Performed by: PHYSICIAN ASSISTANT

## 2025-03-13 PROCEDURE — 96374 THER/PROPH/DIAG INJ IV PUSH: CPT | Mod: 59

## 2025-03-13 PROCEDURE — 85025 COMPLETE CBC W/AUTO DIFF WBC: CPT | Performed by: PHYSICIAN ASSISTANT

## 2025-03-13 PROCEDURE — 250N000011 HC RX IP 250 OP 636: Performed by: PHYSICIAN ASSISTANT

## 2025-03-13 RX ORDER — ACETAMINOPHEN 500 MG
1000 TABLET ORAL ONCE
Status: COMPLETED | OUTPATIENT
Start: 2025-03-13 | End: 2025-03-13

## 2025-03-13 RX ORDER — METOCLOPRAMIDE HYDROCHLORIDE 5 MG/ML
5 INJECTION INTRAMUSCULAR; INTRAVENOUS ONCE
Status: COMPLETED | OUTPATIENT
Start: 2025-03-13 | End: 2025-03-13

## 2025-03-13 RX ORDER — CYCLOBENZAPRINE HCL 5 MG
5 TABLET ORAL AT BEDTIME
Qty: 30 TABLET | Refills: 0 | Status: SHIPPED | OUTPATIENT
Start: 2025-03-13

## 2025-03-13 RX ORDER — DIPHENHYDRAMINE HYDROCHLORIDE 50 MG/ML
50 INJECTION, SOLUTION INTRAMUSCULAR; INTRAVENOUS ONCE
Status: COMPLETED | OUTPATIENT
Start: 2025-03-13 | End: 2025-03-13

## 2025-03-13 RX ORDER — METHYLPREDNISOLONE SODIUM SUCCINATE 125 MG/2ML
60 INJECTION INTRAMUSCULAR; INTRAVENOUS ONCE
Status: COMPLETED | OUTPATIENT
Start: 2025-03-13 | End: 2025-03-13

## 2025-03-13 RX ORDER — IOPAMIDOL 755 MG/ML
67 INJECTION, SOLUTION INTRAVASCULAR ONCE
Status: COMPLETED | OUTPATIENT
Start: 2025-03-13 | End: 2025-03-13

## 2025-03-13 RX ADMIN — ACETAMINOPHEN 1000 MG: 500 TABLET, FILM COATED ORAL at 18:31

## 2025-03-13 RX ADMIN — METHYLPREDNISOLONE SODIUM SUCCINATE 62.5 MG: 125 INJECTION, POWDER, FOR SOLUTION INTRAMUSCULAR; INTRAVENOUS at 15:55

## 2025-03-13 RX ADMIN — SODIUM CHLORIDE 500 ML: 9 INJECTION, SOLUTION INTRAVENOUS at 18:31

## 2025-03-13 RX ADMIN — SODIUM CHLORIDE 90 ML: 9 INJECTION, SOLUTION INTRAVENOUS at 16:52

## 2025-03-13 RX ADMIN — DIPHENHYDRAMINE HYDROCHLORIDE 50 MG: 50 INJECTION, SOLUTION INTRAMUSCULAR; INTRAVENOUS at 15:55

## 2025-03-13 RX ADMIN — METOCLOPRAMIDE 5 MG: 5 INJECTION, SOLUTION INTRAMUSCULAR; INTRAVENOUS at 18:32

## 2025-03-13 RX ADMIN — IOPAMIDOL 67 ML: 755 INJECTION, SOLUTION INTRAVENOUS at 16:52

## 2025-03-13 ASSESSMENT — ACTIVITIES OF DAILY LIVING (ADL)
ADLS_ACUITY_SCORE: 41

## 2025-03-13 NOTE — ED PROVIDER NOTES
Emergency Department Note      History of Present Illness     Chief Complaint   Sudden Severe Headache      HPI   Emely Forbes is a 74 year old female with a history of breast cancer, who presents for evaluation of right-sided headache.  The patient notes she woke up 1 week ago with a severe right-sided headache in the temporal area that is since spread to the upper part of her scalp.  She notes it has been intermittent absent sometimes in the morning and worse as the day progresses.  She also notes some flashers in her visual field of both eyes but only when she looks at bright lights, but no visual field cuts or double vision.  She was evaluated by an eye physician on 2 occasions and they did not find any concern.  She denies any discharge from the eye.  She denies any numbness or weakness in the arms or legs and has not felt off balance no slurred speech.  No vomiting but has felt a little bit nauseous.  She denies a history of headaches.  No recent head injuries.  No one else at home is having similar symptoms.  She has not had a fever or neck stiffness or rash.  She does note a history of hives to contrast but has never had an anaphylactic reaction though she notes she is usually premedicated with oral prednisone a day or 2 in advance for this. No medications today for this.  She does note that the other day she brushed her forehead against something and it was tender and also notes some tenderness to the temporal area. She also notes its worse with chewing and moving her jaw and that she grinds and clenches. Didn't take any meds today.   who she lives with does not have similar symptoms    Independent Historian   None    Review of External Notes   I reviewed Dr. T[facility]q [facility] 12/2/2024 where the patient was seen for CAD and generalized anxiety.  I note she is on metoprolol and spironolactone.    Past Medical History     Medical History and Problem List   Past Medical History:   Diagnosis Date     Breast cancer (H) 10/10    Carpal tunnel syndrome     Gastro-oesophageal reflux disease     GERD (gastroesophageal reflux disease)     Hematuria 2003    HX: benign breast biopsy     Osteopenia     PONV (postoperative nausea and vomiting)     S/P hysterectomy with oophorectomy 2001 for fibroids    S/P tonsillectomy        Medications   cyclobenzaprine (FLEXERIL) 5 MG tablet  aspirin 81 MG EC tablet  busPIRone (BUSPAR) 5 MG tablet  cephALEXin (KEFLEX) 500 MG capsule  estradiol (ESTRACE) 0.1 MG/GM vaginal cream  flecainide (TAMBOCOR) 50 MG tablet  metoprolol succinate ER (TOPROL XL) 50 MG 24 hr tablet  rosuvastatin (CRESTOR) 20 MG tablet  spironolactone (ALDACTONE) 25 MG tablet  vitamin D3 (CHOLECALCIFEROL) 50 mcg (2000 units) tablet        Surgical History   Past Surgical History:   Procedure Laterality Date    BREAST SURGERY      RIGHT BREAST BIOPSY, RIGHT MASTECTOMY WITH TRAM FLAP RECONSTRUCTION    CYSTOSCOPY, URETEROSCOPY, COMBINED  9/14/2012    Procedure: COMBINED CYSTOSCOPY, URETEROSCOPY;  VIDEO CYSTOSCOPY, RIGHT FLEXIBLE URETEROSCOPY, CAUTERIZATION OF PAPILLARY VEIN (LATEX IV CONTRAST TAPE ALLERGY);  Surgeon: Rey Bailon MD;  Location:  OR    ENT SURGERY      TONSILLECTOMY    GI SURGERY      EGD    GYN SURGERY      HYSTERECTOMY WITH OOPHORECTOMY    HYSTERECTOMY, PAP NO LONGER INDICATED      ORTHOPEDIC SURGERY      CARPAL TUNNEL RELEASE    REVISE RECONSTRUCTED BREAST  12/8/2011    Procedure:REVISE RECONSTRUCTED BREAST; REVISION RIGHT BREAST RECONSTRUCTION, LEFT BREAST STEROID INJECTION ; Surgeon:FRAN LEVIN; Location:[facility]       Physical Exam     Patient Vitals for the past 24 hrs:   BP Temp Pulse Resp SpO2   03/13/25 1900 (!) 152/130 -- 69 -- 98 %   03/13/25 1845 (!) 153/78 -- 71 16 97 %   03/13/25 1508 (!) 147/94 -- -- -- --   03/13/25 1501 (!) 186/91 98  F (36.7  C) 69 18 99 %     Physical Exam  General: Awake, alert, non-toxic. Sitting up resting comfortably on bed.  Head:  Scalp is  NC/AT  Eyes:  Conjunctiva normal, PERRL. EOMI without pain.  No periorbital swelling.  No discharge.  No proptosis.  ENT:  The external nose and ears are normal.   Neck:  Normal range of motion without rigidity.  CV:  Regular rate and rhythm    No pathologic murmur, rubs, or gallops.  Resp:  Breath sounds are clear bilaterally    Non-labored, no retractions or accessory muscle use  Abdomen: Abdomen is soft, no distension, no tenderness, no masses,  MS:  No lower extremity edema/swelling.   Skin:  Warm and dry, No rash or lesions noted. No rash to face or scalp. TTP to right scalp/temporal area.  Neuro:  Alert and oriented. GCS 15 5/5 strength BL to all joints of upper and lower extremities.  Normal and symmetric sensation to touch BL in arms, legs, and face.  CNII-XII intact.  Normal finger to nose testing BL. Gait normal. Visual fields intact.  Psych: Awake. Alert. Normal affect. Appropriate interactions.      Diagnostics     Lab Results   Labs Ordered and Resulted from Time of ED Arrival to Time of ED Departure   BASIC METABOLIC PANEL - Abnormal       Result Value    Sodium 139      Potassium 3.8      Chloride 102      Carbon Dioxide (CO2) 26      Anion Gap 11      Urea Nitrogen 21.6      Creatinine 0.90      GFR Estimate 68      Calcium 9.6      Glucose 148 (*)    ERYTHROCYTE SEDIMENTATION RATE AUTO - Normal    Erythrocyte Sedimentation Rate 5     CRP INFLAMMATION - Normal    CRP Inflammation <3.00     CBC WITH PLATELETS AND DIFFERENTIAL    WBC Count 6.1      RBC Count 5.10      Hemoglobin 15.3      Hematocrit 45.7      MCV 90      MCH 30.0      MCHC 33.5      RDW 13.2      Platelet Count 169      % Neutrophils 66      % Lymphocytes 23      % Monocytes 9      % Eosinophils 2      % Basophils 1      % Immature Granulocytes 0      NRBCs per 100 WBC 0      Absolute Neutrophils 4.1      Absolute Lymphocytes 1.4      Absolute Monocytes 0.5      Absolute Eosinophils 0.1      Absolute Basophils 0.1      Absolute  Immature Granulocytes 0.0      Absolute NRBCs 0.0         Imaging   CTA Head Neck with Contrast   Final Result   IMPRESSION:   HEAD CT:   1.  Negative for acute intracranial hemorrhage, transcortical infarct, hydrocephalus, or sizable intracranial mass.      HEAD CTA:    1.  Patent major intracranial arteries. Negative for flow-limiting stenosis, aneurysm, or high flow vascular malformation.      NECK CTA:   1.  Patent carotid and vertebral arteries. Negative for high-grade arterial stenosis or acute dissection.      Head CT w/o contrast   Final Result   IMPRESSION:   HEAD CT:   1.  Negative for acute intracranial hemorrhage, transcortical infarct, hydrocephalus, or sizable intracranial mass.      HEAD CTA:    1.  Patent major intracranial arteries. Negative for flow-limiting stenosis, aneurysm, or high flow vascular malformation.      NECK CTA:   1.  Patent carotid and vertebral arteries. Negative for high-grade arterial stenosis or acute dissection.            Independent Interpretation   CT Head: No intracranial hemorrhage or midline shift.    ED Course      Medications Administered   Medications   methylPREDNISolone Na Suc (solu-MEDROL) injection 62.5 mg (62.5 mg Intravenous $Given 3/13/25 1555)   diphenhydrAMINE (BENADRYL) injection 50 mg (50 mg Intravenous $Given 3/13/25 1555)   iopamidol (ISOVUE-370) solution 67 mL (67 mLs Intravenous $Given 3/13/25 1652)   Saline Flush - CT (90 mLs Intravenous $Given 3/13/25 1652)   metoclopramide (REGLAN) injection 5 mg (5 mg Intravenous $Given 3/13/25 1832)   acetaminophen (TYLENOL) tablet 1,000 mg (1,000 mg Oral $Given 3/13/25 1831)   sodium chloride 0.9% BOLUS 500 mL (0 mLs Intravenous Stopped 3/13/25 1926)       Procedures   Procedures     Discussion of Management   None    ED Course    I performed an eval of the pt  I re-checked the pt.  She feels much better, wants to go home, no vision changes.    Additional Documentation  None    Medical Decision Making / Diagnosis      CMS Diagnoses: None    MIPS       None    [facility]   Emely Forbes is a 74 year old female with no pertinent past medical history who presents with 1 week of intermittent right-sided headache worse with palpation and chewing.  Broad differential considered.  Has also noted some sensitivity and flashers to her bilateral vision fields but only when she stares at light.  Has been evaluated by Optho who feels there is no intraorbital pathology and there is nothing to suggest angle-closure glaucoma.  I did have concern for possible temporal arteritis however both her CRP is undetectable and her ESR is completely normal essentially ruling this out.  A CT CTA was obtained which shows no evidence of bleed mass dissection or aneurysm.  Her neurologic exam is completely normal there is nothing to suggest stroke.  Obviously given the duration of her symptoms for a week difficult to fully rule out subarachnoid hemorrhage just with imaging however I have very low suspicion for this given the intermittent nature as well as the reproducibility with palpation and chewing and do not feel LP is indicated likewise no fever or concern for CNS infection/meningitis.  No aneurysm seen on CTA either and hemoglobin is very normal.  Given no prior history of headache despite normal neurologic exam I did offer MRI but the patient declined prefers to go home at this time and admittedly I think this would be quite low yield given her normal neuroexam as well as the resolution of any of the visual symptoms with medications given here.  Certainly TMJ would be a consideration given the reproducibility there is no evidence of shingles rash and no cellulitis or skin changes.  We will try low-dose Flexeril at night and she is already wearing a mouthguard.  She will follow-up with her PCP to discuss further and return if new worsening or changing symptoms especially if neurologic changes, visual field cuts or double vision, fever etc.  Given she has a lot  of other things going on she will discuss with them further before following up with neuro.    Disposition   The patient was discharged.     Diagnosis     ICD-10-CM    1. Right-sided headache  R51.9     Intermittent      2. TMJ (temporomandibular joint syndrome)  M26.609     possible           Discharge Medications   Discharge Medication List as of 3/13/2025  7:39 PM        START taking these medications    Details   cyclobenzaprine (FLEXERIL) 5 MG tablet Take 1 tablet (5 mg) by mouth at bedtime., Disp-30 tablet, R-0, E-Prescribe                    Foreign Contreras PA-C  03/13/25 5161

## 2025-03-14 ENCOUNTER — PATIENT OUTREACH (OUTPATIENT)
Dept: FAMILY MEDICINE | Facility: CLINIC | Age: 75
End: 2025-03-14
Payer: MEDICARE

## 2025-03-15 ENCOUNTER — NURSE TRIAGE (OUTPATIENT)
Dept: NURSING | Facility: CLINIC | Age: 75
End: 2025-03-15
Payer: MEDICARE

## 2025-03-15 NOTE — TELEPHONE ENCOUNTER
ER 3/13/25.  Was told she had TMJ syndrome and was prescribed a muscle relaxant.  Things have not improved.  She spoke to her ENT again this a.m.  It was her ENT provider who sent her to the ER 3/13/25.  Ent provider said she needs a tryptan because the problem is a migraine and not a muscle issue.  Emely asking to have someone prescribe this for her.  I advised Emely to return to the ER or go to an UC.  Caller stated understanding and agreement.      Esthela CHAPMAN RN [facility]

## 2025-03-18 ENCOUNTER — OFFICE VISIT (OUTPATIENT)
Dept: CARDIOLOGY | Facility: CLINIC | Age: 75
End: 2025-03-18
Attending: INTERNAL MEDICINE
Payer: MEDICARE

## 2025-03-18 ENCOUNTER — NURSE TRIAGE (OUTPATIENT)
Dept: FAMILY MEDICINE | Facility: CLINIC | Age: 75
End: 2025-03-18

## 2025-03-18 ENCOUNTER — APPOINTMENT (OUTPATIENT)
Dept: MRI IMAGING | Facility: CLINIC | Age: 75
End: 2025-03-18
Attending: EMERGENCY MEDICINE
Payer: MEDICARE

## 2025-03-18 ENCOUNTER — HOSPITAL ENCOUNTER (EMERGENCY)
Facility: CLINIC | Age: 75
Discharge: HOME | End: 2025-03-18
Attending: EMERGENCY MEDICINE | Admitting: EMERGENCY MEDICINE
Payer: MEDICARE

## 2025-03-18 VITALS
WEIGHT: 147 LBS | RESPIRATION RATE: 16 BRPM | BODY MASS INDEX: 25.1 KG/M2 | SYSTOLIC BLOOD PRESSURE: 138 MMHG | DIASTOLIC BLOOD PRESSURE: 86 MMHG | TEMPERATURE: 97 F | OXYGEN SATURATION: 95 % | HEART RATE: 62 BPM | HEIGHT: 64 IN

## 2025-03-18 VITALS
WEIGHT: 142 LBS | BODY MASS INDEX: 24.24 KG/M2 | HEART RATE: 58 BPM | SYSTOLIC BLOOD PRESSURE: 154 MMHG | HEIGHT: 64 IN | DIASTOLIC BLOOD PRESSURE: 95 MMHG | RESPIRATION RATE: 18 BRPM | OXYGEN SATURATION: 98 %

## 2025-03-18 DIAGNOSIS — R51.9 ACUTE INTRACTABLE HEADACHE, UNSPECIFIED HEADACHE TYPE: ICD-10-CM

## 2025-03-18 DIAGNOSIS — I47.10 SVT (SUPRAVENTRICULAR TACHYCARDIA): ICD-10-CM

## 2025-03-18 LAB
ALBUMIN SERPL BCG-MCNC: 4.5 G/DL (ref 3.5–5.2)
ALP SERPL-CCNC: 88 U/L (ref 40–150)
ALT SERPL W P-5'-P-CCNC: 36 U/L (ref 0–50)
ANION GAP SERPL CALCULATED.3IONS-SCNC: 11 MMOL/L (ref 7–15)
AST SERPL W P-5'-P-CCNC: 24 U/L (ref 0–45)
BASOPHILS # BLD AUTO: 0.1 10E3/UL (ref 0–0.2)
BASOPHILS NFR BLD AUTO: 1 %
BILIRUB SERPL-MCNC: 0.9 MG/DL
BUN SERPL-MCNC: 25.3 MG/DL (ref 8–23)
CALCIUM SERPL-MCNC: 9.4 MG/DL (ref 8.8–10.4)
CHLORIDE SERPL-SCNC: 104 MMOL/L (ref 98–107)
CREAT SERPL-MCNC: 0.96 MG/DL (ref 0.51–0.95)
CRP SERPL-MCNC: 3.23 MG/L
EGFRCR SERPLBLD CKD-EPI 2021: 63 ML/MIN/1.73M2
EOSINOPHIL # BLD AUTO: 0.2 10E3/UL (ref 0–0.7)
EOSINOPHIL NFR BLD AUTO: 3 %
ERYTHROCYTE [DISTWIDTH] IN BLOOD BY AUTOMATED COUNT: 13.3 % (ref 10–15)
ERYTHROCYTE [SEDIMENTATION RATE] IN BLOOD BY WESTERGREN METHOD: 8 MM/HR (ref 0–30)
GLUCOSE SERPL-MCNC: 102 MG/DL (ref 70–99)
HCO3 SERPL-SCNC: 25 MMOL/L (ref 22–29)
HCT VFR BLD AUTO: 46.7 % (ref 35–47)
HGB BLD-MCNC: 15.6 G/DL (ref 11.7–15.7)
HOLD SPECIMEN: NORMAL
IMM GRANULOCYTES # BLD: 0 10E3/UL
IMM GRANULOCYTES NFR BLD: 0 %
LYMPHOCYTES # BLD AUTO: 1.5 10E3/UL (ref 0.8–5.3)
LYMPHOCYTES NFR BLD AUTO: 21 %
MCH RBC QN AUTO: 30.2 PG (ref 26.5–33)
MCHC RBC AUTO-ENTMCNC: 33.4 G/DL (ref 31.5–36.5)
MCV RBC AUTO: 91 FL (ref 78–100)
MONOCYTES # BLD AUTO: 0.7 10E3/UL (ref 0–1.3)
MONOCYTES NFR BLD AUTO: 10 %
NEUTROPHILS # BLD AUTO: 4.7 10E3/UL (ref 1.6–8.3)
NEUTROPHILS NFR BLD AUTO: 66 %
NRBC # BLD AUTO: 0 10E3/UL
NRBC BLD AUTO-RTO: 0 /100
PLATELET # BLD AUTO: 185 10E3/UL (ref 150–450)
POTASSIUM SERPL-SCNC: 4.4 MMOL/L (ref 3.4–5.3)
PROT SERPL-MCNC: 7.2 G/DL (ref 6.4–8.3)
RBC # BLD AUTO: 5.16 10E6/UL (ref 3.8–5.2)
SODIUM SERPL-SCNC: 140 MMOL/L (ref 135–145)
WBC # BLD AUTO: 7.1 10E3/UL (ref 4–11)

## 2025-03-18 PROCEDURE — 85041 AUTOMATED RBC COUNT: CPT | Performed by: EMERGENCY MEDICINE

## 2025-03-18 PROCEDURE — 96374 THER/PROPH/DIAG INJ IV PUSH: CPT

## 2025-03-18 PROCEDURE — 70551 MRI BRAIN STEM W/O DYE: CPT

## 2025-03-18 PROCEDURE — 85025 COMPLETE CBC W/AUTO DIFF WBC: CPT | Performed by: EMERGENCY MEDICINE

## 2025-03-18 PROCEDURE — 86140 C-REACTIVE PROTEIN: CPT | Performed by: EMERGENCY MEDICINE

## 2025-03-18 PROCEDURE — 3077F SYST BP >= 140 MM HG: CPT | Performed by: INTERNAL MEDICINE

## 2025-03-18 PROCEDURE — 99285 EMERGENCY DEPT VISIT HI MDM: CPT | Mod: 25

## 2025-03-18 PROCEDURE — 99204 OFFICE O/P NEW MOD 45 MIN: CPT | Performed by: INTERNAL MEDICINE

## 2025-03-18 PROCEDURE — 250N000011 HC RX IP 250 OP 636: Performed by: EMERGENCY MEDICINE

## 2025-03-18 PROCEDURE — G2211 COMPLEX E/M VISIT ADD ON: HCPCS | Performed by: INTERNAL MEDICINE

## 2025-03-18 PROCEDURE — 85018 HEMOGLOBIN: CPT | Performed by: EMERGENCY MEDICINE

## 2025-03-18 PROCEDURE — 82040 ASSAY OF SERUM ALBUMIN: CPT | Performed by: EMERGENCY MEDICINE

## 2025-03-18 PROCEDURE — 84155 ASSAY OF PROTEIN SERUM: CPT | Performed by: EMERGENCY MEDICINE

## 2025-03-18 PROCEDURE — 36415 COLL VENOUS BLD VENIPUNCTURE: CPT | Performed by: EMERGENCY MEDICINE

## 2025-03-18 PROCEDURE — 85652 RBC SED RATE AUTOMATED: CPT | Performed by: EMERGENCY MEDICINE

## 2025-03-18 PROCEDURE — 80053 COMPREHEN METABOLIC PANEL: CPT | Performed by: EMERGENCY MEDICINE

## 2025-03-18 PROCEDURE — 3080F DIAST BP >= 90 MM HG: CPT | Performed by: INTERNAL MEDICINE

## 2025-03-18 RX ORDER — PROCHLORPERAZINE MALEATE 10 MG
5 TABLET ORAL EVERY 6 HOURS PRN
Qty: 20 TABLET | Refills: 0 | Status: SHIPPED | OUTPATIENT
Start: 2025-03-18

## 2025-03-18 RX ORDER — METHYLPREDNISOLONE 4 MG/1
TABLET ORAL
Qty: 21 TABLET | Refills: 0 | Status: SHIPPED | OUTPATIENT
Start: 2025-03-18

## 2025-03-18 RX ORDER — LORAZEPAM 2 MG/ML
1 INJECTION INTRAMUSCULAR ONCE
Status: COMPLETED | OUTPATIENT
Start: 2025-03-18 | End: 2025-03-18

## 2025-03-18 RX ADMIN — LORAZEPAM 1 MG: 2 INJECTION INTRAMUSCULAR; INTRAVENOUS at 15:21

## 2025-03-18 ASSESSMENT — ACTIVITIES OF DAILY LIVING (ADL)
ADLS_ACUITY_SCORE: 41

## 2025-03-18 NOTE — TELEPHONE ENCOUNTER
ADS or in person visit.  New migraine in a 74 year old is uncommon, would like to rule out other causes first.

## 2025-03-18 NOTE — TELEPHONE ENCOUNTER
"Dr. Tariq, please advise.    Nurse Triage SBAR    Is this a 2nd Level Triage? YES, LICENSED PRACTITIONER REVIEW IS REQUIRED    Situation: Pt calling for assistance with possible new dx of migraine, after seeing ED/ENT/dentist over last week    Background: Pt hx of cardiac trouble, seeing CARDIAC at [facility] TODAY 11am; pt reports no hx of headaches/migraines    Assessment: Pt states current pain level at 1-2/10 but worsens throughout the day; pt's pain located on right side by temple and radiates up to crown of her head and towards the back; also reports having vision changes incl. Flashes/cruz and 'floating' sensation as light exposure changes in the evening    Protocol Recommended Disposition:   Callback By PCP Within 1 Hour    Recommendation: Pt requesting to be seen OR have a medication ordered for her to manage ASAP - pt will be at [facility] today at 11am, but will be available after noon. Triage reminded pt that UC would be an option for pt to pursue if she needed, and pt reluctantly verbalized understanding. Thoughts on working her in for in-person OV or VV? ADS?     Routed to provider    Does the patient meet one of the following criteria for ADS visit consideration? 16+ years old, with an [facility] PCP     TIP  Providers, please consider if this condition is appropriate for management at one of our Acute and Diagnostic Services sites.     If patient is a good candidate, please use dotphrase <dot>triageresponse and select Refer to ADS to document.    Deja Ayala RN    ~~~~~~~~~~~~~~~~~~~~~~~~~~~~~~~~    1. LOCATION: \"Where does it hurt?\"       Right sided temple and up towards crown and around the back  2. ONSET: \"When did the headache start?\" (e.g., minutes, hours, days)       2 weeks ago - see ED notes (outside notes also from ENT, dental)  3. PATTERN: \"Does the pain come and go, or has it been constant since it started?\"      worsens throughtout the day  4. SEVERITY: \"How bad is the pain?\" and \"What " "does it keep you from doing?\"  (e.g., Scale 1-10; mild, moderate, or severe)      1-2/10, worsens throughout the day  5. RECURRENT SYMPTOM: \"Have you ever had headaches before?\" If Yes, ask: \"When was the last time?\" and \"What happened that time?\"       Denies  6. CAUSE: \"What do you think is causing the headache?\"      Facial nerve pain and possible new dx migraine  7. MIGRAINE: \"Have you been diagnosed with migraine headaches?\" If Yes, ask: \"Is this headache similar?\"       Denies - possible new dx per pt?  8. HEAD INJURY: \"Has there been any recent injury to the head?\"       Denies  9. OTHER SYMPTOMS: \"Do you have any other symptoms?\" (e.g., fever, stiff neck, eye pain, sore throat, cold symptoms)      Vision changes - cruz/flashes, and floating sensation    Reason for Disposition   SEVERE headache and not relieved by pain meds    Additional Information   Negative: Difficult to awaken or acting confused (e.g., disoriented, slurred speech)   Negative: Weakness of the face, arm or leg on one side of the body and new-onset   Negative: Numbness of the face, arm or leg on one side of the body and new-onset   Negative: Loss of speech or garbled speech and new-onset   Negative: Passed out (e.g., fainted, lost consciousness, blacked out and was not responding)   Negative: Sounds like a life-threatening emergency to the triager   Negative: Followed a head injury within last 3 days   Negative: Traumatic Brain Injury (TBI) is suspected   Negative: Sinus pain or congestion is main symptom(s)   Negative: Influenza suspected (i.e., cough, fever, other respiratory symptoms; probable influenza exposure)   Negative: Pregnant   Negative: Unable to walk without falling   Negative: Stiff neck (can't touch chin to chest)   Negative: Other family members (or people in same household) with headaches and possibility of carbon monoxide exposure   Negative: SEVERE headache, states 'worst headache' of life   Negative: SEVERE headache, " sudden-onset (i.e., reaching maximum intensity within seconds to 1 hour)   Negative: Severe pain in one eye   Negative: Loss of vision or double vision (Exception: Same as previously diagnosed migraines.)   Negative: Patient sounds very sick or weak to the triager   Negative: Fever > 103 F (39.4 C)   Negative: Fever > 100 F (37.8 C) and has diabetes mellitus or a weak immune system (e.g., HIV positive, cancer chemotherapy, organ transplant, splenectomy, chronic steroids)   Negative: SEVERE headache (e.g., excruciating) and has had severe headaches before    Protocols used: Headache-A-[place]

## 2025-03-18 NOTE — PROGRESS NOTES
Patient was very unsure of getting the contrast administered for this study. She has had a previous reaction to contrast but is very unsure if it is iodinated or gadolinium. She said her doctor has premedicated her for any contrast studies including MRI. Per Dr Sabillon we can perform study without contrast until patient is premedicated.

## 2025-03-18 NOTE — LETTER
3/18/2025    Stacy Tariq MD  [address] [address]    RE: Emely Forbes       Dear Colleague,     I had the pleasure of seeing Emely Forbes in the [facility].    Electrophysiology Clinic Progress Note    Emely Forbes MRN# [id number]   YOB: 1950 Age: 74 year old     Primary cardiologist: Dr. Gonzalez         Assessment and Plan   Delightful 73 yo female with palpitations - describing it as if having a butterfly fluttering in her heart which appears to be out of proportion to rare amount of pacs/pvcs and short lived svt (<12 beats) in the background of normal LVEF and stress test. Most of reported sxs while wearing zio were not associated with any particular arrhythmias. Nevertheless, she felt better while on Metoprolol which was changed to Flecainide as she was having more symptoms. In the past prior to taking either Metoprolol or Flecainide she would have 2-3 hours of racing beats. Pt was asked to see me to consider ablation.  Pt was in Ed recently due headache and flashing her in eyes. Head CT showed no pathology. Pt was eval by ophth as well and was told not to have primary eye issues. She cont to have flashes and headache.     Pt has been quite anxious after receiving a phone call from Dr. Tariq's office about the need to go to the ED for head MRI. I reassured her that what she has been having was from rare pacs or short burst of AT<12-beats. She felt better while on Flecainide and Metoprolol. As I mentioned her sxs have been out of proportion to degree of arrhythmia noted. Will cont with Flecainide and metoprolol or use metoprolol prn. Reassured pt that her arrhythmias are the least of her concern. No role for ablation due scarcity of arrhythmias noted. Pt was advised to go to the ED for further eval of her migraine. I agree that neurology consult would be the best for her.       The longitudinal plan of care of the diagnosis(es)/condition (s) as documented were  "addressed during this visit. Due to the added complexity in care, I will continue to support the patient in the subsequent management and with ongoing continuity of care.       Review of Systems     12-pt ROS is negative except for as noted in the HPI.          Physical Exam     Vitals: BP (!) 154/95   Pulse 58   Resp 18   Ht 1.626 m (5' 4\")   Wt 64.4 kg (142 lb)   SpO2 98%   BMI 24.37 kg/m    Wt Readings from Last 10 Encounters:   03/18/25 66.7 kg (147 lb)   03/18/25 64.4 kg (142 lb)   02/13/25 66.7 kg (147 lb)   01/22/25 66.7 kg (147 lb)   12/02/24 66.2 kg (146 lb)   10/14/24 66.3 kg (146 lb 1.6 oz)   10/11/24 66.2 kg (146 lb)   10/02/24 67.3 kg (148 lb 4.8 oz)   10/01/24 63.5 kg (140 lb)   08/26/24 67.1 kg (147 lb 14.4 oz)       Constitutional:  Patient is pleasant, alert, cooperative, and in NAD.  HEENT:  NCAT. PERRLA. EOM's intact.   Neck:  CVP appears normal. No carotid bruits.   Pulmonary: Normal respiratory effort. CTAB.   Cardiac: RRR, normal S1/S2, no S3/S4, no murmur or rub.   Abdomen:  Non-tender abdomen, no hepatosplenomegaly appreciated.   Vascular: Pulses in the upper and lower extremities are 2+ and equal bilaterally.  Extremities: No edema, erythema, cyanosis or tenderness appreciated.  Skin:  No rashes or lesions appreciated.   Neurological:  No gross motor or sensory deficits.   Psych: Appropriate affect.          Data   Labs reviewed:  Recent Labs   Lab Test 10/11/24  0921 06/25/24  0854 08/22/23  0825 06/28/23  0824 02/10/23  1644 06/07/22  1049   LDL  --  92 85 136*  --  134*   HDL  --  53 54 47*  --  60   NHDL  --  107 98 154*  --  153*   CHOL  --  160 152 201*  --  213*   TRIG  --  75 67 92  --  93   TSH 1.42  --   --   --  1.66 1.00       Lab Results   Component Value Date    WBC 7.1 03/18/2025    WBC 5.7 12/02/2013    RBC 5.16 03/18/2025    RBC 4.64 12/02/2013    HGB 15.6 03/18/2025    HGB 14.1 12/02/2013    HCT 46.7 03/18/2025    HCT 42.3 12/02/2013    MCV 91 03/18/2025    MCV 91 " "12/02/2013    MCH 30.2 03/18/2025    MCH 30.4 12/02/2013    MCHC 33.4 03/18/2025    MCHC 33.3 12/02/2013    RDW 13.3 03/18/2025    RDW 13.0 12/02/2013     03/18/2025     12/02/2013       Lab Results   Component Value Date     03/18/2025     01/28/2021    POTASSIUM 4.4 03/18/2025    POTASSIUM 3.4 06/07/2022    POTASSIUM 3.8 01/28/2021    CHLORIDE 104 03/18/2025    CHLORIDE 104 06/07/2022    CHLORIDE 105 01/28/2021    CO2 25 03/18/2025    CO2 31 06/07/2022    CO2 31 01/28/2021    ANIONGAP 11 03/18/2025    ANIONGAP 3 06/07/2022    ANIONGAP 3 01/28/2021     (H) 03/18/2025    GLC 99 06/07/2022     (H) 01/28/2021    BUN 25.3 (H) 03/18/2025    BUN 17 06/07/2022    BUN 21 01/28/2021    CR 0.96 (H) 03/18/2025    CR 0.93 01/28/2021    GFRESTIMATED 63 03/18/2025    GFRESTIMATED 63 01/28/2021    GFRESTBLACK 73 01/28/2021    KRISTEL 9.4 03/18/2025    KRISTEL 10.0 01/28/2021      Lab Results   Component Value Date    AST 24 03/18/2025    AST 15 01/28/2021    ALT 36 03/18/2025    ALT 23 01/28/2021       No results found for: \"A1C\"    No results found for: \"INR\"         Problem List     Patient Active Problem List   Diagnosis     Breast cancer (H)     GERD (gastroesophageal reflux disease)     CARDIOVASCULAR SCREENING; LDL GOAL LESS THAN 160     Osteoporosis     Colon cancer screening     Bladder pain syndrome     Hormone receptor positive malignant neoplasm of right breast (H)     SVT (supraventricular tachycardia)     Hyperlipidemia LDL goal <100     Primary hypertension     Need for prophylactic vaccination and inoculation against influenza     Anxiety     Coronary artery disease involving native coronary artery of native heart without angina pectoris     SOILA (generalized anxiety disorder)     Dizziness     ASCVD (arteriosclerotic cardiovascular disease)     Hyperlipidemia with target LDL less than 70            Medications     Current Outpatient Medications   Medication Sig Dispense Refill     " aspirin 81 MG EC tablet Take 1 tablet (81 mg) by mouth daily. 90 tablet 3     busPIRone (BUSPAR) 5 MG tablet TAKE 1 TABLET BY MOUTH TWICE A  tablet 0     cephALEXin (KEFLEX) 500 MG capsule Take 500 mg by mouth 2 times daily. For 10 days       cyclobenzaprine (FLEXERIL) 5 MG tablet Take 1 tablet (5 mg) by mouth at bedtime. 30 tablet 0     estradiol (ESTRACE) 0.1 MG/GM vaginal cream Place 2 g vaginally twice a week 42.5 g 0     flecainide (TAMBOCOR) 50 MG tablet Take 50mg twice daily 180 tablet 2     metoprolol succinate ER (TOPROL XL) 50 MG 24 hr tablet Take 1 tablet (50 mg) by mouth daily 180 tablet 1     rosuvastatin (CRESTOR) 20 MG tablet Take 1 tablet (20 mg) by mouth daily. 90 tablet 3     spironolactone (ALDACTONE) 25 MG tablet Take 0.5 tablets (12.5 mg) by mouth daily. 45 tablet 3     vitamin D3 (CHOLECALCIFEROL) 50 mcg (2000 units) tablet Take 1 tablet (50 mcg) by mouth daily              Past Medical History     Past Medical History:   Diagnosis Date     Breast cancer (H) 10/10    R s/p mast/tram flap, reconstruction     Carpal tunnel syndrome      Gastro-oesophageal reflux disease      GERD (gastroesophageal reflux disease)     s/p EGD 5/06     Hematuria 2003     HX: benign breast biopsy     L, papilloma     Osteopenia     DXA 12/16/10     PONV (postoperative nausea and vomiting)     Scope patch on pre-op     S/P hysterectomy with oophorectomy 2001 for fibroids     S/P tonsillectomy      Past Surgical History:   Procedure Laterality Date     BREAST SURGERY      RIGHT BREAST BIOPSY, RIGHT MASTECTOMY WITH TRAM FLAP RECONSTRUCTION     CYSTOSCOPY, URETEROSCOPY, COMBINED  9/14/2012    Procedure: COMBINED CYSTOSCOPY, URETEROSCOPY;  VIDEO CYSTOSCOPY, RIGHT FLEXIBLE URETEROSCOPY, CAUTERIZATION OF PAPILLARY VEIN (LATEX IV CONTRAST TAPE ALLERGY);  Surgeon: Rey Bailon MD;  Location: [facility]     ENT SURGERY      TONSILLECTOMY     GI SURGERY      EGD     GYN SURGERY      HYSTERECTOMY WITH OOPHORECTOMY      HYSTERECTOMY, PAP NO LONGER INDICATED       ORTHOPEDIC SURGERY      CARPAL TUNNEL RELEASE     REVISE RECONSTRUCTED BREAST  12/8/2011    Procedure:REVISE RECONSTRUCTED BREAST; REVISION RIGHT BREAST RECONSTRUCTION, LEFT BREAST STEROID INJECTION ; Surgeon:FRAN LVEIN; Location[facility]     Family History   Problem Relation Age of Onset     Thyroid Disease Mother         cancer     Hypertension Mother      Breast Cancer Mother      Cancer Mother         uterine     Cardiovascular Father      Cerebrovascular Disease Father      Diabetes Father      Social History     Socioeconomic History     Marital status:      Spouse name: Thomas     Number of children: 2     Years of education: Not on file     Highest education level: Not on file   Occupational History     Occupation:      Employer: UNKNOWN   Tobacco Use     Smoking status: Never     Smokeless tobacco: Never   Vaping Use     Vaping status: Never Used   Substance and Sexual Activity     Alcohol use: Not Currently     Alcohol/week: 0.0 - 0.8 standard drinks of alcohol     Drug use: No     Sexual activity: Yes     Partners: Male   Other Topics Concern     Parent/sibling w/ CABG, MI or angioplasty before 65F 55M? 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     Social Drivers of Health     Financial Resource Strain: Low Risk  (8/26/2024)    Financial Resource Strain      Within the past 12 months, have you or your family members you live with been unable to get utilities (heat, electricity) when it was really needed?: No   Food Insecurity: Low Risk  (8/26/2024)    Food Insecurity      Within the past 12 months, did you worry that your food would run out before you got money to buy more?: No      Within the past 12 months, did the food you bought just not last and you didn t have money to get more?: No    Transportation Needs: Low Risk  (8/26/2024)    Transportation Needs      Within the past 12 months, has lack of transportation kept you from medical appointments, getting your medicines, non-medical meetings or appointments, work, or from getting things that you need?: No   Physical Activity: Not on file   Stress: Not on file   Social Connections: Unknown (2/20/2024)    [facility]    Social Connections      Frequency of Communication with Friends and Family: Not on file   Interpersonal Safety: Low Risk  (1/22/2025)    Interpersonal Safety      Do you feel physically and emotionally safe where you currently live?: Yes      Within the past 12 months, have you been hit, slapped, kicked or otherwise physically hurt by someone?: No      Within the past 12 months, have you been humiliated or emotionally abused in other ways by your partner or ex-partner?: No   Housing Stability: Low Risk  (8/26/2024)    Housing Stability      Do you have housing? : Yes      Are you worried about losing your housing?: No            Allergies   Adhesive tape, Contrast dye, Latex, and Norvasc [amlodipine besylate]    Today's clinic visit entailed:  The following tests were independently interpreted by me as noted in my documentation: ecg, stress test, zio  30 minutes spent by me on the date of the encounter doing chart review, history and exam, documentation and further activities per the note  Provider  Link to [facility] Help Grid     The level of medical decision making during this visit was of moderate complexity.       Thank you for allowing me to participate in the care of your patient.      Sincerely,     Jhony Lemos MD     [facility]  cc:   Rebecca Gonzalez MD  [address] [address]

## 2025-03-18 NOTE — PROGRESS NOTES
Electrophysiology Clinic Progress Note    Emely Forbes MRN# [id number]   YOB: 1950 Age: 74 year old     Primary cardiologist: Dr. Gonzalez         Assessment and Plan   Delightful 75 yo female with palpitations - describing it as if having a butterfly fluttering in her heart which appears to be out of proportion to rare amount of pacs/pvcs and short lived svt (<12 beats) in the background of normal LVEF and stress test. Most of reported sxs while wearing zio were not associated with any particular arrhythmias. Nevertheless, she felt better while on Metoprolol which was changed to Flecainide as she was having more symptoms. In the past prior to taking either Metoprolol or Flecainide she would have 2-3 hours of racing beats. Pt was asked to see me to consider ablation.  Pt was in Ed recently due headache and flashing her in eyes. Head CT showed no pathology. Pt was eval by ophth as well and was told not to have primary eye issues. She cont to have flashes and headache.     Pt has been quite anxious after receiving a phone call from Dr. Tariq's office about the need to go to the ED for head MRI. I reassured her that what she has been having was from rare pacs or short burst of AT<12-beats. She felt better while on Flecainide and Metoprolol. As I mentioned her sxs have been out of proportion to degree of arrhythmia noted. Will cont with Flecainide and metoprolol or use metoprolol prn. Reassured pt that her arrhythmias are the least of her concern. No role for ablation due scarcity of arrhythmias noted. Pt was advised to go to the ED for further eval of her migraine. I agree that neurology consult would be the best for her.       The longitudinal plan of care of the diagnosis(es)/condition (s) as documented were addressed during this visit. Due to the added complexity in care, I will continue to support the patient in the subsequent management and with ongoing continuity of care.       Review of Systems  "    12-pt ROS is negative except for as noted in the HPI.          Physical Exam     Vitals: BP (!) 154/95   Pulse 58   Resp 18   Ht 1.626 m (5' 4\")   Wt 64.4 kg (142 lb)   SpO2 98%   BMI 24.37 kg/m    Wt Readings from Last 10 Encounters:   03/18/25 66.7 kg (147 lb)   03/18/25 64.4 kg (142 lb)   02/13/25 66.7 kg (147 lb)   01/22/25 66.7 kg (147 lb)   12/02/24 66.2 kg (146 lb)   10/14/24 66.3 kg (146 lb 1.6 oz)   10/11/24 66.2 kg (146 lb)   10/02/24 67.3 kg (148 lb 4.8 oz)   10/01/24 63.5 kg (140 lb)   08/26/24 67.1 kg (147 lb 14.4 oz)       Constitutional:  Patient is pleasant, alert, cooperative, and in NAD.  HEENT:  NCAT. PERRLA. EOM's intact.   Neck:  CVP appears normal. No carotid bruits.   Pulmonary: Normal respiratory effort. CTAB.   Cardiac: RRR, normal S1/S2, no S3/S4, no murmur or rub.   Abdomen:  Non-tender abdomen, no hepatosplenomegaly appreciated.   Vascular: Pulses in the upper and lower extremities are 2+ and equal bilaterally.  Extremities: No edema, erythema, cyanosis or tenderness appreciated.  Skin:  No rashes or lesions appreciated.   Neurological:  No gross motor or sensory deficits.   Psych: Appropriate affect.          Data   Labs reviewed:  Recent Labs   Lab Test 10/11/24  0921 06/25/24  0854 08/22/23  0825 06/28/23  0824 02/10/23  1644 06/07/22  1049   LDL  --  92 85 136*  --  134*   HDL  --  53 54 47*  --  60   NHDL  --  107 98 154*  --  153*   CHOL  --  160 152 201*  --  213*   TRIG  --  75 67 92  --  93   TSH 1.42  --   --   --  1.66 1.00       Lab Results   Component Value Date    WBC 7.1 03/18/2025    WBC 5.7 12/02/2013    RBC 5.16 03/18/2025    RBC 4.64 12/02/2013    HGB 15.6 03/18/2025    HGB 14.1 12/02/2013    HCT 46.7 03/18/2025    HCT 42.3 12/02/2013    MCV 91 03/18/2025    MCV 91 12/02/2013    MCH 30.2 03/18/2025    MCH 30.4 12/02/2013    MCHC 33.4 03/18/2025    MCHC 33.3 12/02/2013    RDW 13.3 03/18/2025    RDW 13.0 12/02/2013     03/18/2025     12/02/2013 " "      Lab Results   Component Value Date     03/18/2025     01/28/2021    POTASSIUM 4.4 03/18/2025    POTASSIUM 3.4 06/07/2022    POTASSIUM 3.8 01/28/2021    CHLORIDE 104 03/18/2025    CHLORIDE 104 06/07/2022    CHLORIDE 105 01/28/2021    CO2 25 03/18/2025    CO2 31 06/07/2022    CO2 31 01/28/2021    ANIONGAP 11 03/18/2025    ANIONGAP 3 06/07/2022    ANIONGAP 3 01/28/2021     (H) 03/18/2025    GLC 99 06/07/2022     (H) 01/28/2021    BUN 25.3 (H) 03/18/2025    BUN 17 06/07/2022    BUN 21 01/28/2021    CR 0.96 (H) 03/18/2025    CR 0.93 01/28/2021    GFRESTIMATED 63 03/18/2025    GFRESTIMATED 63 01/28/2021    GFRESTBLACK 73 01/28/2021    KRISTEL 9.4 03/18/2025    KRISTEL 10.0 01/28/2021      Lab Results   Component Value Date    AST 24 03/18/2025    AST 15 01/28/2021    ALT 36 03/18/2025    ALT 23 01/28/2021       No results found for: \"A1C\"    No results found for: \"INR\"         Problem List     Patient Active Problem List   Diagnosis    Breast cancer (H)    GERD (gastroesophageal reflux disease)    CARDIOVASCULAR SCREENING; LDL GOAL LESS THAN 160    Osteoporosis    Colon cancer screening    Bladder pain syndrome    Hormone receptor positive malignant neoplasm of right breast (H)    SVT (supraventricular tachycardia)    Hyperlipidemia LDL goal <100    Primary hypertension    Need for prophylactic vaccination and inoculation against influenza    Anxiety    Coronary artery disease involving native coronary artery of native heart without angina pectoris    SOILA (generalized anxiety disorder)    Dizziness    ASCVD (arteriosclerotic cardiovascular disease)    Hyperlipidemia with target LDL less than 70            Medications     Current Outpatient Medications   Medication Sig Dispense Refill    aspirin 81 MG EC tablet Take 1 tablet (81 mg) by mouth daily. 90 tablet 3    busPIRone (BUSPAR) 5 MG tablet TAKE 1 TABLET BY MOUTH TWICE A  tablet 0    cephALEXin (KEFLEX) 500 MG capsule Take 500 mg by mouth " 2 times daily. For 10 days      cyclobenzaprine (FLEXERIL) 5 MG tablet Take 1 tablet (5 mg) by mouth at bedtime. 30 tablet 0    estradiol (ESTRACE) 0.1 MG/GM vaginal cream Place 2 g vaginally twice a week 42.5 g 0    flecainide (TAMBOCOR) 50 MG tablet Take 50mg twice daily 180 tablet 2    metoprolol succinate ER (TOPROL XL) 50 MG 24 hr tablet Take 1 tablet (50 mg) by mouth daily 180 tablet 1    rosuvastatin (CRESTOR) 20 MG tablet Take 1 tablet (20 mg) by mouth daily. 90 tablet 3    spironolactone (ALDACTONE) 25 MG tablet Take 0.5 tablets (12.5 mg) by mouth daily. 45 tablet 3    vitamin D3 (CHOLECALCIFEROL) 50 mcg (2000 units) tablet Take 1 tablet (50 mcg) by mouth daily              Past Medical History     Past Medical History:   Diagnosis Date    Breast cancer (H) 10/10    R s/p mast/tram flap, reconstruction    Carpal tunnel syndrome     Gastro-oesophageal reflux disease     GERD (gastroesophageal reflux disease)     s/p EGD 5/06    Hematuria 2003    HX: benign breast biopsy     L, papilloma    Osteopenia     DXA 12/16/10    PONV (postoperative nausea and vomiting)     Scope patch on pre-op    S/P hysterectomy with oophorectomy 2001 for fibroids    S/P tonsillectomy      Past Surgical History:   Procedure Laterality Date    BREAST SURGERY      RIGHT BREAST BIOPSY, RIGHT MASTECTOMY WITH TRAM FLAP RECONSTRUCTION    CYSTOSCOPY, URETEROSCOPY, COMBINED  9/14/2012    Procedure: COMBINED CYSTOSCOPY, URETEROSCOPY;  VIDEO CYSTOSCOPY, RIGHT FLEXIBLE URETEROSCOPY, CAUTERIZATION OF PAPILLARY VEIN (LATEX IV CONTRAST TAPE ALLERGY);  Surgeon: Rey Bailon MD;  Location:  OR    ENT SURGERY      TONSILLECTOMY    GI SURGERY      EGD    GYN SURGERY      HYSTERECTOMY WITH OOPHORECTOMY    HYSTERECTOMY, PAP NO LONGER INDICATED      ORTHOPEDIC SURGERY      CARPAL TUNNEL RELEASE    REVISE RECONSTRUCTED BREAST  12/8/2011    Procedure:REVISE RECONSTRUCTED BREAST; REVISION RIGHT BREAST RECONSTRUCTION, LEFT BREAST STEROID INJECTION ;  Surgeon:FRAN LEVIN; Location:[facility]     Family History   Problem Relation Age of Onset    Thyroid Disease Mother         cancer    Hypertension Mother     Breast Cancer Mother     Cancer Mother         uterine    Cardiovascular Father     Cerebrovascular Disease Father     Diabetes Father      Social History     Socioeconomic History    Marital status:      Spouse name: Thomas    Number of children: 2    Years of education: Not on file    Highest education level: Not on file   Occupational History    Occupation:      Employer: UNKNOWN   Tobacco Use    Smoking status: Never    Smokeless tobacco: Never   Vaping Use    Vaping status: Never Used   Substance and Sexual Activity    Alcohol use: Not Currently     Alcohol/week: 0.0 - 0.8 standard drinks of alcohol    Drug use: No    Sexual activity: Yes     Partners: Male   Other Topics Concern    Parent/sibling w/ CABG, MI or angioplasty before 65F 55M? 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     Social Drivers of Health     Financial Resource Strain: Low Risk  (8/26/2024)    Financial Resource Strain     Within the past 12 months, have you or your family members you live with been unable to get utilities (heat, electricity) when it was really needed?: No   Food Insecurity: Low Risk  (8/26/2024)    Food Insecurity     Within the past 12 months, did you worry that your food would run out before you got money to buy more?: No     Within the past 12 months, did the food you bought just not last and you didn t have money to get more?: No   Transportation Needs: Low Risk  (8/26/2024)    Transportation Needs     Within the past 12 months, has lack of transportation kept you from medical appointments, getting your medicines, non-medical meetings or appointments, work, or from getting things that you  need?: No   Physical Activity: Not on file   Stress: Not on file   Social Connections: Unknown (2/20/2024)    Received from [facility], [facility]    Social Connections     Frequency of Communication with Friends and Family: Not on file   Interpersonal Safety: Low Risk  (1/22/2025)    Interpersonal Safety     Do you feel physically and emotionally safe where you currently live?: Yes     Within the past 12 months, have you been hit, slapped, kicked or otherwise physically hurt by someone?: No     Within the past 12 months, have you been humiliated or emotionally abused in other ways by your partner or ex-partner?: No   Housing Stability: Low Risk  (8/26/2024)    Housing Stability     Do you have housing? : Yes     Are you worried about losing your housing?: No            Allergies   Adhesive tape, Contrast dye, Latex, and Norvasc [amlodipine besylate]    Today's clinic visit entailed:  The following tests were independently interpreted by me as noted in my documentation: ecg, stress test, zio  30 minutes spent by me on the date of the encounter doing chart review, history and exam, documentation and further activities per the note  Provider  Link to [facility] Help Grid     The level of medical decision making during this visit was of moderate complexity.

## 2025-03-18 NOTE — ED TRIAGE NOTES
Pt has had a headache for 2 weeks   Pt was seen by and ENT and sent to the er due to ruling out ear issues

## 2025-03-18 NOTE — ED PROVIDER NOTES
"  Emergency Department Note      History of Present Illness     Chief Complaint   Headache      HPI   Emely Forbes is a 74 year old female with a history of hypertension, CAD, ASCVD, GERD, SVT, and hyperlipidemia who presents for an evaluation of a headache. The patient was evaluated in the ED on 03/13/2025 for a severe headache.  She had an extensive workup at that time, which came back negative. The patient reports that she continues to experience a severe right sided headache and \"flashing\" in her eyes when closing them or looking at lights. She states that she consulted with a dentist yesterday to see if her symptoms are caused by a dental issue, which was found to not be the case.  She also explains that she has consulted with multiple eye specialists and a retinal specialist, who have communicated to her that her symptoms do not appear to be caused by her eye but rather by a neurological issue, prompting this ED visit.  She denies experiencing nausea, vomiting, vision changes, numbness, tingling, or weakness in any of her  extremities, chest pain, fever, cough, sore throat, or rhinorrhea.  She notes that she took a dose of Tylenol at 0900 earlier today with some relief.     The patient remarks that the muscle relaxer prescribed to her during her last ED visit was effective initially the first couple of nights, but it did not help the last time she tried it. She states that does not have a follow up appointment scheduled with neurology. She explains that one year ago, she experienced a temporary episode of confusion where she did not know where she was.  She elaborates that she was evaluated extensively for that episode, and she was told that it was possibly caused by positional vertigo.     Independent Historian   None    Review of External Notes   I reviewed the cardiology Clinic Note and Nurse triage phone note from 03/18/2025  I reviewed the ED note from 03/13/2025 where the patient presented for a severe " "headache. Extensive work up was conducted, which all came back negative.     Past Medical History     Medical History and Problem List   History of malignant neoplasm of breast  Hypertension  CAD  ASCVD  SOILA  GERD  Hyperlipidemia  SVT  Osteoporosis    Medications   Aspirin (81 mg)  Buspirone  Cyclobenzaprine  Flecainide  Metoprolol Succinate  Rosuvastatin  Spironolactone    Surgical History   Tonsillectomy  Hysterectomy with Oophorectomy  Benign Breast Biopsy  EGD, Combined  Carpal Tunnel Release  Right Breast Reconstruction  Cystoscopy, Ureteroscopy, combined    Physical Exam     Patient Vitals for the past 24 hrs:   BP Temp Temp src Pulse Resp SpO2 Height Weight   03/18/25 1700 138/86 -- -- 62 16 95 % -- --   03/18/25 1155 (!) 147/95 97  F (36.1  C) Temporal 63 16 97 % 1.626 m (5' 4\") 66.7 kg (147 lb)     Physical Exam  Nursing note and vitals reviewed.  Constitutional:  Oriented to person, place, and time. Vital signs are normal. Cooperative.   HENT:   Mouth/Throat:   Oropharynx is clear and moist and mucous membranes are normal.   Eyes:    Conjunctivae are normal.      Pupils are equal, round, and reactive to light.   Neck:    Trachea normal and normal range of motion.   Cardiovascular:  Normal rate, regular rhythm and normal heart sounds.    Pulses:   Radial pulses are 2+ bilaterally. Dorsalis pedis pulses are 2+ bilaterally.   Pulmonary/Chest:  Effort normal.   Abdominal:   Soft. Normal appearance and bowel sounds are normal.      Exhibits no distension. There is no tenderness.      There is no rebound and no CVA tenderness.   Musculoskeletal:  Extremities atraumatic x 4.   Lymphadenopathy:  No cervical adenopathy.   Neurological:   Alert and oriented to person, place, and time. Normal strength and normal reflexes. Not disoriented. No cranial nerve deficit or sensory      deficit. Displays a negative Romberg sign. Coordination and gait normal. GCS eye subscore is 4. GCS verbal subscore is 5. GCS motor "      subscore is 6. Visual fields intact. No pronator drift. Normal Finger-to-Nose and Rapid Alternating Movement.      Normal Heel-to-shin.   Skin:    Skin is warm, dry and intact.      No rash noted.   Psychiatric:   Normal mood and affect. Speech is normal and behavior is normal. Judgment normal. Cognition and memory are normal.     Diagnostics     Lab Results   Labs Ordered and Resulted from Time of ED Arrival to Time of ED Departure   COMPREHENSIVE METABOLIC PANEL - Abnormal       Result Value    Sodium 140      Potassium 4.4      Carbon Dioxide (CO2) 25      Anion Gap 11      Urea Nitrogen 25.3 (*)     Creatinine 0.96 (*)     GFR Estimate 63      Calcium 9.4      Chloride 104      Glucose 102 (*)     Alkaline Phosphatase 88      AST 24      ALT 36      Protein Total 7.2      Albumin 4.5      Bilirubin Total 0.9     ERYTHROCYTE SEDIMENTATION RATE AUTO - Normal    Erythrocyte Sedimentation Rate 8     CRP INFLAMMATION - Normal    CRP Inflammation 3.23     CBC WITH PLATELETS AND DIFFERENTIAL    WBC Count 7.1      RBC Count 5.16      Hemoglobin 15.6      Hematocrit 46.7      MCV 91      MCH 30.2      MCHC 33.4      RDW 13.3      Platelet Count 185      % Neutrophils 66      % Lymphocytes 21      % Monocytes 10      % Eosinophils 3      % Basophils 1      % Immature Granulocytes 0      NRBCs per 100 WBC 0      Absolute Neutrophils 4.7      Absolute Lymphocytes 1.5      Absolute Monocytes 0.7      Absolute Eosinophils 0.2      Absolute Basophils 0.1      Absolute Immature Granulocytes 0.0      Absolute NRBCs 0.0         Imaging   MR Brain w/o Contrast   Final Result   IMPRESSION:   1.  Negative for acute intracranial process. Mild atrophy and chronic small vessel vascular change.      2.  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               Independent Interpretation   None    ED Course      Medications Administered   Medications   LORazepam  (ATIVAN) injection 1 mg (1 mg Intravenous $Given 3/18/25 1521)       Procedures   Procedures     Discussion of Management   None    ED Course   ED Course as of 03/1950   Tue Mar 18, 2025   0539 I obtained the history and evaluated the patient as noted above.        Additional Documentation  None    Medical Decision Making / Diagnosis     CMS Diagnoses: None    MIPS       None    MDM   Emely Forbes is a 74 year old female who came in for further evaluation of an ongoing headache.  Her physician wanted her to come in for an MRI to rule out anything more serious, as she did not have that when she was here the other day.  I felt that was reasonable, although she does have a normal neurologic exam here.  I considered other potential causes such as temporal arteritis, meningitis, ocular migraines, or possibly cluster headaches.  Her workup here is unremarkable, which is reassuring.  I think it is reasonable for her to try a Medrol Dosepak as well as Compazine at home.  I instructed her to use Benadryl with Compazine as well and did not take the Compazine with the Flexeril that she was prescribed a few days ago.  I am also placing a referral order for outpatient neurology.  I am providing her the contact information for the [facility] as well in case she has not contacted by the referral service.  I tried to reassure her that there does not appear to be anything serious such as a stroke or temporal arteritis at this point as well, and I doubt this is meningitis given the ongoing symptoms and lack of other symptoms.  Therefore I feel that she is safe for discharge and outpatient management.    Disposition   The patient was discharged.     Diagnosis     ICD-10-CM    1. Acute intractable headache, unspecified headache type  R51.9 Adult Neurology  Referral           Discharge Medications   Discharge Medication List as of 3/18/2025  4:55 PM        START taking these medications    Details    methylPREDNISolone (MEDROL DOSEPAK) 4 MG tablet therapy pack Follow Package Directions, Disp-21 tablet, R-0, E-Prescribe      prochlorperazine (COMPAZINE) 10 MG tablet Take 0.5 tablets (5 mg) by mouth every 6 hours as needed for nausea or vomiting., Disp-20 tablet, R-0, E-Prescribe               Scribe Disclosure:  I, James Gillespie, am serving as a scribe at 1:33 PM on 3/18/2025 to document services personally performed by Clifford Benitez MD based on my observations and the provider's statements to me.       Clifford Benitez MD  03/18/25 1954

## 2025-03-18 NOTE — DISCHARGE INSTRUCTIONS
I am prescribing a steroid pack and compazine. I want you to take benadryl with the compazine to prevent any potential side effects.      Discharge Instructions  Headache    You were seen today for a headache. Headaches may be caused by many different things such as muscle tension, sinus inflammation, anxiety and stress, having too little sleep, too much alcohol, some medical conditions or injury. You may have a migraine, which is caused by changes in the blood vessels in your head.  At this time your provider does not find that your headache is a sign of anything dangerous or life-threatening.  However, sometimes the signs of serious illness do not show up right away.      Generally, every Emergency Department visit should have a follow-up clinic visit with either a primary or a specialty clinic/provider. Please follow-up as instructed by your emergency provider today.    Return to the Emergency Department if:  You get a new fever of 100.4 F or higher.  Your headache gets much worse.  You get a stiff neck with your headache.  You get a new headache that is significantly different or worse than headaches you have had before.  You are vomiting (throwing up) and cannot keep food or water down.  You have blurry or double vision or other problems with your eyes.  You have a new weakness on one side of your body.  You have difficulty with balance which is new.  You or your family thinks you are confused.  You have a seizure.    What can I do to help myself?  Pain medications - You may take a pain medication such as Tylenol  (acetaminophen), Advil , Motrin  (ibuprofen) or Aleve  (naproxen).  Take a pain reliever as soon as you notice symptoms.  Starting medications as soon as you start to have symptoms may lessen the amount of pain you have.  Relaxing in a quiet, dark room may help.  Get enough sleep and eat meals regularly.  You may need to watch for certain foods or other things which may trigger your headaches.   Keeping a journal of your headaches and possible triggers may help you and your primary provider to identify things which you should avoid which may be causing your headaches.  If you were given a prescription for medicine here today, be sure to read all of the information (including the package insert) that comes with your prescription.  This will include important information about the medicine, its side effects, and any warnings that you need to know about.  The pharmacist who fills the prescription can provide more information and answer questions you may have about the medicine.  If you have questions or concerns that the pharmacist cannot address, please call or return to the Emergency Department.   Remember that you can always come back to the Emergency Department if you are not able to see your regular provider in the amount of time listed above, if you get any new symptoms, or if there is anything that worries you.

## 2025-03-18 NOTE — TELEPHONE ENCOUNTER
[facility] provider called pt and with the symptoms pt has been exhibiting recommended ER.    Kenyon Lawrence RN  [facility]

## 2025-03-18 NOTE — TELEPHONE ENCOUNTER
Called [facility] for referral. [facility] provider will reach out to pt to get more information.     Kenyon Lawrence RN  [facility]

## 2025-03-19 ENCOUNTER — PATIENT OUTREACH (OUTPATIENT)
Dept: CARE COORDINATION | Facility: CLINIC | Age: 75
End: 2025-03-19
Payer: MEDICARE

## 2025-03-19 NOTE — LETTER
[facility] COORDINATION  [address] [place] [address]    March 19, 2025    Emely Forbes  [address] [place] [address]      Dear Emely,    I am a clinic community health worker who works with Stacy Tariq MD with the [facility]. I wanted to thank you for spending the time to talk with me.  Below is a description of clinic care coordination and how I can further assist you.       The clinic care coordination team is made up of a registered nurse, , financial resource worker and community health worker who understand the health care system. The goal of clinic care coordination is to help you manage your health and improve access to the health care system. Our team works alongside your provider to assist you in determining your health and social needs. We can help you obtain health care and community resources, providing you with necessary information and education. We can work with you through any barriers and develop a care plan that helps coordinate and strengthen the communication between you and your care team.  Our services are voluntary and are offered without charge to you personally.    We are focused on providing you with the highest-quality healthcare experience possible.    Sincerely,     Prabha SCHNEIDER  Community Health Worker    Connected Care Resources   [facility]

## 2025-03-19 NOTE — PROGRESS NOTES
Clinic Care Coordination Contact  Community Health Worker Initial Outreach    CHW Initial Information Gathering:  Referral Source: ED Follow-Up  Current living arrangement:: Not Assessed  No PCP office visit in Past Year: No  CHW Additional Questions  If ED/Hospital discharge, follow-up appointment scheduled as recommended?: Yes  Medication changes made following ED/Hospital discharge?: No  MyChart active?: Yes  Patient sent Social Drivers of Health questionnaire?: No    Patient accepts CC: No, Patient expressed no additional support is needed at this time. Patient will be sent Care Coordination introduction letter for future reference.     Prabha Baker  Community Health Worker    Connected Care Resources   [facility]     *Connected Care Resources Team does NOT   follow patient ongoing. Referrals are identified   based on internal discharge report and the outreach is to ensure   Patient has understanding of their discharge instructions.

## 2025-03-22 NOTE — PROGRESS NOTES
HPI and Plan:       I had the pleasure of seeing  in follow-up at the [facility] physicians heart today.  She is a very pleasant 74-year-old female with a past medical history significant for breast cancer that was treated with mastectomy in 2010.  I originally saw her in March 2023 for palpitations.    Her symptoms of palpitations had led to an emergency department presentation prior to her office visit at the time and subsequent Zio patch monitoring demonstrated symptomatic episodes of SVT that lasted approximately 11 seconds or so.  Ultimately I started her on flecainide since metoprolol was ineffective.  An exercise stress echocardiogram prior to the initiation of flecainide was negative for ischemia.    Her symptoms improved significantly after initiation of flecainide.  We did perform an exercise stress EKG in March of 2024 which demonstrated QRS prolongation with exercise.  I did review this with our electrophysiology team and it was thought that this probably represented a rate related bundle branch block and continuation of flecainide was recommended in addition to metoprolol.  This worked quite well for several months.    In October 2024 she started experiencing recurrent palpitations in association with hypertension.  We initiated a cardiac workup that included a CT coronary angiogram which demonstrated a moderate ostial LAD stenosis which was not flow-limiting by FFR.  We also initiated a secondary hypertension workup which was unremarkable with the exception of an elevated aldosterone level.  This was followed by a CT of the chest/abdomen/pelvis which demonstrated no evidence of an adrenal adenoma.  I started her on Aldactone and an endocrinology referral was placed.    At her last office visit she was experiencing recurrent palpitations and I referred her to see our electrophysiology team.  She saw Dr. Stevenson on 3/18/2025 and her recent Zio patch findings were reviewed.      Given her  low arrhythmic burden with very short runs of SVT and rare PACs/PVCs continued medical therapy with metoprolol and flecainide were recommended.    Most recently she has been dealing with severe headaches and actually presented to the emergency department the day of her EP appointment where a brain MRI was negative for any acute intracranial process.  She has previously undergone a CTA of the head and neck which was also normal.  Due to concerns regarding temporal arteritis inflammatory markers were also checked which were negative.    She was started on methylprednisolone to address suspected inflammation.  Although this did lead to some improvement in the headaches she started experiencing worsening palpitations on this agent.  She is scheduled to see neurology in a couple of weeks for further evaluation.  She is still waiting for her endocrinology appointment which is scheduled for June of this year.    She remains remarkably active despite these issues, working in her garden on a daily basis and walking up to 3 miles a day without limitations.    Physical exam is dictated below.  Of note, she checks her blood pressure at home on a regular basis and it is always within the normotensive range.      Labs on 3/18/2025 included inflammatory markers which were normal as well as a comprehensive metabolic panel and CBC which were also normal.    An echocardiogram on 2/27/2025 was essentially normal.      IMPRESSION:    History of SVT, well-controlled on flecainide and metoprolol.  Episodic hypertension and palpitations that have improved with initiation of spironolactone.  Mild to moderate nonobstructive coronary artery disease based on CT coronary angiography as described above.  Severe headaches as described above.  Symptoms improved with methylprednisolone but it also led to a worsening of her palpitations.    PLAN    Ms. Forbes doing well from a cardiovascular standpoint.  Her recent cardiology workup was unremarkable,  and I would recommend continuation of her current medical therapy given her low arrhythmic burden and stable arrhythmic symptoms.    As we discussed, tapering her off the metoprolol could be considered if recommended by neurology but she would need some form of AV rc blockade in the context of concurrent flecainide therapy.  We will wait for her neurology evaluation before making any recommendations in this regard.    It was a pleasure seeing her in follow-up today.  I will plan on follow-up with her in approximately 3 months after neurological workup is complete.    The longitudinal plan of care for the diagnosis(es)/condition(s) as documented were addressed during this visit. Due to the added complexity in care, I will continue to support Emely in the subsequent management and with ongoing continuity of care.        CURRENT MEDICATIONS:  Current Outpatient Medications   Medication Sig Dispense Refill    aspirin 81 MG EC tablet Take 1 tablet (81 mg) by mouth daily. 90 tablet 3    busPIRone (BUSPAR) 5 MG tablet TAKE 1 TABLET BY MOUTH TWICE A  tablet 0    cephALEXin (KEFLEX) 500 MG capsule Take 500 mg by mouth 2 times daily. For 10 days      cyclobenzaprine (FLEXERIL) 5 MG tablet Take 1 tablet (5 mg) by mouth at bedtime. 30 tablet 0    estradiol (ESTRACE) 0.1 MG/GM vaginal cream Place 2 g vaginally twice a week 42.5 g 0    flecainide (TAMBOCOR) 50 MG tablet Take 50mg twice daily 180 tablet 2    methylPREDNISolone (MEDROL DOSEPAK) 4 MG tablet therapy pack Follow Package Directions 21 tablet 0    metoprolol succinate ER (TOPROL XL) 50 MG 24 hr tablet Take 1 tablet (50 mg) by mouth daily 180 tablet 1    prochlorperazine (COMPAZINE) 10 MG tablet Take 0.5 tablets (5 mg) by mouth every 6 hours as needed for nausea or vomiting. 20 tablet 0    rosuvastatin (CRESTOR) 20 MG tablet Take 1 tablet (20 mg) by mouth daily. 90 tablet 3    spironolactone (ALDACTONE) 25 MG tablet Take 0.5 tablets (12.5 mg) by mouth daily. 45  tablet 3    vitamin D3 (CHOLECALCIFEROL) 50 mcg (2000 units) tablet Take 1 tablet (50 mcg) by mouth daily         ALLERGIES     Allergies   Allergen Reactions    Adhesive Tape      blisters    Contrast Dye Hives     Says she needs pre meds    Latex      blisters    Norvasc [Amlodipine Besylate]        PAST MEDICAL HISTORY:  Past Medical History:   Diagnosis Date    Breast cancer (H) 10/10    R s/p mast/tram flap, reconstruction    Carpal tunnel syndrome     Gastro-oesophageal reflux disease     GERD (gastroesophageal reflux disease)     s/p EGD 5/06    Hematuria 2003    HX: benign breast biopsy     L, papilloma    Osteopenia     DXA 12/16/10    PONV (postoperative nausea and vomiting)     Scope patch on pre-op    S/P hysterectomy with oophorectomy 2001 for fibroids    S/P tonsillectomy        PAST SURGICAL HISTORY:  Past Surgical History:   Procedure Laterality Date    BREAST SURGERY      RIGHT BREAST BIOPSY, RIGHT MASTECTOMY WITH TRAM FLAP RECONSTRUCTION    CYSTOSCOPY, URETEROSCOPY, COMBINED  9/14/2012    Procedure: COMBINED CYSTOSCOPY, URETEROSCOPY;  VIDEO CYSTOSCOPY, RIGHT FLEXIBLE URETEROSCOPY, CAUTERIZATION OF PAPILLARY VEIN (LATEX IV CONTRAST TAPE ALLERGY);  Surgeon: Rey Bailon MD;  Location:  OR    ENT SURGERY      TONSILLECTOMY    GI SURGERY      EGD    GYN SURGERY      HYSTERECTOMY WITH OOPHORECTOMY    HYSTERECTOMY, PAP NO LONGER INDICATED      ORTHOPEDIC SURGERY      CARPAL TUNNEL RELEASE    REVISE RECONSTRUCTED BREAST  12/8/2011    Procedure:REVISE RECONSTRUCTED BREAST; REVISION RIGHT BREAST RECONSTRUCTION, LEFT BREAST STEROID INJECTION ; Surgeon:FRAN LEVIN; Location:[facility]       FAMILY HISTORY:  Family History   Problem Relation Age of Onset    Thyroid Disease Mother         cancer    Hypertension Mother     Breast Cancer Mother     Cancer Mother         uterine    Cardiovascular Father     Cerebrovascular Disease Father     Diabetes Father        SOCIAL HISTORY:  Social History      Socioeconomic History    Marital status:      Spouse name: Thomas    Number of children: 2   Occupational History    Occupation:      Employer: UNKNOWN   Tobacco Use    Smoking status: Never    Smokeless tobacco: Never   Vaping Use    Vaping status: Never Used   Substance and Sexual Activity    Alcohol use: Not Currently     Alcohol/week: 0.0 - 0.8 standard drinks of alcohol    Drug use: No    Sexual activity: Yes     Partners: Male   Social History Narrative    , 2 kids    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     Social Drivers of Health     Financial Resource Strain: Low Risk  (8/26/2024)    Financial Resource Strain     Within the past 12 months, have you or your family members you live with been unable to get utilities (heat, electricity) when it was really needed?: No   Food Insecurity: Low Risk  (8/26/2024)    Food Insecurity     Within the past 12 months, did you worry that your food would run out before you got money to buy more?: No     Within the past 12 months, did the food you bought just not last and you didn t have money to get more?: No   Transportation Needs: Low Risk  (8/26/2024)    Transportation Needs     Within the past 12 months, has lack of transportation kept you from medical appointments, getting your medicines, non-medical meetings or appointments, work, or from getting things that you need?: No    [facility], [facility]    Social Connections   Interpersonal Safety: Low Risk  (1/22/2025)    Interpersonal Safety     Do you feel physically and emotionally safe where you currently live?: Yes     Within the past 12 months, have you been hit, slapped, kicked or otherwise physically hurt by someone?: No     Within the past 12 months, have you been humiliated or emotionally abused in other ways  by your partner or ex-partner?: No   Housing Stability: Low Risk  (8/26/2024)    Housing Stability     Do you have housing? : Yes     Are you worried about losing your housing?: No       Review of Systems:  Skin:          Eyes:         ENT:         Respiratory:          Cardiovascular:         Gastroenterology:        Genitourinary:         Musculoskeletal:         Neurologic:         Psychiatric:         Heme/Lymph/Imm:         Endocrine:           Physical Exam:  Vitals: There were no vitals taken for this visit.    Constitutional:           Skin:             Head:           Eyes:           Lymph:      ENT:           Neck:           Respiratory:   Clear to auscultation         Cardiac:                Regular rate and rhythm                                           GI:           Extremities and Muscular Skeletal:                 Neurological:           Psych:           CC  Stacy Tariq MD  [address] [place] [id number]

## 2025-03-26 ENCOUNTER — OFFICE VISIT (OUTPATIENT)
Dept: CARDIOLOGY | Facility: CLINIC | Age: 75
End: 2025-03-26
Payer: MEDICARE

## 2025-03-26 VITALS
HEART RATE: 61 BPM | SYSTOLIC BLOOD PRESSURE: 144 MMHG | BODY MASS INDEX: 24.96 KG/M2 | HEIGHT: 64 IN | WEIGHT: 146.2 LBS | DIASTOLIC BLOOD PRESSURE: 90 MMHG

## 2025-03-26 DIAGNOSIS — I47.10 SVT (SUPRAVENTRICULAR TACHYCARDIA): Primary | ICD-10-CM

## 2025-03-26 NOTE — LETTER
3/26/2025    Stacy Tariq MD  [address] [place] [address]    RE: Emely Forbes       Dear Colleague,     I had the pleasure of seeing Emely Forbes in the [facility].  HPI and Plan:       I had the pleasure of seeing  in follow-up at the [facility] physicians heart today.  She is a very pleasant 74-year-old female with a past medical history significant for breast cancer that was treated with mastectomy in 2010.  I originally saw her in March 2023 for palpitations.    Her symptoms of palpitations had led to an emergency department presentation prior to her office visit at the time and subsequent Zio patch monitoring demonstrated symptomatic episodes of SVT that lasted approximately 11 seconds or so.  Ultimately I started her on flecainide since metoprolol was ineffective.  An exercise stress echocardiogram prior to the initiation of flecainide was negative for ischemia.    Her symptoms improved significantly after initiation of flecainide.  We did perform an exercise stress EKG in March of 2024 which demonstrated QRS prolongation with exercise.  I did review this with our electrophysiology team and it was thought that this probably represented a rate related bundle branch block and continuation of flecainide was recommended in addition to metoprolol.  This worked quite well for several months.    In October 2024 she started experiencing recurrent palpitations in association with hypertension.  We initiated a cardiac workup that included a CT coronary angiogram which demonstrated a moderate ostial LAD stenosis which was not flow-limiting by FFR.  We also initiated a secondary hypertension workup which was unremarkable with the exception of an elevated aldosterone level.  This was followed by a CT of the chest/abdomen/pelvis which demonstrated no evidence of an adrenal adenoma.  I started her on Aldactone and an endocrinology referral was placed.    At her last office visit  she was experiencing recurrent palpitations and I referred her to see our electrophysiology team.  She saw Dr. Stevenson on 3/18/2025 and her recent Zio patch findings were reviewed.      Given her low arrhythmic burden with very short runs of SVT and rare PACs/PVCs continued medical therapy with metoprolol and flecainide were recommended.    Most recently she has been dealing with severe headaches and actually presented to the emergency department the day of her EP appointment where a brain MRI was negative for any acute intracranial process.  She has previously undergone a CTA of the head and neck which was also normal.  Due to concerns regarding temporal arteritis inflammatory markers were also checked which were negative.    She was started on methylprednisolone to address suspected inflammation.  Although this did lead to some improvement in the headaches she started experiencing worsening palpitations on this agent.  She is scheduled to see neurology in a couple of weeks for further evaluation.  She is still waiting for her endocrinology appointment which is scheduled for June of this year.    She remains remarkably active despite these issues, working in her garden on a daily basis and walking up to 3 miles a day without limitations.    Physical exam is dictated below.  Of note, she checks her blood pressure at home on a regular basis and it is always within the normotensive range.      Labs on 3/18/2025 included inflammatory markers which were normal as well as a comprehensive metabolic panel and CBC which were also normal.    An echocardiogram on 2/27/2025 was essentially normal.      IMPRESSION:    History of SVT, well-controlled on flecainide and metoprolol.  Episodic hypertension and palpitations that have improved with initiation of spironolactone.  Mild to moderate nonobstructive coronary artery disease based on CT coronary angiography as described above.  Severe headaches as described above.  Symptoms  improved with methylprednisolone but it also led to a worsening of her palpitations.    PLAN    Ms. Forbes doing well from a cardiovascular standpoint.  Her recent cardiology workup was unremarkable, and I would recommend continuation of her current medical therapy given her low arrhythmic burden and stable arrhythmic symptoms.    As we discussed, tapering her off the metoprolol could be considered if recommended by neurology but she would need some form of AV rc blockade in the context of concurrent flecainide therapy.  We will wait for her neurology evaluation before making any recommendations in this regard.    It was a pleasure seeing her in follow-up today.  I will plan on follow-up with her in approximately 3 months after neurological workup is complete.    The longitudinal plan of care for the diagnosis(es)/condition(s) as documented were addressed during this visit. Due to the added complexity in care, I will continue to support Emely in the subsequent management and with ongoing continuity of care.        CURRENT MEDICATIONS:  Current Outpatient Medications   Medication Sig Dispense Refill     aspirin 81 MG EC tablet Take 1 tablet (81 mg) by mouth daily. 90 tablet 3     busPIRone (BUSPAR) 5 MG tablet TAKE 1 TABLET BY MOUTH TWICE A  tablet 0     cephALEXin (KEFLEX) 500 MG capsule Take 500 mg by mouth 2 times daily. For 10 days       cyclobenzaprine (FLEXERIL) 5 MG tablet Take 1 tablet (5 mg) by mouth at bedtime. 30 tablet 0     estradiol (ESTRACE) 0.1 MG/GM vaginal cream Place 2 g vaginally twice a week 42.5 g 0     flecainide (TAMBOCOR) 50 MG tablet Take 50mg twice daily 180 tablet 2     methylPREDNISolone (MEDROL DOSEPAK) 4 MG tablet therapy pack Follow Package Directions 21 tablet 0     metoprolol succinate ER (TOPROL XL) 50 MG 24 hr tablet Take 1 tablet (50 mg) by mouth daily 180 tablet 1     prochlorperazine (COMPAZINE) 10 MG tablet Take 0.5 tablets (5 mg) by mouth every 6 hours as needed for  nausea or vomiting. 20 tablet 0     rosuvastatin (CRESTOR) 20 MG tablet Take 1 tablet (20 mg) by mouth daily. 90 tablet 3     spironolactone (ALDACTONE) 25 MG tablet Take 0.5 tablets (12.5 mg) by mouth daily. 45 tablet 3     vitamin D3 (CHOLECALCIFEROL) 50 mcg (2000 units) tablet Take 1 tablet (50 mcg) by mouth daily         ALLERGIES     Allergies   Allergen Reactions     Adhesive Tape      blisters     Contrast Dye Hives     Says she needs pre meds     Latex      blisters     Norvasc [Amlodipine Besylate]        PAST MEDICAL HISTORY:  Past Medical History:   Diagnosis Date     Breast cancer (H) 10/10    R s/p mast/tram flap, reconstruction     Carpal tunnel syndrome      Gastro-oesophageal reflux disease      GERD (gastroesophageal reflux disease)     s/p EGD 5/06     Hematuria 2003     HX: benign breast biopsy     L, papilloma     Osteopenia     DXA 12/16/10     PONV (postoperative nausea and vomiting)     Scope patch on pre-op     S/P hysterectomy with oophorectomy 2001 for fibroids     S/P tonsillectomy        PAST SURGICAL HISTORY:  Past Surgical History:   Procedure Laterality Date     BREAST SURGERY      RIGHT BREAST BIOPSY, RIGHT MASTECTOMY WITH TRAM FLAP RECONSTRUCTION     CYSTOSCOPY, URETEROSCOPY, COMBINED  9/14/2012    Procedure: COMBINED CYSTOSCOPY, URETEROSCOPY;  VIDEO CYSTOSCOPY, RIGHT FLEXIBLE URETEROSCOPY, CAUTERIZATION OF PAPILLARY VEIN (LATEX IV CONTRAST TAPE ALLERGY);  Surgeon: Rey Bailon MD;  Location:  OR     ENT SURGERY      TONSILLECTOMY     GI SURGERY      EGD     GYN SURGERY      HYSTERECTOMY WITH OOPHORECTOMY     HYSTERECTOMY, PAP NO LONGER INDICATED       ORTHOPEDIC SURGERY      CARPAL TUNNEL RELEASE     REVISE RECONSTRUCTED BREAST  12/8/2011    Procedure:REVISE RECONSTRUCTED BREAST; REVISION RIGHT BREAST RECONSTRUCTION, LEFT BREAST STEROID INJECTION ; Surgeon:FRAN LEVIN; Location:[facility]       FAMILY HISTORY:  Family History   Problem Relation Age of Onset     Thyroid  Disease Mother         cancer     Hypertension Mother      Breast Cancer Mother      Cancer Mother         uterine     Cardiovascular Father      Cerebrovascular Disease Father      Diabetes Father        SOCIAL HISTORY:  Social History     Socioeconomic History     Marital status:      Spouse name: Thomas     Number of children: 2   Occupational History     Occupation:      Employer: UNKNOWN   Tobacco Use     Smoking status: Never     Smokeless tobacco: Never   Vaping Use     Vaping status: Never Used   Substance and Sexual Activity     Alcohol use: Not Currently     Alcohol/week: 0.0 - 0.8 standard drinks of alcohol     Drug use: No     Sexual activity: Yes     Partners: Male   Social History Narrative    , 2 kids    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     Social Drivers of Health     Financial Resource Strain: Low Risk  (8/26/2024)    Financial Resource Strain      Within the past 12 months, have you or your family members you live with been unable to get utilities (heat, electricity) when it was really needed?: No   Food Insecurity: Low Risk  (8/26/2024)    Food Insecurity      Within the past 12 months, did you worry that your food would run out before you got money to buy more?: No      Within the past 12 months, did the food you bought just not last and you didn t have money to get more?: No   Transportation Needs: Low Risk  (8/26/2024)    Transportation Needs      Within the past 12 months, has lack of transportation kept you from medical appointments, getting your medicines, non-medical meetings or appointments, work, or from getting things that you need?: No    [facility]    Social Connections   Interpersonal Safety: Low Risk  (1/22/2025)    Interpersonal Safety      Do you feel physically and  emotionally safe where you currently live?: Yes      Within the past 12 months, have you been hit, slapped, kicked or otherwise physically hurt by someone?: No      Within the past 12 months, have you been humiliated or emotionally abused in other ways by your partner or ex-partner?: No   Housing Stability: Low Risk  (8/26/2024)    Housing Stability      Do you have housing? : Yes      Are you worried about losing your housing?: No       Review of Systems:  Skin:          Eyes:         ENT:         Respiratory:          Cardiovascular:         Gastroenterology:        Genitourinary:         Musculoskeletal:         Neurologic:         Psychiatric:         Heme/Lymph/Imm:         Endocrine:           Physical Exam:  Vitals: There were no vitals taken for this visit.    Constitutional:           Skin:             Head:           Eyes:           Lymph:      ENT:           Neck:           Respiratory:   Clear to auscultation         Cardiac:                Regular rate and rhythm                                           GI:           Extremities and Muscular Skeletal:                 Neurological:           Psych:           CC  Stacy Tariq MD  [address] [place] [address]                  Thank you for allowing me to participate in the care of your patient.      Sincerely,     Rebecca Gonzalez MD     [facility]  cc:   Stacy Tariq MD  [address] [place] [address]

## 2025-04-10 ENCOUNTER — TELEPHONE (OUTPATIENT)
Dept: NEUROLOGY | Facility: CLINIC | Age: 75
End: 2025-04-10

## 2025-04-10 NOTE — TELEPHONE ENCOUNTER
Provider is requesting imaging from [facility] - ENT/ XR This year.   Medical Records Phone: [phone]    Spoke with medical records and they will push the images shortly. Image in Chart.    Hannah Craft MA  [facility]

## 2025-06-09 DIAGNOSIS — R00.2 PALPITATIONS: ICD-10-CM

## 2025-06-09 DIAGNOSIS — I47.10 SVT (SUPRAVENTRICULAR TACHYCARDIA): ICD-10-CM

## 2025-06-09 RX ORDER — FLECAINIDE ACETATE 50 MG/1
TABLET ORAL
Qty: 180 TABLET | Refills: 3 | Status: SHIPPED | OUTPATIENT
Start: 2025-06-09

## 2025-08-22 ENCOUNTER — TELEPHONE (OUTPATIENT)
Dept: CARDIOLOGY | Facility: CLINIC | Age: 75
End: 2025-08-22
Payer: MEDICARE

## 2025-08-22 DIAGNOSIS — I25.10 CORONARY ARTERY DISEASE INVOLVING NATIVE CORONARY ARTERY OF NATIVE HEART WITHOUT ANGINA PECTORIS: ICD-10-CM

## 2025-08-28 RX ORDER — ASPIRIN 81 MG/1
81 TABLET ORAL EVERY OTHER DAY
COMMUNITY
Start: 2025-08-28

## (undated) RX ORDER — METOPROLOL TARTRATE 1 MG/ML
INJECTION, SOLUTION INTRAVENOUS
Status: DISPENSED
Start: 2024-11-20